# Patient Record
Sex: FEMALE | Race: WHITE | NOT HISPANIC OR LATINO | Employment: FULL TIME | URBAN - METROPOLITAN AREA
[De-identification: names, ages, dates, MRNs, and addresses within clinical notes are randomized per-mention and may not be internally consistent; named-entity substitution may affect disease eponyms.]

---

## 2017-01-10 ENCOUNTER — TELEPHONE (OUTPATIENT)
Dept: SURGERY | Facility: OTHER | Age: 47
End: 2017-01-10

## 2017-01-10 NOTE — TELEPHONE ENCOUNTER
Surgery Scheduled    Date of Surgery 02/22/17 Time of Surgery 7:30am  Procedure: Colonoscopy  Hospital/Surgical Facility: [facility]  Surgeon: Dr Sage  Type of Anesthesia Anticipated: MAC  Pre-Op: 02/09/17 with Dr Metz   Post-Op: 03/02/17 with Dr Sage  Pre-Certification - to be completed  Consent Signed - to be completed  Hospital Stay - same day procedure    Surgery Packet (and/or) Colonscopy Prep (was given/or mailed) to patient. Patient was also instructed to arrive 1 hour(s) prior to surgery.  Patient understood and agrees to the plan.      Ira Galvin  Specialty

## 2017-02-09 ENCOUNTER — OFFICE VISIT (OUTPATIENT)
Dept: FAMILY MEDICINE | Facility: CLINIC | Age: 47
End: 2017-02-09
Payer: COMMERCIAL

## 2017-02-09 VITALS
WEIGHT: 243 LBS | OXYGEN SATURATION: 99 % | HEIGHT: 66 IN | SYSTOLIC BLOOD PRESSURE: 132 MMHG | DIASTOLIC BLOOD PRESSURE: 82 MMHG | RESPIRATION RATE: 20 BRPM | HEART RATE: 100 BPM | TEMPERATURE: 97.1 F | BODY MASS INDEX: 39.05 KG/M2

## 2017-02-09 DIAGNOSIS — K57.32 DIVERTICULITIS OF COLON: ICD-10-CM

## 2017-02-09 DIAGNOSIS — I10 ESSENTIAL HYPERTENSION, BENIGN: ICD-10-CM

## 2017-02-09 DIAGNOSIS — E11.65 TYPE 2 DIABETES MELLITUS WITH HYPERGLYCEMIA, WITHOUT LONG-TERM CURRENT USE OF INSULIN (H): ICD-10-CM

## 2017-02-09 DIAGNOSIS — K21.9 GASTROESOPHAGEAL REFLUX DISEASE WITHOUT ESOPHAGITIS: ICD-10-CM

## 2017-02-09 DIAGNOSIS — N94.3 PMS (PREMENSTRUAL SYNDROME): ICD-10-CM

## 2017-02-09 DIAGNOSIS — Z01.818 PREOP GENERAL PHYSICAL EXAM: Primary | ICD-10-CM

## 2017-02-09 DIAGNOSIS — E78.2 MIXED HYPERLIPIDEMIA: ICD-10-CM

## 2017-02-09 LAB
CREAT UR-MCNC: 128 MG/DL
HBA1C MFR BLD: 8.4 % (ref 4.3–6)
MICROALBUMIN UR-MCNC: 24 MG/L
MICROALBUMIN/CREAT UR: 18.75 MG/G CR (ref 0–25)

## 2017-02-09 PROCEDURE — 99214 OFFICE O/P EST MOD 30 MIN: CPT | Performed by: FAMILY MEDICINE

## 2017-02-09 PROCEDURE — 82043 UR ALBUMIN QUANTITATIVE: CPT | Performed by: FAMILY MEDICINE

## 2017-02-09 PROCEDURE — 83036 HEMOGLOBIN GLYCOSYLATED A1C: CPT | Performed by: FAMILY MEDICINE

## 2017-02-09 PROCEDURE — 36415 COLL VENOUS BLD VENIPUNCTURE: CPT | Performed by: FAMILY MEDICINE

## 2017-02-09 RX ORDER — HYDROCHLOROTHIAZIDE 25 MG/1
25 TABLET ORAL DAILY
Qty: 30 TABLET | Refills: 0 | Status: CANCELLED | OUTPATIENT
Start: 2017-02-09

## 2017-02-09 RX ORDER — ROSUVASTATIN CALCIUM 20 MG/1
20 TABLET, COATED ORAL DAILY
Qty: 90 TABLET | Refills: 3 | Status: SHIPPED | OUTPATIENT
Start: 2017-02-09 | End: 2018-02-08

## 2017-02-09 NOTE — MR AVS SNAPSHOT
After Visit Summary   2/9/2017    Lashawn Reddy    MRN: [id number]           Patient Information     Date Of Birth          1970        Visit Information        Provider Department      2/9/2017 8:30 AM Tanner Metz MD [facility]        Today's Diagnoses     Preop general physical exam    -  1     Gastroesophageal reflux disease without esophagitis         Essential hypertension, benign         Mixed hyperlipidemia         PMS (premenstrual syndrome)         Type 2 diabetes mellitus with hyperglycemia, without long-term current use of insulin (H)           Care Instructions      Before Your Surgery      Call your surgeon if there is any change in your health. This includes signs of a cold or flu (such as a sore throat, runny nose, cough, rash or fever).    Do not smoke, drink alcohol or take over the counter medicine (unless your surgeon or primary care doctor tells you to) for the 24 hours before and after surgery.    If you take prescribed drugs: Follow your doctor s orders about which medicines to take and which to stop until after surgery.    Eating and drinking prior to surgery: follow the instructions from your surgeon    Take a shower or bath the night before surgery. Use the soap your surgeon gave you to gently clean your skin. If you do not have soap from your surgeon, use your regular soap. Do not shave or scrub the surgery site.  Wear clean pajamas and have clean sheets on your bed.       You may be eligible for the GRADE study at the [facility]  (Glycemia reduction approaches in Diabetes: A comparative effectiveness study).    The point of the study is to determine what is the best second drug to add to metformin in patients with type 2 diabetes.  Adullts with type 2 for less than 10 years who take metformin or no medication at all.  Study participants would come to the [facility] 4 times a year for seven years and receive FREE medications, lab  tests, diabetes supplies.    Call , email [email or web address], or visit GRADE on faceboook at [email or web address]          Follow-ups after your visit        Your next 10 appointments already scheduled     Feb 22, 2017   Procedure with Raoul Sage MD   [facility] ([facility])    [address] [id number] [phone]            Mar 02, 2017  7:45 AM   [facility]    [address] [address] [phone]              Who to contact     If you have questions or need follow up information about today's clinic visit or your schedule please contact [facility] directly at [phone].  Normal or non-critical lab and imaging results will be communicated to you by MyChart, letter or phone within 4 business days after the clinic has received the results. If you do not hear from us within 7 days, please contact the clinic through [employer] or phone. If you have a critical or abnormal lab result, we will notify you by phone as soon as possible.  Submit refill requests through [employer] or call your pharmacy and they will forward the refill request to us. Please allow 3 business days for your refill to be completed.          Additional Information About Your Visit        MyChart Information     [employer] gives you secure access to your electronic health record. If you see a primary care provider, you can also send messages to your care team and make appointments. If you have questions, please call your primary care clinic.  If you do not have a primary care provider, please call [phone] and they will assist you.        Care EveryWhere ID     This is your Care EveryWhere ID. This could be used by other organizations to access your [facility] medical records  [id number]        Your Vitals Were     Pulse Temperature Respirations  "Height BMI (Body Mass Index) Pulse Oximetry    100 97.1  F (36.2  C) (Temporal) 20 5' 6\" (1.676 m) 39.24 kg/m2 99%       Blood Pressure from Last 3 Encounters:   02/09/17 132/82   12/03/16 141/88   09/19/16 157/97    Weight from Last 3 Encounters:   02/09/17 243 lb (110.224 kg)   12/12/16 251 lb 3.2 oz (113.944 kg)   11/28/16 253 lb 8.5 oz (115 kg)              We Performed the Following     Albumin Random Urine Quantitative     Hemoglobin A1c          Where to get your medicines      These medications were sent to [facility] - [place], [place] - [address]   [address] [place] [address]    Hours:  M-F 8:30-6:30; Sat 9-4; closed Sunday Phone:  [phone]    - omeprazole 20 MG CR capsule  - rosuvastatin 20 MG tablet  - sertraline 50 MG tablet       Primary Care Provider Office Phone # Fax #    Tanner Metz [phone]       [facility] [address] [place] [address]        Thank you!     Thank you for choosing [facility]  for your care. Our goal is always to provide you with excellent care. Hearing back from our patients is one way we can continue to improve our services. Please take a few minutes to complete the written survey that you may receive in the mail after your visit with us. Thank you!             Your Updated Medication List - Protect others around you: Learn how to safely use, store and throw away your medicines at www.disposemymeds.org.          This list is accurate as of: 2/9/17  8:57 AM.  Always use your most recent med list.                   Brand Name Dispense Instructions for use    acetaminophen 325 MG tablet    TYLENOL    100 tablet    Take 2 tablets (650 mg) by mouth every 4 hours as needed for mild pain       hydrochlorothiazide 25 MG tablet    HYDRODIURIL    30 tablet    Take 1 tablet (25 mg) by mouth daily       metFORMIN 500 MG 24 hr tablet    GLUCOPHAGE-XR    360 tablet    Take 2 tablets (1,000 mg) tonight " with supper and then go back to your normal regimen of 4 tablets (2,000 mg) tomorrow night with supper.       omeprazole 20 MG CR capsule    priLOSEC    90 capsule    Take 1 capsule (20 mg) by mouth daily       rosuvastatin 20 MG tablet    CRESTOR    90 tablet    Take 1 tablet (20 mg) by mouth daily       sertraline 50 MG tablet    ZOLOFT    45 tablet    Take 0.5 tablets (25 mg) by mouth every morning

## 2017-02-09 NOTE — PATIENT INSTRUCTIONS
Before Your Surgery      Call your surgeon if there is any change in your health. This includes signs of a cold or flu (such as a sore throat, runny nose, cough, rash or fever).    Do not smoke, drink alcohol or take over the counter medicine (unless your surgeon or primary care doctor tells you to) for the 24 hours before and after surgery.    If you take prescribed drugs: Follow your doctor s orders about which medicines to take and which to stop until after surgery.    Eating and drinking prior to surgery: follow the instructions from your surgeon    Take a shower or bath the night before surgery. Use the soap your surgeon gave you to gently clean your skin. If you do not have soap from your surgeon, use your regular soap. Do not shave or scrub the surgery site.  Wear clean pajamas and have clean sheets on your bed.       You may be eligible for the GRADE study at the [facility]  (Glycemia reduction approaches in Diabetes: A comparative effectiveness study).    The point of the study is to determine what is the best second drug to add to metformin in patients with type 2 diabetes.  Adullts with type 2 for less than 10 years who take metformin or no medication at all.  Study participants would come to the [facility] 4 times a year for seven years and receive FREE medications, lab tests, diabetes supplies.    Call , email [email or web address], or visit QUENTIN on faceboook at [email or web address]

## 2017-02-09 NOTE — NURSING NOTE
"Chief Complaint   Patient presents with     Pre-Op Exam       Initial /82 mmHg  Pulse 100  Temp(Src) 97.1  F (36.2  C) (Temporal)  Resp 20  Ht 5' 6\" (1.676 m)  Wt 243 lb (110.224 kg)  BMI 39.24 kg/m2  SpO2 99% Estimated body mass index is 39.24 kg/(m^2) as calculated from the following:    Height as of this encounter: 5' 6\" (1.676 m).    Weight as of this encounter: 243 lb (110.224 kg).  Medication Reconciliation: unable or not appropriate to perform     Jessica Dixon CMA      "

## 2017-02-09 NOTE — PROGRESS NOTES
[address] [address]  [phone]  Dept: [phone]    PRE-OP EVALUATION:  Today's date: 2017    Lashawn Reddy (: 1970) presents for pre-operative evaluation assessment as requested by Dr. Sage.  She requires evaluation and anesthesia risk assessment prior to undergoing surgery/procedure for treatment of Diverticulitis, Fx of colon cancer .  Proposed procedure: Colonoscopy     Date of Surgery/ Procedure: 17  Time of Surgery/ Procedure: 7:30 am  Hospital/Surgical Facility: [facility]  Primary Physician: Tanner Metz  Type of Anesthesia Anticipated: Monitor Anesthesia Care    Patient has a Health Care Directive or Living Will:  NO    1. NO - Do you have a history of heart attack, stroke, stent, bypass or surgery on an artery in the head, neck, heart or legs?  2. NO - Do you ever have any pain or discomfort in your chest?  3. NO - Do you have a history of  Heart Failure?  4. NO - Are you troubled by shortness of breath when: walking on the level, up a slight hill or at night?  5. YES - Do you currently have a cold, bronchitis or other respiratory infection?  6. NO - Do you have a cough, shortness of breath or wheezing?  7. NO - Do you sometimes get pains in the calves of your legs when you walk?  8. YES - Do you or anyone in your family have previous history of blood clots? Patient had blood clot due to a leg injury  9. NO - Do you or does anyone in your family have a serious bleeding problem such as prolonged bleeding following surgeries or cuts?  10. NO - Have you ever had problems with anemia or been told to take iron pills?  11. NO - Have you had any abnormal blood loss such as black, tarry or bloody stools, or abnormal vaginal bleeding?  12. NO - Have you ever had a blood transfusion?  13. YES - Have you or any of your relatives ever had problems with anesthesia?Patients mother does  14. NO - Do you have sleep apnea,  excessive snoring or daytime drowsiness?  15. NO - Do you have any prosthetic heart valves?  16. NO - Do you have prosthetic joints?  17. NO - Is there any chance that you may be pregnant?      HPI:                                                      Brief HPI related to upcoming procedure: She had an episode of diverticulitis and was treated with antibiotics.  Her symptoms have resolved but she is now scheduled for a colonoscopy for follow up of her diverticulitis.      See problem list for active medical problems.  Problems all longstanding and stable, except as noted/documented.  See ROS for pertinent symptoms related to these conditions.                                                                                                  .    MEDICAL HISTORY:                                                      Patient Active Problem List    Diagnosis Date Noted     Type 2 diabetes mellitus with hyperglycemia, without long-term current use of insulin (H) 02/09/2017     Priority: Medium     Sigmoid diverticulitis 11/28/2016     Priority: Medium     Sepsis (H) 11/28/2016     Priority: Medium     Lung nodule 11/28/2016     Priority: Medium     Nausea and vomiting 11/28/2016     Priority: Medium     Mixed hyperlipidemia 05/03/2016     Priority: Medium     CARDIOVASCULAR SCREENING; LDL GOAL LESS THAN 100 05/03/2016     Priority: Medium     Menorrhagia with regular cycle 04/29/2016     Priority: Medium     PMS (premenstrual syndrome) 04/28/2016     Priority: Medium     DVT (deep venous thrombosis) (H) 11/19/2013     Priority: Medium     Type 2 diabetes, HbA1c goal < 7% (H) 06/06/2013     Priority: Medium     Hyperlipidemia with target LDL less than 100 06/06/2013     Priority: Medium     Diagnosis updated by automated process. Provider to review and confirm.       Obesity 02/14/2007     Priority: Medium     Problem list name updated by automated process. Provider to review       Family history of malignant neoplasm of  gastrointestinal tract 05/24/2005     Priority: Medium     Varicose veins of lower extremities with complications 05/13/2005     Priority: Medium     Problem list name updated by automated process. Provider to review       Slow transit constipation 02/24/2003     Priority: Medium      Past Medical History   Diagnosis Date     Depressive disorder, not elsewhere classified      depression in the post partum period after her daughter     Tobacco use disorder      quit in 2008     Diffuse cystic mastopathy      fibrocystic breast disease     Type 2 diabetes, HbA1c goal < 7% (H) 6/6/2013     Diabetes mellitus, type 2 (H) 6/6/2013     CARDIOVASCULAR SCREENING; LDL GOAL LESS THAN 100 6/6/2013     Past Surgical History   Procedure Laterality Date     C appendectomy  04/14/89     Hc dilation/curettage diag/ther non ob  1986     after a miscarriage     Hc removal of tonsils,<10 y/o       C anesth,lower leg vein surg,nos  2002     bilateral     Hc colonoscopy w/wo brush/wash  06/17/2005     Tubal ligation  08/04/2006     Colonoscopy  6/21/2013     Procedure: COLONOSCOPY;  colonoscopy;  Surgeon: Vamshi Loomis MD;  Location:  GI     Current Outpatient Prescriptions   Medication Sig Dispense Refill     omeprazole (PRILOSEC) 20 MG CR capsule Take 1 capsule (20 mg) by mouth daily 90 capsule 3     rosuvastatin (CRESTOR) 20 MG tablet Take 1 tablet (20 mg) by mouth daily 90 tablet 3     sertraline (ZOLOFT) 50 MG tablet Take 0.5 tablets (25 mg) by mouth every morning 45 tablet 3     acetaminophen (TYLENOL) 325 MG tablet Take 2 tablets (650 mg) by mouth every 4 hours as needed for mild pain 100 tablet      metFORMIN (GLUCOPHAGE-XR) 500 MG 24 hr tablet Take 2 tablets (1,000 mg) tonight with supper and then go back to your normal regimen of 4 tablets (2,000 mg) tomorrow night with supper. 360 tablet 3     hydrochlorothiazide (HYDRODIURIL) 25 MG tablet Take 1 tablet (25 mg) by mouth daily 30 tablet 0     [DISCONTINUED] rosuvastatin  "(CRESTOR) 20 MG tablet Take 1 tablet (20 mg) by mouth daily 90 tablet 3     [DISCONTINUED] sertraline (ZOLOFT) 50 MG tablet Take 0.5 tablets (25 mg) by mouth every morning 45 tablet 3     OTC products: herbals and vitamins - Vitamin C, Vitamin D, probiotic    Allergies   Allergen Reactions     No Known Drug Allergies       Latex Allergy: NO    Social History   Substance Use Topics     Smoking status: Former Smoker -- 16 years     Types: Cigarettes     Quit date: 09/29/2008     Smokeless tobacco: Never Used     Alcohol Use: No      Comment: couple drinks per year     History   Drug Use No       REVIEW OF SYSTEMS:                                                    C: NEGATIVE for fever, chills, change in weight  E/M: NEGATIVE for ear, mouth and throat problems.  She has some rhinorrhea but no fevers or purulent discharge.    R: NEGATIVE for significant cough or SOB  CV: NEGATIVE for chest pain, palpitations or peripheral edema    EXAM:                                                    /82 mmHg  Pulse 100  Temp(Src) 97.1  F (36.2  C) (Temporal)  Resp 20  Ht 5' 6\" (1.676 m)  Wt 243 lb (110.224 kg)  BMI 39.24 kg/m2  SpO2 99%  GENERAL APPEARANCE: healthy, alert and no distress  HENT: ear canals and TM's normal and mouth without ulcers or lesions.  She has some clear rhinorrhea noted today  RESP: lungs clear to auscultation - no rales, rhonchi or wheezes  CV: regular rate and rhythm, normal S1 S2, no S3 or S4 and no murmur, click or rub   ABDOMEN: soft, nontender, no HSM or masses and bowel sounds normal  NEURO: Normal strength and tone, sensory exam grossly normal, mentation intact and speech normal    DIAGNOSTICS:                                                    No labs or EKG required for low risk surgery (cataract, skin procedure, breast biopsy, etc)    Recent Labs   Lab Test  12/03/16   0530  12/02/16   0558  12/01/16   0540   11/29/16   0618   09/19/16   1150   04/11/14   1635  02/28/14   1630   HGB   " --   9.6*  9.5*   < >  10.8*   < >  12.4   --    --    --    PLT   --   268  251   < >  234   < >  304   --    --    --    INR   --    --    --    --    --    --    --    --   2.3*  2.5*   NA   --   142  143   < >  139   < >  140   < >   --    --    POTASSIUM   --   3.7  5.3   < >  3.5   < >  4.0   < >   --    --    CR  0.66  0.63  0.57   < >  0.74   < >  0.71   < >   --    --    A1C   --    --    --    --   8.6*   --   8.7*   < >   --    --     < > = values in this interval not displayed.        IMPRESSION:                                                    Reason for surgery/procedure: Patient with a recent episode of diverticulitis, now scheduled for a colonoscopy with Dr. Sage for follow up.    Diagnosis/reason for consult: Preoperative evaluation for surgery and anesthesia     The proposed surgical procedure is considered LOW risk.    REVISED CARDIAC RISK INDEX  The patient has the following serious cardiovascular risks for perioperative complications such as (MI, PE, VFib and 3  AV Block):  No serious cardiac risks  INTERPRETATION: 0 risks: Class I (very low risk - 0.4% complication rate)    The patient has the following additional risks for perioperative complications:  No identified additional risks      ICD-10-CM    1. Preop general physical exam Z01.818    2. Diverticulitis of colon K57.32                                              RECOMMENDATIONS:                                                        Diabetes Medication Use  Will hold her metformin  -----Hold usual  oral diabetic meds (e.g. Metformin, Actos, Glipizide) while NPO.       APPROVAL GIVEN to proceed with proposed procedure, without further diagnostic evaluation       Signed Electronically by: Tanner Metz MD    Copy of this evaluation report is provided to requesting physician.    [facility] Preop Guidelines

## 2017-02-21 NOTE — H&P (VIEW-ONLY)
[address] [address]  [phone]  Dept: [phone]    PRE-OP EVALUATION:  Today's date: 2017    Lashawn Reddy (: 1970) presents for pre-operative evaluation assessment as requested by Dr. Sage.  She requires evaluation and anesthesia risk assessment prior to undergoing surgery/procedure for treatment of Diverticulitis, Fx of colon cancer .  Proposed procedure: Colonoscopy     Date of Surgery/ Procedure: 17  Time of Surgery/ Procedure: 7:30 am  Hospital/Surgical Facility: [facility]  Primary Physician: Tanner Metz  Type of Anesthesia Anticipated: Monitor Anesthesia Care    Patient has a Health Care Directive or Living Will:  NO    1. NO - Do you have a history of heart attack, stroke, stent, bypass or surgery on an artery in the head, neck, heart or legs?  2. NO - Do you ever have any pain or discomfort in your chest?  3. NO - Do you have a history of  Heart Failure?  4. NO - Are you troubled by shortness of breath when: walking on the level, up a slight hill or at night?  5. YES - Do you currently have a cold, bronchitis or other respiratory infection?  6. NO - Do you have a cough, shortness of breath or wheezing?  7. NO - Do you sometimes get pains in the calves of your legs when you walk?  8. YES - Do you or anyone in your family have previous history of blood clots? Patient had blood clot due to a leg injury  9. NO - Do you or does anyone in your family have a serious bleeding problem such as prolonged bleeding following surgeries or cuts?  10. NO - Have you ever had problems with anemia or been told to take iron pills?  11. NO - Have you had any abnormal blood loss such as black, tarry or bloody stools, or abnormal vaginal bleeding?  12. NO - Have you ever had a blood transfusion?  13. YES - Have you or any of your relatives ever had problems with anesthesia?Patients mother does  14. NO - Do you have sleep apnea,  excessive snoring or daytime drowsiness?  15. NO - Do you have any prosthetic heart valves?  16. NO - Do you have prosthetic joints?  17. NO - Is there any chance that you may be pregnant?      HPI:                                                      Brief HPI related to upcoming procedure: She had an episode of diverticulitis and was treated with antibiotics.  Her symptoms have resolved but she is now scheduled for a colonoscopy for follow up of her diverticulitis.      See problem list for active medical problems.  Problems all longstanding and stable, except as noted/documented.  See ROS for pertinent symptoms related to these conditions.                                                                                                  .    MEDICAL HISTORY:                                                      Patient Active Problem List    Diagnosis Date Noted     Type 2 diabetes mellitus with hyperglycemia, without long-term current use of insulin (H) 02/09/2017     Priority: Medium     Sigmoid diverticulitis 11/28/2016     Priority: Medium     Sepsis (H) 11/28/2016     Priority: Medium     Lung nodule 11/28/2016     Priority: Medium     Nausea and vomiting 11/28/2016     Priority: Medium     Mixed hyperlipidemia 05/03/2016     Priority: Medium     CARDIOVASCULAR SCREENING; LDL GOAL LESS THAN 100 05/03/2016     Priority: Medium     Menorrhagia with regular cycle 04/29/2016     Priority: Medium     PMS (premenstrual syndrome) 04/28/2016     Priority: Medium     DVT (deep venous thrombosis) (H) 11/19/2013     Priority: Medium     Type 2 diabetes, HbA1c goal < 7% (H) 06/06/2013     Priority: Medium     Hyperlipidemia with target LDL less than 100 06/06/2013     Priority: Medium     Diagnosis updated by automated process. Provider to review and confirm.       Obesity 02/14/2007     Priority: Medium     Problem list name updated by automated process. Provider to review       Family history of malignant neoplasm of  gastrointestinal tract 05/24/2005     Priority: Medium     Varicose veins of lower extremities with complications 05/13/2005     Priority: Medium     Problem list name updated by automated process. Provider to review       Slow transit constipation 02/24/2003     Priority: Medium      Past Medical History   Diagnosis Date     Depressive disorder, not elsewhere classified      depression in the post partum period after her daughter     Tobacco use disorder      quit in 2008     Diffuse cystic mastopathy      fibrocystic breast disease     Type 2 diabetes, HbA1c goal < 7% (H) 6/6/2013     Diabetes mellitus, type 2 (H) 6/6/2013     CARDIOVASCULAR SCREENING; LDL GOAL LESS THAN 100 6/6/2013     Past Surgical History   Procedure Laterality Date     C appendectomy  04/14/89     Hc dilation/curettage diag/ther non ob  1986     after a miscarriage     Hc removal of tonsils,<10 y/o       C anesth,lower leg vein surg,nos  2002     bilateral     Hc colonoscopy w/wo brush/wash  06/17/2005     Tubal ligation  08/04/2006     Colonoscopy  6/21/2013     Procedure: COLONOSCOPY;  colonoscopy;  Surgeon: Vamshi Lomois MD;  Location:  GI     Current Outpatient Prescriptions   Medication Sig Dispense Refill     omeprazole (PRILOSEC) 20 MG CR capsule Take 1 capsule (20 mg) by mouth daily 90 capsule 3     rosuvastatin (CRESTOR) 20 MG tablet Take 1 tablet (20 mg) by mouth daily 90 tablet 3     sertraline (ZOLOFT) 50 MG tablet Take 0.5 tablets (25 mg) by mouth every morning 45 tablet 3     acetaminophen (TYLENOL) 325 MG tablet Take 2 tablets (650 mg) by mouth every 4 hours as needed for mild pain 100 tablet      metFORMIN (GLUCOPHAGE-XR) 500 MG 24 hr tablet Take 2 tablets (1,000 mg) tonight with supper and then go back to your normal regimen of 4 tablets (2,000 mg) tomorrow night with supper. 360 tablet 3     hydrochlorothiazide (HYDRODIURIL) 25 MG tablet Take 1 tablet (25 mg) by mouth daily 30 tablet 0     [DISCONTINUED] rosuvastatin  "(CRESTOR) 20 MG tablet Take 1 tablet (20 mg) by mouth daily 90 tablet 3     [DISCONTINUED] sertraline (ZOLOFT) 50 MG tablet Take 0.5 tablets (25 mg) by mouth every morning 45 tablet 3     OTC products: herbals and vitamins - Vitamin C, Vitamin D, probiotic    Allergies   Allergen Reactions     No Known Drug Allergies       Latex Allergy: NO    Social History   Substance Use Topics     Smoking status: Former Smoker -- 16 years     Types: Cigarettes     Quit date: 09/29/2008     Smokeless tobacco: Never Used     Alcohol Use: No      Comment: couple drinks per year     History   Drug Use No       REVIEW OF SYSTEMS:                                                    C: NEGATIVE for fever, chills, change in weight  E/M: NEGATIVE for ear, mouth and throat problems.  She has some rhinorrhea but no fevers or purulent discharge.    R: NEGATIVE for significant cough or SOB  CV: NEGATIVE for chest pain, palpitations or peripheral edema    EXAM:                                                    /82 mmHg  Pulse 100  Temp(Src) 97.1  F (36.2  C) (Temporal)  Resp 20  Ht 5' 6\" (1.676 m)  Wt 243 lb (110.224 kg)  BMI 39.24 kg/m2  SpO2 99%  GENERAL APPEARANCE: healthy, alert and no distress  HENT: ear canals and TM's normal and mouth without ulcers or lesions.  She has some clear rhinorrhea noted today  RESP: lungs clear to auscultation - no rales, rhonchi or wheezes  CV: regular rate and rhythm, normal S1 S2, no S3 or S4 and no murmur, click or rub   ABDOMEN: soft, nontender, no HSM or masses and bowel sounds normal  NEURO: Normal strength and tone, sensory exam grossly normal, mentation intact and speech normal    DIAGNOSTICS:                                                    No labs or EKG required for low risk surgery (cataract, skin procedure, breast biopsy, etc)    Recent Labs   Lab Test  12/03/16   0530  12/02/16   0558  12/01/16   0540   11/29/16   0618   09/19/16   1150   04/11/14   1635  02/28/14   1630   HGB   " --   9.6*  9.5*   < >  10.8*   < >  12.4   --    --    --    PLT   --   268  251   < >  234   < >  304   --    --    --    INR   --    --    --    --    --    --    --    --   2.3*  2.5*   NA   --   142  143   < >  139   < >  140   < >   --    --    POTASSIUM   --   3.7  5.3   < >  3.5   < >  4.0   < >   --    --    CR  0.66  0.63  0.57   < >  0.74   < >  0.71   < >   --    --    A1C   --    --    --    --   8.6*   --   8.7*   < >   --    --     < > = values in this interval not displayed.        IMPRESSION:                                                    Reason for surgery/procedure: Patient with a recent episode of diverticulitis, now scheduled for a colonoscopy with Dr. Sage for follow up.    Diagnosis/reason for consult: Preoperative evaluation for surgery and anesthesia     The proposed surgical procedure is considered LOW risk.    REVISED CARDIAC RISK INDEX  The patient has the following serious cardiovascular risks for perioperative complications such as (MI, PE, VFib and 3  AV Block):  No serious cardiac risks  INTERPRETATION: 0 risks: Class I (very low risk - 0.4% complication rate)    The patient has the following additional risks for perioperative complications:  No identified additional risks      ICD-10-CM    1. Preop general physical exam Z01.818    2. Diverticulitis of colon K57.32                                              RECOMMENDATIONS:                                                        Diabetes Medication Use  Will hold her metformin  -----Hold usual  oral diabetic meds (e.g. Metformin, Actos, Glipizide) while NPO.       APPROVAL GIVEN to proceed with proposed procedure, without further diagnostic evaluation       Signed Electronically by: Tanner Metz MD    Copy of this evaluation report is provided to requesting physician.    [facility] Pre[facility] Guidelines

## 2017-02-22 ENCOUNTER — HOSPITAL ENCOUNTER (OUTPATIENT)
Facility: CLINIC | Age: 47
Discharge: HOME | End: 2017-02-22
Attending: SPECIALIST | Admitting: SPECIALIST
Payer: COMMERCIAL

## 2017-02-22 ENCOUNTER — ANESTHESIA (OUTPATIENT)
Dept: GASTROENTEROLOGY | Facility: CLINIC | Age: 47
End: 2017-02-22
Payer: COMMERCIAL

## 2017-02-22 ENCOUNTER — ANESTHESIA EVENT (OUTPATIENT)
Dept: GASTROENTEROLOGY | Facility: CLINIC | Age: 47
End: 2017-02-22
Payer: COMMERCIAL

## 2017-02-22 ENCOUNTER — SURGERY (OUTPATIENT)
Age: 47
End: 2017-02-22

## 2017-02-22 VITALS
OXYGEN SATURATION: 97 % | TEMPERATURE: 97.5 F | RESPIRATION RATE: 20 BRPM | DIASTOLIC BLOOD PRESSURE: 83 MMHG | SYSTOLIC BLOOD PRESSURE: 129 MMHG

## 2017-02-22 LAB
B-HCG SERPL-ACNC: <1 IU/L
COLONOSCOPY: NORMAL
GLUCOSE BLDC GLUCOMTR-MCNC: 184 MG/DL (ref 70–99)

## 2017-02-22 PROCEDURE — 25000125 ZZHC RX 250: Performed by: NURSE ANESTHETIST, CERTIFIED REGISTERED

## 2017-02-22 PROCEDURE — 84702 CHORIONIC GONADOTROPIN TEST: CPT | Performed by: SPECIALIST

## 2017-02-22 PROCEDURE — 40000296 ZZH STATISTIC ENDO RECOVERY CLASS 1:2 FIRST HOUR: Performed by: SPECIALIST

## 2017-02-22 PROCEDURE — 25800025 ZZH RX 258: Performed by: NURSE ANESTHETIST, CERTIFIED REGISTERED

## 2017-02-22 PROCEDURE — 45378 DIAGNOSTIC COLONOSCOPY: CPT | Performed by: SPECIALIST

## 2017-02-22 PROCEDURE — 82962 GLUCOSE BLOOD TEST: CPT

## 2017-02-22 PROCEDURE — 40000297 ZZH STATISTIC ENDO RECOVERY CLASS 1:2 EACH ADDL HOUR: Performed by: SPECIALIST

## 2017-02-22 PROCEDURE — G0105 COLORECTAL SCRN; HI RISK IND: HCPCS | Performed by: SPECIALIST

## 2017-02-22 PROCEDURE — 36415 COLL VENOUS BLD VENIPUNCTURE: CPT | Performed by: SPECIALIST

## 2017-02-22 RX ORDER — ONDANSETRON 4 MG/1
4 TABLET, ORALLY DISINTEGRATING ORAL EVERY 30 MIN PRN
Status: DISCONTINUED | OUTPATIENT
Start: 2017-02-22 | End: 2017-02-22 | Stop reason: HOSPADM

## 2017-02-22 RX ORDER — SODIUM CHLORIDE, SODIUM LACTATE, POTASSIUM CHLORIDE, CALCIUM CHLORIDE 600; 310; 30; 20 MG/100ML; MG/100ML; MG/100ML; MG/100ML
INJECTION, SOLUTION INTRAVENOUS CONTINUOUS
Status: DISCONTINUED | OUTPATIENT
Start: 2017-02-22 | End: 2017-02-22 | Stop reason: HOSPADM

## 2017-02-22 RX ORDER — LIDOCAINE HYDROCHLORIDE 20 MG/ML
INJECTION, SOLUTION INFILTRATION; PERINEURAL PRN
Status: DISCONTINUED | OUTPATIENT
Start: 2017-02-22 | End: 2017-02-22

## 2017-02-22 RX ORDER — LIDOCAINE 40 MG/G
CREAM TOPICAL
Status: DISCONTINUED | OUTPATIENT
Start: 2017-02-22 | End: 2017-02-22 | Stop reason: HOSPADM

## 2017-02-22 RX ORDER — FLUMAZENIL 0.1 MG/ML
0.2 INJECTION, SOLUTION INTRAVENOUS
Status: DISCONTINUED | OUTPATIENT
Start: 2017-02-22 | End: 2017-02-22 | Stop reason: HOSPADM

## 2017-02-22 RX ORDER — ONDANSETRON 2 MG/ML
4 INJECTION INTRAMUSCULAR; INTRAVENOUS EVERY 30 MIN PRN
Status: DISCONTINUED | OUTPATIENT
Start: 2017-02-22 | End: 2017-02-22 | Stop reason: HOSPADM

## 2017-02-22 RX ORDER — PROPOFOL 10 MG/ML
INJECTION, EMULSION INTRAVENOUS PRN
Status: DISCONTINUED | OUTPATIENT
Start: 2017-02-22 | End: 2017-02-22

## 2017-02-22 RX ORDER — MEPERIDINE HYDROCHLORIDE 25 MG/ML
12.5 INJECTION INTRAMUSCULAR; INTRAVENOUS; SUBCUTANEOUS
Status: DISCONTINUED | OUTPATIENT
Start: 2017-02-22 | End: 2017-02-22 | Stop reason: HOSPADM

## 2017-02-22 RX ORDER — NALOXONE HYDROCHLORIDE 0.4 MG/ML
.1-.4 INJECTION, SOLUTION INTRAMUSCULAR; INTRAVENOUS; SUBCUTANEOUS
Status: DISCONTINUED | OUTPATIENT
Start: 2017-02-22 | End: 2017-02-22 | Stop reason: HOSPADM

## 2017-02-22 RX ORDER — SODIUM CHLORIDE 9 MG/ML
INJECTION, SOLUTION INTRAVENOUS CONTINUOUS
Status: DISCONTINUED | OUTPATIENT
Start: 2017-02-22 | End: 2017-02-22 | Stop reason: HOSPADM

## 2017-02-22 RX ORDER — PROPOFOL 10 MG/ML
INJECTION, EMULSION INTRAVENOUS CONTINUOUS PRN
Status: DISCONTINUED | OUTPATIENT
Start: 2017-02-22 | End: 2017-02-22

## 2017-02-22 RX ADMIN — PROPOFOL 40 MG: 10 INJECTION, EMULSION INTRAVENOUS at 07:31

## 2017-02-22 RX ADMIN — SODIUM CHLORIDE, POTASSIUM CHLORIDE, SODIUM LACTATE AND CALCIUM CHLORIDE: 600; 310; 30; 20 INJECTION, SOLUTION INTRAVENOUS at 07:02

## 2017-02-22 RX ADMIN — PROPOFOL 100 MCG/KG/MIN: 10 INJECTION, EMULSION INTRAVENOUS at 07:32

## 2017-02-22 RX ADMIN — LIDOCAINE HYDROCHLORIDE 1 ML: 10 INJECTION, SOLUTION EPIDURAL; INFILTRATION; INTRACAUDAL; PERINEURAL at 07:03

## 2017-02-22 RX ADMIN — LIDOCAINE HYDROCHLORIDE 60 MG: 20 INJECTION, SOLUTION INFILTRATION; PERINEURAL at 07:30

## 2017-02-22 ASSESSMENT — LIFESTYLE VARIABLES: TOBACCO_USE: 1

## 2017-02-22 NOTE — IP AVS SNAPSHOT
[facility]    [address] [address]    Phone:  [phone]                                       After Visit Summary   2/22/2017    Lashawn Reddy    MRN: [id number]           After Visit Summary Signature Page     I have received my discharge instructions, and my questions have been answered. I have discussed any challenges I see with this plan with the nurse or doctor.    ..........................................................................................................................................  Patient/Patient Representative Signature      ..........................................................................................................................................  Patient Representative Print Name and Relationship to Patient    ..................................................               ................................................  Date                                            Time    ..........................................................................................................................................  Reviewed by Signature/Title    ...................................................              ..............................................  Date                                                            Time

## 2017-02-22 NOTE — IP AVS SNAPSHOT
MRN:[id number]                      After Visit Summary   2/22/2017    Lashawn Reddy    MRN: [id number]           Thank you!     Thank you for choosing [facility] for your care. Our goal is always to provide you with excellent care. Hearing back from our patients is one way we can continue to improve our services. Please take a few minutes to complete the written survey that you may receive in the mail after you visit with us. Thank you!        Patient Information     Date Of Birth          1970        About your hospital stay     You were admitted on:  February 22, 2017 You last received care in the:  [facility]    You were discharged on:  February 22, 2017       Who to Call     For medical emergencies, please call 911.  For non-urgent questions about your medical care, please call your primary care provider or clinic, [phone]  For questions related to your surgery, please call your surgery clinic        Attending Provider     Provider Specialty    Raoul Sage MD General Surgery       Primary Care Provider Office Phone # Fax #    Tanner TAMARA Metz [phone]       [facility] [address] [place] [address]        After Care Instructions     Discharge Instructions       Resume pre procedure diet            Discharge Instructions       Restart home medications.                  Your next 10 appointments already scheduled     Mar 02, 2017  7:45 AM CST   [facility]    [address] [address] [phone]              Further instructions from your care team         [facility]    Home Care Following Endoscopy    Dr. Sage    Activity:    You have just undergone an endoscopic procedure usually performed with conscious sedation.  Do not work or operate machinery (including a car) for at least 12 hours.       I encourage you to walk and attempt to pass this air as soon as possible.    Diet:    Return to the diet you were on before your procedure but eat lightly for the first 12-24 hours.    Drink plenty of water.    Resume any regular medications unless otherwise advised by your physician.  Please begin any new medication prescribed as a result of your procedure as directed by your physician.     If you had any biopsy or polyp removed please refrain from aspirin or aspirin products for 2 days.  If on Coumadin please restart as instructed by your physician.   Pain:    You may take Tylenol as needed for pain.  Expected Recovery:    You can expect some mild abdominal fullness and/or discomfort due to the air used to inflate your intestinal tract. It is also normal to have a mild sore throat after upper endoscopy.    Call Your Physician if You Have:    After Upper Endoscopy:  o Shoulder, back or chest pain.  o Difficulty breathing or swallowing.  o Vomiting blood.    After Colonoscopy:  o Worsening persisting abdominal pain which is worse with activity.  o Fevers (>101 degrees F), chills or shakes.  o Passage of continued blood with bowel movements.   Any questions or concerns about your recovery, please call [phone] or after hours [phone] Nurse Advice Line.    Follow-up Care:    You should receive a call or letter with your results within 1 week. Please call if you have not received a notification of your results.    If asked to return to clinic please make an appointment 1 week after your procedure.  Call [phone].  [facility]   Adult Discharge Orders & Instructions     For 24 hours after surgery    1. Get plenty of rest.  A responsible adult must stay with you for at least 24 hours after you leave the hospital.   2. Do not drive or use heavy equipment.  If you have weakness or tingling, don't drive or use heavy equipment until this feeling goes away.  3. Do not drink alcohol.  4. Avoid  strenuous or risky activities.  Ask for help when climbing stairs.   5. You may feel lightheaded.  IF so, sit for a few minutes before standing.  Have someone help you get up.   6. If you have nausea (feel sick to your stomach): Drink only clear liquids such as apple juice, ginger ale, broth or 7-Up.  Rest may also help.  Be sure to drink enough fluids.  Move to a regular diet as you feel able.  7. You may have a slight fever. Call the doctor if your fever is over 100 F (37.7 C) (taken under the tongue) or lasts longer than 24 hours.  8. You may have a dry mouth, a sore throat, muscle aches or trouble sleeping.  These should go away after 24 hours.  9. Do not make important or legal decisions.   Call your doctor for any of the followin.  Signs of infection (fever, growing tenderness at the surgery site, a large amount of drainage or bleeding, severe pain, foul-smelling drainage, redness, swelling).    2. It has been over 8 to 10 hours since surgery and you are still not able to urinate (pass water).    3.  Headache for over 24 hours.          Pending Results     No orders found from 2017 to 2017.            Admission Information     Date & Time Provider Department Dept. Phone    2017 Raoul Sage MD [facility] [phone]      Your Vitals Were     Blood Pressure Temperature Respirations Last Period Pulse Oximetry       128/93 97.5  F (36.4  C) (Oral) 20 2017 92%       MyChart Information     [employer] ([employer] gives you secure access to your electronic health record. If you see a primary care provider, you can also send messages to your care team and make appointments. If you have questions, please call your primary care clinic.  If you do not have a primary care provider, please call [phone] and they will assist you.        Care EveryWhere ID     This is your Care EveryWhere ID. This could be used by other organizations to access your [facility]  records  [id number]           Review of your medicines      UNREVIEWED medicines. Ask your doctor about these medicines        Dose / Directions    acetaminophen 325 MG tablet   Commonly known as:  TYLENOL        Dose:  650 mg   Take 2 tablets (650 mg) by mouth every 4 hours as needed for mild pain   Quantity:  100 tablet   Refills:  0       metFORMIN 500 MG 24 hr tablet   Commonly known as:  GLUCOPHAGE-XR   Used for:  Type 2 diabetes mellitus without complication, without long-term current use of insulin (H)        Take 2 tablets (1,000 mg) tonight with supper and then go back to your normal regimen of 4 tablets (2,000 mg) tomorrow night with supper.   Quantity:  360 tablet   Refills:  3       omeprazole 20 MG CR capsule   Commonly known as:  priLOSEC   Used for:  Gastroesophageal reflux disease without esophagitis        Dose:  20 mg   Take 1 capsule (20 mg) by mouth daily   Quantity:  90 capsule   Refills:  3       rosuvastatin 20 MG tablet   Commonly known as:  CRESTOR   Used for:  Mixed hyperlipidemia, Type 2 diabetes mellitus with hyperglycemia, without long-term current use of insulin (H)        Dose:  20 mg   Take 1 tablet (20 mg) by mouth daily   Quantity:  90 tablet   Refills:  3       sertraline 50 MG tablet   Commonly known as:  ZOLOFT   Used for:  PMS (premenstrual syndrome)        Dose:  25 mg   Take 0.5 tablets (25 mg) by mouth every morning   Quantity:  45 tablet   Refills:  3                Protect others around you: Learn how to safely use, store and throw away your medicines at www.disposemymeds.org.             Medication List: This is a list of all your medications and when to take them. Check marks below indicate your daily home schedule. Keep this list as a reference.      Medications           Morning Afternoon Evening Bedtime As Needed    acetaminophen 325 MG tablet   Commonly known as:  TYLENOL   Take 2 tablets (650 mg) by mouth every 4 hours as needed for mild pain                                 metFORMIN 500 MG 24 hr tablet   Commonly known as:  GLUCOPHAGE-XR   Take 2 tablets (1,000 mg) tonight with supper and then go back to your normal regimen of 4 tablets (2,000 mg) tomorrow night with supper.                                omeprazole 20 MG CR capsule   Commonly known as:  priLOSEC   Take 1 capsule (20 mg) by mouth daily                                rosuvastatin 20 MG tablet   Commonly known as:  CRESTOR   Take 1 tablet (20 mg) by mouth daily                                sertraline 50 MG tablet   Commonly known as:  ZOLOFT   Take 0.5 tablets (25 mg) by mouth every morning

## 2017-02-22 NOTE — DISCHARGE INSTRUCTIONS
[facility]    Home Care Following Endoscopy    Dr. Sage    Activity:    You have just undergone an endoscopic procedure usually performed with conscious sedation.  Do not work or operate machinery (including a car) for at least 12 hours.      I encourage you to walk and attempt to pass this air as soon as possible.    Diet:    Return to the diet you were on before your procedure but eat lightly for the first 12-24 hours.    Drink plenty of water.    Resume any regular medications unless otherwise advised by your physician.  Please begin any new medication prescribed as a result of your procedure as directed by your physician.     If you had any biopsy or polyp removed please refrain from aspirin or aspirin products for 2 days.  If on Coumadin please restart as instructed by your physician.   Pain:    You may take Tylenol as needed for pain.  Expected Recovery:    You can expect some mild abdominal fullness and/or discomfort due to the air used to inflate your intestinal tract. It is also normal to have a mild sore throat after upper endoscopy.    Call Your Physician if You Have:    After Upper Endoscopy:  o Shoulder, back or chest pain.  o Difficulty breathing or swallowing.  o Vomiting blood.    After Colonoscopy:  o Worsening persisting abdominal pain which is worse with activity.  o Fevers (>101 degrees F), chills or shakes.  o Passage of continued blood with bowel movements.   Any questions or concerns about your recovery, please call [phone] or after hours [phone] Nurse Advice Line.    Follow-up Care:    You should receive a call or letter with your results within 1 week. Please call if you have not received a notification of your results.    If asked to return to clinic please make an appointment 1 week after your procedure.  Call [phone].  [facility]   Adult Discharge Orders & Instructions     For 24 hours after surgery    1. Get plenty of rest.  A responsible  adult must stay with you for at least 24 hours after you leave the hospital.   2. Do not drive or use heavy equipment.  If you have weakness or tingling, don't drive or use heavy equipment until this feeling goes away.  3. Do not drink alcohol.  4. Avoid strenuous or risky activities.  Ask for help when climbing stairs.   5. You may feel lightheaded.  IF so, sit for a few minutes before standing.  Have someone help you get up.   6. If you have nausea (feel sick to your stomach): Drink only clear liquids such as apple juice, ginger ale, broth or 7-Up.  Rest may also help.  Be sure to drink enough fluids.  Move to a regular diet as you feel able.  7. You may have a slight fever. Call the doctor if your fever is over 100 F (37.7 C) (taken under the tongue) or lasts longer than 24 hours.  8. You may have a dry mouth, a sore throat, muscle aches or trouble sleeping.  These should go away after 24 hours.  9. Do not make important or legal decisions.   Call your doctor for any of the followin.  Signs of infection (fever, growing tenderness at the surgery site, a large amount of drainage or bleeding, severe pain, foul-smelling drainage, redness, swelling).    2. It has been over 8 to 10 hours since surgery and you are still not able to urinate (pass water).    3.  Headache for over 24 hours.

## 2017-02-22 NOTE — ANESTHESIA POSTPROCEDURE EVALUATION
Patient: Lashawn Reddy    Procedure(s):  COLONOSCOPY  - Wound Class: II-Clean Contaminated    Diagnosis:diverticulitis  Diagnosis Additional Information: No value filed.    Anesthesia Type:  MAC    Note:  Anesthesia Post Evaluation    Patient location during evaluation: Phase 2  Patient participation: Able to fully participate in evaluation  Level of consciousness: awake and alert  Pain management: adequate  Airway patency: patent  Cardiovascular status: stable and blood pressure returned to baseline  Respiratory status: spontaneous ventilation and room air  Hydration status: acceptable  PONV: none     Anesthetic complications: None    Comments: Patient resting without complaint. Denies any discomforts a this time.         Last vitals:  Vitals:    02/22/17 0649 02/22/17 0819   BP: (!) 141/92 (!) 128/93   Resp: 20 20   Temp: 97.5  F (36.4  C)    SpO2: 96% 92%         Electronically Signed By: KRISTI Melton CRNA  February 22, 2017  8:42 AM

## 2017-02-22 NOTE — ANESTHESIA PREPROCEDURE EVALUATION
Anesthesia Evaluation     . Pt has had prior anesthetic. Type: General and MAC      ROS/MED HX    ENT/Pulmonary:     (+)tobacco use, Past use , . .    Neurologic:  - neg neurologic ROS     Cardiovascular:     (+) Dyslipidemia, ----. : . . . :. . No previous cardiac testing       METS/Exercise Tolerance:     Hematologic:     (+) Other Hematologic Disorder-history of menorrhagia      Musculoskeletal:   (+) arthritis, , , -       GI/Hepatic:  - neg GI/hepatic ROS       Renal/Genitourinary:  - ROS Renal section negative   (+) Pt has no history of transplant,       Endo:     (+) type II DM Last HgA1c: 8.2 date: 2/9/17 Not using insulin Obesity, .      Psychiatric:     (+) psychiatric history depression      Infectious Disease:  - neg infectious disease ROS       Malignancy:   (+) Malignancy History of GI          Other:    (+) No chance of pregnancy C-spine cleared: N/A, no H/O Chronic Pain,             Physical Exam  Normal systems: cardiovascular, pulmonary and dental    Airway   Mallampati: II  TM distance: >3 FB  Neck ROM: full    Dental     Cardiovascular   Rhythm and rate: regular and normal      Pulmonary    breath sounds clear to auscultation                    Anesthesia Plan      History & Physical Review  History and physical reviewed and following examination; no interval change.    ASA Status:  2 .    NPO Status:  > 6 hours    Plan for MAC Reason for MAC:  Deep or markedly invasive procedure (G8)         Postoperative Care  Postoperative pain management:  Oral pain medications.      Consents  Anesthetic plan, risks, benefits and alternatives discussed with:  Patient..                          .

## 2017-02-22 NOTE — ANESTHESIA CARE TRANSFER NOTE
Patient: Lashawn Reddy    Procedure(s):  COLONOSCOPY  - Wound Class: II-Clean Contaminated    Diagnosis: diverticulitis  Diagnosis Additional Information: No value filed.    Anesthesia Type:   MAC     Note:  Airway :Room Air  Patient transferred to:Phase II  Comments: Patient resting and denies any discomforts at this time. Report to pacu rn      Vitals: (Last set prior to Anesthesia Care Transfer)    CRNA VITALS  2/22/2017 0745 - 2/22/2017 0822      2/22/2017             Pulse: 87    SpO2: 92 %    Resp Rate (observed): 22                Electronically Signed By: KRISTI Melton CRNA  February 22, 2017  8:22 AM

## 2017-02-28 ENCOUNTER — TELEPHONE (OUTPATIENT)
Dept: SURGERY | Facility: CLINIC | Age: 47
End: 2017-02-28

## 2017-02-28 NOTE — TELEPHONE ENCOUNTER
Have attempted to call pt with colonoscopy results. Pt does not need to come in for f/u appt later this week. Farhad left messages twice to call me. SABRINA Mcmanus

## 2017-03-01 NOTE — TELEPHONE ENCOUNTER
Pt did return my call today. She is aware we cancelled her appt this week. She did understand the instructions and info stated after her colonoscopy. She is aware that she needs to be on lo residue diet. SABRINA Mcmanus '

## 2017-08-24 ENCOUNTER — TELEPHONE (OUTPATIENT)
Dept: FAMILY MEDICINE | Facility: CLINIC | Age: 47
End: 2017-08-24

## 2017-08-24 DIAGNOSIS — E11.9 TYPE 2 DIABETES MELLITUS WITHOUT COMPLICATION, WITHOUT LONG-TERM CURRENT USE OF INSULIN (H): Primary | ICD-10-CM

## 2017-08-24 DIAGNOSIS — E78.5 HYPERLIPIDEMIA WITH TARGET LDL LESS THAN 100: ICD-10-CM

## 2017-08-24 NOTE — TELEPHONE ENCOUNTER
Reason for Call: Request for an order or referral:    Order or referral being requested: PRE-VISIT LABS    Date needed: before my next appointment    Has the patient been seen by the PCP for this problem? YES    Additional comments: Lashawn would like to have her fasting labs drawn prior to her next physical which is set for October 18th. Would Dr. Metz be willing to order what he would like tested?     Phone number Patient can be reached at:  Home number on file [phone] (home)    Best Time:  any    Can we leave a detailed message on this number?  YES    Call taken on 8/24/2017 at 2:31 PM by Rahul Grossman

## 2017-09-06 DIAGNOSIS — E11.65 TYPE 2 DIABETES MELLITUS WITH HYPERGLYCEMIA (H): ICD-10-CM

## 2017-09-06 NOTE — TELEPHONE ENCOUNTER
Metformin         Last Written Prescription Date: 8/24/2017  Last Fill Quantity: 360, # refills: 0  Last Office Visit with G, P or  Health prescribing provider:  2/9/2017   Next 5 appointments (look out 90 days)     Oct 18, 2017  1:40 PM CDT   [facility]    [address] [place] [id number] [phone]                   BP Readings from Last 3 Encounters:   02/22/17 129/83   02/09/17 132/82   12/03/16 141/88     Lab Results   Component Value Date    MICROL 24 02/09/2017     Lab Results   Component Value Date    UMALCR 18.75 02/09/2017     Creatinine   Date Value Ref Range Status   12/03/2016 0.66 0.52 - 1.04 mg/dL Final   ]  GFR Estimate   Date Value Ref Range Status   12/03/2016 >90  Non  GFR Calc   >60 mL/min/1.7m2 Final   12/02/2016 >90  Non  GFR Calc   >60 mL/min/1.7m2 Final   12/01/2016 >90  Non  GFR Calc   >60 mL/min/1.7m2 Final     GFR Estimate If Black   Date Value Ref Range Status   12/03/2016 >90   GFR Calc   >60 mL/min/1.7m2 Final   12/02/2016 >90   GFR Calc   >60 mL/min/1.7m2 Final   12/01/2016 >90   GFR Calc   >60 mL/min/1.7m2 Final     Lab Results   Component Value Date    CHOL 127 07/01/2016     Lab Results   Component Value Date    HDL 63 07/01/2016     Lab Results   Component Value Date    LDL 46 07/01/2016     Lab Results   Component Value Date    TRIG 91 07/01/2016     Lab Results   Component Value Date    CHOLHDLRATIO 3.8 06/29/2015     Lab Results   Component Value Date    AST 10 11/28/2016     Lab Results   Component Value Date    ALT 14 11/28/2016     Lab Results   Component Value Date    A1C 8.4 02/09/2017    A1C 8.6 11/29/2016    A1C 8.7 09/19/2016    A1C 8.6 07/01/2016    A1C 8.8 04/28/2016     Potassium   Date Value Ref Range Status   12/02/2016 3.7 3.4 - 5.3 mmol/L Final

## 2017-09-08 RX ORDER — METFORMIN HCL 500 MG
TABLET, EXTENDED RELEASE 24 HR ORAL
Qty: 360 TABLET | Refills: 0 | Status: SHIPPED | OUTPATIENT
Start: 2017-09-08 | End: 2017-11-17

## 2017-09-08 NOTE — TELEPHONE ENCOUNTER
Medication is being filled for 1 time refill only due to:  Patient has upcoming appointment with PCP.      Una Maria RN

## 2017-10-10 ENCOUNTER — TELEPHONE (OUTPATIENT)
Dept: FAMILY MEDICINE | Facility: CLINIC | Age: 47
End: 2017-10-10

## 2017-10-10 DIAGNOSIS — E11.65 TYPE 2 DIABETES MELLITUS WITH HYPERGLYCEMIA, WITHOUT LONG-TERM CURRENT USE OF INSULIN (H): Primary | ICD-10-CM

## 2017-10-10 DIAGNOSIS — E78.5 HYPERLIPIDEMIA WITH TARGET LDL LESS THAN 100: ICD-10-CM

## 2017-10-10 NOTE — TELEPHONE ENCOUNTER
Reason for Call: Request for an order or referral:    Order or referral being requested: requesting lab orders to be done before her physical date of 10/18.     Date needed: as soon as possible    Has the patient been seen by the PCP for this problem? Yes     Additional comments: Requesting to do all labs prior to her physical appointment. Please call once orders are placed.     Phone number Patient can be reached at:  Home number on file [phone] (home)    Best Time:       Can we leave a detailed message on this number?  YES    Call taken on 10/10/2017 at 4:03 PM by Rosey Anna

## 2017-10-16 ENCOUNTER — TELEPHONE (OUTPATIENT)
Dept: FAMILY MEDICINE | Facility: CLINIC | Age: 47
End: 2017-10-16

## 2017-10-16 DIAGNOSIS — E11.65 TYPE 2 DIABETES MELLITUS WITH HYPERGLYCEMIA, WITHOUT LONG-TERM CURRENT USE OF INSULIN (H): ICD-10-CM

## 2017-10-16 DIAGNOSIS — E78.5 HYPERLIPIDEMIA WITH TARGET LDL LESS THAN 100: ICD-10-CM

## 2017-10-16 DIAGNOSIS — E11.65 TYPE 2 DIABETES MELLITUS WITH HYPERGLYCEMIA, WITHOUT LONG-TERM CURRENT USE OF INSULIN (H): Primary | ICD-10-CM

## 2017-10-16 LAB
CHOLEST SERPL-MCNC: 117 MG/DL
HBA1C MFR BLD: 8.5 % (ref 4.3–6)
HDLC SERPL-MCNC: 67 MG/DL
LDLC SERPL CALC-MCNC: 33 MG/DL
NONHDLC SERPL-MCNC: 50 MG/DL
TRIGL SERPL-MCNC: 87 MG/DL

## 2017-10-16 PROCEDURE — 36415 COLL VENOUS BLD VENIPUNCTURE: CPT | Performed by: FAMILY MEDICINE

## 2017-10-16 PROCEDURE — 80061 LIPID PANEL: CPT | Performed by: FAMILY MEDICINE

## 2017-10-16 PROCEDURE — 83036 HEMOGLOBIN GLYCOSYLATED A1C: CPT | Mod: QW | Performed by: FAMILY MEDICINE

## 2017-10-16 RX ORDER — PIOGLITAZONEHYDROCHLORIDE 15 MG/1
15 TABLET ORAL DAILY
Qty: 90 TABLET | Refills: 3 | Status: SHIPPED | OUTPATIENT
Start: 2017-10-16 | End: 2018-06-19 | Stop reason: ALTCHOICE

## 2017-10-16 NOTE — TELEPHONE ENCOUNTER
Left message for patient to return call to clinic. Referral to diab ed placed.  Anette Jovel, CMA

## 2017-10-16 NOTE — TELEPHONE ENCOUNTER
----- Message from Tanner Metz MD sent at 10/16/2017  1:14 PM CDT -----  Lashawn, your A1C level is still quite elevated.  We need you to get in to see the diabetic educator to discuss your sugars.  We also need start you on a second medication to help get them under better control.  I sent the new medication to the pharmacy for you and will have them set you up to see Dominga MOYA, or diabetic educator.  We really need to get things under better control so you don't start developing complications from your diabetes.      Can you get her set up with diabetic ed and I will send her new Rx to the pharmacy for her to get started on.    Electronically signed by:  Tanner Metz M.D.  10/16/2017

## 2017-10-18 ENCOUNTER — OFFICE VISIT (OUTPATIENT)
Dept: FAMILY MEDICINE | Facility: CLINIC | Age: 47
End: 2017-10-18
Payer: COMMERCIAL

## 2017-10-18 VITALS
TEMPERATURE: 98.2 F | SYSTOLIC BLOOD PRESSURE: 142 MMHG | OXYGEN SATURATION: 98 % | HEART RATE: 97 BPM | HEIGHT: 66 IN | RESPIRATION RATE: 16 BRPM | WEIGHT: 253 LBS | BODY MASS INDEX: 40.66 KG/M2 | DIASTOLIC BLOOD PRESSURE: 88 MMHG

## 2017-10-18 DIAGNOSIS — Z23 NEED FOR VACCINATION: ICD-10-CM

## 2017-10-18 DIAGNOSIS — I10 BENIGN ESSENTIAL HYPERTENSION: ICD-10-CM

## 2017-10-18 DIAGNOSIS — E11.65 TYPE 2 DIABETES MELLITUS WITH HYPERGLYCEMIA, WITHOUT LONG-TERM CURRENT USE OF INSULIN (H): Primary | ICD-10-CM

## 2017-10-18 PROCEDURE — 90471 IMMUNIZATION ADMIN: CPT | Performed by: FAMILY MEDICINE

## 2017-10-18 PROCEDURE — 99214 OFFICE O/P EST MOD 30 MIN: CPT | Mod: 25 | Performed by: FAMILY MEDICINE

## 2017-10-18 PROCEDURE — 90732 PPSV23 VACC 2 YRS+ SUBQ/IM: CPT | Performed by: FAMILY MEDICINE

## 2017-10-18 RX ORDER — LISINOPRIL 10 MG/1
10 TABLET ORAL DAILY
Qty: 30 TABLET | Refills: 3 | Status: SHIPPED | OUTPATIENT
Start: 2017-10-18 | End: 2018-07-09

## 2017-10-18 NOTE — PROGRESS NOTES
SUBJECTIVE:   CC: Lashawn Reddy is an 46 year old woman who presents for preventive health visit.     Healthy Habits:    Do you get at least three servings of calcium containing foods daily (dairy, green leafy vegetables, etc.)? yes and no, taking calcium and/or vitamin D supplement: yes - vitamin D    Amount of exercise or daily activities, outside of work: 3 day(s) per week    Problems taking medications regularly No    Medication side effects: No    Have you had an eye exam in the past two years? yes    Do you see a dentist twice per year? yes  Do you have sleep apnea, excessive snoring or daytime drowsiness?no      Abuse: Current or Past(Physical, Sexual or Emotional)- No  Do you feel safe in your environment - Yes    Social History   Substance Use Topics     Smoking status: Former Smoker     Years: 16.00     Types: Cigarettes     Quit date: 9/29/2008     Smokeless tobacco: Never Used     Alcohol use No      Comment: couple drinks per year     The patient does not drink >3 drinks per day nor >7 drinks per week.    Reviewed orders with patient.  Reviewed health maintenance and updated orders accordingly - Yes      Patient under age 50, mutual decision reflected in health maintenance.      Pertinent mammograms are reviewed under the imaging tab.  History of abnormal Pap smear:   Last 3 Pap Results:   PAP (no units)   Date Value   11/03/2014 NIL   10/14/2014 UNSAT   02/01/2011 NIL       Reviewed and updated as needed this visit by clinical staff         Reviewed and updated as needed this visit by Provider        Past Medical History:   Diagnosis Date     CARDIOVASCULAR SCREENING; LDL GOAL LESS THAN 100 6/6/2013     Depressive disorder, not elsewhere classified     depression in the post partum period after her daughter     Diabetes mellitus, type 2 (H) 6/6/2013     Diffuse cystic mastopathy     fibrocystic breast disease     Tobacco use disorder     quit in 2008     Type 2 diabetes, HbA1c goal < 7% (H)  "2013      Past Surgical History:   Procedure Laterality Date     C ANESTH,LOWER LEG VEIN SURG,NOS  2002    bilateral     C APPENDECTOMY  88     COLONOSCOPY  2013    Procedure: COLONOSCOPY;  colonoscopy;  Surgeon: Vamshi Loomis MD;  Location: [facility]     COLONOSCOPY N/A 2017    Procedure: COLONOSCOPY;  Surgeon: Raoul Sage MD;  Location: [facility]     HC COLONOSCOPY W/WO BRUSH/WASH  2005     HC DILATION/CURETTAGE DIAG/THER NON OB      after a miscarriage     HC REMOVAL OF TONSILS,<10 Y/O       TUBAL LIGATION  2006     Obstetric History       T2      L2     SAB0   TAB0   Ectopic0   Multiple0   Live Births2       # Outcome Date GA Lbr Ezio/2nd Weight Sex Delivery Anes PTL Lv   3 Term 99 39w0d 06:00 8 lb 1 oz (3.657 kg) M    CYNTHIA      Name: Paul   2 Term 96 37w0d 13:00 7 lb 15 oz (3.6 kg) F    CYNTHIA      Name: Jessica   1 SAB 86 6w0d    INCOMPLETE C             ROS:  C: no fevers/chills  I: Skin tag on posterior RLE  E: NEGATIVE for vision changes, sees optometrist regularly  N: NEGATIVE for weakness, headaches or paresthesias  P: NEGATIVE for changes in mood or affect      OBJECTIVE:   /88  Pulse 97  Temp 98.2  F (36.8  C) (Temporal)  Resp 16  Ht 5' 6\" (1.676 m)  Wt 253 lb (114.8 kg)  SpO2 98%  BMI 40.84 kg/m2    EXAM:  GENERAL: healthy, alert and no distress  EYES: Eyes grossly normal to inspection, conjunctivae and sclerae normal  HENT: ear canals and TM's normal, nose and mouth without ulcers or lesions  NECK: no adenopathy, no asymmetry, masses, or scars and thyroid normal to palpation  RESP: lungs clear to auscultation - no rales, rhonchi or wheezes  CV: regular rate and rhythm, normal S1 S2, 1+ pitting peripheral edema on RLE, trace edema on LLE. and peripheral pulses weak  MS: no gross musculoskeletal defects noted, no edema  SKIN: 1 x 0.5 cm skin tag on popliteal area of LLE. Chronic venous stasis changes of RLE  NEURO: Normal " strength and tone, mentation intact and speech normal  PSYCH: mentation appears normal, affect normal/bright  Diabetic foot exam: normal sensory exam, normal monofilament exam and DP reduced bilateral      ASSESSMENT/PLAN:   (E11.65) Type 2 diabetes mellitus with hyperglycemia, without long-term current use of insulin (H)  (primary encounter diagnosis)  Comment: Most recent A1C of 8.5. Patient manages diabetes with oral meds and does not check blood glucose at home.   Plan: Discussed risks of persistently elevated A1C such as irreversible kidney damage, retinopathy, etc. Discussed metabolic syndrome and encouraged patient to lose weight to help improve her blood glucose levels   Comprehensive metabolic panel, Hemoglobin A1c,         Albumin Random Urine Quantitative with Creat         Ratio, lisinopril (PRINIVIL/ZESTRIL) 10 MG  tablet    (I10) Benign essential hypertension  Comment: Hypertensive blood pressure reading today with history of elevated blood pressure over the past two years.   Plan: Discussed long term risks of hypertension with patient as well as protective benefit and possible side effects of of an ACE inhibitor   Start lisinopril (PRINIVIL/ZESTRIL) 10 MG tablet           (Z23) Need for vaccination  Comment: Patient has diabetes and is at an increased risk for infection and it is thus indicated for her to receive PCV 23  Plan: Pneumococcal vaccine 23 valent PPSV23          (Pneumovax) [81764], 1st  Administration   [97075]            COUNSELING:   Reviewed preventive health counseling, as reflected in patient instructions       Regular exercise       Healthy diet/nutrition       Vision screening       Immunizations    Vaccinated for: Pneumococcal           reports that she quit smoking about 9 years ago. Her smoking use included Cigarettes. She quit after 16.00 years of use. She has never used smokeless tobacco.    Estimated body mass index is 39.22 kg/(m^2) as calculated from the following:    Height  "as of 2/9/17: 5' 6\" (1.676 m).    Weight as of 2/9/17: 243 lb (110.2 kg).   Weight management plan: Discussed healthy diet and exercise guidelines and patient will follow up in 3 months in clinic to re-evaluate.    Counseling Resources:  ATP IV Guidelines  Pooled Cohorts Equation Calculator  Breast Cancer Risk Calculator  FRAX Risk Assessment  ICSI Preventive Guidelines  Dietary Guidelines for Americans, 2010  USDA's MyPlate  ASA Prophylaxis  Lung CA Screening    Patient was seen and examined by myself and Dr. Metz.  The note was then scribed by me.     Malaika Qiu, MS3    This patient was seen and examined by myself as well as the medical student.  The medical student scribed the note and I have reviewed it, edited it appropriately, and agree with the final documentation.     Electronically signed by:  Tanner Metz M.D.  10/18/2017      "

## 2017-10-18 NOTE — LETTER
[address]   [address]  Tel. [phone]/ Fax [phone]    January 25, 2018        Lashawn Reddy  [address] [place] [id number]          Dear Ms. Lashawn Reddy:    Your previous orders for lab work have been extended.  Please call to schedule a lab appointment and return to the clinic to have the labs drawn at your earliest convenience.    If you are required to be fasting for these tests,  remember to have nothing by mouth (except water) after midnight on the night before your test.    If you have any questions or concerns, please contact our office.    Sincerely,       Tanner Metz M.D.

## 2017-10-18 NOTE — NURSING NOTE
Screening Questionnaire for Adult Immunization    Are you sick today?  no   Do you have allergies to medications, food, a vaccine component or latex?   No   Have you ever had a serious reaction after receiving a vaccination?   No   Do you have a long-term health problem with heart disease, lung disease, asthma, kidney disease, metabolic disease (e.g. diabetes), anemia, or other blood disorder?   No   Do you have cancer, leukemia, HIV/AIDS, or any other immune system problem?   No   In the past 3 months, have you taken medications that affect  your immune system, such as prednisone, other steroids, or anticancer drugs; drugs for the treatment of rheumatoid arthritis, Crohn s disease, or psoriasis; or have you had radiation treatments?   No   Have you had a seizure, or a brain or other nervous system problem?   No   During the past year, have you received a transfusion of blood or blood     products, or been given immune (gamma) globulin or antiviral drug?   No   For women: Are you pregnant or is there a chance you could become        pregnant during the next month?   No   Have you received any vaccinations in the past 4 weeks?   No     Immunization questionnaire answers were all negative.       Patient instructed to remain in clinic for 15 minutes afterwards, and to report any adverse reaction to me immediately.       Screening performed by Vicky Headley on 10/18/2017 at 3:53 PM.

## 2017-10-18 NOTE — MR AVS SNAPSHOT
After Visit Summary   10/18/2017    Lashawn Reddy    MRN: [id number]           Patient Information     Date Of Birth          1970        Visit Information        Provider Department      10/18/2017 1:40 PM Tanner Metz MD [facility]        Today's Diagnoses     Type 2 diabetes mellitus with hyperglycemia, without long-term current use of insulin (H)    -  1    Benign essential hypertension          Care Instructions      Preventive Health Recommendations  Female Ages 40 to 49    Yearly exam:     See your health care provider every year in order to  1. Review health changes.   2. Discuss preventive care.    3. Review your medicines if your doctor prescribed any.      Get a Pap test every three years (unless you have an abnormal result and your provider advises testing more often).      If you get Pap tests with HPV test, you only need to test every 5 years, unless you have an abnormal result. You do not need a Pap test if your uterus was removed (hysterectomy) and you have not had cancer.      You should be tested each year for STDs (sexually transmitted diseases), if you're at risk.       Ask your doctor if you should have a mammogram.      Have a colonoscopy (test for colon cancer) if someone in your family has had colon cancer or polyps before age 50.       Have a cholesterol test every 5 years.       Have a diabetes test (fasting glucose) after age 45. If you are at risk for diabetes, you should have this test every 3 years.    Shots: Get a flu shot each year. Get a tetanus shot every 10 years.     Nutrition:     Eat at least 5 servings of fruits and vegetables each day.    Eat whole-grain bread, whole-wheat pasta and brown rice instead of white grains and rice.    Talk to your provider about Calcium and Vitamin D.     Lifestyle    Exercise at least 150 minutes a week (an average of 30 minutes a day, 5 days a week). This will help you control your weight and prevent  disease.    Limit alcohol to one drink per day.    No smoking.     Wear sunscreen to prevent skin cancer.    See your dentist every six months for an exam and cleaning.          Follow-ups after your visit        Your next 10 appointments already scheduled     Nov 02, 2017  3:00 PM CDT   Office Visit with Tanner Metz MD   [facility]    [address] [place] [id number]   [phone]           Bring a current list of meds and any records pertaining to this visit. For Physicals, please bring immunization records and any forms needing to be filled out. Please arrive 10 minutes early to complete paperwork.              Future tests that were ordered for you today     Open Future Orders        Priority Expected Expires Ordered    Comprehensive metabolic panel Routine 1/18/2018 1/18/2018 10/18/2017    Hemoglobin A1c Routine 1/18/2018 1/18/2018 10/18/2017    Albumin Random Urine Quantitative with Creat Ratio Routine 1/18/2018 1/18/2018 10/18/2017            Who to contact     If you have questions or need follow up information about today's clinic visit or your schedule please contact [facility] directly at [phone].  Normal or non-critical lab and imaging results will be communicated to you by MyChart, letter or phone within 4 business days after the clinic has received the results. If you do not hear from us within 7 days, please contact the clinic through [employer] or phone. If you have a critical or abnormal lab result, we will notify you by phone as soon as possible.  Submit refill requests through [employer] or call your pharmacy and they will forward the refill request to us. Please allow 3 business days for your refill to be completed.          Additional Information About Your Visit        [employer] Information     [employer] gives you secure access to your electronic health record. If you see a primary care provider, you can also send  "messages to your care team and make appointments. If you have questions, please call your primary care clinic.  If you do not have a primary care provider, please call [phone] and they will assist you.        Care EveryWhere ID     This is your Care EveryWhere ID. This could be used by other organizations to access your [facility] medical records  [id number]        Your Vitals Were     Pulse Temperature Respirations Height Pulse Oximetry BMI (Body Mass Index)    97 98.2  F (36.8  C) (Temporal) 16 5' 6\" (1.676 m) 98% 40.84 kg/m2       Blood Pressure from Last 3 Encounters:   10/18/17 142/88   02/22/17 129/83   02/09/17 132/82    Weight from Last 3 Encounters:   10/18/17 253 lb (114.8 kg)   02/09/17 243 lb (110.2 kg)   12/12/16 251 lb 3.2 oz (113.9 kg)                 Today's Medication Changes          These changes are accurate as of: 10/18/17  2:43 PM.  If you have any questions, ask your nurse or doctor.               Start taking these medicines.        Dose/Directions    lisinopril 10 MG tablet   Commonly known as:  PRINIVIL/ZESTRIL   Used for:  Type 2 diabetes mellitus with hyperglycemia, without long-term current use of insulin (H), Benign essential hypertension   Started by:  Tanner Metz MD        Dose:  10 mg   Take 1 tablet (10 mg) by mouth daily   Quantity:  30 tablet   Refills:  3            Where to get your medicines      These medications were sent to [id number] - [place], [place] - [address] [place] [id number]    Hours:  M-F 8:30-6:30; Sat 9-4; closed Sunday Phone:  [phone]     lisinopril 10 MG tablet                Primary Care Provider Office Phone # Fax #    Tanner Metz [phone]       [address] [place] [id number]        Equal Access to Services     CAMI SAUL AH: Linda Munroe, miguel patiño, sergio morales, yoli bolesn ah. [facility] [phone].    ATENCIÓN: " Si habla renu, tiene a obrien disposición servicios gratuitos de asistencia lingüística. Hector jaramillo [phone].    We comply with applicable federal civil rights laws and [place] laws. We do not discriminate on the basis of race, color, national origin, age, disability, sex, sexual orientation, or gender identity.            Thank you!     Thank you for choosing [facility]  for your care. Our goal is always to provide you with excellent care. Hearing back from our patients is one way we can continue to improve our services. Please take a few minutes to complete the written survey that you may receive in the mail after your visit with us. Thank you!             Your Updated Medication List - Protect others around you: Learn how to safely use, store and throw away your medicines at www.disposemymeds.org.          This list is accurate as of: 10/18/17  2:43 PM.  Always use your most recent med list.                   Brand Name Dispense Instructions for use Diagnosis    acetaminophen 325 MG tablet    TYLENOL    100 tablet    Take 2 tablets (650 mg) by mouth every 4 hours as needed for mild pain        lisinopril 10 MG tablet    PRINIVIL/ZESTRIL    30 tablet    Take 1 tablet (10 mg) by mouth daily    Type 2 diabetes mellitus with hyperglycemia, without long-term current use of insulin (H), Benign essential hypertension       * metFORMIN 500 MG 24 hr tablet    GLUCOPHAGE-XR    360 tablet    Take 2 tablets (1,000 mg) tonight with supper and then go back to your normal regimen of 4 tablets (2,000 mg) tomorrow night with supper.    Type 2 diabetes mellitus without complication, without long-term current use of insulin (H)       * metFORMIN 500 MG 24 hr tablet    GLUCOPHAGE-XR    360 tablet    TAKE 4 TABLETS (2,000 MG) BY MOUTH DAILY WITH DINNER    Type 2 diabetes mellitus with hyperglycemia (H)       omeprazole 20 MG CR capsule    priLOSEC    90 capsule    Take 1 capsule (20 mg) by mouth daily     Gastroesophageal reflux disease without esophagitis       pioglitazone 15 MG tablet    ACTOS    90 tablet    Take 1 tablet (15 mg) by mouth daily    Type 2 diabetes mellitus with hyperglycemia, without long-term current use of insulin (H)       rosuvastatin 20 MG tablet    CRESTOR    90 tablet    Take 1 tablet (20 mg) by mouth daily    Mixed hyperlipidemia, Type 2 diabetes mellitus with hyperglycemia, without long-term current use of insulin (H)       sertraline 50 MG tablet    ZOLOFT    45 tablet    Take 0.5 tablets (25 mg) by mouth every morning    PMS (premenstrual syndrome)       * Notice:  This list has 2 medication(s) that are the same as other medications prescribed for you. Read the directions carefully, and ask your doctor or other care provider to review them with you.

## 2017-10-18 NOTE — NURSING NOTE
"Chief Complaint   Patient presents with     Physical       Initial /88  Pulse 97  Temp 98.2  F (36.8  C) (Temporal)  Resp 16  Ht 5' 6\" (1.676 m)  Wt 253 lb (114.8 kg)  SpO2 98%  BMI 40.84 kg/m2 Estimated body mass index is 40.84 kg/(m^2) as calculated from the following:    Height as of this encounter: 5' 6\" (1.676 m).    Weight as of this encounter: 253 lb (114.8 kg).  Medication Reconciliation: complete   Geovanna Henley CMA    "

## 2017-10-23 PROBLEM — I10 ESSENTIAL HYPERTENSION: Status: ACTIVE | Noted: 2017-10-23

## 2017-10-28 ENCOUNTER — TELEPHONE (OUTPATIENT)
Dept: FAMILY MEDICINE | Facility: CLINIC | Age: 47
End: 2017-10-28

## 2017-10-28 ENCOUNTER — NURSE TRIAGE (OUTPATIENT)
Dept: NURSING | Facility: CLINIC | Age: 47
End: 2017-10-28

## 2017-10-28 DIAGNOSIS — B37.31 YEAST INFECTION OF THE VAGINA: Primary | ICD-10-CM

## 2017-10-28 RX ORDER — FLUCONAZOLE 150 MG/1
150 TABLET ORAL ONCE
Qty: 1 TABLET | Refills: 0 | Status: SHIPPED | OUTPATIENT
Start: 2017-10-28 | End: 2017-10-28

## 2017-10-28 NOTE — TELEPHONE ENCOUNTER
Patient states she spoke with FNA earlier today and prescription for vaginal yeast infection was to be sent to [id number], but pharmacy states they never received.  FNA checked Epic and found:  E-Prescribing Status: Transmission to pharmacy failed.  FNA called pharmacy and gave verbal order.

## 2017-10-28 NOTE — TELEPHONE ENCOUNTER
Patient called with symptoms of a yeast infection:  White discharge, itching, burning, labia swelling. Requested on call MD be paged to ask for treatment. Dr Roy approved diflucan one 150 mg tablet, just this once. I sent the prescription off to [facility] pharmacy.  I called Lashawn and notified her that the MD approved it just this once. She is grateful. MD also recommended the use of Lotrimin cream to the labia, which I told Lashawn about using it. She will talk with the pharmacist about it.  Hilaria Barkley RN-[facility]

## 2017-10-28 NOTE — TELEPHONE ENCOUNTER
"  Reason for Disposition    Diabetes mellitus or weak immune system (e.g., HIV positive, cancer chemotherapy, transplant patient)    Additional Information    Negative: Followed a genital area injury    Negative: Symptoms could be from sexual assault(AFTER using this guideline to treat symptoms, go to guideline SEXUAL ASSAULT OR RAPE)    Negative: Pain or burning with urination is main symptom    Negative: Foreign body in vagina (e.g., tampon)    Negative: [1] Pregnant > 20 weeks  (5 months or more) AND [2] contractions    Negative: Pregnant    Negative: [1] SEVERE abdominal pain (e.g., excruciating) AND [2] present > 1 hour    Negative: Patient sounds very sick or weak to the triager    Negative: [1] Yellow or green vaginal discharge AND [2] fever    Negative: [1] Genital area looks infected (e.g., draining sore, spreading redness) AND [2] fever    Negative: [1] Constant abdominal pain AND [2] present > 2 hours    Negative: [1] Mild lower abdominal pain comes and goes (cramps) AND [2] lasts > 24 hours    Negative: Genital area looks infected (e.g., draining sore, spreading redness)    Negative: [1] Rash is tiny water blisters AND [2] 3 or more    Negative: [1] Rash (e.g., redness, tiny bumps, sore) of genital area AND [2] present > 24 hours    Negative: Bad smelling vaginal discharge    Negative: Abnormal color vaginal discharge (i.e., yellow, green, gray)    Negative: [1] Symptoms of a \"yeast infection\" (i.e., itchy, white discharge, not bad smelling) AND [2] not improved > 3 days following CARE ADVICE    Negative: 4 or more episodes of vaginal infection in past year    Protocols used: VAGINAL DISCHARGE-ADULT-AH    9:21 a.m. Paged Dr Amada Roy to call RN directly about burning, itching, swelling and white vaginal discharge, diabetic. She's had them in the past and was always treated over the phone, requesting prescription be called in to [facility] pharmacy in [place].   Advises use of lotrimin cream for the " labia swelling. He authorized diflucan 150 mg, once.  I sent the prescription to [facility] pharmacy in [place] and called the patient to let her know. She will talk with the pharmacist about the lotrimin cream.  Hilaria Barkley RN-[facility]

## 2017-11-17 DIAGNOSIS — E11.65 TYPE 2 DIABETES MELLITUS WITH HYPERGLYCEMIA (H): ICD-10-CM

## 2017-11-17 RX ORDER — METFORMIN HCL 500 MG
TABLET, EXTENDED RELEASE 24 HR ORAL
Qty: 360 TABLET | Refills: 1 | Status: SHIPPED | OUTPATIENT
Start: 2017-11-17 | End: 2018-05-17

## 2017-11-17 NOTE — TELEPHONE ENCOUNTER
Requested Prescriptions   Pending Prescriptions Disp Refills     metFORMIN (GLUCOPHAGE-XR) 500 MG 24 hr tablet [Pharmacy Med Name: METFORMIN 500MG ER TAB] 360 tablet      Sig: TAKE 4 TABLETS (2,000MG) BY MOUTH EVERY DAY WITH DINNER    Biguanide Agents Failed    11/17/2017  1:09 AM       Failed - Patient's BP is less than 140/90    BP Readings from Last 3 Encounters:   10/18/17 142/88   02/22/17 129/83   02/09/17 132/82                Passed - Patient has documented LDL within the past 12 mos.    Recent Labs   Lab Test  10/16/17   0755   LDL  33            Passed - Patient has had a Microalbumin in the past 12 mos.    Recent Labs   Lab Test  02/09/17   0918   MICROL  24   UMALCR  18.75            Passed - Patient is age 10 or older       Passed - Patient has documented A1c within the specified period of time.    Recent Labs   Lab Test  10/16/17   0755   A1C  8.5*            Passed - Patient's CR is NOT>1.4 OR Patient's EGFR is NOT<45 within past 12 mos.    Recent Labs   Lab Test  12/03/16   0530   GFRESTIMATED  >90  Non  GFR Calc     GFRESTBLACK  >90   GFR Calc         Recent Labs   Lab Test  12/03/16   0530   CR  0.66            Passed - Patient does NOT have a diagnosis of CHF.       Passed - Patient is not pregnant       Passed - Patient has not had a positive pregnancy test within the past 12 mos.        Passed - Recent (6 mos) or future visit with authorizing provider's specialty    Patient had office visit in the last 6 months or has a visit in the next 30 days with authorizing provider.  See chart review.

## 2017-11-17 NOTE — TELEPHONE ENCOUNTER
LOV 10/18/2017.    Prescription approved per [facility] Refill Protocol.    Una Maria RN       Requested Prescriptions   Pending Prescriptions Disp Refills     metFORMIN (GLUCOPHAGE-XR) 500 MG 24 hr tablet [Pharmacy Med Name: METFORMIN 500MG ER TAB] 360 tablet      Sig: TAKE 4 TABLETS (2,000MG) BY MOUTH EVERY DAY WITH DINNER    Biguanide Agents Failed    11/17/2017  9:17 AM       Failed - Patient's BP is less than 140/90    BP Readings from Last 3 Encounters:   10/18/17 142/88   02/22/17 129/83   02/09/17 132/82                Passed - Patient has documented LDL within the past 12 mos.    Recent Labs   Lab Test  10/16/17   0755   LDL  33            Passed - Patient has had a Microalbumin in the past 12 mos.    Recent Labs   Lab Test  02/09/17   0918   MICROL  24   UMALCR  18.75            Passed - Patient is age 10 or older       Passed - Patient has documented A1c within the specified period of time.    Recent Labs   Lab Test  10/16/17   0755   A1C  8.5*            Passed - Patient's CR is NOT>1.4 OR Patient's EGFR is NOT<45 within past 12 mos.    Recent Labs   Lab Test  12/03/16   0530   GFRESTIMATED  >90  Non  GFR Calc     GFRESTBLACK  >90   GFR Calc         Recent Labs   Lab Test  12/03/16   0530   CR  0.66            Passed - Patient does NOT have a diagnosis of CHF.       Passed - Patient is not pregnant       Passed - Patient has not had a positive pregnancy test within the past 12 mos.        Passed - Recent (6 mos) or future visit with authorizing provider's specialty    Patient had office visit in the last 6 months or has a visit in the next 30 days with authorizing provider.  See chart review.

## 2017-12-06 ENCOUNTER — HOSPITAL ENCOUNTER (OUTPATIENT)
Dept: MAMMOGRAPHY | Facility: CLINIC | Age: 47
Discharge: HOME | End: 2017-12-06
Attending: FAMILY MEDICINE | Admitting: FAMILY MEDICINE
Payer: COMMERCIAL

## 2017-12-06 DIAGNOSIS — Z12.31 VISIT FOR SCREENING MAMMOGRAM: ICD-10-CM

## 2017-12-06 PROCEDURE — 77063 BREAST TOMOSYNTHESIS BI: CPT

## 2017-12-06 PROCEDURE — G0202 SCR MAMMO BI INCL CAD: HCPCS

## 2017-12-19 ENCOUNTER — TELEPHONE (OUTPATIENT)
Dept: FAMILY MEDICINE | Facility: CLINIC | Age: 47
End: 2017-12-19

## 2017-12-19 NOTE — TELEPHONE ENCOUNTER
Lashawn Reddy is a 47 year old female who calls with concerns about cold symptoms.  Patient reports symptoms for about 4 days.  Reports cough started as a scratch in the back of the throat, cough is now productive at times with yellow is colored phlegm.  Patient reports sinus pressure to the forehead and around the eyes.  Reports sinus congestion and nasal drainage that is light yellow to clear.  She has sore throat related to post nasal drainage.  She is getting fluids, but decreased appetite.  No fevers.  She is using Tylenol OTC for symptoms.      NURSING ASSESSMENT:  Description:  Cold symptoms.   Onset/duration:  Past 4 days.   Precip. factors:  Family has been sick.   Associated symptoms:  Sore throat, sinus pressure, cough.   Improves/worsens symptoms:  OTC Tylenol not overly effective.   Pain scale (0-10)   6/10  Last exam/Treatment:  10/18/2017  Allergies:   Allergies   Allergen Reactions     No Known Drug Allergies      NURSING PLAN: Nursing advice to patient .    RECOMMENDED DISPOSITION:  Patient encouraged to increase fluids, OTC cough / cold medications, neti pot or saline spray, monitor fevers, humidifier, Vicks rub, tea with lemon and honey, warm salt water gargle.  Educated when to seek emergent care, educated about safe medications to while taking blood pressure medications.   Will comply with recommendation: Yes  If further questions/concerns or if symptoms do not improve, worsen or new symptoms develop, call your PCP or [facility] as soon as possible.    Guideline used:  Common cold symptoms / cough.   Telephone Triage Protocols for Nurses, Fifth Edition, Ofe Maria RN

## 2017-12-19 NOTE — TELEPHONE ENCOUNTER
Reason for call:  Patient reporting a symptom    Symptom or request: sore throat, cough, no fever, chest congestion    Duration (how long have symptoms been present): 4th day    Have you been treated for this before? Yes    Additional comments: asking for nurse advice    Phone Number patient can be reached at:  Home number on file [phone] (home)    Best Time:  Any     Can we leave a detailed message on this number:  YES    Call taken on 12/19/2017 at 7:22 AM by Sara Baires

## 2018-01-08 ENCOUNTER — TELEPHONE (OUTPATIENT)
Dept: FAMILY MEDICINE | Facility: CLINIC | Age: 48
End: 2018-01-08

## 2018-01-08 DIAGNOSIS — B37.31 CANDIDIASIS OF VULVA AND VAGINA: Primary | ICD-10-CM

## 2018-01-08 RX ORDER — FLUCONAZOLE 150 MG/1
150 TABLET ORAL
Qty: 4 TABLET | Refills: 0 | Status: SHIPPED | OUTPATIENT
Start: 2018-01-08 | End: 2018-07-09

## 2018-01-08 NOTE — TELEPHONE ENCOUNTER
Reason for call:  Patient reporting a symptom    Symptom or request: yeast infection    Duration (how long have symptoms been present): five days    Have you been treated for this before? No    Additional comments: patient has been experiencing yeast infection symptoms for five days now.  She is requesting a medication to treat this.  She would like it called into [facility].    Phone Number patient can be reached at:  Cell number on file:    Telephone Information:   Mobile [phone]       Best Time:  anytime    Can we leave a detailed message on this number:  YES    Call taken on 1/8/2018 at 2:07 PM by Melva Samano

## 2018-01-09 NOTE — TELEPHONE ENCOUNTER
This is going to continue to be a problem until she gets her diabetes under better control.  I will send in a Rx for diflucan for her.     Electronically signed by:  Tanner Metz M.D.  1/8/2018

## 2018-02-08 DIAGNOSIS — N94.3 PMS (PREMENSTRUAL SYNDROME): ICD-10-CM

## 2018-02-08 DIAGNOSIS — E78.2 MIXED HYPERLIPIDEMIA: ICD-10-CM

## 2018-02-08 DIAGNOSIS — E11.65 TYPE 2 DIABETES MELLITUS WITH HYPERGLYCEMIA, WITHOUT LONG-TERM CURRENT USE OF INSULIN (H): ICD-10-CM

## 2018-02-08 NOTE — TELEPHONE ENCOUNTER
"Requested Prescriptions   Pending Prescriptions Disp Refills     sertraline (ZOLOFT) 50 MG tablet [Pharmacy Med Name: SERTRALINE 50MG TAB] 45 tablet      Sig: TAKE 1/2 TABLET (25 MG) BY MOUTH EVERY MORNING    SSRIs Protocol Failed    2/8/2018  1:00 AM       Failed - PHQ-9 score less than 5 in past 6 months    The PHQ-9 criteria is meant to fail. It requires a PHQ-9 score review         Failed - No positive pregnancy test in last 12 months       Passed - Patient is age 18 or older       Passed - No active pregnancy on record       Passed - Recent (6 mo) or future visit with authorizing provider's specialty    Patient had office visit in the last 6 months or has a visit in the next 30 days with authorizing provider.  See \"Patient Info\" tab in inbasket, or \"Choose Columns\" in Meds & Orders section of the refill encounter.            rosuvastatin (CRESTOR) 20 MG tablet [Pharmacy Med Name: ROSUVASTATIN 20MG TABLET] 90 tablet      Sig: TAKE 1 TABLET BY MOUTH EVERY DAY    Statins Protocol Failed    2/8/2018  1:00 AM       Failed - No positive pregnancy test in past 12 months       Passed - LDL on file in past 12 months    Recent Labs   Lab Test  10/16/17   0755   LDL  33            Passed - No abnormal creatine kinase in past 12 months    No lab results found.         Passed - Recent or future visit with authorizing provider    Patient had office visit in the last year or has a visit in the next 30 days with authorizing provider.  See \"Patient Info\" tab in inbasket, or \"Choose Columns\" in Meds & Orders section of the refill encounter.            Passed - Patient is age 18 or older       Passed - No active pregnancy on record          Last Written Prescription Date:  2/9/17  Last Fill Quantity: 90, 45,  # refills: 3   Last Office Visit with G, P or [facility] prescribing provider:  10/18/17   Future Office Visit:       "

## 2018-02-09 NOTE — TELEPHONE ENCOUNTER
Routing refill request to provider for review/approval because:  Drug not on the [facility] refill protocol - due to associated dx. Pos pregnancy test in the last 12 months    Nydia Parnell RN

## 2018-02-12 RX ORDER — ROSUVASTATIN CALCIUM 20 MG/1
TABLET, COATED ORAL
Qty: 90 TABLET | Refills: 0 | Status: SHIPPED | OUTPATIENT
Start: 2018-02-12 | End: 2018-05-24

## 2018-02-12 NOTE — TELEPHONE ENCOUNTER
Patient is due for follow-up in the clinic and labs.  She needs both of these before further refills will be given.    Electronically signed by:  Tanner Metz M.D.  2/12/2018

## 2018-02-12 NOTE — TELEPHONE ENCOUNTER
The orders are placed so she can have those labs drawn at any time.  I will see her in April.    Electronically signed by:  Tanner Metz M.D.  2/12/2018

## 2018-02-12 NOTE — TELEPHONE ENCOUNTER
appt made for 4/2/18 nothing available sooner. Patient wants to get labs done first and she stated she isn't talking the lisinopril.    Christal Tse MA 2/12/2018

## 2018-03-30 DIAGNOSIS — E11.65 TYPE 2 DIABETES MELLITUS WITH HYPERGLYCEMIA, WITHOUT LONG-TERM CURRENT USE OF INSULIN (H): ICD-10-CM

## 2018-03-30 LAB
ALBUMIN SERPL-MCNC: 3.4 G/DL (ref 3.4–5)
ALP SERPL-CCNC: 102 U/L (ref 40–150)
ALT SERPL W P-5'-P-CCNC: 15 U/L (ref 0–50)
ANION GAP SERPL CALCULATED.3IONS-SCNC: 7 MMOL/L (ref 3–14)
AST SERPL W P-5'-P-CCNC: 12 U/L (ref 0–45)
BILIRUB SERPL-MCNC: 0.2 MG/DL (ref 0.2–1.3)
BUN SERPL-MCNC: 10 MG/DL (ref 7–30)
CALCIUM SERPL-MCNC: 8.3 MG/DL (ref 8.5–10.1)
CHLORIDE SERPL-SCNC: 103 MMOL/L (ref 94–109)
CO2 SERPL-SCNC: 28 MMOL/L (ref 20–32)
CREAT SERPL-MCNC: 0.57 MG/DL (ref 0.52–1.04)
CREAT UR-MCNC: 232 MG/DL
GFR SERPL CREATININE-BSD FRML MDRD: >90 ML/MIN/1.7M2
GLUCOSE SERPL-MCNC: 223 MG/DL (ref 70–99)
HBA1C MFR BLD: 9.4 % (ref 0–6.4)
MICROALBUMIN UR-MCNC: 32 MG/L
MICROALBUMIN/CREAT UR: 13.58 MG/G CR (ref 0–25)
POTASSIUM SERPL-SCNC: 4.3 MMOL/L (ref 3.4–5.3)
PROT SERPL-MCNC: 6.8 G/DL (ref 6.8–8.8)
SODIUM SERPL-SCNC: 138 MMOL/L (ref 133–144)

## 2018-03-30 PROCEDURE — 36415 COLL VENOUS BLD VENIPUNCTURE: CPT | Performed by: FAMILY MEDICINE

## 2018-03-30 PROCEDURE — 82043 UR ALBUMIN QUANTITATIVE: CPT | Performed by: FAMILY MEDICINE

## 2018-03-30 PROCEDURE — 83036 HEMOGLOBIN GLYCOSYLATED A1C: CPT | Mod: QW | Performed by: FAMILY MEDICINE

## 2018-03-30 PROCEDURE — 80053 COMPREHEN METABOLIC PANEL: CPT | Performed by: FAMILY MEDICINE

## 2018-04-04 ENCOUNTER — TELEPHONE (OUTPATIENT)
Dept: FAMILY MEDICINE | Facility: CLINIC | Age: 48
End: 2018-04-04

## 2018-04-04 NOTE — TELEPHONE ENCOUNTER
Patient called and message relayed to her. Patient states she is taking the Metformin 2000 mg daily and is NOT taking Actos 15mg, and never did take it. Patient states she was very concerned about the side effects of the Actos so did not want to start taking it. Please advise.

## 2018-04-04 NOTE — PROGRESS NOTES
Please call patient with following results.  Let L1 know that her glucose is elevated and her A1c went up from 8.5 to 9.4.  This is the highest it has ever been.  See if she is taking her metformin 2000 mg daily and her actos 15 mg daily.  If so, we need to increase her actos to 45 mg daily along with the metformin.  I want her to see Diabetic ed in follow up even if just by phone so she can have Lashawn call her sugars in to her and if needed can adjust medication.  We will need to recheck an A1c in 3 months then.    Electronically signed by:  Tanner Metz M.D.  4/4/2018

## 2018-04-04 NOTE — TELEPHONE ENCOUNTER
----- Message from Tanner Metz MD sent at 4/4/2018  4:24 PM CDT -----  Please call patient with following results.  Let L1 know that her glucose is elevated and her A1c went up from 8.5 to 9.4.  This is the highest it has ever been.  See if she is taking her metformin 2000 mg daily and her actos 15 mg daily.  If so, we need to increase her actos to 45 mg daily along with the metformin.  I want her to see Diabetic ed in follow up even if just by phone so she can have Lashawn call her sugars in to her and if needed can adjust medication.  We will need to recheck an A1c in 3 months then.    Electronically signed by:  Tanner Metz M.D.  4/4/2018

## 2018-04-05 NOTE — TELEPHONE ENCOUNTER
The black box warning on Actos is that it can exacerbate congestive heart failure which is probably what she is worried about.  It only exacerbates heart failure in those patients that have heart failure and she does not so that is not even a risk factor for her.  We will monitor her other labs along with her liver tests but again she is not on normal healthy liver so that side effect of this medication is minimal in her case.  Our options with her are either to start the Actos and see if we can get her A1c under control or toxic diabetic education about starting insulin or 1 of the other injectables.  The choice is hers.  We can have her meet with diabetic add to go over the options with her, but we need to act now because her labs are worsening.    Electronically signed by:  Tanner Metz M.D.  4/5/2018

## 2018-04-05 NOTE — TELEPHONE ENCOUNTER
Called pt and LM in detail. Read the message from Lashay and told her to let us know what she would like to do.  Let us know asap.  KH

## 2018-04-05 NOTE — TELEPHONE ENCOUNTER
I have attempted to call the pt with the message below. I left message for pt to call back. I will call back another time. Marcela Stoddard CMA ([facility])

## 2018-05-17 DIAGNOSIS — E11.65 TYPE 2 DIABETES MELLITUS WITH HYPERGLYCEMIA (H): ICD-10-CM

## 2018-05-17 NOTE — TELEPHONE ENCOUNTER
"Requested Prescriptions   Pending Prescriptions Disp Refills     metFORMIN (GLUCOPHAGE-XR) 500 MG 24 hr tablet [Pharmacy Med Name: METFORMIN 500MG ER TAB] 360 tablet      Sig: TAKE 4 TABLETS (2,000MG) BY MOUTH EVERY DAY WITH DINNER    Biguanide Agents Failed    5/17/2018  1:10 AM       Failed - Blood pressure less than 140/90 in past 6 months    BP Readings from Last 3 Encounters:   10/18/17 142/88   02/22/17 129/83   02/09/17 132/82                Failed - Recent (6 mo) or future (30 days) visit within the authorizing provider's specialty    Patient had office visit in the last 6 months or has a visit in the next 30 days with authorizing provider or within the authorizing provider's specialty.  See \"Patient Info\" tab in inbasket, or \"Choose Columns\" in Meds & Orders section of the refill encounter.           Passed - Patient has documented LDL within the past 12 mos.    Recent Labs   Lab Test  10/16/17   0755   LDL  33            Passed - Patient has had a Microalbumin in the past 12 mos.    Recent Labs   Lab Test  03/30/18   0805   MICROL  32   UMALCR  13.58            Passed - Patient is age 10 or older       Passed - Patient has documented A1c within the specified period of time.    If HgbA1C is 8 or greater, it needs to be on file within the past 3 months.  If less than 8, must be on file within the past 6 months.     Recent Labs   Lab Test  03/30/18   0755   A1C  9.4*            Passed - Patient's CR is NOT>1.4 OR Patient's EGFR is NOT<45 within past 12 mos.    Recent Labs   Lab Test  03/30/18   0755   GFRESTIMATED  >90   GFRESTBLACK  >90       Recent Labs   Lab Test  03/30/18   0755   CR  0.57            Passed - Patient does NOT have a diagnosis of CHF.       Passed - Patient is not pregnant       Passed - Patient has not had a positive pregnancy test within the past 12 mos.           Last Written Prescription Date:  11/17/17  Last Fill Quantity: 360,  # refills: 1   Last Office Visit with [facility], COLE[facility] or [facility] " Health prescribing provider:  11/2/17   Future Office Visit:    Next 5 appointments (look out 90 days)     Jul 09, 2018  5:20 PM CDT   Office Visit with Raoul Sherman MD   [facility]    [address] [place] [id number] [phone]

## 2018-05-18 RX ORDER — METFORMIN HCL 500 MG
TABLET, EXTENDED RELEASE 24 HR ORAL
Qty: 360 TABLET | Refills: 0 | Status: SHIPPED | OUTPATIENT
Start: 2018-05-18 | End: 2018-08-17

## 2018-05-18 NOTE — TELEPHONE ENCOUNTER
Prescription approved per [facility] Refill Protocol.    Una Maria RN

## 2018-05-24 DIAGNOSIS — E78.2 MIXED HYPERLIPIDEMIA: ICD-10-CM

## 2018-05-24 DIAGNOSIS — E11.65 TYPE 2 DIABETES MELLITUS WITH HYPERGLYCEMIA, WITHOUT LONG-TERM CURRENT USE OF INSULIN (H): ICD-10-CM

## 2018-05-24 DIAGNOSIS — N94.3 PMS (PREMENSTRUAL SYNDROME): ICD-10-CM

## 2018-05-24 RX ORDER — ROSUVASTATIN CALCIUM 20 MG/1
TABLET, COATED ORAL
Qty: 90 TABLET | Refills: 1 | Status: SHIPPED | OUTPATIENT
Start: 2018-05-24 | End: 2018-11-13

## 2018-05-24 NOTE — TELEPHONE ENCOUNTER
"Requested Prescriptions   Pending Prescriptions Disp Refills     sertraline (ZOLOFT) 50 MG tablet [Pharmacy Med Name: SERTRALINE 50MG TAB] 45 tablet      Sig: TAKE 1/2 TABLET (25 MG) BY MOUTH EVERY MORNING    SSRIs Protocol Failed    5/24/2018  1:03 AM       Failed - PHQ-9 score less than 5 in past 6 months    Please review last PHQ-9 score.          Failed - Recent (6 mo) or future (30 days) visit within the authorizing provider's specialty    Patient had office visit in the last 6 months or has a visit in the next 30 days with authorizing provider or within the authorizing provider's specialty.  See \"Patient Info\" tab in inbasket, or \"Choose Columns\" in Meds & Orders section of the refill encounter.           Passed - Patient is age 18 or older       Passed - No active pregnancy on record       Passed - No positive pregnancy test in last 12 months        rosuvastatin (CRESTOR) 20 MG tablet [Pharmacy Med Name: ROSUVASTATIN 20MG TABLET] 90 tablet      Sig: TAKE 1 TABLET BY MOUTH EVERY DAY    Statins Protocol Passed    5/24/2018  1:03 AM       Passed - LDL on file in past 12 months    Recent Labs   Lab Test  10/16/17   0755   LDL  33            Passed - No abnormal creatine kinase in past 12 months    No lab results found.            Passed - Recent (12 mo) or future (30 days) visit within the authorizing provider's specialty    Patient had office visit in the last 12 months or has a visit in the next 30 days with authorizing provider or within the authorizing provider's specialty.  See \"Patient Info\" tab in inbasket, or \"Choose Columns\" in Meds & Orders section of the refill encounter.           Passed - Patient is age 18 or older       Passed - No active pregnancy on record       Passed - No positive pregnancy test in past 12 months          Last Written Prescription Date:  2/12/18  Last Fill Quantity: 45,  # refills: 0   Last Office Visit with G, P or [facility] prescribing provider:  11/02/17   Future Office " Visit:    Next 5 appointments (look out 90 days)     Jul 09, 2018  5:20 PM CDT   [facility] Visit [facility]   [facility]    [address] [place] [id number] [phone]                  Rosuvastatin 20 MG       Last Written Prescription Date:  2/12/18  Last Fill Quantity: 90,   # refills: 0  Last Office Visit: 11/02/17  Future Office visit:

## 2018-05-24 NOTE — TELEPHONE ENCOUNTER
Prescription approved per [facility] Refill Protocol..............SABRINA Sofia

## 2018-06-19 ENCOUNTER — TELEPHONE (OUTPATIENT)
Dept: FAMILY MEDICINE | Facility: CLINIC | Age: 48
End: 2018-06-19

## 2018-06-19 DIAGNOSIS — E11.65 TYPE 2 DIABETES MELLITUS WITH HYPERGLYCEMIA, WITHOUT LONG-TERM CURRENT USE OF INSULIN (H): Primary | ICD-10-CM

## 2018-06-19 RX ORDER — GLIPIZIDE 10 MG/1
10 TABLET, FILM COATED, EXTENDED RELEASE ORAL DAILY
Qty: 90 TABLET | Refills: 3 | Status: ON HOLD | OUTPATIENT
Start: 2018-06-19 | End: 2018-11-17

## 2018-06-19 NOTE — TELEPHONE ENCOUNTER
Reason for Call:  Other prescription    Detailed comments: patient just finished  taking ACTOS and she is concerned about the side effects while taking it. She has been having some swelling in her legs, and causing weight gain. Is wondering if she can be changed to something different. She uses [facility] in [place]. Please do not use [employer] to contact her. It gives her some troubles when trying to use it.     Phone Number Patient can be reached at: Home number on file [phone] (home) or [phone] Ext [phone]    Best Time: any    Can we leave a detailed message on this number? YES    Call taken on 6/19/2018 at 3:42 PM by Ale Ivan

## 2018-06-19 NOTE — TELEPHONE ENCOUNTER
Continue call Lashawn and let her know that I did switch her over to glipizide which is a once a day medications she takes in the morning with breakfast.  We need to recheck her A1c lab so she can stop by the lab and do that anytime in the next week or so.    Electronically signed by:  Tanner Metz M.D.  6/19/2018

## 2018-06-26 ENCOUNTER — TELEPHONE (OUTPATIENT)
Dept: FAMILY MEDICINE | Facility: CLINIC | Age: 48
End: 2018-06-26

## 2018-06-26 NOTE — TELEPHONE ENCOUNTER
Reason for Call:  Same Day Appointment, Requested Provider:  Tanner Metz M.D.    PCP: Tanner Metz    Reason for visit: Lesion removal on Left Thigh. Pt stated this was discussed at a previous visit.    Duration of symptoms:     Have you been treated for this in the past? No    Additional comments: pt will be on vac starting 7/16. Would like this done before vac    Can we leave a detailed message on this number? YES    Phone number patient can be reached at:     Best Time:     Call taken on 6/26/2018 at 3:31 PM by Josefa Pitts

## 2018-06-27 NOTE — TELEPHONE ENCOUNTER
Left message for patient to call back and speak with any .    Thank you,  Amy Ivan   for [facility]

## 2018-07-02 DIAGNOSIS — E11.9 TYPE 2 DIABETES MELLITUS WITHOUT COMPLICATION, WITHOUT LONG-TERM CURRENT USE OF INSULIN (H): Primary | ICD-10-CM

## 2018-07-02 LAB — HBA1C MFR BLD: 8.2 % (ref 0–5.6)

## 2018-07-02 PROCEDURE — 36415 COLL VENOUS BLD VENIPUNCTURE: CPT | Performed by: FAMILY MEDICINE

## 2018-07-02 PROCEDURE — 83036 HEMOGLOBIN GLYCOSYLATED A1C: CPT | Mod: QW | Performed by: FAMILY MEDICINE

## 2018-07-09 ENCOUNTER — OFFICE VISIT (OUTPATIENT)
Dept: FAMILY MEDICINE | Facility: CLINIC | Age: 48
End: 2018-07-09
Payer: COMMERCIAL

## 2018-07-09 VITALS
BODY MASS INDEX: 40.66 KG/M2 | OXYGEN SATURATION: 99 % | TEMPERATURE: 98.2 F | RESPIRATION RATE: 16 BRPM | DIASTOLIC BLOOD PRESSURE: 90 MMHG | SYSTOLIC BLOOD PRESSURE: 144 MMHG | HEART RATE: 74 BPM | HEIGHT: 66 IN | WEIGHT: 253 LBS

## 2018-07-09 DIAGNOSIS — R10.2 PELVIC PAIN IN FEMALE: ICD-10-CM

## 2018-07-09 DIAGNOSIS — N92.1 MENORRHAGIA WITH IRREGULAR CYCLE: Primary | ICD-10-CM

## 2018-07-09 DIAGNOSIS — I10 BENIGN ESSENTIAL HYPERTENSION: ICD-10-CM

## 2018-07-09 DIAGNOSIS — E11.9 TYPE 2 DIABETES MELLITUS WITHOUT COMPLICATION, WITHOUT LONG-TERM CURRENT USE OF INSULIN (H): ICD-10-CM

## 2018-07-09 DIAGNOSIS — E66.01 MORBID OBESITY (H): ICD-10-CM

## 2018-07-09 LAB
ERYTHROCYTE [DISTWIDTH] IN BLOOD BY AUTOMATED COUNT: 16.4 % (ref 10–15)
HCT VFR BLD AUTO: 34.8 % (ref 35–47)
HGB BLD-MCNC: 10.9 G/DL (ref 11.7–15.7)
MCH RBC QN AUTO: 24.6 PG (ref 26.5–33)
MCHC RBC AUTO-ENTMCNC: 31.3 G/DL (ref 31.5–36.5)
MCV RBC AUTO: 79 FL (ref 78–100)
PLATELET # BLD AUTO: 283 10E9/L (ref 150–450)
RBC # BLD AUTO: 4.43 10E12/L (ref 3.8–5.2)
TSH SERPL DL<=0.005 MIU/L-ACNC: 3.16 MU/L (ref 0.4–4)
WBC # BLD AUTO: 10.9 10E9/L (ref 4–11)

## 2018-07-09 PROCEDURE — 84443 ASSAY THYROID STIM HORMONE: CPT | Performed by: OBSTETRICS & GYNECOLOGY

## 2018-07-09 PROCEDURE — 36415 COLL VENOUS BLD VENIPUNCTURE: CPT | Performed by: OBSTETRICS & GYNECOLOGY

## 2018-07-09 PROCEDURE — G0145 SCR C/V CYTO,THINLAYER,RESCR: HCPCS | Performed by: OBSTETRICS & GYNECOLOGY

## 2018-07-09 PROCEDURE — 58100 BIOPSY OF UTERUS LINING: CPT | Performed by: OBSTETRICS & GYNECOLOGY

## 2018-07-09 PROCEDURE — 87624 HPV HI-RISK TYP POOLED RSLT: CPT | Performed by: OBSTETRICS & GYNECOLOGY

## 2018-07-09 PROCEDURE — 85027 COMPLETE CBC AUTOMATED: CPT | Performed by: OBSTETRICS & GYNECOLOGY

## 2018-07-09 PROCEDURE — 88305 TISSUE EXAM BY PATHOLOGIST: CPT | Performed by: OBSTETRICS & GYNECOLOGY

## 2018-07-09 PROCEDURE — 99214 OFFICE O/P EST MOD 30 MIN: CPT | Mod: 25 | Performed by: OBSTETRICS & GYNECOLOGY

## 2018-07-09 ASSESSMENT — PAIN SCALES - GENERAL: PAINLEVEL: NO PAIN (0)

## 2018-07-09 NOTE — MR AVS SNAPSHOT
After Visit Summary   7/9/2018    Lashawn Reddy    MRN: [id number]           Patient Information     Date Of Birth          1970        Visit Information        Provider Department      7/9/2018 5:20 PM Raoul Sherman MD [facility]        Today's Diagnoses     Menorrhagia with irregular cycle    -  1    Morbid obesity (H)        Pelvic pain in female        Type 2 diabetes mellitus without complication, without long-term current use of insulin (H)        Benign essential hypertension           Follow-ups after your visit        Your next 10 appointments already scheduled     Jul 12, 2018  4:00 PM CDT   PROCEDURE with Tanner Metz MD, NL PROC ROOM ONE [facility] ([facility])    [address] [address] [phone]              Future tests that were ordered for you today     Open Future Orders        Priority Expected Expires Ordered    US Pelvic Complete with Transvaginal Routine  7/10/2019 7/9/2018            Who to contact     If you have questions or need follow up information about today's clinic visit or your schedule please contact [facility] directly at [phone].  Normal or non-critical lab and imaging results will be communicated to you by MyChart, letter or phone within 4 business days after the clinic has received the results. If you do not hear from us within 7 days, please contact the clinic through [employer] or phone. If you have a critical or abnormal lab result, we will notify you by phone as soon as possible.  Submit refill requests through [employer] or call your pharmacy and they will forward the refill request to us. Please allow 3 business days for your refill to be completed.          Additional Information About Your Visit        MyChart Information     [employer] gives you secure access to your electronic health record. If you see a primary care provider, you  "can also send messages to your care team and make appointments. If you have questions, please call your primary care clinic.  If you do not have a primary care provider, please call [phone] and they will assist you.        Care EveryWhere ID     This is your Care EveryWhere ID. This could be used by other organizations to access your [employer] medical records  [id number]        Your Vitals Were     Pulse Temperature Respirations Height Last Period Pulse Oximetry    74 98.2  F (36.8  C) (Temporal) 16 5' 6\" (1.676 m) 06/18/2018 99%    Breastfeeding? BMI (Body Mass Index)                No 40.84 kg/m2           Blood Pressure from Last 3 Encounters:   07/09/18 144/90   10/18/17 142/88   02/22/17 129/83    Weight from Last 3 Encounters:   07/09/18 253 lb (114.8 kg)   10/18/17 253 lb (114.8 kg)   02/09/17 243 lb (110.2 kg)              We Performed the Following     CBC with platelets     ENDOMETRIAL BIOPSY W/O CERVICAL DILATION     HPV High Risk Types DNA Cervical     Pap imaged thin layer screen with HPV - recommended age 30 - 65 years (select HPV order below)     Surgical pathology exam     TSH with free T4 reflex        Primary Care Provider Office Phone # Fax #    Tanner Metz [phone] [address] [place] [address]        Equal Access to Services     CAMI SAUL AH: Hadii ashley tran hadmikeo Sogaye, waaxda luqadaha, qaybta kaalmada carmen, yoli rawls. So [facility] [phone].    ATENCIÓN: Si habla español, tiene a obrien disposición servicios gratuitos de asistencia lingüística. Hector al [phone].    We comply with applicable federal civil rights laws and [place] laws. We do not discriminate on the basis of race, color, national origin, age, disability, sex, sexual orientation, or gender identity.            Thank you!     Thank you for choosing [facility]  for your care. Our goal is always to provide you with excellent " care. Hearing back from our patients is one way we can continue to improve our services. Please take a few minutes to complete the written survey that you may receive in the mail after your visit with us. Thank you!             Your Updated Medication List - Protect others around you: Learn how to safely use, store and throw away your medicines at www.disposemymeds.org.          This list is accurate as of 7/9/18  6:27 PM.  Always use your most recent med list.                   Brand Name Dispense Instructions for use Diagnosis    acetaminophen 325 MG tablet    TYLENOL    100 tablet    Take 2 tablets (650 mg) by mouth every 4 hours as needed for mild pain        glipiZIDE 10 MG 24 hr tablet    glipiZIDE XL    90 tablet    Take 1 tablet (10 mg) by mouth daily    Type 2 diabetes mellitus with hyperglycemia, without long-term current use of insulin (H)       * metFORMIN 500 MG 24 hr tablet    GLUCOPHAGE-XR    360 tablet    Take 2 tablets (1,000 mg) tonight with supper and then go back to your normal regimen of 4 tablets (2,000 mg) tomorrow night with supper.    Type 2 diabetes mellitus without complication, without long-term current use of insulin (H)       * metFORMIN 500 MG 24 hr tablet    GLUCOPHAGE-XR    360 tablet    TAKE 4 TABLETS (2,000MG) BY MOUTH EVERY DAY WITH DINNER    Type 2 diabetes mellitus with hyperglycemia (H)       omeprazole 20 MG CR capsule    priLOSEC    90 capsule    Take 1 capsule (20 mg) by mouth daily    Gastroesophageal reflux disease without esophagitis       rosuvastatin 20 MG tablet    CRESTOR    90 tablet    TAKE 1 TABLET BY MOUTH EVERY DAY    Mixed hyperlipidemia, Type 2 diabetes mellitus with hyperglycemia, without long-term current use of insulin (H)       sertraline 50 MG tablet    ZOLOFT    45 tablet    TAKE 1/2 TABLET (25 MG) BY MOUTH EVERY MORNING    PMS (premenstrual syndrome)       * Notice:  This list has 2 medication(s) that are the same as other medications prescribed for you.  Read the directions carefully, and ask your doctor or other care provider to review them with you.

## 2018-07-09 NOTE — PROGRESS NOTES
SUBJECTIVE:                                                      Referral from Tanner Metz       Reason for consultation:  Menorrhagia and pelvic pain- severe dysmenorrhea-    History:  Lashawn  Is a 47 year old female  3 para ( 2 SVDs, 1 SAB- largest baby 8-3 )   who presents today because of 2 year history of menorrhagia-she has had previous history of DVT and so she cannot use estrogen shied away from hormone use.  She has just struggled through the heavy periods which are sometimes irregular.  She has not had a recent ultrasound.  The heavy menses are accompanied by severe dysmenorrhea.    She has type 2 diabetes and recent hemoglobin A1c is 8.2.  No recent CBC or TSH.       Last pap:  2014- NIL        Patient Active Problem List   Diagnosis     Slow transit constipation     Varicose veins of lower extremities with complications     Family history of malignant neoplasm of gastrointestinal tract     Obesity     Type 2 diabetes, HbA1c goal < 7% (H)     Hyperlipidemia with target LDL less than 100     DVT (deep venous thrombosis) (H)     PMS (premenstrual syndrome)     Menorrhagia with regular cycle     Mixed hyperlipidemia     CARDIOVASCULAR SCREENING; LDL GOAL LESS THAN 100     Sigmoid diverticulitis     Sepsis (H)     Lung nodule     Nausea and vomiting     Type 2 diabetes mellitus with hyperglycemia, without long-term current use of insulin (H)     Essential hypertension     Past Surgical History:   Procedure Laterality Date     C ANESTH,LOWER LEG VEIN SURG,NOS      bilateral     C APPENDECTOMY  88     COLONOSCOPY  2013    Procedure: COLONOSCOPY;  colonoscopy;  Surgeon: Vamshi Loomis MD;  Location:  GI     COLONOSCOPY N/A 2017    Procedure: COLONOSCOPY;  Surgeon: Raoul Sage MD;  Location:  GI     HC COLONOSCOPY W/WO BRUSH/WASH  2005     HC DILATION/CURETTAGE DIAG/THER NON OB      after a miscarriage     HC REMOVAL OF TONSILS,<10 Y/O       TUBAL  LIGATION  08/04/2006       Social History   Substance Use Topics     Smoking status: Former Smoker     Years: 16.00     Types: Cigarettes     Quit date: 9/29/2008     Smokeless tobacco: Never Used     Alcohol use No      Comment: couple drinks per year     Family History   Problem Relation Age of Onset     Anesthesia Reaction Mother      Severe nausea     GASTROINTESTINAL DISEASE Mother      Partial colon removal secondary to a blockage     Gynecology Mother      hysterectomy     Lipids Mother      Lipids Father      Cancer - colorectal Father      dx Oct '03 (dx at age 57)     Cancer Father      skin     Hypertension Father      Cancer Maternal Grandmother      colon at age 68     Cerebrovascular Disease Paternal Grandfather      Cerebrovascular Disease Paternal Grandmother      brain aneurysm     Arthritis Sister      mainly affecting her legs     GASTROINTESTINAL DISEASE Sister      2 sisters with colon polyps     Cancer Sister      cervical ca     Cancer Maternal Aunt      all over ? breast was the origin     Cancer Maternal Aunt      pancreatic     HEART DISEASE Maternal Aunt      MI 2010         Current Outpatient Prescriptions   Medication Sig Dispense Refill     acetaminophen (TYLENOL) 325 MG tablet Take 2 tablets (650 mg) by mouth every 4 hours as needed for mild pain 100 tablet      glipiZIDE (GLIPIZIDE XL) 10 MG 24 hr tablet Take 1 tablet (10 mg) by mouth daily 90 tablet 3     metFORMIN (GLUCOPHAGE-XR) 500 MG 24 hr tablet TAKE 4 TABLETS (2,000MG) BY MOUTH EVERY DAY WITH DINNER 360 tablet 0     metFORMIN (GLUCOPHAGE-XR) 500 MG 24 hr tablet Take 2 tablets (1,000 mg) tonight with supper and then go back to your normal regimen of 4 tablets (2,000 mg) tomorrow night with supper. 360 tablet 3     omeprazole (PRILOSEC) 20 MG CR capsule Take 1 capsule (20 mg) by mouth daily (Patient not taking: Reported on 7/9/2018) 90 capsule 3     rosuvastatin (CRESTOR) 20 MG tablet TAKE 1 TABLET BY MOUTH EVERY DAY 90 tablet 1  "    sertraline (ZOLOFT) 50 MG tablet TAKE 1/2 TABLET (25 MG) BY MOUTH EVERY MORNING 45 tablet 1       ROS:  A 12 point systems review is negative except for what is listed above in the Subjective history.             OBJECTIVE:                                                    Vital signs: Pulse 74, temperature 98.2  F (36.8  C), temperature source Temporal, resp. rate 16, height 5' 6\" (1.676 m), weight 253 lb (114.8 kg), last menstrual period 06/18/2018, SpO2 99 %, not currently breastfeeding.    /100 on 1st check    144/90 on second check    HEENT is normal.      Neck is supple, mobile, no adenoapthy or masses palpable. Normal range of motion noted.     Chest is clear to auscultation. No wheezes, rales or rhonchi heard.  cardiac exam is normal with s1, s2, no murmurs or adventitious sounds.Normal rate and rhythm is heard.     Abdomen is soft,  nondistended, No masses felt.No HSM. No guarding or rigidity or rebound noted. Palpation reveals  no    tenderness   Normal bowel sounds heard.  She has healed right lower quadrant scar from open appendectomy and also a subumbilical scar from tubal ligation    Pelvic exam:My nurse Anette   was present to chaperone the exam.    The external genitalia appeared normal.      The vaginal vault was without bleeding  or   discharge or odor.      The cervix was markedly retroverted and that made it difficult to access it.  Also it felt somewhat knobby on the top half possibly consistent with a fibroid.    A pap was obtained.   However it was difficult to access.    No vaginal support defects were noted,      Bimanual exam revealed a 9 week  sized uterus. It does not   descend at all well in the vaginal vault.  She would not be a candidate for vaginal hysterectomy.  The cervix felt anteflexed and knobby and the lower uterine segment feels firm as well so I wonder about a fibroid.    No adnexal masses were felt.      There was no  cervical motion tenderness.      Exam was " severely limited by the patient's body habitus.        With my nurse present, and after receiving informed consent from the patient, I performed the endometrial biopsy in the usual fashion using a standard pipelle. The pipelle sounded to 6 cm. I sent the biopsy for pathology. She tolerated the procedure well. She was instructed to call or present to clinic or ER if she has unusual amount of cramping, bleeding, fever or vaginal discharge.                 ASSESSMENT/PLAN:                                                        1.47 year old female  with Menorrhagia. Differential diagnosis would include:  A. thyroid dysfunction(hypothroid or hyperthroid)- will check TSH      B. uterine leiomyomata(fibroids)- -will check pelvic US      C. endometrial hyperplasia -  await the endometrial biopsy results    D. anovulatory cycles, such as from PCO, stress, weight gain or loss, aging, etc  E.  With the menorrhagia, one must rule out anemia.- will check hgb            2. Severe dysmenorrhea- I wonder about fibroids-    3.  She has type 2 diabetes which is only moderately well controlled.  Before any surgical intervention could be considered this would have to be better controlled.    4.  She has benign essential hypertension and has not treated this.  I advised her to start back on the lisinopril she has been prescribed.    5. Recheck here in 1 week To go over all of her results.                                                                      Thank you for this consultation.    Copy to  Tanner Metz MD  [facility]

## 2018-07-11 ENCOUNTER — OFFICE VISIT (OUTPATIENT)
Dept: FAMILY MEDICINE | Facility: CLINIC | Age: 48
End: 2018-07-11
Payer: COMMERCIAL

## 2018-07-11 ENCOUNTER — HOSPITAL ENCOUNTER (OUTPATIENT)
Dept: ULTRASOUND IMAGING | Facility: CLINIC | Age: 48
Discharge: HOME | End: 2018-07-11
Attending: OBSTETRICS & GYNECOLOGY | Admitting: OBSTETRICS & GYNECOLOGY
Payer: COMMERCIAL

## 2018-07-11 VITALS
WEIGHT: 253 LBS | OXYGEN SATURATION: 97 % | DIASTOLIC BLOOD PRESSURE: 70 MMHG | SYSTOLIC BLOOD PRESSURE: 134 MMHG | TEMPERATURE: 98.3 F | HEIGHT: 66 IN | BODY MASS INDEX: 40.66 KG/M2 | RESPIRATION RATE: 16 BRPM | HEART RATE: 64 BPM

## 2018-07-11 DIAGNOSIS — R10.2 PELVIC PAIN IN FEMALE: ICD-10-CM

## 2018-07-11 DIAGNOSIS — D50.9 IRON DEFICIENCY ANEMIA, UNSPECIFIED IRON DEFICIENCY ANEMIA TYPE: Primary | ICD-10-CM

## 2018-07-11 DIAGNOSIS — N92.1 MENORRHAGIA WITH IRREGULAR CYCLE: ICD-10-CM

## 2018-07-11 LAB
COPATH REPORT: NORMAL
PAP: NORMAL

## 2018-07-11 PROCEDURE — 99213 OFFICE O/P EST LOW 20 MIN: CPT | Performed by: OBSTETRICS & GYNECOLOGY

## 2018-07-11 PROCEDURE — 76830 TRANSVAGINAL US NON-OB: CPT

## 2018-07-11 RX ORDER — FERROUS GLUCONATE 324(38)MG
324 TABLET ORAL
Qty: 60 TABLET | Refills: 1 | Status: ON HOLD | OUTPATIENT
Start: 2018-07-11 | End: 2019-03-04

## 2018-07-11 ASSESSMENT — PAIN SCALES - GENERAL: PAINLEVEL: NO PAIN (0)

## 2018-07-11 NOTE — PROGRESS NOTES
Subjective:  Here to discuss results. The TSH is normal but hgb is low at 10.9   Pap NIL      The US shows a large uterus with multiple fibroids.      The embx is pending.      The past medical history, social history, past surgical history and family history as shown below have been reviewed by me today.  Past Medical History:   Diagnosis Date     CARDIOVASCULAR SCREENING; LDL GOAL LESS THAN 100 6/6/2013     Depressive disorder, not elsewhere classified     depression in the post partum period after her daughter     Diabetes mellitus, type 2 (H) 6/6/2013     Diffuse cystic mastopathy     fibrocystic breast disease     Tobacco use disorder     quit in 2008     Type 2 diabetes, HbA1c goal < 7% (H) 6/6/2013        Allergies   Allergen Reactions     No Known Drug Allergies      Current Outpatient Prescriptions   Medication Sig Dispense Refill     acetaminophen (TYLENOL) 325 MG tablet Take 2 tablets (650 mg) by mouth every 4 hours as needed for mild pain 100 tablet      ferrous gluconate (FERGON) 324 (38 Fe) MG tablet Take 1 tablet (324 mg) by mouth daily (with breakfast) 60 tablet 1     glipiZIDE (GLIPIZIDE XL) 10 MG 24 hr tablet Take 1 tablet (10 mg) by mouth daily 90 tablet 3     metFORMIN (GLUCOPHAGE-XR) 500 MG 24 hr tablet TAKE 4 TABLETS (2,000MG) BY MOUTH EVERY DAY WITH DINNER 360 tablet 0     omeprazole (PRILOSEC) 20 MG CR capsule Take 1 capsule (20 mg) by mouth daily 90 capsule 3     rosuvastatin (CRESTOR) 20 MG tablet TAKE 1 TABLET BY MOUTH EVERY DAY 90 tablet 1     sertraline (ZOLOFT) 50 MG tablet TAKE 1/2 TABLET (25 MG) BY MOUTH EVERY MORNING 45 tablet 1     [DISCONTINUED] metFORMIN (GLUCOPHAGE-XR) 500 MG 24 hr tablet Take 2 tablets (1,000 mg) tonight with supper and then go back to your normal regimen of 4 tablets (2,000 mg) tomorrow night with supper. 360 tablet 3     Past Surgical History:   Procedure Laterality Date     C ANESTH,LOWER LEG VEIN SURG,NOS  2002    bilateral     C APPENDECTOMY  04/14/89      COLONOSCOPY  6/21/2013    Procedure: COLONOSCOPY;  colonoscopy;  Surgeon: Vamshi Loomis MD;  Location: [facility]     COLONOSCOPY N/A 2/22/2017    Procedure: COLONOSCOPY;  Surgeon: Raoul Sage MD;  Location:  GI     HC COLONOSCOPY W/WO BRUSH/WASH  06/17/2005     HC DILATION/CURETTAGE DIAG/THER NON OB  1986    after a miscarriage     HC REMOVAL OF TONSILS,<12 Y/O       TUBAL LIGATION  08/04/2006     Social History     Social History     Marital status:      Spouse name: Tim     Number of children: 2     Years of education: 12     Occupational History     labor      Social History Main Topics     Smoking status: Former Smoker     Years: 16.00     Types: Cigarettes     Quit date: 9/29/2008     Smokeless tobacco: Never Used     Alcohol use No      Comment: couple drinks per year     Drug use: No     Sexual activity: Yes     Partners: Male     Birth control/ protection: Surgical      Comment: tubal ligation     Other Topics Concern      Service No     Blood Transfusions No     Caffeine Concern No     Occupational Exposure No     Hobby Hazards No     Sleep Concern No     Stress Concern Yes     stress at home at work     Weight Concern Yes     has continued to gain weight     Special Diet Yes     trying to eat better     Back Care No     Exercise No     nothing in the past 3 months     Bike Helmet No     NA     Seat Belt Yes     Self-Exams Yes     sometimes     Social History Narrative     Family History   Problem Relation Age of Onset     Anesthesia Reaction Mother      Severe nausea     GASTROINTESTINAL DISEASE Mother      Partial colon removal secondary to a blockage     Gynecology Mother      hysterectomy     Lipids Mother      Lipids Father      Cancer - colorectal Father      dx Oct '03 (dx at age 57)     Cancer Father      skin     Hypertension Father      Cancer Maternal Grandmother      colon at age 68     Cerebrovascular Disease Paternal Grandfather      Cerebrovascular Disease Paternal  "Grandmother      brain aneurysm     Arthritis Sister      mainly affecting her legs     GASTROINTESTINAL DISEASE Sister      2 sisters with colon polyps     Cancer Sister      cervical ca     Cancer Maternal Aunt      all over ? breast was the origin     Cancer Maternal Aunt      pancreatic     HEART DISEASE Maternal Aunt      MI 2010       ROS: A 12 point review of systems was done. Except for what is listed above in the HPI, the systems review is negative .      Objective: Vital signs:  Blood pressure 134/70, pulse 64, temperature 98.3  F (36.8  C), temperature source Temporal, resp. rate 16, height 5' 6\" (1.676 m), weight 253 lb (114.8 kg), last menstrual period 06/18/2018, SpO2 97 %, not currently breastfeeding.    No other exam is repeated today.        Assessment/Plan:A total of 20 minutes were spent face-to-face with this patient during today's consultation, with more than 50% of that time devoted to conversation and counseling about the management decisions.      1. She is 47, has a large uterus with multiple fibroids and menorrhagia and pelvic pain.  The only solution for the pain but also the bleeding will be hysterectomy.  I am going to refer her to the [facility] since I am without a operating partner and I believe this will be a challenging hysterectomy.  She will wait for her consultation until she comes back from a trip to [place] which will take about a month.    2.  She has anemia which I am certain is iron deficiency.  Will start iron supplementation.  See Rx.    3.  I am awaiting the official ultrasound report I saw only a preliminary report.  4.  We will call her with the endometrial biopsy results.  5.  We have placed a referral to be evaluated by the [facility] gynecology.      MAGDALENA Sherman MD              "

## 2018-07-11 NOTE — MR AVS SNAPSHOT
After Visit Summary   7/11/2018    Lasahwn Reddy    MRN: [id number]           Patient Information     Date Of Birth          1970        Visit Information        Provider Department      7/11/2018 2:30 PM Raoul Sherman MD [facility]        Today's Diagnoses     Iron deficiency anemia, unspecified iron deficiency anemia type    -  1       Follow-ups after your visit        Additional Services     OB/GYN REFERRAL       Your provider has referred you to:  [facility]: [facility] [phone]   [email or web address]    Please be aware that coverage of these services is subject to the terms and limitations of your health insurance plan.  Call member services at your health plan with any benefit or coverage questions.      Please bring the following with you to your appointment:    (1) Any X-Rays, CTs or MRIs which have been performed.  Contact the facility where they were done to arrange for  prior to your scheduled appointment.   (2) List of current medications   (3) This referral request   (4) Any documents/labs given to you for this referral                  Your next 10 appointments already scheduled     Jul 12, 2018  4:00 PM CDT   PROCEDURE with Tanner Metz MD, NL PROC ROOM ONE [facility] ([facility])    [address] [place] [address] [phone]              Who to contact     If you have questions or need follow up information about today's clinic visit or your schedule please contact [facility] directly at [phone].  Normal or non-critical lab and imaging results will be communicated to you by MyChart, letter or phone within 4 business days after the clinic has received the results. If you do not hear from us within 7 days, please contact the clinic through MyChart or phone. If you have a critical or  "abnormal lab result, we will notify you by phone as soon as possible.  Submit refill requests through [employer] or call your pharmacy and they will forward the refill request to us. Please allow 3 business days for your refill to be completed.          Additional Information About Your Visit        [employer] Information     [employer] gives you secure access to your electronic health record. If you see a primary care provider, you can also send messages to your care team and make appointments. If you have questions, please call your primary care clinic.  If you do not have a primary care provider, please call [phone] and they will assist you.        Care EveryWhere ID     This is your Care EveryWhere ID. This could be used by other organizations to access your [facility] medical records  [id number]        Your Vitals Were     Pulse Temperature Respirations Height Last Period Pulse Oximetry    64 98.3  F (36.8  C) (Temporal) 16 5' 6\" (1.676 m) 06/18/2018 97%    Breastfeeding? BMI (Body Mass Index)                No 40.84 kg/m2           Blood Pressure from Last 3 Encounters:   07/11/18 134/70   07/09/18 144/90   10/18/17 142/88    Weight from Last 3 Encounters:   07/11/18 253 lb (114.8 kg)   07/09/18 253 lb (114.8 kg)   10/18/17 253 lb (114.8 kg)              We Performed the Following     OB/GYN REFERRAL          Today's Medication Changes          These changes are accurate as of 7/11/18  4:01 PM.  If you have any questions, ask your nurse or doctor.               Start taking these medicines.        Dose/Directions    ferrous gluconate 324 (38 Fe) MG tablet   Commonly known as:  FERGON   Used for:  Iron deficiency anemia, unspecified iron deficiency anemia type   Started by:  Raoul Sherman MD        Dose:  324 mg   Take 1 tablet (324 mg) by mouth daily (with breakfast)   Quantity:  60 tablet   Refills:  1            Where to get your medicines      These medications were sent to [id number] - " [place], [place] - [address] [place] [address]  [address] [place], [place] [address] [address]    Hours:  M-F 8:30-6:30; Sat 9-4; closed Sunday Phone:  [phone]     ferrous gluconate 324 (38 Fe) MG tablet                Primary Care Provider Office Phone # Fax #    Tanner TAMARA Metz [phone]       [address] [place] [address]        Equal Access to Services     CAMI SAUL AH: Hadii aad ku hadasho Soomaali, waaxda luqadaha, qaybta kaalmada adeegyada, waxay idiin hayaan adeeg kharash la'nadine . So [facility] [phone].    ATENCIÓN: Si habla español, tiene a obrien disposición servicios gratuitos de asistencia lingüística. [facility] [phone].    We comply with applicable federal civil rights laws and [place] laws. We do not discriminate on the basis of race, color, national origin, age, disability, sex, sexual orientation, or gender identity.            Thank you!     Thank you for choosing [facility]  for your care. Our goal is always to provide you with excellent care. Hearing back from our patients is one way we can continue to improve our services. Please take a few minutes to complete the written survey that you may receive in the mail after your visit with us. Thank you!             Your Updated Medication List - Protect others around you: Learn how to safely use, store and throw away your medicines at www.disposemymeds.org.          This list is accurate as of 7/11/18  4:01 PM.  Always use your most recent med list.                   Brand Name Dispense Instructions for use Diagnosis    acetaminophen 325 MG tablet    TYLENOL    100 tablet    Take 2 tablets (650 mg) by mouth every 4 hours as needed for mild pain        ferrous gluconate 324 (38 Fe) MG tablet    FERGON    60 tablet    Take 1 tablet (324 mg) by mouth daily (with breakfast)    Iron deficiency anemia, unspecified iron deficiency anemia type       glipiZIDE 10 MG 24 hr tablet    glipiZIDE XL    90 tablet    Take 1 tablet (10 mg)  by mouth daily    Type 2 diabetes mellitus with hyperglycemia, without long-term current use of insulin (H)       metFORMIN 500 MG 24 hr tablet    GLUCOPHAGE-XR    360 tablet    TAKE 4 TABLETS (2,000MG) BY MOUTH EVERY DAY WITH DINNER    Type 2 diabetes mellitus with hyperglycemia (H)       omeprazole 20 MG CR capsule    priLOSEC    90 capsule    Take 1 capsule (20 mg) by mouth daily    Gastroesophageal reflux disease without esophagitis       rosuvastatin 20 MG tablet    CRESTOR    90 tablet    TAKE 1 TABLET BY MOUTH EVERY DAY    Mixed hyperlipidemia, Type 2 diabetes mellitus with hyperglycemia, without long-term current use of insulin (H)       sertraline 50 MG tablet    ZOLOFT    45 tablet    TAKE 1/2 TABLET (25 MG) BY MOUTH EVERY MORNING    PMS (premenstrual syndrome)

## 2018-07-12 ENCOUNTER — TELEPHONE (OUTPATIENT)
Dept: FAMILY MEDICINE | Facility: CLINIC | Age: 48
End: 2018-07-12

## 2018-07-12 LAB
FINAL DIAGNOSIS: NORMAL
HPV HR 12 DNA CVX QL NAA+PROBE: NEGATIVE
HPV16 DNA SPEC QL NAA+PROBE: NEGATIVE
HPV18 DNA SPEC QL NAA+PROBE: NEGATIVE
SPECIMEN DESCRIPTION: NORMAL
SPECIMEN SOURCE CVX/VAG CYTO: NORMAL

## 2018-07-12 NOTE — TELEPHONE ENCOUNTER
Called patient and left message to call the clinic back.  MJP/MA   I did ask some other providers, Dr Melissa, Dr. Roy and Dr Anna and there is no openings for a mole removal today. She will need to reschedule with  or another Provider.  MP/MA

## 2018-07-12 NOTE — TELEPHONE ENCOUNTER
Reason for Call:  Same Day Appointment    PCP: Tanner Metz    Reason for visit: Lesion removal    Duration of symptoms:     Have you been treated for this in the past? Yes    Additional comments: Pt has appt today at 4 to have this removal done. Pt is leaving out of town on Monday for 3 weeks and would like to do it before she leaves. She will be very disappointed if she can't get this done today? Can another provider work her in today around that 4 spot?     Can we leave a detailed message on this number? NO    Phone number patient can be reached at: Other phone number:      Best Time:     Call taken on 7/12/2018 at 2:01 PM by Manda Meyers

## 2018-07-13 LAB — COPATH REPORT: NORMAL

## 2018-07-14 NOTE — PROGRESS NOTES
Anette Please inform Lashawn/ or caretaker  that this result(s) is/are normal.  The biopsy of the uterine lining is normal-Thanks. MAGDALENA Sherman MD

## 2018-07-16 NOTE — PROGRESS NOTES
Pt notified of labs results, via [employer] with providers comments.  Anette Jovel, CMA

## 2018-07-18 NOTE — TELEPHONE ENCOUNTER
Left message for patient to return call. Will close encounter till patient calls back due to her being out of town and would return call  Christal Tse MA 7/18/2018

## 2018-08-17 DIAGNOSIS — E11.65 TYPE 2 DIABETES MELLITUS WITH HYPERGLYCEMIA (H): ICD-10-CM

## 2018-08-20 RX ORDER — METFORMIN HCL 500 MG
TABLET, EXTENDED RELEASE 24 HR ORAL
Qty: 360 TABLET | Refills: 1 | Status: SHIPPED | OUTPATIENT
Start: 2018-08-20 | End: 2019-02-13

## 2018-08-20 NOTE — TELEPHONE ENCOUNTER
"Requested Prescriptions   Pending Prescriptions Disp Refills     metFORMIN (GLUCOPHAGE-XR) 500 MG 24 hr tablet [Pharmacy Med Name: METFORMIN 500MG ER TAB] 360 tablet      Sig: TAKE 4 TABLETS (2,000MG) BY MOUTH EVERY DAY WITH DINNER    Biguanide Agents Passed    8/17/2018  5:49 PM       Passed - Blood pressure less than 140/90 in past 6 months    BP Readings from Last 3 Encounters:   07/11/18 134/70   07/09/18 144/90   10/18/17 142/88                Passed - Patient has documented LDL within the past 12 mos.    Recent Labs   Lab Test  10/16/17   0755   LDL  33            Passed - Patient has had a Microalbumin in the past 12 mos.    Recent Labs   Lab Test  03/30/18   0805   MICROL  32   UMALCR  13.58            Passed - Patient is age 10 or older       Passed - Patient has documented A1c within the specified period of time.    If HgbA1C is 8 or greater, it needs to be on file within the past 3 months.  If less than 8, must be on file within the past 6 months.     Recent Labs   Lab Test  07/02/18   0805   A1C  8.2*            Passed - Patient's CR is NOT>1.4 OR Patient's EGFR is NOT<45 within past 12 mos.    Recent Labs   Lab Test  03/30/18   0755   GFRESTIMATED  >90   GFRESTBLACK  >90       Recent Labs   Lab Test  03/30/18   0755   CR  0.57            Passed - Patient does NOT have a diagnosis of CHF.       Passed - Patient is not pregnant       Passed - Patient has not had a positive pregnancy test within the past 12 mos.        Passed - Recent (6 mo) or future (30 days) visit within the authorizing provider's specialty    Patient had office visit in the last 6 months or has a visit in the next 30 days with authorizing provider or within the authorizing provider's specialty.  See \"Patient Info\" tab in inbasket, or \"Choose Columns\" in Meds & Orders section of the refill encounter.            Last Written Prescription Date:  5/18/2018  Last Fill Quantity: 360,  # refills: 0   Last office visit: 7/11/2018 with " prescribing provider:  11/2/2017   Future Office Visit:

## 2018-08-20 NOTE — TELEPHONE ENCOUNTER
Prescription approved per [facility] Refill Protocol.    Una Maria RN

## 2018-08-21 ENCOUNTER — OFFICE VISIT (OUTPATIENT)
Dept: OBGYN | Facility: CLINIC | Age: 48
End: 2018-08-21
Payer: COMMERCIAL

## 2018-08-21 VITALS
SYSTOLIC BLOOD PRESSURE: 146 MMHG | BODY MASS INDEX: 40.79 KG/M2 | HEIGHT: 66 IN | WEIGHT: 253.8 LBS | DIASTOLIC BLOOD PRESSURE: 87 MMHG | HEART RATE: 83 BPM

## 2018-08-21 DIAGNOSIS — D25.1 INTRAMURAL AND SUBMUCOUS LEIOMYOMA OF UTERUS: ICD-10-CM

## 2018-08-21 DIAGNOSIS — Z86.718 PERSONAL HISTORY OF DVT (DEEP VEIN THROMBOSIS): ICD-10-CM

## 2018-08-21 DIAGNOSIS — E11.9 TYPE 2 DIABETES MELLITUS WITHOUT COMPLICATION, WITHOUT LONG-TERM CURRENT USE OF INSULIN (H): ICD-10-CM

## 2018-08-21 DIAGNOSIS — D64.9 ANEMIA, UNSPECIFIED TYPE: ICD-10-CM

## 2018-08-21 DIAGNOSIS — N93.9 ABNORMAL UTERINE BLEEDING: Primary | ICD-10-CM

## 2018-08-21 DIAGNOSIS — D25.0 INTRAMURAL AND SUBMUCOUS LEIOMYOMA OF UTERUS: ICD-10-CM

## 2018-08-21 NOTE — MR AVS SNAPSHOT
After Visit Summary   8/21/2018    Lashawn Reddy    MRN: [id number]           Patient Information     Date Of Birth          1970        Visit Information        Provider Department      8/21/2018 2:15 PM Suad Robledo MD [facility]        Today's Diagnoses     Abnormal uterine bleeding    -  1    Intramural and submucous leiomyoma of uterus        Anemia, unspecified type        Personal history of DVT (deep vein thrombosis)        Type 2 diabetes mellitus without complication, without long-term current use of insulin (H)           Follow-ups after your visit        Your next 10 appointments already scheduled     Aug 30, 2018  8:00 AM CDT   [facility]   [facility]    [address] [facility] [id number] [phone]            Sep 12, 2018   Procedure with Temi Corado MD   [facility], Same Day Surgery (--)    [address] [id number] [phone]            Oct 25, 2018 12:45 PM CDT   Return Visit with Temi Corado MD   [facility] ([facility])    [facility] [address]  [address]  [facility] [place] [address] [phone]              Who to contact     Please call your clinic at [phone] to:    Ask questions about your health    Make or cancel appointments    Discuss your medicines    Learn about your test results    Speak to your doctor            Additional Information About Your Visit        [employer] Information     [employer] gives you secure access to your electronic health record. If you see a primary care provider, you can also send messages to your care team and make appointments. If you have questions, please call your primary care clinic.  If you do not have a primary care provider, please call [phone] and they will assist you.      [employer] is an electronic gateway  "that provides easy, online access to your medical records. With [employer], you can request a clinic appointment, read your test results, renew a prescription or communicate with your care team.     To access your existing account, please contact your [facility] or call [phone] for assistance.        Care EveryWhere ID     This is your Care EveryWhere ID. This could be used by other organizations to access your [facility] medical records  [id number]        Your Vitals Were     Pulse Height Last Period BMI (Body Mass Index)          83 1.676 m (5' 6\") 08/12/2018 (Exact Date) 40.96 kg/m2         Blood Pressure from Last 3 Encounters:   08/21/18 146/87   07/11/18 134/70   07/09/18 144/90    Weight from Last 3 Encounters:   08/21/18 115.1 kg (253 lb 12.8 oz)   07/11/18 114.8 kg (253 lb)   07/09/18 114.8 kg (253 lb)              We Performed the Following     [facility]aphne-Operative Worksheet (Total Abdominal Hysterectomy)        Primary Care Provider Office Phone # Fax #    Tanner Metz [phone]       [address] [place] [address]        Equal Access to Services     CAMI SAUL : Hadii ashley cliffordo Sogaye, waaxda luqadaha, qaybta kaalmada adeegyada, yoli mckinney . So [facility] [phone].    ATENCIÓN: Si habla español, tiene a obrien disposición servicios gratuitos de asistencia lingüística. Llame al [phone].    We comply with applicable federal civil rights laws and [place] laws. We do not discriminate on the basis of race, color, national origin, age, disability, sex, sexual orientation, or gender identity.            Thank you!     Thank you for choosing [facility]  for your care. Our goal is always to provide you with excellent care. Hearing back from our patients is one way we can continue to improve our services. Please take a few minutes to complete the written survey that you may receive in the " mail after your visit with us. Thank you!             Your Updated Medication List - Protect others around you: Learn how to safely use, store and throw away your medicines at www.disposemymeds.org.          This list is accurate as of 8/21/18 11:59 PM.  Always use your most recent med list.                   Brand Name Dispense Instructions for use Diagnosis    acetaminophen 325 MG tablet    TYLENOL    100 tablet    Take 2 tablets (650 mg) by mouth every 4 hours as needed for mild pain        ferrous gluconate 324 (38 Fe) MG tablet    FERGON    60 tablet    Take 1 tablet (324 mg) by mouth daily (with breakfast)    Iron deficiency anemia, unspecified iron deficiency anemia type       glipiZIDE 10 MG 24 hr tablet    glipiZIDE XL    90 tablet    Take 1 tablet (10 mg) by mouth daily    Type 2 diabetes mellitus with hyperglycemia, without long-term current use of insulin (H)       metFORMIN 500 MG 24 hr tablet    GLUCOPHAGE-XR    360 tablet    TAKE 4 TABLETS (2,000MG) BY MOUTH EVERY DAY WITH DINNER    Type 2 diabetes mellitus with hyperglycemia (H)       omeprazole 20 MG CR capsule    priLOSEC    90 capsule    Take 1 capsule (20 mg) by mouth daily    Gastroesophageal reflux disease without esophagitis       rosuvastatin 20 MG tablet    CRESTOR    90 tablet    TAKE 1 TABLET BY MOUTH EVERY DAY    Mixed hyperlipidemia, Type 2 diabetes mellitus with hyperglycemia, without long-term current use of insulin (H)       sertraline 50 MG tablet    ZOLOFT    45 tablet    TAKE 1/2 TABLET (25 MG) BY MOUTH EVERY MORNING    PMS (premenstrual syndrome)

## 2018-08-21 NOTE — PROGRESS NOTES
[facility]  Gynecology Visit    Reason for Consult: Definitive management of AUB  Consulting Provider: MAGDALENA Sherman MD    HPI:    Lashawn Reddy is a 47 year old , here for further discussion and management of abnormal uterine bleeding. Her past medical history is notable for type II diabetes mellitus which is moderately controlled and history of DVT.    Per her report, she has been dealing with abnormal uterine bleeding for several years. She states that she had fairly regular menstrual cycles throughout her life until about 2-3 years ago. At that time, they worsened significantly. She reports needing to wear 3-4 pads at once and changing them every 2-3 hours. The duration of her periods were longer at a total of 7 days and the quantity of menses also became heavy and included passage of clots. Her menstrual cycle is occurring at regular intervals q28-30 days. She intermittently will notice some light spotting the week prior to her menses. Additional symptoms noted at the time of her bleeding, includes extensive pelvic pain/dysmenorrhea. She denies any symptoms of bloating, change in bowel habit, or constipation. She is currently taking iron supplementation for management of anemia.  Her mother also had a hysterectomy for heavy bleeding.      GYN History  - LMP: Patient's last menstrual period was 2018 (exact date).  - Menses: Menarche: 14. As listed in HPI  - Pap Smears: Never any abnormal pap smears or procedures on cervix     Lab Results   Component Value Date    PAP NIL 2018    PAP NIL 2014    PAP UNSAT 10/14/2014     - Contraception: Has taken OCPs in the past, but it has been at least 13-15 years ago since her last use.  - Sexual Activity/Concerns: Denies  - Hx STIs/UTIs: Denies    OBHx  Obstetric History       T2      L2     SAB1   TAB0   Ectopic0   Multiple0   Live Births2       # Outcome Date GA Lbr Ezio/2nd Weight Sex Delivery Anes PTL Lv   3 Term 99 39w0d  06:00 3.657 kg (8 lb 1 oz) M    CYNTHIA      Name: Paul Goodrich Term 96 37w0d 13:00 3.6 kg (7 lb 15 oz) F    CYNTHIA      Name: Jessica Lawrence SAB 86 6w0d    INCOMPLETE C             PMHx:   Past Medical History:   Diagnosis Date     CARDIOVASCULAR SCREENING; LDL GOAL LESS THAN 100 2013     Depressive disorder, not elsewhere classified     depression in the post partum period after her daughter     Diabetes mellitus, type 2 (H) 2013     Diffuse cystic mastopathy     fibrocystic breast disease     Tobacco use disorder     quit in      Type 2 diabetes, HbA1c goal < 7% (H) 2013       PSHx:   Past Surgical History:   Procedure Laterality Date     C ANESTH,LOWER LEG VEIN SURG,NOS      bilateral     C APPENDECTOMY  88     COLONOSCOPY  2013    Procedure: COLONOSCOPY;  colonoscopy;  Surgeon: Vamshi Loomis MD;  Location: [facility]     COLONOSCOPY N/A 2017    Procedure: COLONOSCOPY;  Surgeon: Raoul Sage MD;  Location: [facility]     HC COLONOSCOPY W/WO BRUSH/WASH  2005     HC DILATION/CURETTAGE DIAG/THER NON OB  1986    after a miscarriage     HC REMOVAL OF TONSILS,<13 Y/O       TUBAL LIGATION  2006       Meds:   Current Outpatient Prescriptions   Medication     acetaminophen (TYLENOL) 325 MG tablet     ferrous gluconate (FERGON) 324 (38 Fe) MG tablet     glipiZIDE (GLIPIZIDE XL) 10 MG 24 hr tablet     metFORMIN (GLUCOPHAGE-XR) 500 MG 24 hr tablet     omeprazole (PRILOSEC) 20 MG CR capsule     rosuvastatin (CRESTOR) 20 MG tablet     sertraline (ZOLOFT) 50 MG tablet     No current facility-administered medications for this visit.        Allergies:     Allergies   Allergen Reactions     No Known Drug Allergies        SocHx:  Quit smoking. No alcohol, or illicit drug. Works as a . Her job consistently mainly of tasks completed at a desk.  Social History     Social History     Marital status:      Spouse name: Tim     Number of children: 2      Years of education: 12     Occupational History     labor      Social History Main Topics     Smoking status: Former Smoker     Years: 16.00     Types: Cigarettes     Quit date: 9/29/2008     Smokeless tobacco: Never Used     Alcohol use No      Comment: couple drinks per year     Drug use: No     Sexual activity: Yes     Partners: Male     Birth control/ protection: Surgical      Comment: tubal ligation     Other Topics Concern      Service No     Blood Transfusions No     Caffeine Concern No     Occupational Exposure No     Hobby Hazards No     Sleep Concern No     Stress Concern Yes     stress at home at work     Weight Concern Yes     has continued to gain weight     Special Diet Yes     trying to eat better     Back Care No     Exercise No     nothing in the past 3 months     Bike Helmet No     NA     Seat Belt Yes     Self-Exams Yes     sometimes     Social History Narrative     FamHx:    Reports sister had potential gynecological cancer. However, states she never had any surgery, chemo, or radiation. She also states that she is continuing regular screening. Suspicion that her sister may have pre-malignant lesion of the cervix.    Denies family history of uterine cancer, ovarian cancer, or breast cancer    Family History   Problem Relation Age of Onset     Anesthesia Reaction Mother      Severe nausea     GASTROINTESTINAL DISEASE Mother      Partial colon removal secondary to a blockage     Gynecology Mother      hysterectomy     Lipids Mother      Lipids Father      Cancer - colorectal Father      dx Oct '03 (dx at age 57)     Cancer Father      skin     Hypertension Father      Cancer Maternal Grandmother      colon at age 68     Cerebrovascular Disease Paternal Grandfather      Cerebrovascular Disease Paternal Grandmother      brain aneurysm     Arthritis Sister      mainly affecting her legs     GASTROINTESTINAL DISEASE Sister      2 sisters with colon polyps     Cancer Sister      cervical ca  "    Cancer Maternal Aunt      all over ? breast was the origin     Cancer Maternal Aunt      pancreatic     HEART DISEASE Maternal Aunt      MI 2010     ROS: 10-Point ROS negative except as noted in HPI    Physical Exam  Ht 1.676 m (5' 6\")  Wt 115.1 kg (253 lb 12.8 oz)  LMP 08/12/2018 (Exact Date)  BMI 40.96 kg/m2  Gen: Well-appearing, NAD  HEENT: Normocephalic, atraumatic  Neck: Thyroid is not enlarged, no appreciable masses palpated. Non-tender  CV:  RRR, no m/r/g auscultated  Pulm: CTAB, no w/r/r auscultated  Abd: Soft, non-tender, non-distended  Ext: No LE edema, extremities warm and well perfused    Pelvic:  Normal appearing external female genitalia. Normal hair distribution. Vagina is without lesions. Physiologic discharge. Cervix multiparous, no lesions, no cervical motion tenderness. Uterus is roughly 12 weeks in size, mobile, non-tender. No adnexal tenderness or masses    Pelvic US:  FINDINGS: The uterus is markedly enlarged measuring 13.4 x 2.9 x 10.0 cm. Multiple fibroids are seen in the uterus with the largest in the posterior uterine body measuring 8.7 x 6.9 x 8.6 cm. This fibroid is mostly myometrial but is also submucosal. Another smaller fibroid is seen along the uterine fundus on the right measuring 2.7 x 2.5 x 2.8 cm. Endometrial stripe measures 0.7 cm in thickness and is limited in evaluation due to the large uterine fibroid.     The right ovary measures 4.2 x 4.0 x 3.4 cm and contains a simple appearing cystic structure measuring up to 2.9 cm in diameter likely representing a dominant follicle. The left ovary is not seen and cannot be evaluated. There is trace amount of free fluid in the pelvis. Color and spectral Doppler analysis demonstrate arterial and venous waveforms in the right ovary.     IMPRESSION:  1. Enlarged uterus with several fibroids. The largest fibroid is myometrial and submucosal and could be associated with the patient's pelvic pain and menorrhagia.  2. Right ovary contains " a probable dominant follicle measuring up to 2.9 cm.  3. Left ovary is not seen and cannot be evaluated.  4. Physiologic amount of free fluid is seen in the pelvis.    Labs:  18: Hgb A1C 8.2  18: TSH 3.16  18: Pap smear NILM, HPV negative  18: Hgb 10.9, Plt 283  18: EMBx: No diagnostic abnormalities identified, benign late secretory endometrium without atypia.    Assessment/Plan:  Lashawn Reddy is a 47 year old , here for further discussion and management of abnormal uterine bleeding. Her past medical history is notable for type II diabetes mellitus which is moderately controlled and history of DVT.    - Abnormal uterine bleeding- Leiomyoma   - Several year history of abnormal uterine bleeding.   - Ultrasound with evidence of multiple large fibroids, the largest of which is in the posterior uterine body abutting the cervix.   - Endometrial biopsy without evidence of atypia or malignancy.   - Discussed various options for management of continued AUB. Reviewed that she is not a candidate for hormonal methods that include estrogen d/t her history of DVT, however, she would be a candidate for progesterone only therapy. IUD not likely to be successful d/t fibroid distortion of cavity and its large extent. Alternative therapy could include UAE or surgical intervention. Reviewed type of hysterectomy, including vaginal, laparoscopic, and open methods, but discussed that based on her ultrasound recommendations and close proximity of fibroid to site of colpotomy, recommendations would be made to proceed with an open procedure if she desired definitive management.  Also reviewed recommendation for removal of fallopian tubes at the time of surgery. Also reviewed that we would evaluate her right ovary at the time of surgery due to recent findings of cyst. If appears normal, the plan would be to leave bilateral ovaries in place as there are significant health benefits derived from ovaries prior to the  age of 59.  Reviewed risks of surgery, including bleeding, infection, and injury to surrounding organs. After complete discussion, patient wished for definitive management with surgery. Orders placed. Discussed that surgery scheduling would be in touch.    - Pre-operative planning. Patient will require pre-operative clearance by PCP. In addition, she should work on optimization of her diabetes regimen. Currently on glipizide and metformin with recent decrease of A1c to 8.2. Prior history of provoked DVT. Discussed post-operative management with Lovenox and patient is amenable.    Staffed with Dr. Mireille Robledo MD  Ob/Gyn, PGY-4  8/21/2018, 2:05 PM    I agree with note as above. The patient was seen in continuity clinic by the resident doctor.  Assessment and plan were jointly made.  Carey Kendrick MD

## 2018-08-22 ENCOUNTER — TELEPHONE (OUTPATIENT)
Dept: OBGYN | Facility: CLINIC | Age: 48
End: 2018-08-22

## 2018-08-22 NOTE — TELEPHONE ENCOUNTER
Confirmed surgery date with patient 9/12/18 with arrival time at 5:30a.m with nothing to eat eight hours before scheduled surgery time and clear liquids up to two hours before scheduled surgery time, informed patient that a h&p is needed before surgery and that a map and letter will be mailed out.     to complete the following fields:            CHECKLIST     Google Calendar : Yes     Resident notified:YES      Clinic schedule blocked:  Not Applicable    Patient notified:Yes      Pre op information sent: Yes     Given to patient over the phone.Yes    Comments:

## 2018-08-30 ENCOUNTER — ALLIED HEALTH/NURSE VISIT (OUTPATIENT)
Dept: EDUCATION SERVICES | Facility: CLINIC | Age: 48
End: 2018-08-30
Payer: COMMERCIAL

## 2018-08-30 ENCOUNTER — OFFICE VISIT (OUTPATIENT)
Dept: FAMILY MEDICINE | Facility: CLINIC | Age: 48
End: 2018-08-30
Payer: COMMERCIAL

## 2018-08-30 VITALS
SYSTOLIC BLOOD PRESSURE: 144 MMHG | DIASTOLIC BLOOD PRESSURE: 86 MMHG | BODY MASS INDEX: 40.98 KG/M2 | HEIGHT: 66 IN | WEIGHT: 255 LBS | RESPIRATION RATE: 18 BRPM | HEART RATE: 84 BPM | OXYGEN SATURATION: 99 % | TEMPERATURE: 98.1 F

## 2018-08-30 DIAGNOSIS — E78.2 MIXED HYPERLIPIDEMIA: ICD-10-CM

## 2018-08-30 DIAGNOSIS — N92.0 MENORRHAGIA WITH REGULAR CYCLE: ICD-10-CM

## 2018-08-30 DIAGNOSIS — E66.01 MORBID OBESITY (H): ICD-10-CM

## 2018-08-30 DIAGNOSIS — E11.65 TYPE 2 DIABETES MELLITUS WITH HYPERGLYCEMIA, WITHOUT LONG-TERM CURRENT USE OF INSULIN (H): Primary | ICD-10-CM

## 2018-08-30 DIAGNOSIS — Z86.718 PERSONAL HISTORY OF DVT (DEEP VEIN THROMBOSIS): ICD-10-CM

## 2018-08-30 DIAGNOSIS — Z01.818 PREOP GENERAL PHYSICAL EXAM: Primary | ICD-10-CM

## 2018-08-30 DIAGNOSIS — I10 BENIGN ESSENTIAL HYPERTENSION: ICD-10-CM

## 2018-08-30 DIAGNOSIS — E11.65 TYPE 2 DIABETES MELLITUS WITH HYPERGLYCEMIA, WITHOUT LONG-TERM CURRENT USE OF INSULIN (H): ICD-10-CM

## 2018-08-30 LAB
ANION GAP SERPL CALCULATED.3IONS-SCNC: 5 MMOL/L (ref 3–14)
BUN SERPL-MCNC: 11 MG/DL (ref 7–30)
CALCIUM SERPL-MCNC: 8.1 MG/DL (ref 8.5–10.1)
CHLORIDE SERPL-SCNC: 106 MMOL/L (ref 94–109)
CO2 SERPL-SCNC: 28 MMOL/L (ref 20–32)
CREAT SERPL-MCNC: 0.89 MG/DL (ref 0.52–1.04)
GFR SERPL CREATININE-BSD FRML MDRD: 67 ML/MIN/1.7M2
GLUCOSE SERPL-MCNC: 124 MG/DL (ref 70–99)
POTASSIUM SERPL-SCNC: 4.2 MMOL/L (ref 3.4–5.3)
SODIUM SERPL-SCNC: 139 MMOL/L (ref 133–144)

## 2018-08-30 PROCEDURE — G0108 DIAB MANAGE TRN  PER INDIV: HCPCS

## 2018-08-30 PROCEDURE — 36415 COLL VENOUS BLD VENIPUNCTURE: CPT | Performed by: FAMILY MEDICINE

## 2018-08-30 PROCEDURE — 80048 BASIC METABOLIC PNL TOTAL CA: CPT | Performed by: FAMILY MEDICINE

## 2018-08-30 PROCEDURE — 93000 ELECTROCARDIOGRAM COMPLETE: CPT | Performed by: FAMILY MEDICINE

## 2018-08-30 PROCEDURE — 99215 OFFICE O/P EST HI 40 MIN: CPT | Performed by: FAMILY MEDICINE

## 2018-08-30 RX ORDER — LISINOPRIL 10 MG/1
10 TABLET ORAL DAILY
Qty: 30 TABLET | Refills: 1 | Status: ON HOLD | OUTPATIENT
Start: 2018-08-30 | End: 2018-11-17

## 2018-08-30 RX ORDER — LANCETS
1 EACH MISCELLANEOUS 2 TIMES DAILY
Qty: 102 EACH | Refills: 3 | Status: SHIPPED | OUTPATIENT
Start: 2018-08-30 | End: 2022-08-25

## 2018-08-30 ASSESSMENT — PAIN SCALES - GENERAL: PAINLEVEL: NO PAIN (0)

## 2018-08-30 NOTE — Clinical Note
Patient has been checking her blood sugars daily up until her surgery in 2 weeks.  If her blood sugars are not consistently less than 200, she needs to be contacting us to help her get her sugars adequately controlled prior to her surgery.

## 2018-08-30 NOTE — PROGRESS NOTES
[address] [address]  [phone]  Dept: [phone]    PRE-OP EVALUATION:  Today's date: 2018    Lashawn Reddy (: 1970) presents for pre-operative evaluation assessment as requested by Dr. Corado .  She requires evaluation and anesthesia risk assessment prior to undergoing surgery/procedure for treatment of hysterectomy  .    Fax number for surgical facility:   Primary Physician: Mu Stiles   Type of Anesthesia Anticipated: General    Patient has a Health Care Directive or Living Will:  NO    Preop Questions 2018   Who is doing your surgery? Dr   At the    What are you having done? hysterectomy   Date of Surgery/Procedure: 2018   Facility or Hospital where procedure/surgery will be performed:    1.  Do you have a history of Heart attack, stroke, stent, coronary bypass surgery, or other heart surgery? No   2.  Do you ever have any pain or discomfort in your chest? No   3.  Do you have a history of  Heart Failure? No   4.   Are you troubled by shortness of breath when:  walking on a level surface, or up a slight hill, or at night? No   5.  Do you currently have a cold, bronchitis or other respiratory infection? No   6.  Do you have a cough, shortness of breath, or wheezing? No   7.  Do you sometimes get pains in the calves of your legs when you walk? No   8. Do you or anyone in your family have previous history of blood clots? YES - 4 years ago.  She fell, it was provoked.  Was not recommended to continue anticoagulation.   9.  Do you or does anyone in your family have a serious bleeding problem such as prolonged bleeding following surgeries or cuts? No   10. Have you ever had problems with anemia or been told to take iron pills? YES -  Due to blood loss from heavy periods.  Not taking the iron due to constipation.    11. Have you had any abnormal blood loss such as black, tarry or bloody stools, or abnormal vaginal bleeding?  YES - heavy periods (reason for surgery)   12. Have you ever had a blood transfusion? No   13. Have you or any of your relatives ever had problems with anesthesia? YES - mom gets sick    14. Do you have sleep apnea, excessive snoring or daytime drowsiness? No   15. Do you have any prosthetic heart valves? No   16. Do you have prosthetic joints? No   17. Is there any chance that you may be pregnant? No         HPI:     HPI related to upcoming procedure: is having history of heavy periods.  Was recommend have a hysterectomy due to lots of fibroids.  Is having procedure at the [facility] due to likely complexity of surgery.    Patient has diabetes that is not well controlled.  She recently got started on glipizide XL for helping with her diabetes management.  She is not certain of what her blood sugar is that she does not check her sugars daily and does not have a glucometer or test strips.  She has not seen a diabetic educator for a long time.  Her last hemoglobin A1c was 8.2, done 6 weeks ago and is decreased from her previous.    Patient has hypertension and is supposed to be taking lisinopril.  This was prescribed to her 9 months ago.  She states that she has the prescription at home and has just not started taking it.    See problem list for all other active medical problems.  All other problems are longstanding and stable, except as noted/documented.  See ROS for pertinent symptoms related to these conditions.                                                                                                                                                          .    MEDICAL HISTORY:     Patient Active Problem List    Diagnosis Date Noted     Personal history of DVT (deep vein thrombosis) 08/30/2018     Priority: Medium     Morbid obesity (H) 07/09/2018     Priority: Medium     Essential hypertension 10/23/2017     Priority: Medium     Type 2 diabetes mellitus with hyperglycemia, without long-term  current use of insulin (H) 02/09/2017     Priority: Medium     History of diverticulitis 11/28/2016     Priority: Medium     Lung nodule 11/28/2016     Priority: Medium     Mixed hyperlipidemia 05/03/2016     Priority: Medium     CARDIOVASCULAR SCREENING; LDL GOAL LESS THAN 100 05/03/2016     Priority: Medium     Menorrhagia with regular cycle 04/29/2016     Priority: Medium     PMS (premenstrual syndrome) 04/28/2016     Priority: Medium     Hyperlipidemia with target LDL less than 100 06/06/2013     Priority: Medium     Diagnosis updated by automated process. Provider to review and confirm.       Family history of malignant neoplasm of gastrointestinal tract 05/24/2005     Priority: Medium     Varicose veins of lower extremities with complications 05/13/2005     Priority: Medium     Problem list name updated by automated process. Provider to review       Slow transit constipation 02/24/2003     Priority: Medium      Past Medical History:   Diagnosis Date     CARDIOVASCULAR SCREENING; LDL GOAL LESS THAN 100 6/6/2013     Depressive disorder, not elsewhere classified     depression in the post partum period after her daughter     Diabetes mellitus, type 2 (H) 6/6/2013     Diffuse cystic mastopathy     fibrocystic breast disease     Tobacco use disorder     quit in 2008     Type 2 diabetes, HbA1c goal < 7% (H) 6/6/2013     Past Surgical History:   Procedure Laterality Date     C ANESTH,LOWER LEG VEIN SURG,NOS  2002    bilateral     C APPENDECTOMY  04/14/89     COLONOSCOPY  6/21/2013    Procedure: COLONOSCOPY;  colonoscopy;  Surgeon: Vamshi Loomis MD;  Location: [facility]     COLONOSCOPY N/A 2/22/2017    Procedure: COLONOSCOPY;  Surgeon: Raoul Sage MD;  Location:  GI     HC COLONOSCOPY W/WO BRUSH/WASH  06/17/2005     HC DILATION/CURETTAGE DIAG/THER NON OB  1986    after a miscarriage     HC REMOVAL OF TONSILS,<13 Y/O       TUBAL LIGATION  08/04/2006     Current Outpatient Prescriptions   Medication Sig Dispense  "Refill     acetaminophen (TYLENOL) 325 MG tablet Take 2 tablets (650 mg) by mouth every 4 hours as needed for mild pain 100 tablet      ferrous gluconate (FERGON) 324 (38 Fe) MG tablet Take 1 tablet (324 mg) by mouth daily (with breakfast) 60 tablet 1     glipiZIDE (GLIPIZIDE XL) 10 MG 24 hr tablet Take 1 tablet (10 mg) by mouth daily 90 tablet 3     lisinopril (PRINIVIL/ZESTRIL) 10 MG tablet Take 1 tablet (10 mg) by mouth daily 30 tablet 1     metFORMIN (GLUCOPHAGE-XR) 500 MG 24 hr tablet TAKE 4 TABLETS (2,000MG) BY MOUTH EVERY DAY WITH DINNER 360 tablet 1     omeprazole (PRILOSEC) 20 MG CR capsule Take 1 capsule (20 mg) by mouth daily 90 capsule 3     rosuvastatin (CRESTOR) 20 MG tablet TAKE 1 TABLET BY MOUTH EVERY DAY 90 tablet 1     sertraline (ZOLOFT) 50 MG tablet TAKE 1/2 TABLET (25 MG) BY MOUTH EVERY MORNING 45 tablet 1     OTC products: None, except as noted above    Allergies   Allergen Reactions     No Known Drug Allergies       Latex Allergy: NO    Social History   Substance Use Topics     Smoking status: Former Smoker     Years: 16.00     Types: Cigarettes     Quit date: 9/29/2008     Smokeless tobacco: Never Used     Alcohol use No      Comment: couple drinks per year     History   Drug Use No       REVIEW OF SYSTEMS:   Constitutional, neuro, ENT, endocrine, pulmonary, cardiac, gastrointestinal, genitourinary, musculoskeletal, integument and psychiatric systems are negative, except as otherwise noted.    EXAM:   /86  Pulse 84  Temp 98.1  F (36.7  C) (Temporal)  Resp 18  Ht 5' 6\" (1.676 m)  Wt 255 lb (115.7 kg)  LMP 08/12/2018 (Exact Date)  SpO2 99%  Breastfeeding? No  BMI 41.16 kg/m2    GENERAL APPEARANCE: healthy, alert and no distress     EYES: EOMI, PERRL     HENT: ear canals and TM's normal and nose and mouth without ulcers or lesions     NECK: no adenopathy, no asymmetry, masses, or scars and thyroid normal to palpation     RESP: lungs clear to auscultation - no rales, rhonchi or " wheezes     CV: regular rates and rhythm, normal S1 S2, no S3 or S4 and no murmur, click or rub     ABDOMEN:  soft, nontender, no HSM or masses and bowel sounds normal     MS: extremities normal- no gross deformities noted, no evidence of inflammation in joints, FROM in all extremities.     SKIN: no suspicious lesions or rashes     NEURO: Normal strength and tone, sensory exam grossly normal, mentation intact and speech normal     PSYCH: mentation appears normal. and affect normal/bright     LYMPHATICS: No cervical adenopathy    DIAGNOSTICS:   EKG: appears normal, NSR, normal axis, normal intervals, no acute ST/T changes c/w ischemia, no LVH by voltage criteria  BMP pending    Recent Labs   Lab Test  07/09/18   1805  07/02/18   0805  03/30/18   0755   12/03/16   0530  12/02/16   0558   04/11/14   1635  02/28/14   1630   HGB  10.9*   --    --    --    --   9.6*   < >   --    --    PLT  283   --    --    --    --   268   < >   --    --    INR   --    --    --    --    --    --    --   2.3*  2.5*   NA   --    --   138   --    --   142   < >   --    --    POTASSIUM   --    --   4.3   --    --   3.7   < >   --    --    CR   --    --   0.57   --   0.66  0.63   < >   --    --    A1C   --   8.2*  9.4*   < >   --    --    < >   --    --     < > = values in this interval not displayed.        IMPRESSION:   Reason for surgery/procedure:    Menorrhagia with regular cycle    Diagnosis/reason for consult:   Personal history of DVT (deep vein thrombosis)  Morbid obesity (H)  Type 2 diabetes mellitus with hyperglycemia, without long-term current use of insulin (H)  Mixed hyperlipidemia  Benign essential hypertension        The proposed surgical procedure is considered INTERMEDIATE risk.    REVISED CARDIAC RISK INDEX  The patient has the following serious cardiovascular risks for perioperative complications such as (MI, PE, VFib and 3  AV Block):  No serious cardiac risks  INTERPRETATION: 0 risks: Class I (very low risk - 0.4%  complication rate)    The patient has the following additional risks for perioperative complications:  No identified additional risks      ICD-10-CM    1. Preop general physical exam Z01.818 Basic metabolic panel     EKG 12-lead complete w/read - Clinics     CANCELED: Glucose   2. Menorrhagia with regular cycle N92.0    3. Personal history of DVT (deep vein thrombosis) Z86.718    4. Morbid obesity (H) E66.01    5. Type 2 diabetes mellitus with hyperglycemia, without long-term current use of insulin (H) E11.65 EKG 12-lead complete w/read - Clinics     DIABETES EDUCATOR REFERRAL     CANCELED: Glucose   6. Mixed hyperlipidemia E78.2    7. Benign essential hypertension I10 lisinopril (PRINIVIL/ZESTRIL) 10 MG tablet     Basic metabolic panel       RECOMMENDATIONS:     I discussed with patient today that if her diabetes is not well controlled, her elective surgery may indeed be canceled.  She needs to be reliably checking her blood sugars at least daily until her surgery.  We were able to get her in with diabetic educator today for a review of her diabetes management and to get her set up with a glucometer and test strips.    Ideally, her blood sugar should consistently be less than 200 in order to proceed with her surgery.  I feel that this is likely going to be the case but we need to be certain.    Patient was set up for a follow-up appointment with her primary care provider, Dr. Metz in 1 month for recheck on her diabetes management.    Patient needs to be starting her lisinopril and taking this daily as her blood pressure is not at goal today.    Anemia  Anemia and does not require treatment prior to surgery.  Monitor Hemoglobin postoperatively.      --Patient is to take all scheduled medications on the day of surgery EXCEPT for modifications listed below.    Diabetes Medication Use  -----Hold usual oral and non-insulin diabetic meds (e.g. Metformin, Actos, Glipizide) while NPO.       ACE Inhibitor or Angiotensin  Receptor Blocker (ARB) Use  Ace inhibitor or Angiotensin Receptor Blocker (ARB) and should HOLD this medication for the 24 hours prior to surgery.      APPROVAL GIVEN to proceed with proposed procedure, without further diagnostic evaluation provided that her blood sugars are below 200.       Signed Electronically by: Mu Stiles MD    Copy of this evaluation report is provided to requesting physician.    [facility] Preop Guidelines    Revised Cardiac Risk Index

## 2018-08-30 NOTE — MR AVS SNAPSHOT
After Visit Summary   8/30/2018    Lashawn Reddy    MRN: [id number]           Patient Information     Date Of Birth          1970        Visit Information        Provider Department      8/30/2018 8:00 AM Mu Stiles MD [facility]        Today's Diagnoses     Preop general physical exam    -  1    Menorrhagia with regular cycle        Personal history of DVT (deep vein thrombosis)        Morbid obesity (H)        Type 2 diabetes mellitus with hyperglycemia, without long-term current use of insulin (H)        Mixed hyperlipidemia        Benign essential hypertension          Care Instructions      Before Your Surgery      Call your surgeon if there is any change in your health. This includes signs of a cold or flu (such as a sore throat, runny nose, cough, rash or fever).    Do not smoke, drink alcohol or take over the counter medicine (unless your surgeon or primary care doctor tells you to) for the 24 hours before and after surgery.    If you take prescribed drugs: Follow your doctor s orders about which medicines to take and which to stop until after surgery.    Eating and drinking prior to surgery: follow the instructions from your surgeon    Take a shower or bath the night before surgery. Use the soap your surgeon gave you to gently clean your skin. If you do not have soap from your surgeon, use your regular soap. Do not shave or scrub the surgery site.  Wear clean pajamas and have clean sheets on your bed.           Follow-ups after your visit        Additional Services     DIABETES EDUCATOR REFERRAL       DIABETES SELF MANAGEMENT TRAINING (DSMT)      Your provider has referred you to Diabetes Education: FMG: Diabetes Education - [facility] [phone]   [email or web address]     If an urgent visit is needed or A1C is above 12, Care Team to call the Diabetes  Education Team at [phone] or send an In  Basket message to the Diabetes Education Pool (P DIAB ED-PATIENT CARE).    A  will call you to make your appointment. If it has been more than 3 business days since your referral was placed, please call the above phone number to schedule.    Type of training and number of hours: Previous Diagnosis: Follow-up DSMT - 2 hours.    Diabetes Type: Type 2 - On Oral Medication   Medicare covers: 10 hours of initial DSMT in 12 month period from the time of first visit, plus 2 hours of follow-up DSMT annually, and additional hours as requested for insulin training.         Diabetes Co-Morbidities: hypertension and obesity               A1C Goal:  <8.0       A1C is: Lab Results       Component                Value               Date                       A1C                      8.2                 07/02/2018              MEDICAL NUTRITION THERAPY (MNT) for Diabetes    Medical Nutrition Therapy with a Registered Dietitian can be provided in coordination with Diabetes Self-Management Training to assist in achieving optimal diabetes management.    MNT Type and Hours: Previous diagnosis: Annual follow-up MNT - 2 hours                       Medicare will cover: 3 hours initial MNT in 12 month period after first visit, plus 2 hours of follow-up MNT annually        Diabetes Education Topics: Comprehensive Knowledge Assessment and Instruction, Knowledge: Healthy Eating, Being Active, Monitoring Blood Sugar, Taking Medication, Problem Solving/Goal Setting, Reducing Risks (Preventing Acute and Chronic Diabetes Complications) and Healthy Coping and Blood glucose meter instruction     Special Educational Needs Requiring Individual DSMT: None      Please be aware that coverage of these services is subject to the terms and limitations of your health insurance plan.  Call member services at your health plan to determine Diabetes Self-Management Training (Codes  and ) and Medical Nutrition Therapy (Codes 27819 and 18436)  benefits and ask which blood glucose monitor brands are covered by your plan.  Please bring the following with you to your appointment:    (1)  List of current medications   (2)  List of Blood Glucose Monitor brands that are covered by your insurance plan  (3)  Blood Glucose Monitor and log book  (4)   Food records for the 3 days prior to your visit    The Certified Diabetes Educator may make diabetes medication adjustments per the CDE Protocol and Collaborative Practice Agreement.                  Your next 10 appointments already scheduled     Aug 30, 2018  3:00 PM CDT   Diabetes Education with NL DIABETIC ED RESOURCE   [place] [facility] ([facility])    [address] [place], [place] [address] [place] [address] [phone]            Sep 12, 2018   Procedure with Temi Corado MD   [facility], [place], Same Day Surgery (--)    [address] [facility] [address] [phone]            Sep 26, 2018  4:00 PM CDT   Office Visit with Tanner Metz MD   [facility]    [address] [facility] [id number] [phone]           Bring a current list of meds and any records pertaining to this visit. For Physicals, please bring immunization records and any forms needing to be filled out. Please arrive 10 minutes early to complete paperwork.            Oct 25, 2018 12:45 PM CDT   Return Visit with Temi Corado MD   [facility] ([facility])    [place] [facility] [address]  [address]  [facility] [id number] [phone]              Who to contact     If you have questions or need follow up information about today's clinic visit or your schedule please contact [facility] directly at [phone].  Normal or non-critical lab and imaging results will be communicated to you by MyChart, letter or phone within 4 business days after the clinic has received  "the results. If you do not hear from us within 7 days, please contact the clinic through the Shelf or phone. If you have a critical or abnormal lab result, we will notify you by phone as soon as possible.  Submit refill requests through the Shelf or call your pharmacy and they will forward the refill request to us. Please allow 3 business days for your refill to be completed.          Additional Information About Your Visit        IbercheckharCandid io Information     the Shelf gives you secure access to your electronic health record. If you see a primary care provider, you can also send messages to your care team and make appointments. If you have questions, please call your primary care clinic.  If you do not have a primary care provider, please call [phone] and they will assist you.        Care EveryWhere ID     This is your Care EveryWhere ID. This could be used by other organizations to access your [place] medical records  [id number]        Your Vitals Were     Pulse Temperature Respirations Height Last Period Pulse Oximetry    84 98.1  F (36.7  C) (Temporal) 18 5' 6\" (1.676 m) 08/12/2018 (Exact Date) 99%    Breastfeeding? BMI (Body Mass Index)                No 41.16 kg/m2           Blood Pressure from Last 3 Encounters:   08/30/18 144/86   08/21/18 146/87   07/11/18 134/70    Weight from Last 3 Encounters:   08/30/18 255 lb (115.7 kg)   08/21/18 253 lb 12.8 oz (115.1 kg)   07/11/18 253 lb (114.8 kg)              We Performed the Following     Basic metabolic panel     DIABETES EDUCATOR REFERRAL     EKG 12-lead complete w/read - Clinics          Today's Medication Changes          These changes are accurate as of 8/30/18  9:27 AM.  If you have any questions, ask your nurse or doctor.               Start taking these medicines.        Dose/Directions    lisinopril 10 MG tablet   Commonly known as:  PRINIVIL/ZESTRIL   Used for:  Benign essential hypertension   Started by:  Mu Stiles MD        Dose:  10 mg "   Take 1 tablet (10 mg) by mouth daily   Quantity:  30 tablet   Refills:  1            Where to get your medicines      These medications were sent to [facility] - [place], [place] - [address]   [address] [place] [address]    Hours:  M-F 8:30-6:30; Sat 9-4; closed Sunday Phone:  [phone]     lisinopril 10 MG tablet                Primary Care Provider Office Phone # Fax #    Tanner Metz [phone]       [address] [place] [address]        Equal Access to Services     [facility]: Hadii aad ku hadasho Sogaye, waaxda luqadaha, qaybta kaalmada adeegyada, yoli mckinney . So [facility] [phone].    ATENCIÓN: Si habla español, tiene a obrien disposición servicios gratuitos de asistencia lingüística. [facility] [phone].    We comply with applicable federal civil rights laws and [place] laws. We do not discriminate on the basis of race, color, national origin, age, disability, sex, sexual orientation, or gender identity.            Thank you!     Thank you for choosing [facility]  for your care. Our goal is always to provide you with excellent care. Hearing back from our patients is one way we can continue to improve our services. Please take a few minutes to complete the written survey that you may receive in the mail after your visit with us. Thank you!             Your Updated Medication List - Protect others around you: Learn how to safely use, store and throw away your medicines at www.disposemymeds.org.          This list is accurate as of 8/30/18  9:27 AM.  Always use your most recent med list.                   Brand Name Dispense Instructions for use Diagnosis    acetaminophen 325 MG tablet    TYLENOL    100 tablet    Take 2 tablets (650 mg) by mouth every 4 hours as needed for mild pain        ferrous gluconate 324 (38 Fe) MG tablet    FERGON    60 tablet    Take 1 tablet (324 mg) by mouth daily (with breakfast)     Iron deficiency anemia, unspecified iron deficiency anemia type       glipiZIDE 10 MG 24 hr tablet    glipiZIDE XL    90 tablet    Take 1 tablet (10 mg) by mouth daily    Type 2 diabetes mellitus with hyperglycemia, without long-term current use of insulin (H)       lisinopril 10 MG tablet    PRINIVIL/ZESTRIL    30 tablet    Take 1 tablet (10 mg) by mouth daily    Benign essential hypertension       metFORMIN 500 MG 24 hr tablet    GLUCOPHAGE-XR    360 tablet    TAKE 4 TABLETS (2,000MG) BY MOUTH EVERY DAY WITH DINNER    Type 2 diabetes mellitus with hyperglycemia (H)       omeprazole 20 MG CR capsule    priLOSEC    90 capsule    Take 1 capsule (20 mg) by mouth daily    Gastroesophageal reflux disease without esophagitis       rosuvastatin 20 MG tablet    CRESTOR    90 tablet    TAKE 1 TABLET BY MOUTH EVERY DAY    Mixed hyperlipidemia, Type 2 diabetes mellitus with hyperglycemia, without long-term current use of insulin (H)       sertraline 50 MG tablet    ZOLOFT    45 tablet    TAKE 1/2 TABLET (25 MG) BY MOUTH EVERY MORNING    PMS (premenstrual syndrome)

## 2018-08-30 NOTE — PROGRESS NOTES
Lashawn,  Your results look good. Hopefully your blood sugars continue to look good.  Please let me know if you have any questions.    Sincerely,  Dr. Stiles

## 2018-08-30 NOTE — MR AVS SNAPSHOT
After Visit Summary   8/30/2018    Lashawn Reddy    MRN: [id number]           Patient Information     Date Of Birth          1970        Visit Information        Provider Department      8/30/2018 3:00 PM NL DIABETIC ED RESOURCE [place] [facility]        Today's Diagnoses     Type 2 diabetes mellitus with hyperglycemia, without long-term current use of insulin (H)    -  1       Follow-ups after your visit        Follow-up notes from your care team     Return if symptoms worsen or fail to improve.      Your next 10 appointments already scheduled     Sep 12, 2018   Procedure with Temi Corado MD   [facility], [place], Same Day Surgery (--)    [address] [facility] [id number] [phone]            Sep 26, 2018  4:00 PM CDT   Office Visit with Tanner Metz MD   [facility]    [address] [facility] [id number] [phone]           Bring a current list of meds and any records pertaining to this visit. For Physicals, please bring immunization records and any forms needing to be filled out. Please arrive 10 minutes early to complete paperwork.            Oct 25, 2018 12:45 PM CDT   Return Visit with Temi Corado MD   [facility] ([facility])    [place] [facility] [address]  [address] [facility] [address] [phone]              Who to contact     If you have questions or need follow up information about today's clinic visit or your schedule please contact [facility] directly at [phone].  Normal or non-critical lab and imaging results will be communicated to you by MyChart, letter or phone within 4 business days after the clinic has received the results. If you do not hear from us within 7 days, please contact the clinic through MyChart or phone. If you have a critical or abnormal lab result, we will  notify you by phone as soon as possible.  Submit refill requests through [facility] app or call your pharmacy and they will forward the refill request to us. Please allow 3 business days for your refill to be completed.          Additional Information About Your Visit        Caring.comharOSSIANIX Information     [facility] app gives you secure access to your electronic health record. If you see a primary care provider, you can also send messages to your care team and make appointments. If you have questions, please call your primary care clinic.  If you do not have a primary care provider, please call [phone] and they will assist you.        Care EveryWhere ID     This is your Care EveryWhere ID. This could be used by other organizations to access your [place] medical records  [id number]        Your Vitals Were     Last Period                   08/12/2018 (Exact Date)            Blood Pressure from Last 3 Encounters:   08/30/18 144/86   08/21/18 146/87   07/11/18 134/70    Weight from Last 3 Encounters:   08/30/18 115.7 kg (255 lb)   08/21/18 115.1 kg (253 lb 12.8 oz)   07/11/18 114.8 kg (253 lb)              We Performed the Following     DIABETES EDUCATION - Individual  []          Today's Medication Changes          These changes are accurate as of 8/30/18  4:17 PM.  If you have any questions, ask your nurse or doctor.               Start taking these medicines.        Dose/Directions    blood glucose monitoring lancets   Used for:  Type 2 diabetes mellitus with hyperglycemia, without long-term current use of insulin (H)        Dose:  1 each   1 each 2 times daily   Quantity:  102 each   Refills:  3       blood glucose monitoring test strip   Commonly known as:  ACCU-CHEK GUIDE   Used for:  Type 2 diabetes mellitus with hyperglycemia, without long-term current use of insulin (H)        Use to test blood sugar 2 times daily or as directed.   Quantity:  100 strip   Refills:  11       lisinopril 10 MG tablet   Commonly known as:   PRINIVIL/ZESTRIL   Used for:  Benign essential hypertension   Started by:  Mu Stiles MD        Dose:  10 mg   Take 1 tablet (10 mg) by mouth daily   Quantity:  30 tablet   Refills:  1            Where to get your medicines      These medications were sent to [facility] - [place], [place] - [address] [facility]  [address] [facility] [place] [address]    Hours:  M-F 8:30-6:30; Sat 9-4; closed Sunday Phone:  [phone]     blood glucose monitoring lancets    blood glucose monitoring test strip    lisinopril 10 MG tablet                Primary Care Provider Office Phone # Fax #    Tanner TAMARA Metz -[phone] [address] [place] [address]        Goals        General    Monitoring (pt-stated)     Notes - Note created  8/30/2018  4:11 PM by Dominga Oliveira RD    Goal Statement: I will check blood sugar 1-2 times daily    Measure of Success: meter memory or log of blood sugar results    Strengths: good understanding of using meter  Ideas to overcome barriers: no barriers noted    Date to Achieve By: 9/12          Equal Access to Services     [facility]: Hadii ashley tran hadasho Sogaye, waaxda luqadaha, qaybta kaalmada aderoger, yoli mckinney . So [facility] [phone].    ATENCIÓN: Si habla español, tiene a obrien disposición servicios gratuitos de asistencia lingüística. [facility] [phone].    We comply with applicable federal civil rights laws and [place] laws. We do not discriminate on the basis of race, color, national origin, age, disability, sex, sexual orientation, or gender identity.            Thank you!     Thank you for choosing [place] [facility]  for your care. Our goal is always to provide you with excellent care. Hearing back from our patients is one way we can continue to improve our services. Please take a few minutes to complete the written survey that you may receive in the mail after your visit with us. Thank  you!             Your Updated Medication List - Protect others around you: Learn how to safely use, store and throw away your medicines at www.disposemymeds.org.          This list is accurate as of 8/30/18  4:17 PM.  Always use your most recent med list.                   Brand Name Dispense Instructions for use Diagnosis    acetaminophen 325 MG tablet    TYLENOL    100 tablet    Take 2 tablets (650 mg) by mouth every 4 hours as needed for mild pain        blood glucose monitoring lancets     102 each    1 each 2 times daily    Type 2 diabetes mellitus with hyperglycemia, without long-term current use of insulin (H)       blood glucose monitoring test strip    ACCU-CHEK GUIDE    100 strip    Use to test blood sugar 2 times daily or as directed.    Type 2 diabetes mellitus with hyperglycemia, without long-term current use of insulin (H)       ferrous gluconate 324 (38 Fe) MG tablet    FERGON    60 tablet    Take 1 tablet (324 mg) by mouth daily (with breakfast)    Iron deficiency anemia, unspecified iron deficiency anemia type       glipiZIDE 10 MG 24 hr tablet    glipiZIDE XL    90 tablet    Take 1 tablet (10 mg) by mouth daily    Type 2 diabetes mellitus with hyperglycemia, without long-term current use of insulin (H)       lisinopril 10 MG tablet    PRINIVIL/ZESTRIL    30 tablet    Take 1 tablet (10 mg) by mouth daily    Benign essential hypertension       metFORMIN 500 MG 24 hr tablet    GLUCOPHAGE-XR    360 tablet    TAKE 4 TABLETS (2,000MG) BY MOUTH EVERY DAY WITH DINNER    Type 2 diabetes mellitus with hyperglycemia (H)       omeprazole 20 MG CR capsule    priLOSEC    90 capsule    Take 1 capsule (20 mg) by mouth daily    Gastroesophageal reflux disease without esophagitis       rosuvastatin 20 MG tablet    CRESTOR    90 tablet    TAKE 1 TABLET BY MOUTH EVERY DAY    Mixed hyperlipidemia, Type 2 diabetes mellitus with hyperglycemia, without long-term current use of insulin (H)       sertraline 50 MG tablet     ZOLOFT    45 tablet    TAKE 1/2 TABLET (25 MG) BY MOUTH EVERY MORNING    PMS (premenstrual syndrome)

## 2018-08-30 NOTE — Clinical Note
I am not sure what the University requirements are prior to surgery but she is curious to know if her A1c has come down. Do you think we should order A1c to have checked prior to surgery or just go with her meter results?   Dominga Oliveira RDN, LD, CDE

## 2018-09-11 ENCOUNTER — ANESTHESIA EVENT (OUTPATIENT)
Dept: SURGERY | Facility: CLINIC | Age: 48
DRG: 742 | End: 2018-09-11
Payer: COMMERCIAL

## 2018-09-11 ASSESSMENT — LIFESTYLE VARIABLES: TOBACCO_USE: 1

## 2018-09-11 NOTE — OR NURSING
Pt had recent root canal a week ago and was on antibiotics. Called OBGYN clinic and notified them.

## 2018-09-11 NOTE — ANESTHESIA PREPROCEDURE EVALUATION
Anesthesia Evaluation     . Pt has had prior anesthetic. Type: General and MAC    No history of anesthetic complications          ROS/MED HX    ENT/Pulmonary:     (+)hypertension, obese, tobacco use, Past use , . .    Neurologic:  - neg neurologic ROS     Cardiovascular:     (+) Dyslipidemia, hypertension-range: 135-145 / 70-95, ---. : . . . :. . Previous cardiac testing date:results:date: results:ECG reviewed date:8/30/2018 results:NSR date: results:          METS/Exercise Tolerance:     Hematologic:     (+) History of blood clots Anemia (2/2 abnormal uterine bleeding, last Hgb = 10.9), Other Hematologic Disorder-history of menorrhagia      Musculoskeletal:   (+) arthritis, , , -       GI/Hepatic:  - neg GI/hepatic ROS   (+) GERD       Renal/Genitourinary:  - ROS Renal section negative   (+) Pt has no history of transplant,       Endo:     (+) type II DM Last HgA1c: 8.2 date: 2/9/17 - not using insulin pump Obesity, .      Psychiatric:     (+) psychiatric history depression      Infectious Disease:  - neg infectious disease ROS       Malignancy:   (+) Malignancy History of GI          Other:    (+) No chance of pregnancy C-spine cleared: N/A, no H/O Chronic Pain,no other significant disability   - neg other ROS                 Physical Exam  Normal systems: cardiovascular, pulmonary and dental    Airway   Mallampati: II  TM distance: >3 FB  Neck ROM: full    Dental     Cardiovascular   Rhythm and rate: regular and normal      Pulmonary    breath sounds clear to auscultation                        Anesthesia Plan      History & Physical Review  History and physical reviewed and following examination; no interval change.    ASA Status:  3 .    NPO Status:  > 6 hours    Plan for General and ETT with Intravenous induction. Maintenance will be Balanced.    PONV prophylaxis:  Ondansetron (or other 5HT-3) and Dexamethasone or Solumedrol       Postoperative Care  Postoperative pain management:  Oral pain medications, IV  analgesics, Multi-modal analgesia and Peripheral nerve block (Single Shot).      Consents  Anesthetic plan, risks, benefits and alternatives discussed with:  Patient.  Use of blood products discussed: Yes.   Use of blood products discussed with Patient.  Consented to blood products.  .                          .

## 2018-09-12 ENCOUNTER — ANESTHESIA (OUTPATIENT)
Dept: SURGERY | Facility: CLINIC | Age: 48
DRG: 742 | End: 2018-09-12
Payer: COMMERCIAL

## 2018-09-12 ENCOUNTER — SURGERY (OUTPATIENT)
Age: 48
End: 2018-09-12

## 2018-09-12 ENCOUNTER — HOSPITAL ENCOUNTER (INPATIENT)
Facility: CLINIC | Age: 48
LOS: 3 days | Discharge: HOME | DRG: 742 | End: 2018-09-15
Attending: OBSTETRICS & GYNECOLOGY | Admitting: OBSTETRICS & GYNECOLOGY
Payer: COMMERCIAL

## 2018-09-12 DIAGNOSIS — Z90.710 S/P HYSTERECTOMY: Primary | ICD-10-CM

## 2018-09-12 DIAGNOSIS — K59.01 SLOW TRANSIT CONSTIPATION: ICD-10-CM

## 2018-09-12 LAB
ABO + RH BLD: NORMAL
ABO + RH BLD: NORMAL
BLD GP AB SCN SERPL QL: NORMAL
BLOOD BANK CMNT PATIENT-IMP: NORMAL
GLUCOSE BLDC GLUCOMTR-MCNC: 112 MG/DL (ref 70–99)
GLUCOSE BLDC GLUCOMTR-MCNC: 137 MG/DL (ref 70–99)
GLUCOSE BLDC GLUCOMTR-MCNC: 146 MG/DL (ref 70–99)
GLUCOSE SERPL-MCNC: 150 MG/DL (ref 70–99)
HCG UR QL: NEGATIVE
HGB BLD-MCNC: 9.9 G/DL (ref 11.7–15.7)
SPECIMEN EXP DATE BLD: NORMAL

## 2018-09-12 PROCEDURE — 40000170 ZZH STATISTIC PRE-PROCEDURE ASSESSMENT II: Performed by: OBSTETRICS & GYNECOLOGY

## 2018-09-12 PROCEDURE — 0UT90ZZ RESECTION OF UTERUS, OPEN APPROACH: ICD-10-PCS | Performed by: OBSTETRICS & GYNECOLOGY

## 2018-09-12 PROCEDURE — 71000014 ZZH RECOVERY PHASE 1 LEVEL 2 FIRST HR: Performed by: OBSTETRICS & GYNECOLOGY

## 2018-09-12 PROCEDURE — 25000125 ZZHC RX 250: Performed by: STUDENT IN AN ORGANIZED HEALTH CARE EDUCATION/TRAINING PROGRAM

## 2018-09-12 PROCEDURE — 25000566 ZZH SEVOFLURANE, EA 15 MIN: Performed by: OBSTETRICS & GYNECOLOGY

## 2018-09-12 PROCEDURE — 0UB60ZZ EXCISION OF LEFT FALLOPIAN TUBE, OPEN APPROACH: ICD-10-PCS | Performed by: OBSTETRICS & GYNECOLOGY

## 2018-09-12 PROCEDURE — 25000128 H RX IP 250 OP 636: Performed by: STUDENT IN AN ORGANIZED HEALTH CARE EDUCATION/TRAINING PROGRAM

## 2018-09-12 PROCEDURE — 36000057 ZZH SURGERY LEVEL 3 1ST 30 MIN - UMMC: Performed by: OBSTETRICS & GYNECOLOGY

## 2018-09-12 PROCEDURE — 25000125 ZZHC RX 250: Performed by: OBSTETRICS & GYNECOLOGY

## 2018-09-12 PROCEDURE — 37000008 ZZH ANESTHESIA TECHNICAL FEE, 1ST 30 MIN: Performed by: OBSTETRICS & GYNECOLOGY

## 2018-09-12 PROCEDURE — C9290 INJ, BUPIVACAINE LIPOSOME: HCPCS | Performed by: ANESTHESIOLOGY

## 2018-09-12 PROCEDURE — 88307 TISSUE EXAM BY PATHOLOGIST: CPT | Performed by: OBSTETRICS & GYNECOLOGY

## 2018-09-12 PROCEDURE — 12000001 ZZH R&B MED SURG/OB UMMC

## 2018-09-12 PROCEDURE — 85018 HEMOGLOBIN: CPT | Performed by: ANESTHESIOLOGY

## 2018-09-12 PROCEDURE — 86900 BLOOD TYPING SEROLOGIC ABO: CPT | Performed by: ANESTHESIOLOGY

## 2018-09-12 PROCEDURE — 27210794 ZZH OR GENERAL SUPPLY STERILE: Performed by: OBSTETRICS & GYNECOLOGY

## 2018-09-12 PROCEDURE — 25000128 H RX IP 250 OP 636: Performed by: ANESTHESIOLOGY

## 2018-09-12 PROCEDURE — 82947 ASSAY GLUCOSE BLOOD QUANT: CPT | Performed by: ANESTHESIOLOGY

## 2018-09-12 PROCEDURE — 25000132 ZZH RX MED GY IP 250 OP 250 PS 637: Performed by: OBSTETRICS & GYNECOLOGY

## 2018-09-12 PROCEDURE — 81025 URINE PREGNANCY TEST: CPT | Performed by: ANESTHESIOLOGY

## 2018-09-12 PROCEDURE — 25000132 ZZH RX MED GY IP 250 OP 250 PS 637: Performed by: STUDENT IN AN ORGANIZED HEALTH CARE EDUCATION/TRAINING PROGRAM

## 2018-09-12 PROCEDURE — 25000125 ZZHC RX 250: Performed by: ANESTHESIOLOGY

## 2018-09-12 PROCEDURE — 86901 BLOOD TYPING SEROLOGIC RH(D): CPT | Performed by: ANESTHESIOLOGY

## 2018-09-12 PROCEDURE — 86850 RBC ANTIBODY SCREEN: CPT | Performed by: ANESTHESIOLOGY

## 2018-09-12 PROCEDURE — 36000059 ZZH SURGERY LEVEL 3 EA 15 ADDTL MIN UMMC: Performed by: OBSTETRICS & GYNECOLOGY

## 2018-09-12 PROCEDURE — 88307 TISSUE EXAM BY PATHOLOGIST: CPT | Mod: 26 | Performed by: OBSTETRICS & GYNECOLOGY

## 2018-09-12 PROCEDURE — 37000009 ZZH ANESTHESIA TECHNICAL FEE, EACH ADDTL 15 MIN: Performed by: OBSTETRICS & GYNECOLOGY

## 2018-09-12 PROCEDURE — 88302 TISSUE EXAM BY PATHOLOGIST: CPT | Mod: 26 | Performed by: OBSTETRICS & GYNECOLOGY

## 2018-09-12 PROCEDURE — 71000015 ZZH RECOVERY PHASE 1 LEVEL 2 EA ADDTL HR: Performed by: OBSTETRICS & GYNECOLOGY

## 2018-09-12 PROCEDURE — 88302 TISSUE EXAM BY PATHOLOGIST: CPT | Performed by: OBSTETRICS & GYNECOLOGY

## 2018-09-12 PROCEDURE — 25000128 H RX IP 250 OP 636: Performed by: OBSTETRICS & GYNECOLOGY

## 2018-09-12 PROCEDURE — 00000146 ZZHCL STATISTIC GLUCOSE BY METER IP

## 2018-09-12 PROCEDURE — 36415 COLL VENOUS BLD VENIPUNCTURE: CPT | Performed by: ANESTHESIOLOGY

## 2018-09-12 RX ORDER — ONDANSETRON 2 MG/ML
4 INJECTION INTRAMUSCULAR; INTRAVENOUS EVERY 30 MIN PRN
Status: DISCONTINUED | OUTPATIENT
Start: 2018-09-12 | End: 2018-09-12 | Stop reason: HOSPADM

## 2018-09-12 RX ORDER — KETOROLAC TROMETHAMINE 30 MG/ML
30 INJECTION, SOLUTION INTRAMUSCULAR; INTRAVENOUS EVERY 6 HOURS
Status: COMPLETED | OUTPATIENT
Start: 2018-09-12 | End: 2018-09-13

## 2018-09-12 RX ORDER — ACETAMINOPHEN 325 MG/1
975 TABLET ORAL EVERY 8 HOURS
Status: DISPENSED | OUTPATIENT
Start: 2018-09-12 | End: 2018-09-15

## 2018-09-12 RX ORDER — MAGNESIUM HYDROXIDE 1200 MG/15ML
LIQUID ORAL PRN
Status: DISCONTINUED | OUTPATIENT
Start: 2018-09-12 | End: 2018-09-12 | Stop reason: HOSPADM

## 2018-09-12 RX ORDER — SODIUM CHLORIDE, SODIUM LACTATE, POTASSIUM CHLORIDE, CALCIUM CHLORIDE 600; 310; 30; 20 MG/100ML; MG/100ML; MG/100ML; MG/100ML
INJECTION, SOLUTION INTRAVENOUS CONTINUOUS PRN
Status: DISCONTINUED | OUTPATIENT
Start: 2018-09-12 | End: 2018-09-12

## 2018-09-12 RX ORDER — AMOXICILLIN 250 MG
1 CAPSULE ORAL 2 TIMES DAILY
Status: DISCONTINUED | OUTPATIENT
Start: 2018-09-12 | End: 2018-09-15 | Stop reason: HOSPADM

## 2018-09-12 RX ORDER — BUPIVACAINE HYDROCHLORIDE 2.5 MG/ML
INJECTION, SOLUTION EPIDURAL; INFILTRATION; INTRACAUDAL PRN
Status: DISCONTINUED | OUTPATIENT
Start: 2018-09-12 | End: 2018-09-12

## 2018-09-12 RX ORDER — SERTRALINE HYDROCHLORIDE 25 MG/1
25 TABLET, FILM COATED ORAL DAILY
Status: DISCONTINUED | OUTPATIENT
Start: 2018-09-12 | End: 2018-09-15 | Stop reason: HOSPADM

## 2018-09-12 RX ORDER — CEFAZOLIN SODIUM 1 G/3ML
INJECTION, POWDER, FOR SOLUTION INTRAMUSCULAR; INTRAVENOUS PRN
Status: DISCONTINUED | OUTPATIENT
Start: 2018-09-12 | End: 2018-09-12

## 2018-09-12 RX ORDER — ATROPA BELLADONNA AND OPIUM 16.2; 6 MG/1; MG/1
60 SUPPOSITORY RECTAL EVERY 8 HOURS PRN
Status: DISCONTINUED | OUTPATIENT
Start: 2018-09-12 | End: 2018-09-12 | Stop reason: HOSPADM

## 2018-09-12 RX ORDER — NICOTINE POLACRILEX 4 MG
15-30 LOZENGE BUCCAL
Status: DISCONTINUED | OUTPATIENT
Start: 2018-09-12 | End: 2018-09-15 | Stop reason: HOSPADM

## 2018-09-12 RX ORDER — SODIUM CHLORIDE, SODIUM LACTATE, POTASSIUM CHLORIDE, CALCIUM CHLORIDE 600; 310; 30; 20 MG/100ML; MG/100ML; MG/100ML; MG/100ML
INJECTION, SOLUTION INTRAVENOUS CONTINUOUS
Status: DISCONTINUED | OUTPATIENT
Start: 2018-09-12 | End: 2018-09-12 | Stop reason: HOSPADM

## 2018-09-12 RX ORDER — MEPERIDINE HYDROCHLORIDE 25 MG/ML
12.5 INJECTION INTRAMUSCULAR; INTRAVENOUS; SUBCUTANEOUS
Status: DISCONTINUED | OUTPATIENT
Start: 2018-09-12 | End: 2018-09-12 | Stop reason: HOSPADM

## 2018-09-12 RX ORDER — NALOXONE HYDROCHLORIDE 0.4 MG/ML
.1-.4 INJECTION, SOLUTION INTRAMUSCULAR; INTRAVENOUS; SUBCUTANEOUS
Status: DISCONTINUED | OUTPATIENT
Start: 2018-09-12 | End: 2018-09-12 | Stop reason: HOSPADM

## 2018-09-12 RX ORDER — ONDANSETRON 4 MG/1
4 TABLET, ORALLY DISINTEGRATING ORAL EVERY 6 HOURS PRN
Status: DISCONTINUED | OUTPATIENT
Start: 2018-09-12 | End: 2018-09-15 | Stop reason: HOSPADM

## 2018-09-12 RX ORDER — LABETALOL HYDROCHLORIDE 5 MG/ML
10 INJECTION, SOLUTION INTRAVENOUS
Status: COMPLETED | OUTPATIENT
Start: 2018-09-12 | End: 2018-09-12

## 2018-09-12 RX ORDER — ONDANSETRON 2 MG/ML
4 INJECTION INTRAMUSCULAR; INTRAVENOUS EVERY 6 HOURS PRN
Status: DISCONTINUED | OUTPATIENT
Start: 2018-09-12 | End: 2018-09-15 | Stop reason: HOSPADM

## 2018-09-12 RX ORDER — PROPOFOL 10 MG/ML
INJECTION, EMULSION INTRAVENOUS PRN
Status: DISCONTINUED | OUTPATIENT
Start: 2018-09-12 | End: 2018-09-12

## 2018-09-12 RX ORDER — GABAPENTIN 300 MG/1
300 CAPSULE ORAL ONCE
Status: COMPLETED | OUTPATIENT
Start: 2018-09-12 | End: 2018-09-12

## 2018-09-12 RX ORDER — ACETAMINOPHEN 325 MG/1
975 TABLET ORAL ONCE
Status: COMPLETED | OUTPATIENT
Start: 2018-09-12 | End: 2018-09-12

## 2018-09-12 RX ORDER — AMOXICILLIN 250 MG
2 CAPSULE ORAL 2 TIMES DAILY
Status: DISCONTINUED | OUTPATIENT
Start: 2018-09-12 | End: 2018-09-15 | Stop reason: HOSPADM

## 2018-09-12 RX ORDER — ACETAMINOPHEN 325 MG/1
650 TABLET ORAL EVERY 4 HOURS PRN
Status: DISCONTINUED | OUTPATIENT
Start: 2018-09-15 | End: 2018-09-15 | Stop reason: HOSPADM

## 2018-09-12 RX ORDER — SODIUM CHLORIDE, SODIUM LACTATE, POTASSIUM CHLORIDE, CALCIUM CHLORIDE 600; 310; 30; 20 MG/100ML; MG/100ML; MG/100ML; MG/100ML
INJECTION, SOLUTION INTRAVENOUS CONTINUOUS
Status: DISCONTINUED | OUTPATIENT
Start: 2018-09-12 | End: 2018-09-13

## 2018-09-12 RX ORDER — LIDOCAINE 40 MG/G
CREAM TOPICAL
Status: DISCONTINUED | OUTPATIENT
Start: 2018-09-12 | End: 2018-09-12 | Stop reason: HOSPADM

## 2018-09-12 RX ORDER — FENTANYL CITRATE 50 UG/ML
25-50 INJECTION, SOLUTION INTRAMUSCULAR; INTRAVENOUS
Status: DISCONTINUED | OUTPATIENT
Start: 2018-09-12 | End: 2018-09-12 | Stop reason: HOSPADM

## 2018-09-12 RX ORDER — LIDOCAINE HYDROCHLORIDE 20 MG/ML
INJECTION, SOLUTION INFILTRATION; PERINEURAL PRN
Status: DISCONTINUED | OUTPATIENT
Start: 2018-09-12 | End: 2018-09-12

## 2018-09-12 RX ORDER — HYDROMORPHONE HYDROCHLORIDE 1 MG/ML
.3-.5 INJECTION, SOLUTION INTRAMUSCULAR; INTRAVENOUS; SUBCUTANEOUS
Status: DISCONTINUED | OUTPATIENT
Start: 2018-09-12 | End: 2018-09-15 | Stop reason: HOSPADM

## 2018-09-12 RX ORDER — ONDANSETRON 4 MG/1
4 TABLET, ORALLY DISINTEGRATING ORAL EVERY 30 MIN PRN
Status: DISCONTINUED | OUTPATIENT
Start: 2018-09-12 | End: 2018-09-12 | Stop reason: HOSPADM

## 2018-09-12 RX ORDER — LISINOPRIL 10 MG/1
10 TABLET ORAL DAILY
Status: DISCONTINUED | OUTPATIENT
Start: 2018-09-13 | End: 2018-09-15 | Stop reason: HOSPADM

## 2018-09-12 RX ORDER — HYDROMORPHONE HYDROCHLORIDE 1 MG/ML
.3-.5 INJECTION, SOLUTION INTRAMUSCULAR; INTRAVENOUS; SUBCUTANEOUS EVERY 10 MIN PRN
Status: DISCONTINUED | OUTPATIENT
Start: 2018-09-12 | End: 2018-09-12 | Stop reason: HOSPADM

## 2018-09-12 RX ORDER — DEXTROSE MONOHYDRATE 25 G/50ML
25-50 INJECTION, SOLUTION INTRAVENOUS
Status: DISCONTINUED | OUTPATIENT
Start: 2018-09-12 | End: 2018-09-15 | Stop reason: HOSPADM

## 2018-09-12 RX ORDER — ONDANSETRON 2 MG/ML
INJECTION INTRAMUSCULAR; INTRAVENOUS PRN
Status: DISCONTINUED | OUTPATIENT
Start: 2018-09-12 | End: 2018-09-12

## 2018-09-12 RX ORDER — LIDOCAINE 40 MG/G
CREAM TOPICAL
Status: DISCONTINUED | OUTPATIENT
Start: 2018-09-12 | End: 2018-09-15 | Stop reason: HOSPADM

## 2018-09-12 RX ORDER — PROPOFOL 10 MG/ML
INJECTION, EMULSION INTRAVENOUS CONTINUOUS PRN
Status: DISCONTINUED | OUTPATIENT
Start: 2018-09-12 | End: 2018-09-12

## 2018-09-12 RX ORDER — NALOXONE HYDROCHLORIDE 0.4 MG/ML
.1-.4 INJECTION, SOLUTION INTRAMUSCULAR; INTRAVENOUS; SUBCUTANEOUS
Status: DISCONTINUED | OUTPATIENT
Start: 2018-09-12 | End: 2018-09-15 | Stop reason: HOSPADM

## 2018-09-12 RX ORDER — PROCHLORPERAZINE MALEATE 5 MG
10 TABLET ORAL EVERY 6 HOURS PRN
Status: DISCONTINUED | OUTPATIENT
Start: 2018-09-12 | End: 2018-09-15 | Stop reason: HOSPADM

## 2018-09-12 RX ORDER — OXYCODONE HYDROCHLORIDE 5 MG/1
5-10 TABLET ORAL
Status: DISCONTINUED | OUTPATIENT
Start: 2018-09-12 | End: 2018-09-15 | Stop reason: HOSPADM

## 2018-09-12 RX ORDER — FENTANYL CITRATE 50 UG/ML
INJECTION, SOLUTION INTRAMUSCULAR; INTRAVENOUS PRN
Status: DISCONTINUED | OUTPATIENT
Start: 2018-09-12 | End: 2018-09-12

## 2018-09-12 RX ADMIN — SERTRALINE HYDROCHLORIDE 25 MG: 25 TABLET ORAL at 22:37

## 2018-09-12 RX ADMIN — FENTANYL CITRATE 100 MCG: 50 INJECTION, SOLUTION INTRAMUSCULAR; INTRAVENOUS at 07:36

## 2018-09-12 RX ADMIN — ROCURONIUM BROMIDE 20 MG: 10 INJECTION INTRAVENOUS at 07:41

## 2018-09-12 RX ADMIN — KETOROLAC TROMETHAMINE 30 MG: 30 INJECTION, SOLUTION INTRAMUSCULAR at 18:23

## 2018-09-12 RX ADMIN — ROCURONIUM BROMIDE 10 MG: 10 INJECTION INTRAVENOUS at 08:17

## 2018-09-12 RX ADMIN — PROPOFOL 300 MG: 10 INJECTION, EMULSION INTRAVENOUS at 07:36

## 2018-09-12 RX ADMIN — ONDANSETRON 4 MG: 2 INJECTION INTRAMUSCULAR; INTRAVENOUS at 13:03

## 2018-09-12 RX ADMIN — ROCURONIUM BROMIDE 10 MG: 10 INJECTION INTRAVENOUS at 08:40

## 2018-09-12 RX ADMIN — ACETAMINOPHEN 975 MG: 325 TABLET, FILM COATED ORAL at 06:52

## 2018-09-12 RX ADMIN — PROCHLORPERAZINE EDISYLATE 10 MG: 5 INJECTION INTRAMUSCULAR; INTRAVENOUS at 16:05

## 2018-09-12 RX ADMIN — ONDANSETRON 4 MG: 4 TABLET, ORALLY DISINTEGRATING ORAL at 21:37

## 2018-09-12 RX ADMIN — SENNOSIDES AND DOCUSATE SODIUM 2 TABLET: 8.6; 5 TABLET ORAL at 22:36

## 2018-09-12 RX ADMIN — ACETAMINOPHEN 975 MG: 325 TABLET, FILM COATED ORAL at 22:36

## 2018-09-12 RX ADMIN — BUPIVACAINE 20 ML: 13.3 INJECTION, SUSPENSION, LIPOSOMAL INFILTRATION at 08:50

## 2018-09-12 RX ADMIN — GABAPENTIN 300 MG: 300 CAPSULE ORAL at 06:51

## 2018-09-12 RX ADMIN — FENTANYL CITRATE 50 MCG: 50 INJECTION, SOLUTION INTRAMUSCULAR; INTRAVENOUS at 11:55

## 2018-09-12 RX ADMIN — BUPIVACAINE HYDROCHLORIDE 20 ML: 2.5 INJECTION, SOLUTION EPIDURAL; INFILTRATION; INTRACAUDAL at 08:50

## 2018-09-12 RX ADMIN — ONDANSETRON 4 MG: 2 INJECTION INTRAMUSCULAR; INTRAVENOUS at 10:17

## 2018-09-12 RX ADMIN — SODIUM CHLORIDE 200 ML: 900 IRRIGANT IRRIGATION at 10:45

## 2018-09-12 RX ADMIN — CEFAZOLIN 3 G: 1 INJECTION, POWDER, FOR SOLUTION INTRAMUSCULAR; INTRAVENOUS at 07:40

## 2018-09-12 RX ADMIN — FENTANYL CITRATE 200 MCG/HR: 50 INJECTION, SOLUTION INTRAMUSCULAR; INTRAVENOUS at 08:10

## 2018-09-12 RX ADMIN — KETOROLAC TROMETHAMINE 30 MG: 30 INJECTION, SOLUTION INTRAMUSCULAR at 13:16

## 2018-09-12 RX ADMIN — Medication 60 MG: at 07:36

## 2018-09-12 RX ADMIN — CEFAZOLIN 1 G: 1 INJECTION, POWDER, FOR SOLUTION INTRAMUSCULAR; INTRAVENOUS at 09:33

## 2018-09-12 RX ADMIN — FENTANYL CITRATE 50 MCG: 50 INJECTION, SOLUTION INTRAMUSCULAR; INTRAVENOUS at 11:20

## 2018-09-12 RX ADMIN — SODIUM CHLORIDE 400 ML: 900 IRRIGANT IRRIGATION at 10:33

## 2018-09-12 RX ADMIN — LIDOCAINE HYDROCHLORIDE 100 MG: 20 INJECTION, SOLUTION INFILTRATION; PERINEURAL at 07:36

## 2018-09-12 RX ADMIN — FENTANYL CITRATE 50 MCG: 50 INJECTION, SOLUTION INTRAMUSCULAR; INTRAVENOUS at 11:39

## 2018-09-12 RX ADMIN — SODIUM CHLORIDE, POTASSIUM CHLORIDE, SODIUM LACTATE AND CALCIUM CHLORIDE: 600; 310; 30; 20 INJECTION, SOLUTION INTRAVENOUS at 07:30

## 2018-09-12 RX ADMIN — Medication 10 MG: at 13:05

## 2018-09-12 RX ADMIN — FENTANYL CITRATE 50 MCG: 50 INJECTION, SOLUTION INTRAMUSCULAR; INTRAVENOUS at 12:12

## 2018-09-12 RX ADMIN — OXYCODONE HYDROCHLORIDE 5 MG: 5 TABLET ORAL at 22:36

## 2018-09-12 RX ADMIN — Medication 0.3 MG: at 12:44

## 2018-09-12 RX ADMIN — PROPOFOL 40 MCG/KG/MIN: 10 INJECTION, EMULSION INTRAVENOUS at 07:49

## 2018-09-12 RX ADMIN — VASOPRESSIN 2 ML: 20 INJECTION INTRAVENOUS at 08:48

## 2018-09-12 ASSESSMENT — ACTIVITIES OF DAILY LIVING (ADL)
ADLS_ACUITY_SCORE: 9
ADLS_ACUITY_SCORE: 9

## 2018-09-12 NOTE — DISCHARGE SUMMARY
[facility]  Gynecology Discharge Summary    Admission Date:  18  Discharge Date:  9/15/2018    Admission Attending: Temi Corado MD  Discharge Attending: Carey Lopes MD    Admission Diagnosis:  - Abnormal uterine bleeding  - Dysmenorrhea  - Uterine fibroids  - T2DM    Discharge Diagnosis:  - Same, now s/p below stated procedure    Procedures Performed:  - Total abdominal hysterectomy, left salpingectomy    Admission History:  Ms. Lashawn Reddy is a 47 year old  who initially presented to clinic with abnormal uterine bleeding and dysmenorrhea. She was found to have multiple uterine fibroids on ultrasound. Surgical management with TARAN DIA was recommended. The risks, benefits, and alternatives of surgery were discussed, and she agreed to proceed.    Operative Course:  She underwent total abdominal hysterectomy and left salpingectomy, which was uncomplicated. EBL was 500 cc.    Operative Findings:   EUA: large mobile uterus with firm posterior uterus palpated behind cervix. Intra-operative: Large mobile uterus with 6-7 cm fundal fibroid. Evidence of surgical absent portions of bilateral fallopian tube. The fimbriae of left fallopian tube easily identifiable. Right ovary within normal limits, left ovary with small hemorrhagic cyst. Hemostasis noted at end of case.    Hospital Course:  She had one fever on POD#2 that was thought to be due to atelectasis. Her postoperative course was otherwise uncomplicated. She was initially maintained on IV fluids with IV pain medications and mcneil catheter in place. She was placed on mid-dose sliding scale insulin for diabetes management. Her home sertraline was also continued and her home lisinopril was restarted on POD#1. On POD#1, diet was advanced, mcneil catheter was removed, and she was transitioned to PO pain medications. At time of discharge, she was tolerating regular diet, ambulating without difficulty, voiding spontaneously, and  controlling pain with PO medications, and she was deemed stable for discharge. She was afebrile for >24 hours.  Hemoglobin at the time of discharge was 9.1.     Discharge Plans:  - The patient was discharged to home  - PO Activity:   - No lifting >15 lbs or strenuous exercise for 6 weeks   - No driving while taking narcotics or until you can slam on the breaks without pain  - She was instructed to call [facility] or return to ED if she has any of the following:    - Temperature greater than 100.4F   - Pain not controlled by pain medications   - Uncontrolled nausea/vomiting   - Foul-smelling vaginal discharge   - Vaginal bleeding soaking 1 pad per hour for 2 hours in a row  - She will follow up with Dr. Corado on 10/25/2018.    - Discharge medications include:     Review of your medicines      START taking       Dose / Directions    ibuprofen 600 MG tablet   Commonly known as:  ADVIL/MOTRIN   Used for:  S/P hysterectomy        Dose:  600 mg   Take 1 tablet (600 mg) by mouth every 6 hours as needed for moderate pain   Quantity:  40 tablet   Refills:  0       oxyCODONE IR 5 MG tablet   Commonly known as:  ROXICODONE   Used for:  S/P hysterectomy        Dose:  5 mg   Take 1 tablet (5 mg) by mouth every 4 hours as needed for other (pain control or improvement in physical function. Hold dose for analgesic side effects.)   Quantity:  18 tablet   Refills:  0       polyethylene glycol Packet   Commonly known as:  MIRALAX/GLYCOLAX   Used for:  S/P hysterectomy, Slow transit constipation        Dose:  17 g   Take 17 g by mouth daily as needed for constipation   Quantity:  7 packet   Refills:  3       senna-docusate 8.6-50 MG per tablet   Commonly known as:  SENOKOT-S;PERICOLACE   Used for:  S/P hysterectomy        Dose:  1 tablet   Take 1 tablet by mouth 2 times daily as needed for constipation   Quantity:  30 tablet   Refills:  0         CONTINUE these medicines which may have CHANGED, or have new prescriptions. If we are uncertain  of the size of tablets/capsules you have at home, strength may be listed as something that might have changed.       Dose / Directions    acetaminophen 325 MG tablet   Commonly known as:  TYLENOL   This may have changed:  reasons to take this   Used for:  S/P hysterectomy        Dose:  650 mg   Take 2 tablets (650 mg) by mouth every 4 hours as needed for other (multimodal surgical pain management)   Quantity:  40 tablet   Refills:  0       rosuvastatin 20 MG tablet   Commonly known as:  CRESTOR   This may have changed:  See the new instructions.   Used for:  Mixed hyperlipidemia, Type 2 diabetes mellitus with hyperglycemia, without long-term current use of insulin (H)        TAKE 1 TABLET BY MOUTH EVERY DAY   Quantity:  90 tablet   Refills:  1       sertraline 50 MG tablet   Commonly known as:  ZOLOFT   This may have changed:  See the new instructions.   Used for:  PMS (premenstrual syndrome)        TAKE 1/2 TABLET (25 MG) BY MOUTH EVERY MORNING   Quantity:  45 tablet   Refills:  1         CONTINUE these medicines which have NOT CHANGED       Dose / Directions    blood glucose monitoring lancets   Used for:  Type 2 diabetes mellitus with hyperglycemia, without long-term current use of insulin (H)        Dose:  1 each   1 each 2 times daily   Quantity:  102 each   Refills:  3       blood glucose monitoring test strip   Commonly known as:  ACCU-CHEK GUIDE   Used for:  Type 2 diabetes mellitus with hyperglycemia, without long-term current use of insulin (H)        Use to test blood sugar 2 times daily or as directed.   Quantity:  100 strip   Refills:  11       ferrous gluconate 324 (38 Fe) MG tablet   Commonly known as:  FERGON   Used for:  Iron deficiency anemia, unspecified iron deficiency anemia type        Dose:  324 mg   Take 1 tablet (324 mg) by mouth daily (with breakfast)   Quantity:  60 tablet   Refills:  1       glipiZIDE 10 MG 24 hr tablet   Commonly known as:  glipiZIDE XL   Used for:  Type 2 diabetes  mellitus with hyperglycemia, without long-term current use of insulin (H)        Dose:  10 mg   Take 1 tablet (10 mg) by mouth daily   Quantity:  90 tablet   Refills:  3       lisinopril 10 MG tablet   Commonly known as:  PRINIVIL/ZESTRIL   Used for:  Benign essential hypertension        Dose:  10 mg   Take 1 tablet (10 mg) by mouth daily   Quantity:  30 tablet   Refills:  1       metFORMIN 500 MG 24 hr tablet   Commonly known as:  GLUCOPHAGE-XR   Used for:  Type 2 diabetes mellitus with hyperglycemia (H)        TAKE 4 TABLETS (2,000MG) BY MOUTH EVERY DAY WITH DINNER   Quantity:  360 tablet   Refills:  1       omeprazole 20 MG CR capsule   Commonly known as:  priLOSEC   Used for:  Gastroesophageal reflux disease without esophagitis        Dose:  20 mg   Take 1 capsule (20 mg) by mouth daily   Quantity:  90 capsule   Refills:  3            Where to get your medicines      These medications were sent to [facility] [address]  [address] [address]     Phone:  [phone]      acetaminophen 325 MG tablet     ibuprofen 600 MG tablet     polyethylene glycol Packet     senna-docusate 8.6-50 MG per tablet         Some of these will need a paper prescription and others can be bought over the counter. Ask your nurse if you have questions.     Bring a paper prescription for each of these medications      oxyCODONE IR 5 MG tablet               Lexii Lopez MD PhD  Ob/Gyn PGY-3  9/15/2018 3:23 PM    Appreciate Dr. Lopez's summary above, patient also seen and examined by me on the day of discharge. I agree with the summary above.   Carey Lopes MD

## 2018-09-12 NOTE — BRIEF OP NOTE
Brief Operative Note    Pre-operative diagnosis: Abnormal Uterine Bleeding, Dysmenorrhea    Post-operative diagnosis: Same   Procedure: Total abdominal hysterectomy, left salpingectomy   Surgeon: Temi Corado MD   Assistant(s): Hailey Singh, PGY-4  Suad Robledo, PGY-4  Janet Ramos, PGY-1   Anesthesia: General Endotracheal Anesthesia   Estimated blood loss: 500ml   Total IV fluids: 1200ml   Blood transfusion: No transfusion was given during surgery   Total urine output: 100ml   Drains: Mckeon   Specimens: Uterus and cervix, fundal fibroid, left fallopian tube   Findings: EUA: large mobile uterus with firm posterior uterus palpated behind cervix. Intra-operative: Large mobile uterus with 6-7 cm fundal fibroid. Evidence of surgical absent portions of bilateral fallopian tube. The fimbriae of left fallopian tube easily identifiable. Right ovary within normal limits, left ovary with small hemorrhagic cyst. Hemostasis noted at end of case.   Complications: None   Condition: Stable   Comments: See dictated operative report for full details     Suad Robledo MD  OB/GYN Resident, PGY-4  9/12/2018 10:57 AM

## 2018-09-12 NOTE — ANESTHESIA PROCEDURE NOTES
Peripheral Nerve Block Procedure Note    Staff:     Anesthesiologist:  ERIC CHOE  Location: OR AFTER induction  Procedure Start/Stop TImes:      9/12/2018 8:40 AM    patient identified, IV checked, site marked, risks and benefits discussed, informed consent, monitors and equipment checked, pre-op evaluation, at physician/surgeon's request and post-op pain management      Correct Patient: Yes      Correct Position: Yes      Correct Site: Yes      Correct Procedure: Yes      Correct Laterality:  Yes    Site Marked:  Yes  Procedure details:     Procedure:  TAP    ASA:  3    Diagnosis:  SOUTH    Laterality:  Bilateral    Position:  Supine    Sterile Prep: chloraprep      Ultrasound: Yes      Ultrasound used to identify targeted nerve, plexus, or vascular structure and placed a needle adjacent to it      Permanent Image entered into patiient's record      Abnormal pain on injection: No      Blood Aspirated: No      Paresthesias:  No    Bleeding at site: No      Bolus via:  Needle    Infusion Method:  Single Shot    Complications:  None

## 2018-09-12 NOTE — ANESTHESIA POSTPROCEDURE EVALUATION
Patient: Lashawn Reddy    Procedure(s):  Total Abdominal Hysterectomy, left Salpingectomy - Wound Class: II-Clean Contaminated    Diagnosis:Abnormal Uterine Bleeding, Dysmenorrhea   Diagnosis Additional Information: No value filed.    Anesthesia Type:  General, ETT    Note:  Anesthesia Post Evaluation    Patient location during evaluation: PACU  Patient participation: Able to fully participate in evaluation  Level of consciousness: awake  Pain management: adequate  Airway patency: patent  Cardiovascular status: acceptable and stable  Respiratory status: acceptable and room air  Hydration status: acceptable  PONV: none     Anesthetic complications: None          Last vitals:  Vitals:    09/12/18 1245 09/12/18 1345 09/12/18 1405   BP: (!) 155/93 (!) 155/91 162/74   Pulse:      Resp:  13 16   Temp:  36.5  C (97.7  F) 35.9  C (96.6  F)   SpO2: 99% 99% 95%         Electronically Signed By: Teodoro Mcnulty MD  September 12, 2018  2:06 PM

## 2018-09-12 NOTE — IP AVS SNAPSHOT
MRN:[id number]                      After Visit Summary   9/12/2018    Lashawn Reddy    MRN: [id number]           Thank you!     Thank you for choosing [facility] for your care. Our goal is always to provide you with excellent care. Hearing back from our patients is one way we can continue to improve our services. Please take a few minutes to complete the written survey that you may receive in the mail after you visit with us. Thank you!        Patient Information     Date Of Birth          1970        Designated Caregiver       Most Recent Value    Caregiver    Will someone help with your care after discharge? yes    Name of designated caregiver Family    Phone number of caregiver [phone]    Caregiver address [address] [address]      About your hospital stay     You were admitted on:  September 12, 2018 You last received care in the:  [facility]    You were discharged on:  September 15, 2018        Reason for your hospital stay       For hysterectomy                  Who to Call     For medical emergencies, please call 911.  For non-urgent questions about your medical care, please call your primary care provider or clinic, [phone]  For questions related to your surgery, please call your surgery clinic        Attending Provider     Provider Specialty    Temi Corado MD OB/Gyn       Primary Care Provider Office Phone # Fax #    Tanner Metz [phone]      After Care Instructions     Activity       Your activity upon discharge: no lifting >20lbs for 6 weeks, no driving for 2 weeks or until pain well controlled without PO narcotic pain meds.            Diet       Follow this diet upon discharge: Orders Placed This Encounter      Regular Diet Adult            Discharge Instructions       GENERAL POST-OPERATIVE  PATIENT INSTRUCTIONS      FOLLOW-UP:    Call Surgeon if you have:  Temperature greater than 100.4  Persistent nausea and vomiting  Severe  uncontrolled pain  Redness, tenderness, or signs of infection (pain, swelling, redness, odor or green/yellow discharge around the site)  Difficulty breathing, headache or visual disturbances  Hives  Persistent dizziness or light-headedness  Extreme fatigue  Any other questions or concerns you may have after discharge    In an emergency, call 911 or go to an Emergency Department at a nearby hospital       WOUND CARE INSTRUCTIONS:  Keep a dry clean dressing on the wound if there is drainage. The initial bandage may be removed after 24 hours.  Once the wound has quit draining you may leave it open to air.  If clothing rubs against the wound or causes irritation and the wound is not draining you may cover it with a dry dressing during the daytime.  Try to keep the wound dry and avoid ointments on the wound unless directed to do so.  If the wound becomes bright red and painful or starts to drain infected material that is not clear, please contact your physician immediately.    1.  You may shower 48 hrs after surgery   2.  No soaking in the tub        DIET:  There are no dietary restrictions.  You may eat any foods that you can tolerate.  It is a good idea to eat a high fiber diet and take in plenty of fluids to prevent constipation.  If you do become constipated you may want to take a mild laxative or take ducolax tablets on a daily basis until your bowel habits are regular.  Constipation can be very uncomfortable, along with straining, after recent surgery.    ACTIVITY:  You are encouraged to cough and deep breath or use your incentive spirometer if you were given one, every 15-30 minutes when awake.  This will help prevent respiratory complications and low grade fevers post-operatively if you had a general anesthetic.  You may want to hug a pillow when coughing and sneezing to add additional support to the surgical area, if you had abdominal or chest surgery, which will decrease pain during these times.       1.  No  heavy lifting >20lbs or strenuous exercise for six-eight weeks.  No exercise in which you are using core muscles (yoga, pilates, swimming, weight lifting)   2.  You may walk as much as you wish.  You are encouraged to increase your   activity each day after surgery.  Stairs are okay.    3.  Nothing per vagina for eight weeks.  No tampons, no intercourse, no douching.  You can expect some light vaginal spotting and discharge for up to six weeks.  If bleeding becomes heavy, please contact the office.     MEDICATIONS:  Try to take narcotic medications and anti-inflammatory medications, such as tylenol, ibuprofen, naprosyn, etc., with food.  This will minimize stomach upset from the medication.  Should you develop nausea and vomiting from the pain medication, or develop a rash, please discontinue the medication and contact your physician.  You should not drive, make important decisions, or operate machinery when taking narcotic pain medication.    OTHER:  Patients are often constipated after general anesthesia and surgery.  The patient should continue to take stool softeners (for example, Senokot-S) for the next six weeks and consume adequate amounts of water.  If the patient remains constipated or unable to pass stool, please try one or all of the following measures:  1.  Milk of Magnesia 30cc twice a day as needed by mouth  2.  Metamucil 2 tablespoons in 12 ounces of fluid  3.  Dulcolax oral or suppositories  4.  Prunes or prune juice  5.  Miralax daily      QUESTIONS:  Please feel free to call your physician or the hospital  if you have any questions, and they will be glad to assist you.                  Follow-up Appointments     Adult [facility] Follow-up and recommended labs and tests       Follow up with Dr. Corado in clinc on 10/25/18.     Appointments on [facility] and/or [facility] (with [facility] or [facility] provider or service). Call [phone] if you haven't heard regarding these appointments within 7  "days of discharge.                  Your next 10 appointments already scheduled     Sep 26, 2018  4:00 PM CDT   [facility]    [address] [facility] [address] [phone]           Bring a current list of meds and any records pertaining to this visit. For Physicals, please bring immunization records and any forms needing to be filled out. Please arrive 10 minutes early to complete paperwork.            Oct 25, 2018 12:45 PM CDT   Return Visit with Temi Corado MD   [facility] ([facility])    [facility] [address]  [address]  [facility] [address] [phone]              Pending Results     Date and Time Order Name Status Description    9/14/2018 0049 Blood culture Preliminary     9/14/2018 0049 Blood culture Preliminary     9/12/2018 0954 Surgical pathology exam In process             Statement of Approval     Ordered          09/15/18 1037  I have reviewed and agree with all the recommendations and orders detailed in this document.  EFFECTIVE NOW     Approved and electronically signed by:  Carey Lopes MD             Admission Information     Date & Time Provider Department Dept. Phone    9/12/2018 Temi Corado MD  [phone]      Your Vitals Were     Blood Pressure Pulse Temperature Respirations Height Weight    116/60 (BP Location: Right arm) 82 97.3  F (36.3  C) (Oral) 18 1.676 m (5' 5.98\") 113 kg (249 lb 1.9 oz)    Last Period Pulse Oximetry BMI (Body Mass Index)             09/03/2018 97% 40.23 kg/m2         MyChart Information     [employer] gives you secure access to your electronic health record. If you see a primary care provider, you can also send messages to your care team and make appointments. If you have questions, please call your primary care clinic.  If you do not have a primary care provider, please " call [phone] and they will assist you.        Care EveryWhere ID     This is your Care EveryWhere ID. This could be used by other organizations to access your [facility] medical records  [id number]        Equal Access to Services     CAMI SAUL : Hadii aad ku hadmikenancy Ludwig, waaxda luqadaha, qaybta kaalmada aderoger, yoli hernandezjonah tovarnatalie haque hank rawls. So [facility] [phone].    ATENCIÓN: Si habla español, tiene a obrien disposición servicios gratuitos de asistencia lingüística. Llame al [phone].    We comply with applicable federal civil rights laws and [place] laws. We do not discriminate on the basis of race, color, national origin, age, disability, sex, sexual orientation, or gender identity.               Review of your medicines      START taking        Dose / Directions    ibuprofen 600 MG tablet   Commonly known as:  ADVIL/MOTRIN        Dose:  600 mg   Take 1 tablet (600 mg) by mouth every 6 hours as needed for moderate pain   Quantity:  40 tablet   Refills:  0       oxyCODONE IR 5 MG tablet   Commonly known as:  ROXICODONE        Dose:  5 mg   Take 1 tablet (5 mg) by mouth every 4 hours as needed for other (pain control or improvement in physical function. Hold dose for analgesic side effects.)   Quantity:  18 tablet   Refills:  0       polyethylene glycol Packet   Commonly known as:  MIRALAX/GLYCOLAX   Used for:  Slow transit constipation        Dose:  17 g   Take 17 g by mouth daily as needed for constipation   Quantity:  7 packet   Refills:  3       senna-docusate 8.6-50 MG per tablet   Commonly known as:  SENOKOT-S;PERICOLACE        Dose:  1 tablet   Take 1 tablet by mouth 2 times daily as needed for constipation   Quantity:  30 tablet   Refills:  0         CONTINUE these medicines which may have CHANGED, or have new prescriptions. If we are uncertain of the size of tablets/capsules you have at home, strength may be listed as something that might have changed.        Dose / Directions     acetaminophen 325 MG tablet   Commonly known as:  TYLENOL   This may have changed:  reasons to take this        Dose:  650 mg   Take 2 tablets (650 mg) by mouth every 4 hours as needed for other (multimodal surgical pain management)   Quantity:  40 tablet   Refills:  0       rosuvastatin 20 MG tablet   Commonly known as:  CRESTOR   This may have changed:  See the new instructions.   Used for:  Mixed hyperlipidemia, Type 2 diabetes mellitus with hyperglycemia, without long-term current use of insulin (H)        TAKE 1 TABLET BY MOUTH EVERY DAY   Quantity:  90 tablet   Refills:  1       sertraline 50 MG tablet   Commonly known as:  ZOLOFT   This may have changed:  See the new instructions.   Used for:  PMS (premenstrual syndrome)        TAKE 1/2 TABLET (25 MG) BY MOUTH EVERY MORNING   Quantity:  45 tablet   Refills:  1         CONTINUE these medicines which have NOT CHANGED        Dose / Directions    blood glucose monitoring lancets   Used for:  Type 2 diabetes mellitus with hyperglycemia, without long-term current use of insulin (H)        Dose:  1 each   1 each 2 times daily   Quantity:  102 each   Refills:  3       blood glucose monitoring test strip   Commonly known as:  ACCU-CHEK GUIDE   Used for:  Type 2 diabetes mellitus with hyperglycemia, without long-term current use of insulin (H)        Use to test blood sugar 2 times daily or as directed.   Quantity:  100 strip   Refills:  11       ferrous gluconate 324 (38 Fe) MG tablet   Commonly known as:  FERGON   Used for:  Iron deficiency anemia, unspecified iron deficiency anemia type        Dose:  324 mg   Take 1 tablet (324 mg) by mouth daily (with breakfast)   Quantity:  60 tablet   Refills:  1       glipiZIDE 10 MG 24 hr tablet   Commonly known as:  glipiZIDE XL   Used for:  Type 2 diabetes mellitus with hyperglycemia, without long-term current use of insulin (H)        Dose:  10 mg   Take 1 tablet (10 mg) by mouth daily   Quantity:  90 tablet   Refills:  3  "      lisinopril 10 MG tablet   Commonly known as:  PRINIVIL/ZESTRIL   Used for:  Benign essential hypertension        Dose:  10 mg   Take 1 tablet (10 mg) by mouth daily   Quantity:  30 tablet   Refills:  1       metFORMIN 500 MG 24 hr tablet   Commonly known as:  GLUCOPHAGE-XR   Used for:  Type 2 diabetes mellitus with hyperglycemia (H)        TAKE 4 TABLETS (2,000MG) BY MOUTH EVERY DAY WITH DINNER   Quantity:  360 tablet   Refills:  1       omeprazole 20 MG CR capsule   Commonly known as:  priLOSEC   Used for:  Gastroesophageal reflux disease without esophagitis        Dose:  20 mg   Take 1 capsule (20 mg) by mouth daily   Quantity:  90 capsule   Refills:  3            Where to get your medicines      These medications were sent to [facility] [place], [place] - [address]  [address] [address]     Phone:  [phone]     acetaminophen 325 MG tablet    ibuprofen 600 MG tablet    polyethylene glycol Packet    senna-docusate 8.6-50 MG per tablet         Some of these will need a paper prescription and others can be bought over the counter. Ask your nurse if you have questions.     Bring a paper prescription for each of these medications     oxyCODONE IR 5 MG tablet                Protect others around you: Learn how to safely use, store and throw away your medicines at www.disposemymeds.org.        Information about your nerve block     Today you received a block to numb the nerves near your surgery site.    This is a block using local anesthetic or \"numbing\" medication injected around the nerves to anesthetize or \"numb\" the area supplied by those nerves. This block is injected into the muscle layer near your surgical site. The type of anesthesia (Exparel) your anesthesia team used to numb your abdomen may give you relief for up to 72 hours.     Diet: There are no diet restrictions, but you should drink plenty of fluids, unless you are on a fluid-restricted diet. "     Activity: If your surgical site is an arm or leg you should be careful with your affected limb, since it is possible to injure your limb without being aware of it due to the numbing. Until full feeling returns, you should guard against bumping or hitting your limb, and avoid extreme hot or cold temperatures on the skin.    Pain Medication: As the block wears off, the feeling will return as a tingling or prickly sensation near your surgical site. You will experience more discomfort from your incisions as the feeling returns. You may want to take a pain pill (a narcotic or Tylenol if this was prescribed by your surgeon) when you start to experience mild pain, before the pain becomes more severe. If your pain medications do not control your pain, you should notify your surgeon. If you are taking narcotics for pain management, do not drink alcohol, drive a car, or perform hazardous activities.  If you have questions or concerns you may call your surgeon at the number provided with your discharge instructions.     Call your surgeon if you experience blurry vision, ringing in the ears or metallic taste in your mouth.         Information about OPIOIDS     PRESCRIPTION OPIOIDS: WHAT YOU NEED TO KNOW   We gave you an opioid (narcotic) pain medicine. It is important to manage your pain, but opioids are not always the best choice. You should first try all the other options your care team gave you. Take this medicine for as short a time (and as few doses) as possible.    Some activities can increase your pain, such as bandage changes or therapy sessions. It may help to take your pain medicine 30 to 60 minutes before these activities. Reduce your stress by getting enough sleep, working on hobbies you enjoy and practicing relaxation or meditation. Talk to your care team about ways to manage your pain beyond prescription opioids.    These medicines have risks:    DO NOT drive when on new or higher doses of pain medicine. These  medicines can affect your alertness and reaction times, and you could be arrested for driving under the influence (DUI). If you need to use opioids long-term, talk to your care team about driving.    DO NOT operate heavy machinery    DO NOT do any other dangerous activities while taking these medicines.    DO NOT drink any alcohol while taking these medicines.     If the opioid prescribed includes acetaminophen, DO NOT take with any other medicines that contain acetaminophen. Read all labels carefully. Look for the word  acetaminophen  or  Tylenol.  Ask your pharmacist if you have questions or are unsure.    You can get addicted to pain medicines, especially if you have a history of addiction (chemical, alcohol or substance dependence). Talk to your care team about ways to reduce this risk.    All opioids tend to cause constipation. Drink plenty of water and eat foods that have a lot of fiber, such as fruits, vegetables, prune juice, apple juice and high-fiber cereal. Take a laxative (Miralax, milk of magnesia, Colace, Senna) if you don t move your bowels at least every other day. Other side effects include upset stomach, sleepiness, dizziness, throwing up, tolerance (needing more of the medicine to have the same effect), physical dependence and slowed breathing.    Store your pills in a secure place, locked if possible. We will not replace any lost or stolen medicine. If you don t finish your medicine, please throw away (dispose) as directed by your pharmacist. The [place] Pollution Control Agency has more information about safe disposal: https://www.pca.Pending sale to [facility].[place].us/living-green/managing-unwanted-medications             Medication List: This is a list of all your medications and when to take them. Check marks below indicate your daily home schedule. Keep this list as a reference.      Medications           Morning Afternoon Evening Bedtime As Needed    acetaminophen 325 MG tablet   Commonly known as:  TYLENOL    Take 2 tablets (650 mg) by mouth every 4 hours as needed for other (multimodal surgical pain management)   Last time this was given:  975 mg on 9/15/2018  1:57 AM                                blood glucose monitoring lancets   1 each 2 times daily                                blood glucose monitoring test strip   Commonly known as:  ACCU-CHEK GUIDE   Use to test blood sugar 2 times daily or as directed.                                ferrous gluconate 324 (38 Fe) MG tablet   Commonly known as:  FERGON   Take 1 tablet (324 mg) by mouth daily (with breakfast)                                glipiZIDE 10 MG 24 hr tablet   Commonly known as:  glipiZIDE XL   Take 1 tablet (10 mg) by mouth daily                                ibuprofen 600 MG tablet   Commonly known as:  ADVIL/MOTRIN   Take 1 tablet (600 mg) by mouth every 6 hours as needed for moderate pain   Last time this was given:  600 mg on 9/15/2018  5:10 AM                                lisinopril 10 MG tablet   Commonly known as:  PRINIVIL/ZESTRIL   Take 1 tablet (10 mg) by mouth daily   Last time this was given:  10 mg on 9/14/2018  8:00 AM                                metFORMIN 500 MG 24 hr tablet   Commonly known as:  GLUCOPHAGE-XR   TAKE 4 TABLETS (2,000MG) BY MOUTH EVERY DAY WITH DINNER                                omeprazole 20 MG CR capsule   Commonly known as:  priLOSEC   Take 1 capsule (20 mg) by mouth daily   Last time this was given:  20 mg on 9/15/2018  8:57 AM                                oxyCODONE IR 5 MG tablet   Commonly known as:  ROXICODONE   Take 1 tablet (5 mg) by mouth every 4 hours as needed for other (pain control or improvement in physical function. Hold dose for analgesic side effects.)   Last time this was given:  10 mg on 9/15/2018  9:19 AM                                polyethylene glycol Packet   Commonly known as:  MIRALAX/GLYCOLAX   Take 17 g by mouth daily as needed for constipation   Last time this was given:  17 g on  9/14/2018  6:57 PM                                rosuvastatin 20 MG tablet   Commonly known as:  CRESTOR   TAKE 1 TABLET BY MOUTH EVERY DAY                                senna-docusate 8.6-50 MG per tablet   Commonly known as:  SENOKOT-S;PERICOLACE   Take 1 tablet by mouth 2 times daily as needed for constipation   Last time this was given:  1 tablet on 9/15/2018  8:57 AM                                sertraline 50 MG tablet   Commonly known as:  ZOLOFT   TAKE 1/2 TABLET (25 MG) BY MOUTH EVERY MORNING   Last time this was given:  25 mg on 9/14/2018  8:16 PM

## 2018-09-12 NOTE — PROGRESS NOTES
Gynecology Progress Note     S: Pt asleep. Received update from mother and sister at bedside. Per family, she was feeling discomfort in her lower abdomen, describing the pressure as feeling like she needed to urinate. Family also stated that the patient began feeling nauseous earlier and was given prochorperazine 10mg IV. She has only had water and part of a popsicle since coming to the floor. No SOB, CP. No pain reported to family by patient.     O:  Vitals:    09/12/18 1230 09/12/18 1245 09/12/18 1345 09/12/18 1405   BP: (!) 176/106 (!) 155/93 (!) 155/91 162/74   BP Location:    Left arm   Pulse:       Resp: 12 13 16   Temp:   97.7  F (36.5  C) 96.6  F (35.9  C)   TempSrc:   Oral Oral   SpO2: 100% 99% 99% 95%   Weight:       Height:            Gen: sleeping comfortably, no acute distress  CV: RRR, no murmurs  Lungs: CTAB, appropriate work of breathing  Abd: soft, appropriately tender.  Incision with bandage in place; clean, dry, and intact.  : Mcneil in place draining light yellow urine.  Ext: warm and well perfused, SCDs in place, no edema noted    Hemoglobin   Date Value Ref Range Status   09/12/2018 9.9 (L) 11.7 - 15.7 g/dL Final   07/09/2018 10.9 (L) 11.7 - 15.7 g/dL Final       A/P: Lashawn Reddy is a 47 year old female POD #0 s/p Total abdominal hysterectomy, bilateral salpingectomy.    Abdominal Pressure/Discomfort: Reassured family that she is urinating via a catheter that is in place and feeling a bit of pressure/discomfort after surgery is normal. Mcneil draining adequately and urine is clear yellow, abdominal exam with no suprapubic fullness or tenderness. Will continue to reassess symptoms overnight.    Nausea: Controlled with prochlorperazine 10mg IV, zofran prn    FEN: continue IVF until tolerating adequate PO intake, advance diet as tolerated  Pain: doing well on Toradol 30mg injection & acetaminophen tablet 975mg  : mcneil catheter still in place, plan to remove in the morning  Heme: Hgb 9.9 >  EBL 500cc > AM Hgb pending    Dispo: Anticipate discharge to home POD#1-2 or when meeting postoperative goals    I, Janet Mendoza am acting as scribe for Dr. Janet Ramos M.D.    ------------------  I agree with the above note by Janet Mendoza, MS3, and have edited it as appropriate. Please see additional findings below:    S: Patient is feeling some nausea, controlled with zofran and compazine. Also feeling urethral pressure from Mckeon catheter, no pain per family. Otherwise feeling well postop.    O:  Gen: resting comfortably in bed  CV: RRR  Lungs: CTAB  Abd: Soft, appropriately tender, nondistended. Bandage in place; clean, dry, and intact  Ext: No LE edema    A/P: Continue prn zofran and compazine for nausea, plan to reassess discomfort secondary to Mckeon overnight. Plan to DC Mckeon in the morning. Otherwise continue normal postop cares.    Janet Ramos MD  Ob/Gyn Resident, PGY-1  09/12/18 5:23 PM

## 2018-09-12 NOTE — ANESTHESIA CARE TRANSFER NOTE
Patient: Lashawn Reddy    Procedure(s):  Total Abdominal Hysterectomy, left Salpingectomy - Wound Class: II-Clean Contaminated    Diagnosis: Abnormal Uterine Bleeding, Dysmenorrhea   Diagnosis Additional Information: No value filed.    Anesthesia Type:   General, ETT     Note:  Airway :Face Mask  Patient transferred to:PACU  Comments: Patient VSS, following commands, but patient is perseverating on needing to go to the bathroom, she has a mcneil in place. Care transfer signed out to PACU RNHandoff Report: Identifed the Patient, Identified the Reponsible Provider, Reviewed the pertinent medical history, Discussed the surgical course, Reviewed Intra-OP anesthesia mangement and issues during anesthesia, Set expectations for post-procedure period and Allowed opportunity for questions and acknowledgement of understanding      Vitals: (Last set prior to Anesthesia Care Transfer)    CRNA VITALS  9/12/2018 1030 - 9/12/2018 1118      9/12/2018             NIBP: (!)  149/106    Pulse: 72    NIBP Mean: 127    Ht Rate: 71    SpO2: 100 %    Resp Rate (observed): (!)  7                Electronically Signed By: Nathanael Moore MD  September 12, 2018  11:18 AM

## 2018-09-12 NOTE — OR NURSING
Pt has 5 inch area on right inner lower leg that is dark brown and lower leg has many veins that are distended and dark blue in color. Pt states he leg has been that way since she had a blood clot about 4 years ago.

## 2018-09-12 NOTE — IP AVS SNAPSHOT
[facility]    [address] [place] [address]    Phone:  [phone]                                       After Visit Summary   9/12/2018    Lashawn Reddy    MRN: [id number]           After Visit Summary Signature Page     I have received my discharge instructions, and my questions have been answered. I have discussed any challenges I see with this plan with the nurse or doctor.    ..........................................................................................................................................  Patient/Patient Representative Signature      ..........................................................................................................................................  Patient Representative Print Name and Relationship to Patient    ..................................................               ................................................  Date                                   Time    ..........................................................................................................................................  Reviewed by Signature/Title    ...................................................              ..............................................  Date                                               Time          22EPIC Rev 08/18

## 2018-09-12 NOTE — OP NOTE
Operative Note    Patient: Lashawn Reddy   : 1970  MRN: [id number]    Date of Service: 2018    Pre-operative diagnosis:  1. Abnormal uterine bleeding  2. Dysmenorrhea  3. Type II DM  4. obesity    Post-operative diagnosis:  Same as above s/p procedure    Procedure:   1. Total abdominal hysterectomy  2. left salpingectomy    Surgeon: Temi Corado MD    Assistants:   Suad Robledo, PGY-4  Hailey Singh, PGY-4  Janet Ramos, PGY-1    Anesthesia: General Endotracheal Anesthesia    EBL: 500mL  Urine: 100mL  Fluids: 1200cmL    Specimens:   1. Uterus and cervix  2. fundal fibroid  3. left fallopian tube    Complications: none apparent    Findings: EUA: large mobile uterus with firm posterior uterus palpated behind cervix. Intra-operative: Large mobile uterus with 6-7 cm fundal fibroid. Evidence of surgical absent portions of bilateral fallopian tube. The fimbriae of left fallopian tube easily identifiable. Right ovary within normal limits, left ovary with small hemorrhagic cyst. Hemostasis noted at end of case.    Indications: Lashawn Reddy is a 47 year old female with Pmhx of Type II DM and history of provoked DVT who is undergoing abdominal hysterectomy secondary to abnormal uterine bleeding and dysmenorrhea. US findings revealed a uterus measuring [id number] with multiple uterine fibroids, largest measuring 8.7cmx6.9cmx8.6cm.  Ovaries normal appearing other than simple cyst on right ovary. PAP smear NILM and endometrial biopsy without malignancy or hyperplasia.  Risks, benefits, and alternatives to the procedure were discussed. The patient desired surgical management with the above procedure.  Patient's questions were answered, understanding confirmed, and the patient signed written informed consent.    Procedure:  The patient was taken to the operating room with IV fluids running. She was given ancef IV for infection prophylaxis.  She was positioned in dorsal lithotomy position in yellow fin  maddi.  Exam under anesthesia was performed with the findings above.  She was then prepped and draped in the usual sterile fashion.  GETA was obtained and found to be adequate. A safety time out was performed. A mcneil catheter was placed in the bladder.      A Pfannenstiel skin incision was made with the scalpel and carried through the subcutaneous tissue to the fascia with electrocautery.  The fascia was incised and extended with the Bovie on cut. The rectus muscle diastasis was identified, seperated and the peritoneum was entered and extended with blunt dissection. The pelvis was examined with the above noted findings. The bowels were packed and the Te-0 retractor was placed. However given density of subcutaneous tissue, inadequate visualization was noted.  For this reason the Te-0 retractor was removed and the Dougie retractor was placed with adequate visualization.    A basket clamp was placed on the uterus, but complete delivery of the uterus was not achieved given a large posterior fundal fibroid.  Decision was made to proceed with myomectomy prior to hysterectomy.  Dilute vasopressin was infiltrated under the uterine serosa.  The serosa was excised in a linear fashion with electrocautery and a denise clamp was used to dissect the uterine fibroid from the uterus.  The fibroid was sent to pathology. The uterine defect was closed with a running, locked stitch of 0-vicryl. Next Carmalt clamps were placed on bilateral cornua for retraction in addition to the basket clamp.  The uterus was retracted out of the pelvis. Attention was turned to the left side where a kocher clamp was placed on the left round ligament and retracted.   An 0- vicryl suture was placed through the round ligament for hemostasis and traction. The round ligament and broad ligament was incised, opened, and the posterior leaf cauterized toward the lateral pelvic sidewall parallel to the IP ligament. A window was created in the  mesosalpinx using cautery. The left uterine-ovarian ligament was doubly clamped and cut. The distal end towards the ovary was tied with a free tie of 0 vicryl, followed by an Tee stitch of 0-vicryl.  The pedicle proximal to the uterus was also suture ligated. The anterior leaf of the left broad ligament was opened to the level of the lower uterine segment. The vesicouterine peritoneum was then dissected off from the lower uterine segment.     Attention was then turned to the right side which, in a similar fashion, had a suture placed through the round ligament, the posterior leaf incised, a window created in the mesosalpinx, the right utero-ovarian ligament doubly clamped and cut, suture ligated. The anterior leaf was incised towards the lower uterine segment to connect with the prior bladder flap. The bladder was further dissected down off of the lower uterine segment and cervix, and both uterine arteries were skeletonized bilaterally. The uterine arteries were then clamped, transected and suture ligated with 0-vicryl using a Greta stitch.   Hemostasis was noted. The cardinal ligaments on both sides were then serially clamped using straight Greta clamps, transected and suture ligated with 0-vicryl. The dissection was then carried down on cardinal ligaments and uterosacral ligaments using straight Greta clamp, transected and suture ligated using 0 Vicryl.  At this time, the vagina and the cervix was then grasped from both sides using curved Greta clamps and the cervix was then amputated from the upper vagina using Pippa scissors. The uterus, cervix, and left tube were removed and sent to pathology.  Vaginal cuff angles were closed using 0-Vicryl with Greta stitches. The vaginal cuff was closed using figure of X sutures of 0-vicryl. The vaginal cuff was noted to be hemostatic. The pelvis was copiously irrigated and again noted to be hemostatic.    Fascia and muscles were inspected and noted to be  hemostatic. The fascia was closed with a running stitch of 0-vicryl.  The subcutaneous tissue was re-approximated with 3-0 plain gut. The skin was closed with 4-0 monocryl.  Instrument, needle, and sponge counts were correct times two The patient was transferred to the PACU in stable condition.    Dr. Corado was present for the entire procedure.    Hailey Singh MD MPH  OB/GYN, PGY4  Pager: [phone]  9/12/2018  3:48 PM    I was present and scrubbed for entire procedure.   Temi Corado MD

## 2018-09-12 NOTE — OR NURSING
PACU to Inpatient Nursing Handoff    Patient Lashawn Reddy is a 47 year old female who speaks English.   Procedure Procedure(s):  Total Abdominal Hysterectomy, left Salpingectomy - Wound Class: II-Clean Contaminated   Surgeon(s) Primary: Temi Corado MD  Resident - Assisting: Suad Robledo MD; Janet Ramos MD; Hailey Singh MD     Allergies   Allergen Reactions     No Known Drug Allergies        Isolation  No active isolations     Past Medical History   has a past medical history of CARDIOVASCULAR SCREENING; LDL GOAL LESS THAN 100 (6/6/2013); Depressive disorder, not elsewhere classified; Diabetes mellitus, type 2 (H) (6/6/2013); Diffuse cystic mastopathy; Tobacco use disorder; and Type 2 diabetes, HbA1c goal < 7% (H) (6/6/2013).    Anesthesia Combined General with Block   Dermatome Level     Preop Meds acetaminophen (Tylenol) - time given: 975mg @ 0652  gabapentin (Neurontin) - time given: 300mg @ 0651   Nerve block Transversus abdominus plane (TAP).  Location:bilateral. Med:Exparel (liposomal bupivacaine). Time given: 1015   Intraop Meds fentanyl (Sublimaze): 100 mcg total  ondansetron (Zofran): last given at 1017   Local Meds No   Antibiotics cefazolin (Ancef) - last given at 0933     Pain Patient Currently in Pain: yes  Comfort: tolerable with discomfort  Pain Control: inadequate pain control   PACU meds  fentanyl (Sublimaze): 200 mcg (total dose) last given at 1130   hydromorphone (Dilaudid): 0.3 mg (total dose) last given at 1245   ketorolac (Toradol): 30 mg last given at 1317  labetalol (Normodyne/Trandate): 10 mg (total dose) last given at 1305   ondansetron (Zofran): 4 mg (total dose) last given at 1303   B & O Suppository @ 1200   PCA / epidural No   Capnography Respiratory Monitoring (EtCO2): 35 mmHg  Integrated Pulmonary Index (IPI): 10   Telemetry ECG Rhythm: Normal sinus rhythm   Inpatient Telemetry Monitor Ordered? No        Labs Glucose Lab Results   Component Value  Date     09/12/2018       Hgb Lab Results   Component Value Date    HGB 9.9 09/12/2018       INR Lab Results   Component Value Date    INR 2.3 04/11/2014    INR 3.4 11/25/2013    INR 1.04 10/25/2013      PACU Imaging Not applicable     Wound/Incision Incision/Surgical Site 09/12/18 Bilateral Abdomen (Active)   Incision Assessment UTV 9/12/2018 12:30 PM   Closure ISATU 9/12/2018 12:30 PM   Incision Drainage Amount None 9/12/2018 12:30 PM   Dressing Intervention Clean, dry, intact 9/12/2018 12:30 PM   Number of days:0      CMS Peripheral Neurovascular WDL: WDL (09/12/18 1230)  All Extremities Temperature: warm (09/12/18 1230)  All Extremities Color: no discoloration (09/12/18 1230)  All Extremities Sensation: no numbness;no tingling (09/12/18 1230)  RLE Color:  (see note) (09/12/18 0634)   Equipment Not applicable   Other LDA       IV Access Peripheral IV 09/12/18 Right Hand (Active)   Site Assessment [facility] 9/12/2018 12:30 PM   Line Status Infusing;Checked every 1 hour 9/12/2018 12:30 PM   Phlebitis Scale 0-->no symptoms 9/12/2018 12:30 PM   Infiltration Scale 0 9/12/2018 12:30 PM   Infiltration Site Treatment Method  None 9/12/2018 12:00 PM   Extravasation? No 9/12/2018 12:30 PM   Number of days:0       Peripheral IV 09/12/18 Left Hand (Active)   Site Assessment [facility] 9/12/2018 12:30 PM   Line Status Saline locked;Checked every 1-2 hour 9/12/2018 12:30 PM   Phlebitis Scale 0-->no symptoms 9/12/2018 12:30 PM   Infiltration Scale 0 9/12/2018 12:30 PM   Infiltration Site Treatment Method  None 9/12/2018 12:00 PM   Extravasation? No 9/12/2018 12:30 PM   Number of days:0      Blood Products Not applicable  mL   Intake/Output Date 09/12/18 0700 - 09/13/18 0659   Shift 5383-1907 7639-0890 6059-1382 24 Hour Total   I  N  T  A  K  E   I.V. 1200   1200    Shift Total  (mL/kg) 1200  (10.62)   1200  (10.62)   O  U  T  P  U  T   Urine 100   100    Blood 500   500    Shift Total  (mL/kg) 600  (5.31)   600  (5.31)   Weight  (kg) 113 113 113 113        Drains / Mckeon Urethral Catheter Latex;Straight-tip 16 fr (Active)   Collection Container Standard 9/12/2018 12:30 PM   Securement Method Securing device (Describe) 9/12/2018 12:30 PM   Rationale for Continued Use Strict 1-2 Hour I&O;/GI/GYN Pelvic Procedure 9/12/2018 12:30 PM   Number of days:0      Time of void PreOp Void Prior to Procedure: 0620 (09/12/18 0640)    PostOp      Diapered? No   Bladder Scan     PO    nausea and ice chips     Vitals    B/P: (!) 155/93  T: 96.8  F (36  C)    Temp src: Temporal  P:  Pulse: 82 (09/12/18 0610)    Heart Rate: 62 (09/12/18 1230)     R: 12  O2:  SpO2: 99 %    O2 Device: Nasal cannula (09/12/18 1245)    Oxygen Delivery: 3 LPM (09/12/18 1245)         Family/support present significant other   Patient belongings Patient Belongings: glasses;clothing;shoes  Disposition of Belongings: Locker (labeled and locked storage)   Patient transported on cart   DC meds/scripts (obs/outpt) Not applicable   Inpatient Pain Meds Released? Yes       Special needs/considerations None   Tasks needing completion None       Parisa Fonseca RN  [facility]COM [id number]

## 2018-09-13 LAB
GLUCOSE BLDC GLUCOMTR-MCNC: 118 MG/DL (ref 70–99)
GLUCOSE BLDC GLUCOMTR-MCNC: 122 MG/DL (ref 70–99)
GLUCOSE BLDC GLUCOMTR-MCNC: 130 MG/DL (ref 70–99)
GLUCOSE BLDC GLUCOMTR-MCNC: 139 MG/DL (ref 70–99)
GLUCOSE BLDC GLUCOMTR-MCNC: 145 MG/DL (ref 70–99)
GLUCOSE BLDC GLUCOMTR-MCNC: 148 MG/DL (ref 70–99)
GLUCOSE BLDC GLUCOMTR-MCNC: 165 MG/DL (ref 70–99)
GLUCOSE BLDC GLUCOMTR-MCNC: 171 MG/DL (ref 70–99)
HGB BLD-MCNC: 9.7 G/DL (ref 11.7–15.7)
PLATELET # BLD AUTO: 251 10E9/L (ref 150–450)

## 2018-09-13 PROCEDURE — 85049 AUTOMATED PLATELET COUNT: CPT | Performed by: OBSTETRICS & GYNECOLOGY

## 2018-09-13 PROCEDURE — 25000132 ZZH RX MED GY IP 250 OP 250 PS 637: Performed by: OBSTETRICS & GYNECOLOGY

## 2018-09-13 PROCEDURE — 12000001 ZZH R&B MED SURG/OB UMMC

## 2018-09-13 PROCEDURE — 25000128 H RX IP 250 OP 636: Performed by: OBSTETRICS & GYNECOLOGY

## 2018-09-13 PROCEDURE — 00000146 ZZHCL STATISTIC GLUCOSE BY METER IP

## 2018-09-13 PROCEDURE — 85018 HEMOGLOBIN: CPT | Performed by: OBSTETRICS & GYNECOLOGY

## 2018-09-13 PROCEDURE — 36415 COLL VENOUS BLD VENIPUNCTURE: CPT | Performed by: OBSTETRICS & GYNECOLOGY

## 2018-09-13 PROCEDURE — 25000131 ZZH RX MED GY IP 250 OP 636 PS 637: Performed by: OBSTETRICS & GYNECOLOGY

## 2018-09-13 RX ORDER — SODIUM CHLORIDE, SODIUM LACTATE, POTASSIUM CHLORIDE, CALCIUM CHLORIDE 600; 310; 30; 20 MG/100ML; MG/100ML; MG/100ML; MG/100ML
INJECTION, SOLUTION INTRAVENOUS CONTINUOUS
Status: DISCONTINUED | OUTPATIENT
Start: 2018-09-13 | End: 2018-09-15 | Stop reason: HOSPADM

## 2018-09-13 RX ORDER — IBUPROFEN 600 MG/1
600 TABLET, FILM COATED ORAL EVERY 6 HOURS PRN
Status: DISCONTINUED | OUTPATIENT
Start: 2018-09-13 | End: 2018-09-15 | Stop reason: HOSPADM

## 2018-09-13 RX ORDER — IBUPROFEN 600 MG/1
600 TABLET, FILM COATED ORAL EVERY 6 HOURS PRN
Qty: 40 TABLET | Refills: 0 | Status: ON HOLD | OUTPATIENT
Start: 2018-09-13 | End: 2019-03-06

## 2018-09-13 RX ORDER — OXYCODONE HYDROCHLORIDE 5 MG/1
5 TABLET ORAL EVERY 4 HOURS PRN
Qty: 18 TABLET | Refills: 0 | Status: SHIPPED | OUTPATIENT
Start: 2018-09-13 | End: 2018-09-21

## 2018-09-13 RX ORDER — AMOXICILLIN 250 MG
1 CAPSULE ORAL 2 TIMES DAILY PRN
Qty: 30 TABLET | Refills: 0 | Status: SHIPPED | OUTPATIENT
Start: 2018-09-13 | End: 2018-12-31

## 2018-09-13 RX ORDER — ACETAMINOPHEN 325 MG/1
650 TABLET ORAL EVERY 4 HOURS PRN
Qty: 40 TABLET | Refills: 0 | Status: ON HOLD | OUTPATIENT
Start: 2018-09-15 | End: 2019-03-04

## 2018-09-13 RX ADMIN — ACETAMINOPHEN 975 MG: 325 TABLET, FILM COATED ORAL at 16:13

## 2018-09-13 RX ADMIN — ACETAMINOPHEN 975 MG: 325 TABLET, FILM COATED ORAL at 07:57

## 2018-09-13 RX ADMIN — SODIUM CHLORIDE, POTASSIUM CHLORIDE, SODIUM LACTATE AND CALCIUM CHLORIDE: 600; 310; 30; 20 INJECTION, SOLUTION INTRAVENOUS at 11:11

## 2018-09-13 RX ADMIN — LISINOPRIL 10 MG: 10 TABLET ORAL at 07:58

## 2018-09-13 RX ADMIN — INSULIN ASPART 1 UNITS: 100 INJECTION, SOLUTION INTRAVENOUS; SUBCUTANEOUS at 12:35

## 2018-09-13 RX ADMIN — OXYCODONE HYDROCHLORIDE 5 MG: 5 TABLET ORAL at 14:21

## 2018-09-13 RX ADMIN — SENNOSIDES AND DOCUSATE SODIUM 2 TABLET: 8.6; 5 TABLET ORAL at 20:07

## 2018-09-13 RX ADMIN — INSULIN ASPART 1 UNITS: 100 INJECTION, SOLUTION INTRAVENOUS; SUBCUTANEOUS at 04:00

## 2018-09-13 RX ADMIN — SERTRALINE HYDROCHLORIDE 25 MG: 25 TABLET ORAL at 20:07

## 2018-09-13 RX ADMIN — OXYCODONE HYDROCHLORIDE 5 MG: 5 TABLET ORAL at 21:38

## 2018-09-13 RX ADMIN — SENNOSIDES AND DOCUSATE SODIUM 2 TABLET: 8.6; 5 TABLET ORAL at 07:59

## 2018-09-13 RX ADMIN — OXYCODONE HYDROCHLORIDE 5 MG: 5 TABLET ORAL at 20:07

## 2018-09-13 RX ADMIN — INSULIN ASPART 1 UNITS: 100 INJECTION, SOLUTION INTRAVENOUS; SUBCUTANEOUS at 20:16

## 2018-09-13 RX ADMIN — ENOXAPARIN SODIUM 40 MG: 40 INJECTION SUBCUTANEOUS at 11:12

## 2018-09-13 RX ADMIN — KETOROLAC TROMETHAMINE 30 MG: 30 INJECTION, SOLUTION INTRAMUSCULAR at 07:14

## 2018-09-13 RX ADMIN — IBUPROFEN 600 MG: 600 TABLET ORAL at 21:38

## 2018-09-13 RX ADMIN — OXYCODONE HYDROCHLORIDE 5 MG: 5 TABLET ORAL at 15:12

## 2018-09-13 RX ADMIN — OXYCODONE HYDROCHLORIDE 5 MG: 5 TABLET ORAL at 17:25

## 2018-09-13 RX ADMIN — KETOROLAC TROMETHAMINE 30 MG: 30 INJECTION, SOLUTION INTRAMUSCULAR at 00:23

## 2018-09-13 RX ADMIN — ACETAMINOPHEN 975 MG: 325 TABLET, FILM COATED ORAL at 23:08

## 2018-09-13 RX ADMIN — OMEPRAZOLE 20 MG: 20 CAPSULE, DELAYED RELEASE ORAL at 07:59

## 2018-09-13 ASSESSMENT — ACTIVITIES OF DAILY LIVING (ADL)
RETIRED_EATING: 0-->INDEPENDENT
COGNITION: 0 - NO COGNITION ISSUES REPORTED
ADLS_ACUITY_SCORE: 10
ADLS_ACUITY_SCORE: 10
DRESS: 0-->INDEPENDENT
AMBULATION: 0-->INDEPENDENT
ADLS_ACUITY_SCORE: 10
ADLS_ACUITY_SCORE: 10
FALL_HISTORY_WITHIN_LAST_SIX_MONTHS: YES
TOILETING: 0-->INDEPENDENT
RETIRED_COMMUNICATION: 0-->UNDERSTANDS/COMMUNICATES WITHOUT DIFFICULTY
SWALLOWING: 0-->SWALLOWS FOODS/LIQUIDS WITHOUT DIFFICULTY
ADLS_ACUITY_SCORE: 10
NUMBER_OF_TIMES_PATIENT_HAS_FALLEN_WITHIN_LAST_SIX_MONTHS: 1
TRANSFERRING: 0-->INDEPENDENT
BATHING: 0-->INDEPENDENT
ADLS_ACUITY_SCORE: 10

## 2018-09-13 NOTE — PLAN OF CARE
Problem: Surgery Nonspecified (Adult)  Goal: Signs and Symptoms of Listed Potential Problems Will be Absent, Minimized or Managed (Surgery Nonspecified)  Signs and symptoms of listed potential problems will be absent, minimized or managed by discharge/transition of care (reference Surgery Nonspecified (Adult) CPG).   Outcome: No Change  Pt A&Ox4. VSS. LS clear, on RA. BS active, LBM on 9/11/2018. Zofran given for nausea. Pain managed with 5mg oxycodone and toradol. Mckeon removed at 0600 . BG checks q4h, stable <150. PIV in r arm and infusing. Continue to monitor.

## 2018-09-13 NOTE — PLAN OF CARE
"Problem: Patient Care Overview  Goal: Plan of Care/Patient Progress Review  Outcome: Improving    VS: /68  Pulse 82  Temp 98.6  F (37  C) (Oral)  Resp 16  Ht 1.676 m (5' 5.98\")  Wt 113 kg (249 lb 1.9 oz)  LMP 09/03/2018  SpO2 94%  Breastfeeding? No  BMI 40.23 kg/m2     O2: Room air, capno   Output: Voided 1 x, 0 PVR   Last BM: 9/11   Activity: Up to bathroom   Up for meals? Yes   Skin: Incision   Pain: Patient declinded pain meds this shift   CMS: Intact   Dressing: CDI   Diet: Regular, poor appetite   LDA: PIV infusing    Equipment: IV pump and poll   Plan: TBD   Additional Info: Patient alert and oriented             "

## 2018-09-13 NOTE — PROGRESS NOTES
Gynecology Progress Note     S: Pt feels well. Pain well controlled while in bed, increased with movement. Nausea improved after zofran and compazine yesterday. Mckeon out this morning, patient has not yet voided or passed flatus. Not yet ambulating. No questions or concerns at this time. She required one unit of sliding scale insulin overnight.    O:  Vitals:    09/12/18 1605 09/12/18 1705 09/12/18 1805 09/12/18 2100   BP: 149/81 170/81 168/85 161/74   BP Location:       Pulse:       Resp:    16   Temp:    98.4  F (36.9  C)   TempSrc:    Oral   SpO2:    94%   Weight:       Height:            Gen: comfortable, no acute distress  CV: well perfused  Lungs: appropriate work of breathing  Abd: soft, appropriately tender.  Incision with bandage in place; clean, dry, and intact with minimal shadowing  Ext: warm and well perfused, SCDs in place, trace bilateral LE edema.    Hemoglobin   Date Value Ref Range Status   09/13/2018 9.7 (L) 11.7 - 15.7 g/dL Final   09/12/2018 9.9 (L) 11.7 - 15.7 g/dL Final       A/P: Lashawn Reddy is a 47 year old female POD #1 s/p SOUTH, left salpingectomy.    T2DM: home oral meds held, continue MSSI    Hx of provoked DVT: Hgb stable, plan to start prophylactic lovenox today    Chronic HTN: BPs 140s-160s/80s overnight, restart home lisinopril today    Depression/anxiety: continue home sertraline    GERD: omeprazole ordered    HLD: home statin held    FEN: continue IVF until tolerating adequate PO intake, advance diet as tolerated  Pain: doing well on tylenol and toradol, transition to PO pain meds today  GI: advance diet as tolerated, continue prn antiemetics and stool softeners  : Mckeon out, follow up void  Heme: Hgb 9.9 >  > 9.7    Dispo: Anticipate discharge to home POD#1-2 or when meeting postoperative goals    Janet Ramos MD  Ob/Gyn Resident, PGY-1  09/13/18 7:31 AM     I have seen and examined the patient without the resident. I have reviewed, edited, and agree with the  note.   My findings are:having pain with coughing otherwise doing well. Has not been up to void yet. Advised on use of binder.   Abdomen: soft, NT, diffusely tender  Bandage clean and dry  Hemoglobin   Date Value Ref Range Status   09/13/2018 9.7 (L) 11.7 - 15.7 g/dL Final   09/12/2018 9.9 (L) 11.7 - 15.7 g/dL Final   chronic anemia, stable and asymptomatic.     Stephanie Warren MD

## 2018-09-13 NOTE — PLAN OF CARE
Problem: Surgery Nonspecified (Adult)  Goal: Signs and Symptoms of Listed Potential Problems Will be Absent, Minimized or Managed (Surgery Nonspecified)  Signs and symptoms of listed potential problems will be absent, minimized or managed by discharge/transition of care (reference Surgery Nonspecified (Adult) CPG).  Outcome: No Change  Pt arrived to unit at 1400 and was oriented to room and call light  VS: BP elevated; MD notified; no new orders at this time. Pt received labetolol at 1300 in PACU   O2: >90% on RA   Output: Mcneil in place with adequate output   Last BM: 9/10   Activity: 2A to reposition in bed; explained to pt that she should dangle this evening.   Up for meals? NA (did not eat anything besides a popcicle)   Skin: Intact except for incisions. Baseline discoloration BLE   Pain: Pain described as spasms and the urge to void. Mcneil draining; bladder scan in PACU miniman. Pain managed with scheduled toradol. Declined oxycodone/IV dilaudid. Pt received B&O suppository in PACU.   CMS: Intact   Dressing: CDI   Diet: Clears; could only tolerate part of a popsicle. Nausea managed with zofran and compazine. Pt became sedated but arousable after compazine.   LDA: PIV infusing; mcneil patent   Equipment: PCDs, IV pole   Plan: TBD   Additional Info: -Pt received general anesthesia + tap block with exparel. .  -DM II: sliding scale Insulin ordered q4 (verified with OB) BG upon arrival 137.

## 2018-09-14 ENCOUNTER — APPOINTMENT (OUTPATIENT)
Dept: GENERAL RADIOLOGY | Facility: CLINIC | Age: 48
DRG: 742 | End: 2018-09-14
Attending: OBSTETRICS & GYNECOLOGY
Payer: COMMERCIAL

## 2018-09-14 LAB
ALBUMIN UR-MCNC: 10 MG/DL
APPEARANCE UR: CLEAR
BACTERIA #/AREA URNS HPF: ABNORMAL /HPF
BILIRUB UR QL STRIP: NEGATIVE
COLOR UR AUTO: YELLOW
ERYTHROCYTE [DISTWIDTH] IN BLOOD BY AUTOMATED COUNT: 15.7 % (ref 10–15)
GLUCOSE BLDC GLUCOMTR-MCNC: 138 MG/DL (ref 70–99)
GLUCOSE BLDC GLUCOMTR-MCNC: 143 MG/DL (ref 70–99)
GLUCOSE BLDC GLUCOMTR-MCNC: 145 MG/DL (ref 70–99)
GLUCOSE BLDC GLUCOMTR-MCNC: 160 MG/DL (ref 70–99)
GLUCOSE BLDC GLUCOMTR-MCNC: 186 MG/DL (ref 70–99)
GLUCOSE UR STRIP-MCNC: 30 MG/DL
HCT VFR BLD AUTO: 29.5 % (ref 35–47)
HGB BLD-MCNC: 9.1 G/DL (ref 11.7–15.7)
HGB UR QL STRIP: ABNORMAL
HYALINE CASTS #/AREA URNS LPF: 1 /LPF (ref 0–2)
KETONES UR STRIP-MCNC: NEGATIVE MG/DL
LACTATE BLD-SCNC: 0.7 MMOL/L (ref 0.7–2)
LEUKOCYTE ESTERASE UR QL STRIP: ABNORMAL
MCH RBC QN AUTO: 24.1 PG (ref 26.5–33)
MCHC RBC AUTO-ENTMCNC: 30.8 G/DL (ref 31.5–36.5)
MCV RBC AUTO: 78 FL (ref 78–100)
MUCOUS THREADS #/AREA URNS LPF: PRESENT /LPF
NITRATE UR QL: NEGATIVE
PH UR STRIP: 5.5 PH (ref 5–7)
PLATELET # BLD AUTO: 258 10E9/L (ref 150–450)
RBC # BLD AUTO: 3.77 10E12/L (ref 3.8–5.2)
RBC #/AREA URNS AUTO: 167 /HPF (ref 0–2)
SOURCE: ABNORMAL
SP GR UR STRIP: 1.02 (ref 1–1.03)
SQUAMOUS #/AREA URNS AUTO: 1 /HPF (ref 0–1)
UROBILINOGEN UR STRIP-MCNC: NORMAL MG/DL (ref 0–2)
WBC # BLD AUTO: 12.5 10E9/L (ref 4–11)
WBC #/AREA URNS AUTO: 12 /HPF (ref 0–5)

## 2018-09-14 PROCEDURE — 83605 ASSAY OF LACTIC ACID: CPT | Performed by: OBSTETRICS & GYNECOLOGY

## 2018-09-14 PROCEDURE — 25000132 ZZH RX MED GY IP 250 OP 250 PS 637: Performed by: OBSTETRICS & GYNECOLOGY

## 2018-09-14 PROCEDURE — 00000146 ZZHCL STATISTIC GLUCOSE BY METER IP

## 2018-09-14 PROCEDURE — 25000128 H RX IP 250 OP 636: Performed by: OBSTETRICS & GYNECOLOGY

## 2018-09-14 PROCEDURE — 87040 BLOOD CULTURE FOR BACTERIA: CPT | Performed by: OBSTETRICS & GYNECOLOGY

## 2018-09-14 PROCEDURE — 81001 URINALYSIS AUTO W/SCOPE: CPT | Performed by: OBSTETRICS & GYNECOLOGY

## 2018-09-14 PROCEDURE — 87086 URINE CULTURE/COLONY COUNT: CPT | Performed by: OBSTETRICS & GYNECOLOGY

## 2018-09-14 PROCEDURE — 25000132 ZZH RX MED GY IP 250 OP 250 PS 637: Performed by: INTERNAL MEDICINE

## 2018-09-14 PROCEDURE — 71046 X-RAY EXAM CHEST 2 VIEWS: CPT

## 2018-09-14 PROCEDURE — 12000001 ZZH R&B MED SURG/OB UMMC

## 2018-09-14 PROCEDURE — 85027 COMPLETE CBC AUTOMATED: CPT | Performed by: OBSTETRICS & GYNECOLOGY

## 2018-09-14 PROCEDURE — 36415 COLL VENOUS BLD VENIPUNCTURE: CPT | Performed by: OBSTETRICS & GYNECOLOGY

## 2018-09-14 PROCEDURE — 25000125 ZZHC RX 250: Performed by: OBSTETRICS & GYNECOLOGY

## 2018-09-14 RX ORDER — POLYETHYLENE GLYCOL 3350 17 G/17G
17 POWDER, FOR SOLUTION ORAL DAILY PRN
Status: DISCONTINUED | OUTPATIENT
Start: 2018-09-14 | End: 2018-09-15 | Stop reason: HOSPADM

## 2018-09-14 RX ADMIN — SERTRALINE HYDROCHLORIDE 25 MG: 25 TABLET ORAL at 20:16

## 2018-09-14 RX ADMIN — IBUPROFEN 600 MG: 600 TABLET ORAL at 22:22

## 2018-09-14 RX ADMIN — ACETAMINOPHEN 975 MG: 325 TABLET, FILM COATED ORAL at 15:58

## 2018-09-14 RX ADMIN — ONDANSETRON 4 MG: 4 TABLET, ORALLY DISINTEGRATING ORAL at 02:15

## 2018-09-14 RX ADMIN — LISINOPRIL 10 MG: 10 TABLET ORAL at 08:00

## 2018-09-14 RX ADMIN — POLYETHYLENE GLYCOL 3350 17 G: 17 POWDER, FOR SOLUTION ORAL at 18:57

## 2018-09-14 RX ADMIN — SENNOSIDES AND DOCUSATE SODIUM 2 TABLET: 8.6; 5 TABLET ORAL at 08:00

## 2018-09-14 RX ADMIN — SENNOSIDES AND DOCUSATE SODIUM 2 TABLET: 8.6; 5 TABLET ORAL at 18:57

## 2018-09-14 RX ADMIN — OMEPRAZOLE 20 MG: 20 CAPSULE, DELAYED RELEASE ORAL at 08:00

## 2018-09-14 RX ADMIN — OXYCODONE HYDROCHLORIDE 10 MG: 5 TABLET ORAL at 03:26

## 2018-09-14 RX ADMIN — OXYCODONE HYDROCHLORIDE 5 MG: 5 TABLET ORAL at 11:33

## 2018-09-14 RX ADMIN — IBUPROFEN 600 MG: 600 TABLET ORAL at 15:58

## 2018-09-14 RX ADMIN — IBUPROFEN 600 MG: 600 TABLET ORAL at 03:26

## 2018-09-14 RX ADMIN — OXYCODONE HYDROCHLORIDE 10 MG: 5 TABLET ORAL at 22:22

## 2018-09-14 RX ADMIN — ACETAMINOPHEN 975 MG: 325 TABLET, FILM COATED ORAL at 08:00

## 2018-09-14 RX ADMIN — OXYCODONE HYDROCHLORIDE 10 MG: 5 TABLET ORAL at 18:57

## 2018-09-14 RX ADMIN — OXYCODONE HYDROCHLORIDE 5 MG: 5 TABLET ORAL at 14:53

## 2018-09-14 RX ADMIN — OXYCODONE HYDROCHLORIDE 10 MG: 5 TABLET ORAL at 00:34

## 2018-09-14 RX ADMIN — IBUPROFEN 600 MG: 600 TABLET ORAL at 10:26

## 2018-09-14 RX ADMIN — ENOXAPARIN SODIUM 40 MG: 40 INJECTION SUBCUTANEOUS at 10:26

## 2018-09-14 ASSESSMENT — ACTIVITIES OF DAILY LIVING (ADL)
ADLS_ACUITY_SCORE: 10
ADLS_ACUITY_SCORE: 10
ADLS_ACUITY_SCORE: 11
ADLS_ACUITY_SCORE: 10
ADLS_ACUITY_SCORE: 11
ADLS_ACUITY_SCORE: 11

## 2018-09-14 NOTE — PROVIDER NOTIFICATION
Notified OBGYN on call that pt spiked temp of 101.1, endorsing chills. Pt BP also elevated at 186/77, recheck 169/96. Tylenol not due until midnight. Will await for response.     Instructed to give tylenol an hour early and recheck temp/ BP at midnight and notify Suad RAMSAY resident on call if still elevated.

## 2018-09-14 NOTE — PLAN OF CARE
"Problem: Surgery Nonspecified (Adult)  Goal: Signs and Symptoms of Listed Potential Problems Will be Absent, Minimized or Managed (Surgery Nonspecified)  Signs and symptoms of listed potential problems will be absent, minimized or managed by discharge/transition of care (reference Surgery Nonspecified (Adult) CPG).   Outcome: No Change    VS: VSS ex high BP at end of shift and spiking temp of 101.1, endorsing chills.   O2: >90% on RA   Output: Pt voiding in BR in adequate amounts, spotting noted earlier but no spotting seen at final time to BR tonight.    Last BM: LBM 9/11 per pt report, denies passing flatus but reports \"feeling rumbles/like I could go\". Given senna.   Activity: Pt up with assist of 1 and walker to BR   Skin: Skin intact ex for incision, skin is warm and dry.   Pain: Pain is being managed with PRN oxycodone 5-10 mg Q3H, attempted to do 5 mg x2 but pt having poor pain control, recommend doing 10 mg around-the-clock overnight. Also given ibuprofen x1. Heat packs provided.    CMS: CMS intact, denies N/T in all extremities. DP +2 bilaterally, able to wiggle toes.    Dressing: Dressing to lower abdomen CDI, no drainage visible.    Diet: Tolerating regular diet, endorses poor appetite. Denies N/V. Adequate intake of PO fluids.   LDA: PIV x2 WDL and SL    Equipment: IV pole, walker, PCD's, personal belongings at bedside.   Plan: Continue to monitor patient and manage pain. Possible discharge tomorrow if pain control improving and medically cleared.    Additional Info: Followed by OB only. OB resident on call paged about temperature spike and high BP.              "

## 2018-09-14 NOTE — PLAN OF CARE
Problem: Surgery Nonspecified (Adult)  Goal: Signs and Symptoms of Listed Potential Problems Will be Absent, Minimized or Managed (Surgery Nonspecified)  Signs and symptoms of listed potential problems will be absent, minimized or managed by discharge/transition of care (reference Surgery Nonspecified (Adult) CPG).   Outcome: No Change    VS: VSS   O2: Sats >90% on RA   Output: Urine output adequate   Last BM: 9/10 - passing flatus    Activity: Up with SBA to bathroom, ambulation encouraged   Skin: Intact- incision    Pain: Managed with Ibuprofen, Oxycodone, and heat packs    CMS: Intact - denies numbness or tingling   Dressing: Open to air   Diet: Regular diet, well tolerated. Denies nausea    LDA: PIV x2, SL   Equipment: None   Plan: Continue to monitor and manage pain   Additional Info:

## 2018-09-14 NOTE — PLAN OF CARE
Problem: Patient Care Overview  Goal: Plan of Care/Patient Progress Review  Pt. A&Ox4. BP improved this shift last 114/59. Tmax 101.1, last 98.8. Triggered sepsis protocol, LA returned 0.7. Lung sounds diminished at bases. CXR complete. O2 sats 87-88% on RA, 1L O2 applied sating mid 90's. IS encouraged and performed. Pt notes improvement with IS overnight. Bowel sounds active, LBM 9/11, flauts +. PP+ DP+. CMS and neuro's are intact. Denies numbness and tingling in all extremities. Denies shortness of breath, and chest pain. Nausea w/ emesis x1 overnight, Zofran given.  overnight. Lower abdominal pain managed w/ scheduled Tylenol, prn Ibuprofen, Oxycodone, and warm applied. Voids spontaneously without difficulty in the BR. Lower abdominal incisional is CDI and OPAL, dressing removed per OB. Pt up with ax1. PIV is patent and SL. PCD's on BLE's. Bilateral heels are elevated off the bed. Call light is within reach, pt able to make needs known. Will continue to monitor.

## 2018-09-14 NOTE — PROGRESS NOTES
"Brief OBGYN Note:  Lashawn Reddy is a 47 year old  female who is POD#2 s/p SOUTH, LS for AUB.  Alerted about two episodes of fever in the post-operative period - 101.1F x 2.    /76 (BP Location: Left arm)  Pulse 82  Temp 101.1  F (38.4  C) (Oral)  Resp 17  Ht 1.676 m (5' 5.98\")  Wt 113 kg (249 lb 1.9 oz)  LMP 2018  SpO2 91%  Breastfeeding? No  BMI 40.23 kg/m2     Patient notes that she feels warm and intermittent chills. Endorses that she has not been using her incentive spirometry often. She denies any nausea/vomiting, chest pain, shortness of breath, or difficulties with urination. On exam, patient does appear to be diaphoretic. Crackles at bases. Pain diffusely, but appropriately tender. Bandage overlying incision removed and incision remains intact. SCDs in place, non-tender calves.    Discussed common causes of fever in the post-operative setting, including atelectasis, UTI, pneumonia, abscess, etc.  Most commonly this early in the course, atelectasis is the cause.    Plan:  - Continue to monitor vitals closely  - UA/UC, CBC, Chest X-ray, Blood Cx.  - Continuous pulse oximetry. Sats in high 80s. O2 replaced.  - Discussed importance of aggressive incentive spirometry and encouraged regular use.    Staffed with Dr. Alexander Robledo MD  OB/GYN Resident, PGY-4  2018 12:36 AM    "

## 2018-09-14 NOTE — PROGRESS NOTES
Gynecology Progress Note     S: Pt feeling okay this morning, improved from overnight. She was febrile around midnight and also vomited once. She is still somewhat nauseated. Still taking zofran and compazine. She also had some pain in her right calf early this morning which has since resolved. Pain well controlled while in bed, increased with movement or coughing. Ambulating to bathroom only. Voiding spontaneously and passing flatus, no BM yet. She required one unit of insulin at 1235 and 2016 yesterday. She has many questions about postop cares today.    O:  Vitals:    09/14/18 0254 09/14/18 0500 09/14/18 0545 09/14/18 0700   BP: 114/59      BP Location: Right arm      Pulse:       Resp: 16      Temp: 98.8  F (37.1  C)      TempSrc: Oral      SpO2: 94% 95% 94% 96%   Weight:       Height:         Tmax/last 101.1 0022 9/14/18     Gen: Appears somewhat uncomfortable  CV: Regular rate and rhythm  Lungs: Diminished breath sounds at bilateral lung bases, no crackles or rales  Abd: Soft, appropriately tender. Incision clean, dry, and intact  Ext: No tenderness in right calf, no erythema or swelling. SCDs reapplied.    Hemoglobin   Date Value Ref Range Status   09/14/2018 9.1 (L) 11.7 - 15.7 g/dL Final   09/13/2018 9.7 (L) 11.7 - 15.7 g/dL Final     CXR (prelim read):  IMPRESSION: Streaky and platelike opacities in the lung bases favor  atelectasis, versus aspiration or infection.    Lactate 0.7  WBC 12.5  UA with moderate blood, trace LE, negative nitrites, 12 WBCs, 167 RBCs, few bacteria, 1 squam, mucous    Urine culture, blood cultures pending.      A/P: Lashawn Reddy is a 47 year old female POD #1 s/p SOUTH, left salpingectomy.    Fever: Most likely atelectasis given poor use of IS, minimal ambulation with findings of atelectasis on CXR. Patient denies symptoms of UTI, UA with blood, trace LE, some WBCs and urine culture pending. Blood cultures pending. Will continue to monitor temps, repeat labs if again febrile.  Encouraged IS, ambulation, SCDs while in bed today.    T2DM: Home oral meds held, continue MSSI.    Hx of provoked DVT: On prophylactic lovenox, no evidence for DVT on exam but low threshold to order LE venous dopplers if patient continues to have calf pain. Encouraged SCDs while in bed, ambulation.    Chronic HTN: BPs with max of 186/77 > 169/85 at 2200, otherwise within normal to mild range, continue home lisinopril.    Depression/anxiety: Continue home sertraline.    GERD: Omeprazole ordered.    HLD: Home statin held.    FEN: Continue IVF until tolerating adequate PO intake, advance diet as tolerated.  Pain: Doing well on tylenol, ibuprofen, prn oxycodone. Recommended decreasing narcotics if nausea continues and pain well controlled.  GI: Advance diet as tolerated, continue prn antiemetics and stool softeners.  : Voiding spontaneously. Follow up urine culture.  Heme: Hgb 9.9 >  > 9.7. No signs or symptoms of anemia.    Dispo: Anticipate discharge to home POD#3 or when meeting postoperative goals    Janet Ramos MD  Ob/Gyn Resident, PGY-1  09/14/18 7:29 AM     [facility] staff:  Appreciate note by Dr. Ramos.  I have seen and examined the patient without the resident. I have reviewed, edited, and agree with the note.    My findings are: no calf tenderness noted.  Calf feels totally better per patient. F/u fevers if re spikes. cx pending. Likely discharge tomorrow if remains afebrile.     Sally Galeas MD, FACOG  9/14/2018  9:13 AM

## 2018-09-15 VITALS
HEART RATE: 82 BPM | OXYGEN SATURATION: 97 % | WEIGHT: 249.12 LBS | TEMPERATURE: 97.3 F | RESPIRATION RATE: 18 BRPM | HEIGHT: 66 IN | SYSTOLIC BLOOD PRESSURE: 116 MMHG | DIASTOLIC BLOOD PRESSURE: 60 MMHG | BODY MASS INDEX: 40.04 KG/M2

## 2018-09-15 LAB
BACTERIA SPEC CULT: NORMAL
GLUCOSE BLDC GLUCOMTR-MCNC: 136 MG/DL (ref 70–99)
GLUCOSE BLDC GLUCOMTR-MCNC: 151 MG/DL (ref 70–99)
Lab: NORMAL
SPECIMEN SOURCE: NORMAL

## 2018-09-15 PROCEDURE — 25000128 H RX IP 250 OP 636: Performed by: OBSTETRICS & GYNECOLOGY

## 2018-09-15 PROCEDURE — 25000132 ZZH RX MED GY IP 250 OP 250 PS 637: Performed by: OBSTETRICS & GYNECOLOGY

## 2018-09-15 PROCEDURE — 00000146 ZZHCL STATISTIC GLUCOSE BY METER IP

## 2018-09-15 RX ORDER — BISACODYL 10 MG
10 SUPPOSITORY, RECTAL RECTAL DAILY PRN
Status: DISCONTINUED | OUTPATIENT
Start: 2018-09-15 | End: 2018-09-15 | Stop reason: HOSPADM

## 2018-09-15 RX ORDER — POLYETHYLENE GLYCOL 3350 17 G/17G
17 POWDER, FOR SOLUTION ORAL DAILY PRN
Qty: 7 PACKET | Refills: 3 | Status: SHIPPED | OUTPATIENT
Start: 2018-09-15 | End: 2018-11-14

## 2018-09-15 RX ADMIN — BISACODYL 10 MG: 10 SUPPOSITORY RECTAL at 10:43

## 2018-09-15 RX ADMIN — ENOXAPARIN SODIUM 40 MG: 40 INJECTION SUBCUTANEOUS at 10:50

## 2018-09-15 RX ADMIN — IBUPROFEN 600 MG: 600 TABLET ORAL at 05:10

## 2018-09-15 RX ADMIN — OXYCODONE HYDROCHLORIDE 10 MG: 5 TABLET ORAL at 01:57

## 2018-09-15 RX ADMIN — SENNOSIDES AND DOCUSATE SODIUM 1 TABLET: 8.6; 5 TABLET ORAL at 08:57

## 2018-09-15 RX ADMIN — OXYCODONE HYDROCHLORIDE 10 MG: 5 TABLET ORAL at 05:10

## 2018-09-15 RX ADMIN — ACETAMINOPHEN 975 MG: 325 TABLET, FILM COATED ORAL at 01:57

## 2018-09-15 RX ADMIN — OXYCODONE HYDROCHLORIDE 10 MG: 5 TABLET ORAL at 09:19

## 2018-09-15 RX ADMIN — OMEPRAZOLE 20 MG: 20 CAPSULE, DELAYED RELEASE ORAL at 08:57

## 2018-09-15 ASSESSMENT — ACTIVITIES OF DAILY LIVING (ADL)
ADLS_ACUITY_SCORE: 11
ADLS_ACUITY_SCORE: 12

## 2018-09-15 NOTE — PROGRESS NOTES
POD #2 s/p SOUTH/LS    S: Patient has done better yesterday and today.  She states pain medications are helpful, but she will notice when they start to wear off.  She was up early this am ambulating.  She has tolerated regular meals x2 yesterday and this am.  She is passing flatus and no difficulty voiding.  No BM yet.  She has had no further leg pain.  She is worried about going home to [place] because it it so far from the hospital.    O:  Vitals:    09/14/18 0737 09/14/18 1604 09/14/18 2213 09/15/18 0819   BP: 94/48 98/54 137/76 116/60   BP Location: Left arm Left arm Right arm Right arm   Pulse:       Resp: 16 16 16 18   Temp: 96.8  F (36  C) 96.1  F (35.6  C) 97.5  F (36.4  C) 97.3  F (36.3  C)   TempSrc: Oral Oral Oral Oral   SpO2: 96% 95% 93% 97%   Weight:       Height:       Tmax/last 101.1 at 0022 9/14/18    Gen'l: appears in no distress, tired  CV: RRR  Lungs: clear bilaterally  Abd: mild distention, soft, appropriately tender  Incision: c/d/i with minimal surrounding ecchymosis   Ext: no edema, no tenderness,     Hemoglobin   Date Value Ref Range Status   09/14/2018 9.1 (L) 11.7 - 15.7 g/dL Final   09/13/2018 9.7 (L) 11.7 - 15.7 g/dL Final   ]  WBC 12.5, Plt 258  Lactate 0.7  Urine culture <10,000 mixed urogenital matt  Blood culture x2: no growth to date  Last glucose at 0156 151 (total of 3 units insulin yesterday), am level pending    Assessment/Plan  46 yo POD #2 s/p SOUTH, left salpingectomy, post op course c/b fever early POD#1, now >24 hours afebrile    ID/Fever: most likely atelectasis, encourage continued improved use of IS.  Patient has a thermometer at home.    T2DM: continue home meds and sliding scale insulin    History provoked DVT: on lovenox.  No sign or symptoms of DVT on exam today, continue to increase ambulation    HTN: continue home meds    Pain: continue current regimen    Dispo: Plan discharge to home today.  Discharge instructions reviewed.    Carey Lopes MD

## 2018-09-15 NOTE — PLAN OF CARE
Problem: Surgery Nonspecified (Adult)  Goal: Signs and Symptoms of Listed Potential Problems Will be Absent, Minimized or Managed (Surgery Nonspecified)  Signs and symptoms of listed potential problems will be absent, minimized or managed by discharge/transition of care (reference Surgery Nonspecified (Adult) CPG).   Outcome: Improving  VS: VSS   O2: Sats >90% on RA   Output: Urine output adequate, no spotting noted today.    Last BM: 9/10 - passing flatus. Given senna and miralax added. May need suppository in AM.   Activity: Up independently to BR and ambulated in hallways   Skin: Skin intact ex for incision, skin is warm and dry   Pain: Managed with Ibuprofen, Oxycodone, and heat packs    CMS: Intact - denies numbness or tingling, DP and radial pulses +2 bilaterally.    Dressing: Open to air, incision approximated. Heat applied.    Diet: Regular diet, well tolerated. Denies nausea.  at dinnertime, 186 at bedtime. Given sliding scale insulin per orders.    LDA: PIV x2, SL. Flushed.    Equipment: None   Plan: Continue to monitor and manage pain, most likely discharge tomorrow.    Additional Info: Afebrile  Is encouraged, pt doing independently.   R calf pain this morning resolved

## 2018-09-15 NOTE — PROGRESS NOTES
"  VS: stable   O2: Room air saturations 97%   Output: Up to bathroom to void clear faisal urine   Last BM: Monday.9/10/2018 gave suppository with no results.    Activity: UP with abdominal binder on for extra support.   Skin: Right lower leg has a big purple bruise on leg. Patient also has a lot of varicose veins in both lower legs.    Pain: Using oral pain management for pain relief. Script was filled prior to discharge to home. See Discharge instruction sheets!   CMS: Intact   Dressing: NA. Abdominal incision open to air. Appear to be CDI and no signs of infection   Diet: Regular. Patient states\" My appetite is not back yet. But it is getting there.\"   LDA: NA   Equipment: Abdominal binder.    Plan: Patient to be discharged to home per Drs orders. Patient will be picked up by  via car. Patient wanted to have family medical papers filled out prior to discharge. Dr Lopes was notified of patients wishes. Papers were left with charge nurse and Resident of OB was text page to fill out papers and submit later. Patient was allowed to discharge prior to papers for family medical leave being filled out. Drs are aware of status. Patient had all scripts filled here at the hospital and all belongings returned to patient prior to discharge.    Additional Info:        "

## 2018-09-15 NOTE — PLAN OF CARE
Problem: Patient Care Overview  Goal: Plan of Care/Patient Progress Review  Outcome: Improving  Patient A/Ox4. VSS. Denies CP, SOB, dizziness/LH. LSCTA, pt has a productive cough. +fl/BS. Voiding well in bathroom. CMS intact. Dressing to abdomen CDI. Tolerating regular diet without NV. IS encouraged. Activity level is good, pt up walking in halls and to bathroom. IV SL. Pain rated as comfortably managed throughout shift, pt taking PRN oxycodone for pain and rotating scheduled tylenol and ibuprofen PRN. Patient has demonstrated ability to call appropriately. Patient is resting with call light within reach. Will continue to monitor.

## 2018-09-17 LAB — COPATH REPORT: NORMAL

## 2018-09-18 ENCOUNTER — TELEPHONE (OUTPATIENT)
Dept: FAMILY MEDICINE | Facility: CLINIC | Age: 48
End: 2018-09-18

## 2018-09-18 NOTE — TELEPHONE ENCOUNTER
Follow up encounter documentation:    Reason for follow up: Lashawn Reddy appeared on our list for recent discharge from an Emergency Room, inpatient admission, or TCU/nursing home facility.    Visit date: 9/12/18  Location: [facility]   Reason for visit: s/p hysterectomy   Discharge instructions include: follow up with provider in a week   Follow up phone call indicated? Yes. Details: Patient is doing well. Appt scheduled for 9/26/18. No further questions or concerns.   Katherine Kaiser, RN, BSN

## 2018-09-18 NOTE — TELEPHONE ENCOUNTER
Patient called to schedule an appointment for a hospital follow-up or appeared on a report showing that they were recently discharged from the hospital.    Patient was admitted to [facility]:  9/12/18  Discharged date: 9/15/18  Reason for hospital admission:  Abnormal Uterine Bleeding, Dysmenorrhea, S/P Hysterectomy  Does patient have future appointment scheduled with provider? No ([facility] 10/25/18)  Date of future appointment:        This information will be used to help the care team plan for the patients upcoming visit.  The triage RN may determine that a follow up call is necessary and reach out to the patient via the phone number listed in the chart.     Please route this message on routine priority to the Triage RN pool.

## 2018-09-20 LAB
BACTERIA SPEC CULT: NO GROWTH
BACTERIA SPEC CULT: NO GROWTH
Lab: NORMAL
Lab: NORMAL
SPECIMEN SOURCE: NORMAL
SPECIMEN SOURCE: NORMAL

## 2018-09-21 ENCOUNTER — HOSPITAL ENCOUNTER (EMERGENCY)
Facility: CLINIC | Age: 48
Discharge: HOME | End: 2018-09-21
Attending: EMERGENCY MEDICINE | Admitting: EMERGENCY MEDICINE
Payer: COMMERCIAL

## 2018-09-21 ENCOUNTER — TELEPHONE (OUTPATIENT)
Dept: OBGYN | Facility: CLINIC | Age: 48
End: 2018-09-21

## 2018-09-21 ENCOUNTER — OFFICE VISIT (OUTPATIENT)
Dept: FAMILY MEDICINE | Facility: OTHER | Age: 48
End: 2018-09-21
Payer: COMMERCIAL

## 2018-09-21 ENCOUNTER — APPOINTMENT (OUTPATIENT)
Dept: ULTRASOUND IMAGING | Facility: CLINIC | Age: 48
End: 2018-09-21
Attending: EMERGENCY MEDICINE
Payer: COMMERCIAL

## 2018-09-21 ENCOUNTER — APPOINTMENT (OUTPATIENT)
Dept: GENERAL RADIOLOGY | Facility: CLINIC | Age: 48
End: 2018-09-21
Attending: EMERGENCY MEDICINE
Payer: COMMERCIAL

## 2018-09-21 VITALS
RESPIRATION RATE: 14 BRPM | SYSTOLIC BLOOD PRESSURE: 168 MMHG | HEART RATE: 74 BPM | DIASTOLIC BLOOD PRESSURE: 98 MMHG | OXYGEN SATURATION: 100 % | WEIGHT: 266 LBS | BODY MASS INDEX: 42.95 KG/M2 | TEMPERATURE: 98.3 F

## 2018-09-21 VITALS
SYSTOLIC BLOOD PRESSURE: 161 MMHG | BODY MASS INDEX: 42.65 KG/M2 | HEART RATE: 70 BPM | RESPIRATION RATE: 20 BRPM | TEMPERATURE: 97.7 F | OXYGEN SATURATION: 97 % | DIASTOLIC BLOOD PRESSURE: 92 MMHG | WEIGHT: 264.1 LBS

## 2018-09-21 DIAGNOSIS — Z90.710 S/P HYSTERECTOMY: ICD-10-CM

## 2018-09-21 DIAGNOSIS — D64.9 ANEMIA, UNSPECIFIED TYPE: ICD-10-CM

## 2018-09-21 DIAGNOSIS — R60.0 BILATERAL EDEMA OF LOWER EXTREMITY: ICD-10-CM

## 2018-09-21 DIAGNOSIS — R60.9 DEPENDENT EDEMA: Primary | ICD-10-CM

## 2018-09-21 DIAGNOSIS — I10 ESSENTIAL HYPERTENSION: ICD-10-CM

## 2018-09-21 DIAGNOSIS — M79.89 LEG SWELLING: ICD-10-CM

## 2018-09-21 DIAGNOSIS — R11.0 NAUSEA: ICD-10-CM

## 2018-09-21 DIAGNOSIS — Z98.890 POSTOPERATIVE STATE: ICD-10-CM

## 2018-09-21 LAB
ALBUMIN SERPL-MCNC: 3 G/DL (ref 3.4–5)
ALBUMIN SERPL-MCNC: 3 G/DL (ref 3.4–5)
ALBUMIN UR-MCNC: NEGATIVE MG/DL
ALP SERPL-CCNC: 92 U/L (ref 40–150)
ALP SERPL-CCNC: 92 U/L (ref 40–150)
ALT SERPL W P-5'-P-CCNC: 18 U/L (ref 0–50)
ALT SERPL W P-5'-P-CCNC: 20 U/L (ref 0–50)
ANION GAP SERPL CALCULATED.3IONS-SCNC: 9 MMOL/L (ref 3–14)
ANION GAP SERPL CALCULATED.3IONS-SCNC: 9 MMOL/L (ref 3–14)
APPEARANCE UR: CLEAR
AST SERPL W P-5'-P-CCNC: 11 U/L (ref 0–45)
AST SERPL W P-5'-P-CCNC: 12 U/L (ref 0–45)
BASOPHILS # BLD AUTO: 0 10E9/L (ref 0–0.2)
BASOPHILS NFR BLD AUTO: 0.5 %
BILIRUB SERPL-MCNC: 0.3 MG/DL (ref 0.2–1.3)
BILIRUB SERPL-MCNC: 0.3 MG/DL (ref 0.2–1.3)
BILIRUB UR QL STRIP: NEGATIVE
BUN SERPL-MCNC: 8 MG/DL (ref 7–30)
BUN SERPL-MCNC: 9 MG/DL (ref 7–30)
CALCIUM SERPL-MCNC: 8.1 MG/DL (ref 8.5–10.1)
CALCIUM SERPL-MCNC: 8.4 MG/DL (ref 8.5–10.1)
CHLORIDE SERPL-SCNC: 106 MMOL/L (ref 94–109)
CHLORIDE SERPL-SCNC: 106 MMOL/L (ref 94–109)
CO2 SERPL-SCNC: 26 MMOL/L (ref 20–32)
CO2 SERPL-SCNC: 26 MMOL/L (ref 20–32)
COLOR UR AUTO: ABNORMAL
CREAT SERPL-MCNC: 0.51 MG/DL (ref 0.52–1.04)
CREAT SERPL-MCNC: 0.57 MG/DL (ref 0.52–1.04)
DIFFERENTIAL METHOD BLD: ABNORMAL
EOSINOPHIL # BLD AUTO: 0.3 10E9/L (ref 0–0.7)
EOSINOPHIL NFR BLD AUTO: 2.9 %
ERYTHROCYTE [DISTWIDTH] IN BLOOD BY AUTOMATED COUNT: 15.8 % (ref 10–15)
GFR SERPL CREATININE-BSD FRML MDRD: >90 ML/MIN/1.7M2
GFR SERPL CREATININE-BSD FRML MDRD: >90 ML/MIN/1.7M2
GLUCOSE SERPL-MCNC: 113 MG/DL (ref 70–99)
GLUCOSE SERPL-MCNC: 154 MG/DL (ref 70–99)
GLUCOSE UR STRIP-MCNC: NEGATIVE MG/DL
HCT VFR BLD AUTO: 26.7 % (ref 35–47)
HGB BLD-MCNC: 8.2 G/DL (ref 11.7–15.7)
HGB UR QL STRIP: NEGATIVE
INR BLD: 1.1 (ref 0.86–1.14)
KETONES UR STRIP-MCNC: NEGATIVE MG/DL
LEUKOCYTE ESTERASE UR QL STRIP: NEGATIVE
LYMPHOCYTES # BLD AUTO: 1.7 10E9/L (ref 0.8–5.3)
LYMPHOCYTES NFR BLD AUTO: 19 %
MCH RBC QN AUTO: 24.2 PG (ref 26.5–33)
MCHC RBC AUTO-ENTMCNC: 30.7 G/DL (ref 31.5–36.5)
MCV RBC AUTO: 79 FL (ref 78–100)
MONOCYTES # BLD AUTO: 0.5 10E9/L (ref 0–1.3)
MONOCYTES NFR BLD AUTO: 5.2 %
MUCOUS THREADS #/AREA URNS LPF: PRESENT /LPF
NEUTROPHILS # BLD AUTO: 6.3 10E9/L (ref 1.6–8.3)
NEUTROPHILS NFR BLD AUTO: 72.4 %
NITRATE UR QL: NEGATIVE
NT-PROBNP SERPL-MCNC: 1011 PG/ML (ref 0–450)
NT-PROBNP SERPL-MCNC: 963 PG/ML (ref 0–125)
PH UR STRIP: 6 PH (ref 5–7)
PLATELET # BLD AUTO: 339 10E9/L (ref 150–450)
POTASSIUM SERPL-SCNC: 3.7 MMOL/L (ref 3.4–5.3)
POTASSIUM SERPL-SCNC: 3.7 MMOL/L (ref 3.4–5.3)
PROT SERPL-MCNC: 6.6 G/DL (ref 6.8–8.8)
PROT SERPL-MCNC: 6.7 G/DL (ref 6.8–8.8)
RBC # BLD AUTO: 3.39 10E12/L (ref 3.8–5.2)
RBC #/AREA URNS AUTO: 1 /HPF (ref 0–2)
SODIUM SERPL-SCNC: 141 MMOL/L (ref 133–144)
SODIUM SERPL-SCNC: 141 MMOL/L (ref 133–144)
SOURCE: ABNORMAL
SP GR UR STRIP: 1 (ref 1–1.03)
SQUAMOUS #/AREA URNS AUTO: 1 /HPF (ref 0–1)
TROPONIN I SERPL-MCNC: <0.015 UG/L (ref 0–0.04)
TSH SERPL DL<=0.005 MIU/L-ACNC: 1.57 MU/L (ref 0.4–4)
TSH SERPL DL<=0.005 MIU/L-ACNC: 1.96 MU/L (ref 0.4–4)
UROBILINOGEN UR STRIP-MCNC: 0 MG/DL (ref 0–2)
WBC # BLD AUTO: 8.7 10E9/L (ref 4–11)
WBC #/AREA URNS AUTO: 1 /HPF (ref 0–5)

## 2018-09-21 PROCEDURE — 80053 COMPREHEN METABOLIC PANEL: CPT | Performed by: EMERGENCY MEDICINE

## 2018-09-21 PROCEDURE — 96374 THER/PROPH/DIAG INJ IV PUSH: CPT | Performed by: EMERGENCY MEDICINE

## 2018-09-21 PROCEDURE — 96375 TX/PRO/DX INJ NEW DRUG ADDON: CPT | Performed by: EMERGENCY MEDICINE

## 2018-09-21 PROCEDURE — 71046 X-RAY EXAM CHEST 2 VIEWS: CPT | Mod: TC

## 2018-09-21 PROCEDURE — 25000128 H RX IP 250 OP 636: Performed by: EMERGENCY MEDICINE

## 2018-09-21 PROCEDURE — 99285 EMERGENCY DEPT VISIT HI MDM: CPT | Mod: 25 | Performed by: EMERGENCY MEDICINE

## 2018-09-21 PROCEDURE — 81001 URINALYSIS AUTO W/SCOPE: CPT | Performed by: EMERGENCY MEDICINE

## 2018-09-21 PROCEDURE — 80053 COMPREHEN METABOLIC PANEL: CPT | Performed by: PHYSICIAN ASSISTANT

## 2018-09-21 PROCEDURE — 84443 ASSAY THYROID STIM HORMONE: CPT | Performed by: PHYSICIAN ASSISTANT

## 2018-09-21 PROCEDURE — 36415 COLL VENOUS BLD VENIPUNCTURE: CPT | Performed by: PHYSICIAN ASSISTANT

## 2018-09-21 PROCEDURE — 83880 ASSAY OF NATRIURETIC PEPTIDE: CPT | Performed by: EMERGENCY MEDICINE

## 2018-09-21 PROCEDURE — 83880 ASSAY OF NATRIURETIC PEPTIDE: CPT | Performed by: PHYSICIAN ASSISTANT

## 2018-09-21 PROCEDURE — 85025 COMPLETE CBC W/AUTO DIFF WBC: CPT | Performed by: PHYSICIAN ASSISTANT

## 2018-09-21 PROCEDURE — 99214 OFFICE O/P EST MOD 30 MIN: CPT | Performed by: PHYSICIAN ASSISTANT

## 2018-09-21 PROCEDURE — 93970 EXTREMITY STUDY: CPT

## 2018-09-21 PROCEDURE — 93010 ELECTROCARDIOGRAM REPORT: CPT | Mod: Z6 | Performed by: EMERGENCY MEDICINE

## 2018-09-21 PROCEDURE — 93005 ELECTROCARDIOGRAM TRACING: CPT | Performed by: EMERGENCY MEDICINE

## 2018-09-21 PROCEDURE — 84443 ASSAY THYROID STIM HORMONE: CPT | Performed by: EMERGENCY MEDICINE

## 2018-09-21 PROCEDURE — 85610 PROTHROMBIN TIME: CPT | Mod: QW | Performed by: PHYSICIAN ASSISTANT

## 2018-09-21 PROCEDURE — 84484 ASSAY OF TROPONIN QUANT: CPT | Performed by: EMERGENCY MEDICINE

## 2018-09-21 RX ORDER — FUROSEMIDE 10 MG/ML
20 INJECTION INTRAMUSCULAR; INTRAVENOUS ONCE
Status: COMPLETED | OUTPATIENT
Start: 2018-09-21 | End: 2018-09-21

## 2018-09-21 RX ORDER — FUROSEMIDE 20 MG
20 TABLET ORAL DAILY PRN
Qty: 10 TABLET | Refills: 0 | Status: SHIPPED | OUTPATIENT
Start: 2018-09-21 | End: 2018-10-25

## 2018-09-21 RX ORDER — KETOROLAC TROMETHAMINE 15 MG/ML
15 INJECTION, SOLUTION INTRAMUSCULAR; INTRAVENOUS ONCE
Status: COMPLETED | OUTPATIENT
Start: 2018-09-21 | End: 2018-09-21

## 2018-09-21 RX ORDER — OXYCODONE HYDROCHLORIDE 5 MG/1
5 TABLET ORAL EVERY 4 HOURS PRN
Qty: 18 TABLET | Refills: 0 | Status: SHIPPED | OUTPATIENT
Start: 2018-09-21 | End: 2018-10-25

## 2018-09-21 RX ORDER — POTASSIUM CHLORIDE 1500 MG/1
20 TABLET, EXTENDED RELEASE ORAL DAILY PRN
Qty: 10 TABLET | Refills: 0 | Status: SHIPPED | OUTPATIENT
Start: 2018-09-21 | End: 2018-10-25

## 2018-09-21 RX ORDER — ONDANSETRON 8 MG/1
8 TABLET, ORALLY DISINTEGRATING ORAL
Qty: 20 TABLET | Refills: 1 | Status: SHIPPED | OUTPATIENT
Start: 2018-09-21 | End: 2018-11-14

## 2018-09-21 RX ADMIN — KETOROLAC TROMETHAMINE 15 MG: 15 INJECTION, SOLUTION INTRAMUSCULAR; INTRAVENOUS at 18:02

## 2018-09-21 RX ADMIN — FUROSEMIDE 20 MG: 10 INJECTION, SOLUTION INTRAVENOUS at 17:51

## 2018-09-21 ASSESSMENT — PAIN SCALES - GENERAL: PAINLEVEL: SEVERE PAIN (6)

## 2018-09-21 NOTE — ED AVS SNAPSHOT
[facility] Emergency Department    [address] [place] [address]    Phone:  [phone]    Fax:  [phone]                                       Lashawn Reddy   MRN: [id number]    Department:  [facility] Emergency Department   Date of Visit:  9/21/2018           After Visit Summary Signature Page     I have received my discharge instructions, and my questions have been answered. I have discussed any challenges I see with this plan with the nurse or doctor.    ..........................................................................................................................................  Patient/Patient Representative Signature      ..........................................................................................................................................  Patient Representative Print Name and Relationship to Patient    ..................................................               ................................................  Date                                   Time    ..........................................................................................................................................  Reviewed by Signature/Title    ...................................................              ..............................................  Date                                               Time          22EPIC Rev 08/18

## 2018-09-21 NOTE — PROGRESS NOTES
SUBJECTIVE:   Lashawn Reddy is a 47 year old female who presents to clinic today for the following health issues:      [facility]      Hospital Follow-up Visit:    Hospital/Nursing Home/IP Rehab Facility: [facility]  Date of Admission: 09/12  Date of Discharge: 09/15  Reason(s) for Admission: Hysterectomy            Problems taking medications regularly:  None       Medication changes since discharge: None       Problems adhering to non-medication therapy:  None    Summary of hospitalization:  [facility] discharge summary reviewed  Diagnostic Tests/Treatments reviewed.  Follow up needed: none  Other Healthcare Providers Involved in Patient s Care:         None  Update since discharge: worsened with respect to swelling her legs.  She states she feels like she had less swelling and was more comfortable 2 days ago than she is today.      Post Discharge Medication Reconciliation: discharge medications reconciled, continue medications without change.  Plan of care communicated with patient and family     Coding guidelines for this visit:  Type of Medical   Decision Making Face-to-Face Visit       within 7 Days of discharge Face-to-Face Visit        within 14 days of discharge   Moderate Complexity [id number]   High Complexity [id number]            Problem list and histories reviewed & adjusted, as indicated.  Additional history: She had a cough about 2 days ago but has not had one since.    Patient Active Problem List   Diagnosis     Slow transit constipation     Varicose veins of lower extremities with complications     Family history of malignant neoplasm of gastrointestinal tract     Hyperlipidemia with target LDL less than 100     PMS (premenstrual syndrome)     Menorrhagia with regular cycle     Mixed hyperlipidemia     History of diverticulitis     Lung nodule     Type 2 diabetes mellitus with hyperglycemia, without long-term current use of insulin (H)     Essential hypertension      Morbid obesity (H)     Personal history of DVT (deep vein thrombosis)     S/P hysterectomy     Past Surgical History:   Procedure Laterality Date     C ANESTH,LOWER LEG VEIN SURG,NOS  2002    bilateral     C APPENDECTOMY  04/14/89     COLONOSCOPY  6/21/2013    Procedure: COLONOSCOPY;  colonoscopy;  Surgeon: Vamshi Loomis MD;  Location: [facility]     COLONOSCOPY N/A 2/22/2017    Procedure: COLONOSCOPY;  Surgeon: Raoul Sage MD;  Location: [facility]     HC COLONOSCOPY W/WO BRUSH/WASH  06/17/2005     HC DILATION/CURETTAGE DIAG/THER NON OB  1986    after a miscarriage     HC REMOVAL OF TONSILS,<10 Y/O       HYSTERECTOMY TOTAL ABDOMINAL, SALPINGECTOMY Bilateral 9/12/2018    Procedure: HYSTERECTOMY TOTAL ABDOMINAL, SALPINGECTOMY;  Total Abdominal Hysterectomy, left Salpingectomy;  Surgeon: Temi Corado MD;  Location: [facility] OR     TUBAL LIGATION  08/04/2006       Social History   Substance Use Topics     Smoking status: Former Smoker     Years: 16.00     Types: Cigarettes     Quit date: 9/29/2008     Smokeless tobacco: Never Used     Alcohol use No      Comment: couple drinks per year     Family History   Problem Relation Age of Onset     Anesthesia Reaction Mother      Severe nausea     GASTROINTESTINAL DISEASE Mother      Partial colon removal secondary to a blockage     Gynecology Mother      hysterectomy     Lipids Mother      Lipids Father      Cancer - colorectal Father      dx Oct '03 (dx at age 57)     Cancer Father      skin     Hypertension Father      Cancer Maternal Grandmother      colon at age 68     Cerebrovascular Disease Paternal Grandfather      Cerebrovascular Disease Paternal Grandmother      brain aneurysm     Arthritis Sister      mainly affecting her legs     GASTROINTESTINAL DISEASE Sister      2 sisters with colon polyps     Cancer Sister      cervical ca     Cancer Maternal Aunt      all over ? breast was the origin     Cancer Maternal Aunt      pancreatic     HEART DISEASE Maternal Aunt       MI 2010         Current Outpatient Prescriptions   Medication Sig Dispense Refill     acetaminophen (TYLENOL) 325 MG tablet Take 2 tablets (650 mg) by mouth every 4 hours as needed for other (multimodal surgical pain management) 40 tablet 0     blood glucose monitoring (ACCU-CHEK FASTCLIX) lancets 1 each 2 times daily 102 each 3     blood glucose monitoring (ACCU-CHEK GUIDE) test strip Use to test blood sugar 2 times daily or as directed. 100 strip 11     ferrous gluconate (FERGON) 324 (38 Fe) MG tablet Take 1 tablet (324 mg) by mouth daily (with breakfast) 60 tablet 1     glipiZIDE (GLIPIZIDE XL) 10 MG 24 hr tablet Take 1 tablet (10 mg) by mouth daily 90 tablet 3     ibuprofen (ADVIL/MOTRIN) 600 MG tablet Take 1 tablet (600 mg) by mouth every 6 hours as needed for moderate pain 40 tablet 0     lisinopril (PRINIVIL/ZESTRIL) 10 MG tablet Take 1 tablet (10 mg) by mouth daily 30 tablet 1     metFORMIN (GLUCOPHAGE-XR) 500 MG 24 hr tablet TAKE 4 TABLETS (2,000MG) BY MOUTH EVERY DAY WITH DINNER 360 tablet 1     omeprazole (PRILOSEC) 20 MG CR capsule Take 1 capsule (20 mg) by mouth daily 90 capsule 3     ondansetron (ZOFRAN ODT) 8 MG ODT tab Take 1 tablet (8 mg) by mouth 3 times daily (before meals) 20 tablet 1     oxyCODONE IR (ROXICODONE) 5 MG tablet Take 1 tablet (5 mg) by mouth every 4 hours as needed 18 tablet 0     polyethylene glycol (MIRALAX/GLYCOLAX) Packet Take 17 g by mouth daily as needed for constipation 7 packet 3     rosuvastatin (CRESTOR) 20 MG tablet TAKE 1 TABLET BY MOUTH EVERY DAY (Patient taking differently: TAKE 1 TABLET BY MOUTH EVERY EVENING) 90 tablet 1     senna-docusate (SENOKOT-S;PERICOLACE) 8.6-50 MG per tablet Take 1 tablet by mouth 2 times daily as needed for constipation 30 tablet 0     sertraline (ZOLOFT) 50 MG tablet TAKE 1/2 TABLET (25 MG) BY MOUTH EVERY MORNING (Patient taking differently: TAKE 1/2 TABLET (25 MG) BY MOUTH EVERY EVENING) 45 tablet 1     Allergies   Allergen Reactions      No Known Drug Allergies      Recent Labs   Lab Test  08/30/18   0931  07/09/18   1805  07/02/18   0805  03/30/18   0755  10/16/17   0755   11/28/16   1750  09/19/16   1150  07/01/16   0800  04/28/16   0958   A1C   --    --   8.2*  9.4*  8.5*   < >   --   8.7*  8.6*  8.8*   LDL   --    --    --    --   33   --    --    --   46  145*   HDL   --    --    --    --   67   --    --    --   63  57   TRIG   --    --    --    --   87   --    --    --   91  94   ALT   --    --    --   15   --    --   14  21   --   19   CR  0.89   --    --   0.57   --    < >  0.76  0.71  0.71  0.85   GFRESTIMATED  67   --    --   >90   --    < >  82  88  88  72   GFRESTBLACK  82   --    --   >90   --    < >  >90   GFR Calc    >90   GFR Calc    >90   GFR Calc    87   POTASSIUM  4.2   --    --   4.3   --    < >  3.8  4.0  3.7  4.3   TSH   --   3.16   --    --    --    --    --    --    --   1.32    < > = values in this interval not displayed.      BP Readings from Last 3 Encounters:   09/21/18 (!) 161/92   09/15/18 116/60   08/30/18 144/86    Wt Readings from Last 3 Encounters:   09/21/18 264 lb 1.6 oz (119.8 kg)   09/12/18 249 lb 1.9 oz (113 kg)   08/30/18 255 lb (115.7 kg)                    ROS:  Constitutional, HEENT, cardiovascular, pulmonary, gi and gu systems are negative, except as otherwise noted.    OBJECTIVE:     BP (!) 161/92 (Cuff Size: Adult Large)  Pulse 70  Temp 97.7  F (36.5  C) (Temporal)  Resp 20  Wt 264 lb 1.6 oz (119.8 kg)  LMP 09/03/2018  SpO2 97%  BMI 42.65 kg/m2  Body mass index is 42.65 kg/(m^2).  GENERAL: healthy, alert and no distress  NECK: no adenopathy, no asymmetry, masses, or scars and trachea midline and normal to palpation  RESP: Fairly clear with exception of a few scattered rales to the left base posteriorly.  CV: regular rate and rhythm, normal S1 S2, no S3 or S4, no murmur, click or rub, no peripheral edema and peripheral pulses strong  ABDOMEN: soft,  nontender, no hepatosplenomegaly, no masses and bowel sounds normal  MS: 3+ edema above the knee is noted.  This is equal bilaterally.  Possibly more tense on the right than on the left.  Evidence of past healed venous stasis ulceration of the right medial lower extremity is noted.   SKIN: no suspicious lesions or rashes to visible skin however varicose veins are noted.  NEURO: Normal strength and tone, mentation intact and speech normal  PSYCH: anxious, fatigued, judgement and insight intact, appearance well groomed and she appears miserable today.    Diagnostic Test Results:  Results for orders placed or performed in visit on 09/21/18 (from the past 24 hour(s))   CBC with platelets and differential   Result Value Ref Range    WBC 8.7 4.0 - 11.0 10e9/L    RBC Count 3.39 (L) 3.8 - 5.2 10e12/L    Hemoglobin 8.2 (L) 11.7 - 15.7 g/dL    Hematocrit 26.7 (L) 35.0 - 47.0 %    MCV 79 78 - 100 fl    MCH 24.2 (L) 26.5 - 33.0 pg    MCHC 30.7 (L) 31.5 - 36.5 g/dL    RDW 15.8 (H) 10.0 - 15.0 %    Platelet Count 339 150 - 450 10e9/L    % Neutrophils 72.4 %    % Lymphocytes 19.0 %    % Monocytes 5.2 %    % Eosinophils 2.9 %    % Basophils 0.5 %    Absolute Neutrophil 6.3 1.6 - 8.3 10e9/L    Absolute Lymphocytes 1.7 0.8 - 5.3 10e9/L    Absolute Monocytes 0.5 0.0 - 1.3 10e9/L    Absolute Eosinophils 0.3 0.0 - 0.7 10e9/L    Absolute Basophils 0.0 0.0 - 0.2 10e9/L    Diff Method Automated Method    INR point of care   Result Value Ref Range    INR Point of Care 1.1 0.86 - 1.14       ASSESSMENT/PLAN:     1. Dependent edema  2. Leg swelling  3. Postoperative state  4. S/P hysterectomy  5. Nausea  - CBC with platelets and differential  - Comprehensive metabolic panel  - BNP-N terminal pro  - TSH with free T4 reflex  - INR point of care  - US Lower Extremity Venous Duplex Bilateral; Future  - oxyCODONE IR (ROXICODONE) 5 MG tablet; Take 1 tablet (5 mg) by mouth every 4 hours as needed  Dispense: 18 tablet; Refill: 0  - ondansetron (ZOFRAN  ODT) 8 MG ODT tab; Take 1 tablet (8 mg) by mouth 3 times daily (before meals)  Dispense: 20 tablet; Refill: 1    He is to follow-up as directed.  After discussion with my attending physician Dr. Sylvester here in the clinic, radiology staff and ER clinicians, we elect to send her to the ER for further evaluation and treatment since we do not have a ultrasound tech to take a look at bilateral Doppler ultrasounds for DVT.  I must admit that DVT bilaterally is remote but cannot ignore her current risk factors and underlying vascular disease potential due to diabetes etc.  She understands the risks and will proceed to the emergency room in [place] for further evaluation treatment.    Jatinder Alvarez PA-C  [facility]

## 2018-09-21 NOTE — ED TRIAGE NOTES
Pt here with bilateral leg swelling after having an abdominal Hysterectomy on Sept 12th. She has history of DVT's so was on Coumadin, but is not now.

## 2018-09-21 NOTE — ED PROVIDER NOTES
History     Chief Complaint   Patient presents with     Leg Swelling     The history is provided by the patient.     Lashawn Reddy is a 47 year old female who was sent to the emergency department from the [facility] with concerns of bilateral leg swelling. The patient is 9 days post op from an open hysterectomy for fibroids and bleeding.  She spent 3 days recovering at the [facility]. She was on Lovenox during the time that she was in the hospital. She says that her legs were swollen when she left the hospital, but they have been getting progressively worse since leaving. Her right leg feels more swollen than the left. She also has some numbness in her leg from the swelling. She has been trying to lay in her chair with her legs up, but she doesn't think her legs have been elevated above her heart.   The patient notes that she has also had decreased energy since getting home from the hospital.  Patient denies any chest pain, shortness of breath, fever, or chills. The patient says that her stomach is also feeling bigger than normal. She has been able to pass gas and have bowel movements normally. She thinks that she has been urinating a lot. Patient has had a nagging headache. She has been taking Ibuprofen.  The patient had a DVT in her right leg about 4 years ago. She says that she tore her meniscus and cracked her knee cap so she was not able to walk on her leg for a while and developed a blood clot. She still occasionally gets swelling in her right leg, but it usually subsides after elevating. Patient is a diabetic and takes oral medication. Her blood sugar was 120 this morning.    Patient does not smoke.  No current blood thinners.      Problem List:    Patient Active Problem List    Diagnosis Date Noted     S/P hysterectomy 09/12/2018     Priority: Medium     Personal history of DVT (deep vein thrombosis) 08/30/2018     Priority: Medium     Morbid obesity (H) 07/09/2018     Priority: Medium     Essential  hypertension 10/23/2017     Priority: Medium     Type 2 diabetes mellitus with hyperglycemia, without long-term current use of insulin (H) 02/09/2017     Priority: Medium     History of diverticulitis 11/28/2016     Priority: Medium     Lung nodule 11/28/2016     Priority: Medium     Mixed hyperlipidemia 05/03/2016     Priority: Medium     Menorrhagia with regular cycle 04/29/2016     Priority: Medium     PMS (premenstrual syndrome) 04/28/2016     Priority: Medium     Hyperlipidemia with target LDL less than 100 06/06/2013     Priority: Medium     Diagnosis updated by automated process. Provider to review and confirm.       Family history of malignant neoplasm of gastrointestinal tract 05/24/2005     Priority: Medium     Varicose veins of lower extremities with complications 05/13/2005     Priority: Medium     Problem list name updated by automated process. Provider to review       Slow transit constipation 02/24/2003     Priority: Medium        Past Medical History:    Past Medical History:   Diagnosis Date     Depressive disorder, not elsewhere classified      Diabetes mellitus, type 2 (H) 6/6/2013     Diffuse cystic mastopathy      Tobacco use disorder      Type 2 diabetes, HbA1c goal < 7% (H) 6/6/2013       Past Surgical History:    Past Surgical History:   Procedure Laterality Date     C ANESTH,LOWER LEG VEIN SURG,NOS  2002    bilateral     C APPENDECTOMY  04/14/89     COLONOSCOPY  6/21/2013    Procedure: COLONOSCOPY;  colonoscopy;  Surgeon: Vamshi Loomis MD;  Location: [facility]     COLONOSCOPY N/A 2/22/2017    Procedure: COLONOSCOPY;  Surgeon: Raoul Sage MD;  Location:  GI     HC COLONOSCOPY W/WO BRUSH/WASH  06/17/2005     HC DILATION/CURETTAGE DIAG/THER NON OB  1986    after a miscarriage     HC REMOVAL OF TONSILS,<12 Y/O       HYSTERECTOMY TOTAL ABDOMINAL, SALPINGECTOMY Bilateral 9/12/2018    Procedure: HYSTERECTOMY TOTAL ABDOMINAL, SALPINGECTOMY;  Total Abdominal Hysterectomy, left Salpingectomy;   Surgeon: Temi Corado MD;  Location: [facility]     TUBAL LIGATION  08/04/2006       Family History:    Family History   Problem Relation Age of Onset     Anesthesia Reaction Mother      Severe nausea     GASTROINTESTINAL DISEASE Mother      Partial colon removal secondary to a blockage     Gynecology Mother      hysterectomy     Lipids Mother      Lipids Father      Cancer - colorectal Father      dx Oct '03 (dx at age 57)     Cancer Father      skin     Hypertension Father      Cancer Maternal Grandmother      colon at age 68     Cerebrovascular Disease Paternal Grandfather      Cerebrovascular Disease Paternal Grandmother      brain aneurysm     Arthritis Sister      mainly affecting her legs     GASTROINTESTINAL DISEASE Sister      2 sisters with colon polyps     Cancer Sister      cervical ca     Cancer Maternal Aunt      all over ? breast was the origin     Cancer Maternal Aunt      pancreatic     HEART DISEASE Maternal Aunt      MI 2010       Social History:  Marital Status:   [2]  Social History   Substance Use Topics     Smoking status: Former Smoker     Years: 16.00     Types: Cigarettes     Quit date: 9/29/2008     Smokeless tobacco: Never Used     Alcohol use No      Comment: couple drinks per year        Medications:      furosemide (LASIX) 20 MG tablet   potassium chloride SA (KLOR-CON) 20 MEQ CR tablet   acetaminophen (TYLENOL) 325 MG tablet   blood glucose monitoring (ACCU-CHEK FASTCLIX) lancets   blood glucose monitoring (ACCU-CHEK GUIDE) test strip   ferrous gluconate (FERGON) 324 (38 Fe) MG tablet   glipiZIDE (GLIPIZIDE XL) 10 MG 24 hr tablet   ibuprofen (ADVIL/MOTRIN) 600 MG tablet   lisinopril (PRINIVIL/ZESTRIL) 10 MG tablet   metFORMIN (GLUCOPHAGE-XR) 500 MG 24 hr tablet   omeprazole (PRILOSEC) 20 MG CR capsule   ondansetron (ZOFRAN ODT) 8 MG ODT tab   oxyCODONE IR (ROXICODONE) 5 MG tablet   polyethylene glycol (MIRALAX/GLYCOLAX) Packet   rosuvastatin (CRESTOR) 20 MG tablet    senna-docusate (SENOKOT-S;PERICOLACE) 8.6-50 MG per tablet   sertraline (ZOLOFT) 50 MG tablet         Review of Systems   All other ROS reviewed and are negative or non-contributory except as stated in HPI.    Physical Exam   BP: (!) 180/111  Pulse: 74  Temp: 98.3  F (36.8  C)  Resp: 14  Weight: 120.7 kg (266 lb)  SpO2: 100 %      Physical Exam   Constitutional: She appears well-developed and well-nourished.   Pleasant, tired appearing, mildly obese female sitting in the bed   HENT:   Head: Normocephalic.   Nose: Nose normal.   Mouth/Throat: Oropharynx is clear and moist.   Eyes: Conjunctivae and EOM are normal.   Neck: Normal range of motion. Neck supple.   Cardiovascular: Normal rate, regular rhythm, normal heart sounds and intact distal pulses.    Pulmonary/Chest: Effort normal and breath sounds normal.   Abdominal: Soft. There is tenderness (Mild diffuse).   Lower abdominal incision site clean/dry/intact   Musculoskeletal: Normal range of motion. She exhibits edema (Bilateral lower extremity pitting edema to the knees).   Neurological: She is alert. No cranial nerve deficit. She exhibits normal muscle tone.   Skin: Skin is warm and dry. No rash noted. She is not diaphoretic.   Psychiatric: She has a normal mood and affect. Her behavior is normal.   Vitals reviewed.      ED Course (with Medical Decision Making)    Pt seen and examined by me.  RN and EPIC notes reviewed.      Patient with recent surgery and hospitalization presenting with leg swelling.  History of DVT.  Sent from clinic over concerns of fluid overload versus DVT.    Patient had some labs done at clinic that were not completed so these were done in the ED.  Ultrasound ordered.    BNP is over twice normal.  Troponin negative.  TSH and chemistry panel unremarkable.  Patient is anemic with hemoglobin 8.2 versus postoperatively 9.1.  Possible delusional effect as she has not had any other bleeding.  No evidence for infection.  Chest x-ray is clear.   EKG normal.    Patient was given 20 mg of IV Lasix.  She had good urine output and her blood pressure was noted to have slight decrease.  She also states she starting to feel better.  Spoke at length about peripheral edema, elevating the legs, blood pressures, salt intake.  She also has been taking nonsteroidal anti-inflammatory agents which could be contributing to the edema.  Recommendation will be for increased elevation of the legs at or above the level of her heart.  Continue to be as mobile as possible.  Limit salt intake.  I am going to give her a small amount of as needed Lasix to use for weight gain and peripheral edema.  She has an appointment for follow-up with Dr. Sherman on Wednesday, 5 days from now.  In the meantime, return at anytime for concerns.       Procedures               EKG Interpretation:      Interpreted by Sruthi Valadez  Time reviewed: 1812  Symptoms at time of EKG: none   Rhythm: normal sinus   Rate: normal  Axis: normal  Ectopy: none  Conduction: normal  ST Segments/ T Waves: No ST-T wave changes  Q Waves: none  Comparison to prior: Unchanged from 8/30/18    Clinical Impression: normal EKG    Results for orders placed or performed during the hospital encounter of 09/21/18 (from the past 24 hour(s))   Comprehensive metabolic panel   Result Value Ref Range    Sodium 141 133 - 144 mmol/L    Potassium 3.7 3.4 - 5.3 mmol/L    Chloride 106 94 - 109 mmol/L    Carbon Dioxide 26 20 - 32 mmol/L    Anion Gap 9 3 - 14 mmol/L    Glucose 154 (H) 70 - 99 mg/dL    Urea Nitrogen 8 7 - 30 mg/dL    Creatinine 0.51 (L) 0.52 - 1.04 mg/dL    GFR Estimate >90 >60 mL/min/1.7m2    GFR Estimate If Black >90 >60 mL/min/1.7m2    Calcium 8.1 (L) 8.5 - 10.1 mg/dL    Bilirubin Total 0.3 0.2 - 1.3 mg/dL    Albumin 3.0 (L) 3.4 - 5.0 g/dL    Protein Total 6.6 (L) 6.8 - 8.8 g/dL    Alkaline Phosphatase 92 40 - 150 U/L    ALT 18 0 - 50 U/L    AST 12 0 - 45 U/L   Nt probnp inpatient (BNP)   Result Value Ref Range     N-Terminal Pro BNP Inpatient 1011 (H) 0 - 450 pg/mL   TSH with free T4 reflex   Result Value Ref Range    TSH 1.57 0.40 - 4.00 mU/L   Troponin I   Result Value Ref Range    Troponin I ES <0.015 0.000 - 0.045 ug/L   UA with Microscopic   Result Value Ref Range    Color Urine Straw     Appearance Urine Clear     Glucose Urine Negative NEG^Negative mg/dL    Bilirubin Urine Negative NEG^Negative    Ketones Urine Negative NEG^Negative mg/dL    Specific Gravity Urine 1.005 1.003 - 1.035    Blood Urine Negative NEG^Negative    pH Urine 6.0 5.0 - 7.0 pH    Protein Albumin Urine Negative NEG^Negative mg/dL    Urobilinogen mg/dL 0.0 0.0 - 2.0 mg/dL    Nitrite Urine Negative NEG^Negative    Leukocyte Esterase Urine Negative NEG^Negative    Source Unspecified Urine     WBC Urine 1 0 - 5 /HPF    RBC Urine 1 0 - 2 /HPF    Squamous Epithelial /HPF Urine 1 0 - 1 /HPF    Mucous Urine Present (A) NEG^Negative /LPF   US Lower Extremity Venous Duplex Bilateral    Narrative    US LOWER EXTREMITY VENOUS DUPLEX BILATERAL 9/21/2018 6:55 PM    HISTORY: Postop swelling.     TECHNIQUE: Imaged deep venous structures of each lower extremity  include the common femoral veins, superficial femoral veins, popliteal  veins, and visualized posterior deep calf veins.  Color flow and  spectral Doppler with waveform analysis performed.    COMPARISON: None.    FINDINGS: No DVT is demonstrated in either lower extremity.      Impression    IMPRESSION: Negative for DVT. Bilateral calf edema.     ALEXANDER CLARK MD   Chest XR,  PA & LAT    Narrative    XR CHEST 2 VW 9/21/2018 7:26 PM    HISTORY: Hypertension.     COMPARISON: September 14, 2018.       Impression    IMPRESSION: The lungs are clear. No focal pulmonary opacities. Heart  and mediastinum are unremarkable. No acute cardiopulmonary  abnormalities.    ALEXANDER CLARK MD       Medications   furosemide (LASIX) injection 20 mg (20 mg Intravenous Given 9/21/18 1579)   ketorolac (TORADOL) injection 15 mg (15  mg Intravenous Given 9/21/18 5232)       Assessments & Plan     I have reviewed the findings, diagnosis, plan and need for follow up with the patient.    Discharge Medication List as of 9/21/2018  8:07 PM      START taking these medications    Details   furosemide (LASIX) 20 MG tablet Take 1 tablet (20 mg) by mouth daily as needed (leg swelling), Disp-10 tablet, R-0, E-Prescribe      potassium chloride SA (KLOR-CON) 20 MEQ CR tablet Take 1 tablet (20 mEq) by mouth daily as needed for potassium supplementation ((take with furosemide/lasix)), Disp-10 tablet, R-0, E-Prescribe             Final diagnoses:   Bilateral edema of lower extremity   Essential hypertension   Anemia, unspecified type     Disposition: Patient discharged home in stable condition.  Plan as above.  Return for concerns.      This document serves as a record of services personally performed by Sruthi Valadez MD. It was created on their behalf by Shannan Martinez, a trained medical scribe. The creation of this record is based on the provider's personal observations and the statements of the patient. This document has been checked and approved by the attending provider.  Note: Chart documentation done in part with Dragon Voice Recognition software. Although reviewed after completion, some word and grammatical errors may remain.  9/21/2018   [facility] EMERGENCY DEPARTMENT     Sruthi Valadez MD  09/21/18 0026

## 2018-09-21 NOTE — ED AVS SNAPSHOT
[facility] Emergency Department    [address] [place] [address]    Phone:  [phone]    Fax:  [phone]                                       Lashawn Reddy   MRN: [id number]    Department:  [facility] Emergency Department   Date of Visit:  9/21/2018           Patient Information     Date Of Birth          1970        Your diagnoses for this visit were:     Bilateral edema of lower extremity     Essential hypertension     Anemia, unspecified type        You were seen by Sruthi Valadez MD.      Follow-up Information     Follow up with Raoul Sherman MD.    Specialties:  Family Practice, OB/Gyn    Contact information:    [address] [place] [address] [address] [place] [address] [phone]          Discharge Instructions       Elevate legs as much as possible.    Lasix/furosemide daily as needed for leg swelling and weight gain.  If you take the Lasix, also take 1 tablet of potassium to replace.    If possible, cut back on your ibuprofen and use Tylenol for pain.    Follow-up with Dr. Sherman as scheduled.    Be sure to start the iron.  You could also try Slow Fe which may be a little less hard on your stomach.    Return at anytime for concerns.    I hope you heal quickly!!    Discharge References/Attachments     LEG SWELLING IN BOTH LEGS (ENGLISH)      Your next 10 appointments already scheduled     Sep 26, 2018  9:10 AM CDT   [facility]    [address] [place] [id number] [phone]           Bring a current list of meds and any records pertaining to this visit. For Physicals, please bring immunization records and any forms needing to be filled out. Please arrive 10 minutes early to complete paperwork.            Oct 25, 2018 12:45 PM CDT   Return Visit with Temi Corado MD   [facility] ([facility])    [place]  [address] [facility] [address]  [address]  [facility] [id number] [phone]              24 Hour Appointment Hotline       To make an appointment at any [facility] clinic, call [phone] ([phone]). If you don't have a family doctor or clinic, we will help you find one. [facility] clinics are conveniently located to serve the needs of you and your family.             Review of your medicines      START taking        Dose / Directions Last dose taken    furosemide 20 MG tablet   Commonly known as:  LASIX   Dose:  20 mg   Quantity:  10 tablet        Take 1 tablet (20 mg) by mouth daily as needed (leg swelling)   Refills:  0        potassium chloride SA 20 MEQ CR tablet   Commonly known as:  KLOR-CON   Dose:  20 mEq   Quantity:  10 tablet        Take 1 tablet (20 mEq) by mouth daily as needed for potassium supplementation ((take with furosemide/lasix))   Refills:  0          Our records show that you are taking the medicines listed below. If these are incorrect, please call your family doctor or clinic.        Dose / Directions Last dose taken    acetaminophen 325 MG tablet   Commonly known as:  TYLENOL   Dose:  650 mg   Quantity:  40 tablet        Take 2 tablets (650 mg) by mouth every 4 hours as needed for other (multimodal surgical pain management)   Refills:  0        blood glucose monitoring lancets   Dose:  1 each   Quantity:  102 each        1 each 2 times daily   Refills:  3        blood glucose monitoring test strip   Commonly known as:  ACCU-CHEK GUIDE   Quantity:  100 strip        Use to test blood sugar 2 times daily or as directed.   Refills:  11        ferrous gluconate 324 (38 Fe) MG tablet   Commonly known as:  FERGON   Dose:  324 mg   Quantity:  60 tablet        Take 1 tablet (324 mg) by mouth daily (with breakfast)   Refills:  1        glipiZIDE 10 MG 24 hr tablet   Commonly known as:  glipiZIDE XL   Dose:  10 mg   Quantity:  90 tablet        Take 1 tablet (10 mg) by  mouth daily   Refills:  3        ibuprofen 600 MG tablet   Commonly known as:  ADVIL/MOTRIN   Dose:  600 mg   Quantity:  40 tablet        Take 1 tablet (600 mg) by mouth every 6 hours as needed for moderate pain   Refills:  0        lisinopril 10 MG tablet   Commonly known as:  PRINIVIL/ZESTRIL   Dose:  10 mg   Quantity:  30 tablet        Take 1 tablet (10 mg) by mouth daily   Refills:  1        metFORMIN 500 MG 24 hr tablet   Commonly known as:  GLUCOPHAGE-XR   Quantity:  360 tablet        TAKE 4 TABLETS (2,000MG) BY MOUTH EVERY DAY WITH DINNER   Refills:  1        omeprazole 20 MG CR capsule   Commonly known as:  priLOSEC   Dose:  20 mg   Quantity:  90 capsule        Take 1 capsule (20 mg) by mouth daily   Refills:  3        ondansetron 8 MG ODT tab   Commonly known as:  ZOFRAN ODT   Dose:  8 mg   Quantity:  20 tablet        Take 1 tablet (8 mg) by mouth 3 times daily (before meals)   Refills:  1        oxyCODONE IR 5 MG tablet   Commonly known as:  ROXICODONE   Dose:  5 mg   Quantity:  18 tablet        Take 1 tablet (5 mg) by mouth every 4 hours as needed   Refills:  0        polyethylene glycol Packet   Commonly known as:  MIRALAX/GLYCOLAX   Dose:  17 g   Quantity:  7 packet        Take 17 g by mouth daily as needed for constipation   Refills:  3        rosuvastatin 20 MG tablet   Commonly known as:  CRESTOR   Quantity:  90 tablet        TAKE 1 TABLET BY MOUTH EVERY DAY   Refills:  1        senna-docusate 8.6-50 MG per tablet   Commonly known as:  SENOKOT-S;PERICOLACE   Dose:  1 tablet   Quantity:  30 tablet        Take 1 tablet by mouth 2 times daily as needed for constipation   Refills:  0        sertraline 50 MG tablet   Commonly known as:  ZOLOFT   Quantity:  45 tablet        TAKE 1/2 TABLET (25 MG) BY MOUTH EVERY MORNING   Refills:  1                Prescriptions were sent or printed at these locations (2 Prescriptions)                   [id number] - [place], [place] - [address] [place] [address] [address]  [place] [address]    Telephone:  [phone]   Fax:  [phone]   Hours:  M-F 8:30-6:30; Sat 9-4; closed Sunday                E-Prescribed (2 of 2)         furosemide (LASIX) 20 MG tablet               potassium chloride SA (KLOR-CON) 20 MEQ CR tablet                Procedures and tests performed during your visit     Chest XR,  PA & LAT    Comprehensive metabolic panel    EKG 12 lead    Nt probnp inpatient (BNP)    Peripheral IV catheter    TSH with free T4 reflex    Troponin I    UA with Microscopic    US Lower Extremity Venous Duplex Bilateral      Orders Needing Specimen Collection     None      Pending Results     Date and Time Order Name Status Description    9/21/2018 1507 TSH WITH FREE T4 REFLEX In process     9/21/2018 1507 N TERMINAL PRO BNP OUTPATIENT In process     9/21/2018 1507 COMPREHENSIVE METABOLIC PANEL In process             Pending Culture Results     No orders found from 9/19/2018 to 9/22/2018.            Pending Results Instructions     If you had any lab results that were not finalized at the time of your Discharge, you can call the ED Lab Result RN at [phone]. You will be contacted by this team for any positive Lab results or changes in treatment. The nurses are available 7 days a week from 10A to 6:30P.  You can leave a message 24 hours per day and they will return your call.        Thank you for choosing [facility]       Thank you for choosing [facility] for your care. Our goal is always to provide you with excellent care. Hearing back from our patients is one way we can continue to improve our services. Please take a few minutes to complete the written survey that you may receive in the mail after you visit with us. Thank you!        [facility] Information     [facility] gives you secure access to your electronic health record. If you see a primary care provider, you can also send messages to your care team and make appointments. If you have questions, please call your primary care  clinic.  If you do not have a primary care provider, please call [phone] and they will assist you.        Care EveryWhere ID     This is your Care EveryWhere ID. This could be used by other organizations to access your [facility] medical records  [id number]        Equal Access to Services     CAMI SAUL : Linda Munroe, wailene patiño, sergio spannalrosemary morales, yoli rawls. So [facility] [phone].    ATENCIÓN: Si habla español, tiene a obrien disposición servicios gratuitos de asistencia lingüística. Llame al [phone].    We comply with applicable federal civil rights laws and [place] laws. We do not discriminate on the basis of race, color, national origin, age, disability, sex, sexual orientation, or gender identity.            After Visit Summary       This is your record. Keep this with you and show to your community pharmacist(s) and doctor(s) at your next visit.

## 2018-09-21 NOTE — MR AVS SNAPSHOT
After Visit Summary   9/21/2018    Lashawn Reddy    MRN: [id number]           Patient Information     Date Of Birth          1970        Visit Information        Provider Department      9/21/2018 2:40 PM Jatinder Rankin PA-C [facility]        Today's Diagnoses     Dependent edema    -  1    Leg swelling        Postoperative state        S/P hysterectomy        Nausea           Follow-ups after your visit        Your next 10 appointments already scheduled     Sep 26, 2018  9:10 AM CDT   [facility] Visit with Raoul Sherman MD   [facility]    [address] [place] [id number] [phone]           Bring a current list of meds and any records pertaining to this visit. For Physicals, please bring immunization records and any forms needing to be filled out. Please arrive 10 minutes early to complete paperwork.            Oct 25, 2018 12:45 PM CDT   Return Visit with Temi Corado MD   [facility] ([facility])    [facility]  [address]  [address]  [facility] [id number] [phone]              Future tests that were ordered for you today     Open Future Orders        Priority Expected Expires Ordered    US Lower Extremity Venous Duplex Bilateral Routine  9/21/2019 9/21/2018            Who to contact     If you have questions or need follow up information about today's clinic visit or your schedule please contact [facility] directly at [phone].  Normal or non-critical lab and imaging results will be communicated to you by MyChart, letter or phone within 4 business days after the clinic has received the results. If you do not hear from us within 7 days, please contact the clinic through MyChart or phone. If you have a critical or abnormal lab result, we will notify you by phone as soon as possible.  Submit refill requests  through [employer] or call your pharmacy and they will forward the refill request to us. Please allow 3 business days for your refill to be completed.          Additional Information About Your Visit        [employer] Information     [employer] gives you secure access to your electronic health record. If you see a primary care provider, you can also send messages to your care team and make appointments. If you have questions, please call your primary care clinic.  If you do not have a primary care provider, please call [phone] and they will assist you.        Care EveryWhere ID     This is your Care EveryWhere ID. This could be used by other organizations to access your [place] medical records  [id number]        Your Vitals Were     Pulse Temperature Respirations Last Period Pulse Oximetry BMI (Body Mass Index)    70 97.7  F (36.5  C) (Temporal) 20 09/03/2018 97% 42.65 kg/m2       Blood Pressure from Last 3 Encounters:   09/21/18 (!) 161/92   09/15/18 116/60   08/30/18 144/86    Weight from Last 3 Encounters:   09/21/18 264 lb 1.6 oz (119.8 kg)   09/12/18 249 lb 1.9 oz (113 kg)   08/30/18 255 lb (115.7 kg)              We Performed the Following     BNP-N terminal pro     CBC with platelets and differential     Comprehensive metabolic panel     INR point of care     TSH with free T4 reflex          Today's Medication Changes          These changes are accurate as of 9/21/18  3:57 PM.  If you have any questions, ask your nurse or doctor.               Start taking these medicines.        Dose/Directions    ondansetron 8 MG ODT tab   Commonly known as:  ZOFRAN ODT   Used for:  S/P hysterectomy, Postoperative state, Leg swelling, Dependent edema, Nausea   Started by:  Jatinder Rankin PA-C        Dose:  8 mg   Take 1 tablet (8 mg) by mouth 3 times daily (before meals)   Quantity:  20 tablet   Refills:  1         These medicines have changed or have updated prescriptions.        Dose/Directions    oxyCODONE IR 5 MG tablet    Commonly known as:  ROXICODONE   This may have changed:  reasons to take this   Used for:  S/P hysterectomy, Dependent edema, Leg swelling, Postoperative state   Changed by:  Jatinder Rankin PA-C        Dose:  5 mg   Take 1 tablet (5 mg) by mouth every 4 hours as needed   Quantity:  18 tablet   Refills:  0       rosuvastatin 20 MG tablet   Commonly known as:  CRESTOR   This may have changed:  See the new instructions.   Used for:  Mixed hyperlipidemia, Type 2 diabetes mellitus with hyperglycemia, without long-term current use of insulin (H)        TAKE 1 TABLET BY MOUTH EVERY DAY   Quantity:  90 tablet   Refills:  1       sertraline 50 MG tablet   Commonly known as:  ZOLOFT   This may have changed:  See the new instructions.   Used for:  PMS (premenstrual syndrome)        TAKE 1/2 TABLET (25 MG) BY MOUTH EVERY MORNING   Quantity:  45 tablet   Refills:  1            Where to get your medicines      These medications were sent to [id number] - [place], [place]  [address] [place] [address]  [address] [place] [address]    Hours:  M-F 8:30-6:30; Sat 9-4; closed Sunday Phone:  [phone]     ondansetron 8 MG ODT tab         Some of these will need a paper prescription and others can be bought over the counter.  Ask your nurse if you have questions.     Bring a paper prescription for each of these medications     oxyCODONE IR 5 MG tablet               Information about OPIOIDS     PRESCRIPTION OPIOIDS: WHAT YOU NEED TO KNOW   We gave you an opioid (narcotic) pain medicine. It is important to manage your pain, but opioids are not always the best choice. You should first try all the other options your care team gave you. Take this medicine for as short a time (and as few doses) as possible.    Some activities can increase your pain, such as bandage changes or therapy sessions. It may help to take your pain medicine 30 to 60 minutes before these activities. Reduce your stress by getting enough sleep, working on hobbies you  enjoy and practicing relaxation or meditation. Talk to your care team about ways to manage your pain beyond prescription opioids.    These medicines have risks:    DO NOT drive when on new or higher doses of pain medicine. These medicines can affect your alertness and reaction times, and you could be arrested for driving under the influence (DUI). If you need to use opioids long-term, talk to your care team about driving.    DO NOT operate heavy machinery    DO NOT do any other dangerous activities while taking these medicines.    DO NOT drink any alcohol while taking these medicines.     If the opioid prescribed includes acetaminophen, DO NOT take with any other medicines that contain acetaminophen. Read all labels carefully. Look for the word  acetaminophen  or  Tylenol.  Ask your pharmacist if you have questions or are unsure.    You can get addicted to pain medicines, especially if you have a history of addiction (chemical, alcohol or substance dependence). Talk to your care team about ways to reduce this risk.    All opioids tend to cause constipation. Drink plenty of water and eat foods that have a lot of fiber, such as fruits, vegetables, prune juice, apple juice and high-fiber cereal. Take a laxative (Miralax, milk of magnesia, Colace, Senna) if you don t move your bowels at least every other day. Other side effects include upset stomach, sleepiness, dizziness, throwing up, tolerance (needing more of the medicine to have the same effect), physical dependence and slowed breathing.    Store your pills in a secure place, locked if possible. We will not replace any lost or stolen medicine. If you don t finish your medicine, please throw away (dispose) as directed by your pharmacist. The [place] [employer]ution [employer] has more information about safe disposal: https://www.pca.state.[place]/living-green/managing-unwanted-medications         Primary Care Provider Office Phone # Fax #    Tanner Metz MD  [phone]       [address] [place] [address]        Goals        General    Monitoring (pt-stated)     Notes - Note created  8/30/2018  4:11 PM by Dominga Oliveira RD    Goal Statement: I will check blood sugar 1-2 times daily    Measure of Success: meter memory or log of blood sugar results    Strengths: good understanding of using meter  Ideas to overcome barriers: no barriers noted    Date to Achieve By: 9/12          Equal Access to Services     CAMI SAUL AH: Hadii aad ku hadasho Soomaali, waaxda luqadaha, qaybta kaalmada adeegyada, waxay idiin hayaan adeeg kharash la'aan . So [facility] [phone].    ATENCIÓN: Si habla español, tiene a obrien disposición servicios gratuitos de asistencia lingüística. [facility] [phone].    We comply with applicable federal civil rights laws and [place] laws. We do not discriminate on the basis of race, color, national origin, age, disability, sex, sexual orientation, or gender identity.            Thank you!     Thank you for choosing [facility]  for your care. Our goal is always to provide you with excellent care. Hearing back from our patients is one way we can continue to improve our services. Please take a few minutes to complete the written survey that you may receive in the mail after your visit with us. Thank you!             Your Updated Medication List - Protect others around you: Learn how to safely use, store and throw away your medicines at www.disposemymeds.org.          This list is accurate as of 9/21/18  3:57 PM.  Always use your most recent med list.                   Brand Name Dispense Instructions for use Diagnosis    acetaminophen 325 MG tablet    TYLENOL    40 tablet    Take 2 tablets (650 mg) by mouth every 4 hours as needed for other (multimodal surgical pain management)    S/P hysterectomy       blood glucose monitoring lancets     102 each    1 each 2 times daily    Type 2 diabetes mellitus with  hyperglycemia, without long-term current use of insulin (H)       blood glucose monitoring test strip    ACCU-CHEK GUIDE    100 strip    Use to test blood sugar 2 times daily or as directed.    Type 2 diabetes mellitus with hyperglycemia, without long-term current use of insulin (H)       ferrous gluconate 324 (38 Fe) MG tablet    FERGON    60 tablet    Take 1 tablet (324 mg) by mouth daily (with breakfast)    Iron deficiency anemia, unspecified iron deficiency anemia type       glipiZIDE 10 MG 24 hr tablet    glipiZIDE XL    90 tablet    Take 1 tablet (10 mg) by mouth daily    Type 2 diabetes mellitus with hyperglycemia, without long-term current use of insulin (H)       ibuprofen 600 MG tablet    ADVIL/MOTRIN    40 tablet    Take 1 tablet (600 mg) by mouth every 6 hours as needed for moderate pain    S/P hysterectomy       lisinopril 10 MG tablet    PRINIVIL/ZESTRIL    30 tablet    Take 1 tablet (10 mg) by mouth daily    Benign essential hypertension       metFORMIN 500 MG 24 hr tablet    GLUCOPHAGE-XR    360 tablet    TAKE 4 TABLETS (2,000MG) BY MOUTH EVERY DAY WITH DINNER    Type 2 diabetes mellitus with hyperglycemia (H)       omeprazole 20 MG CR capsule    priLOSEC    90 capsule    Take 1 capsule (20 mg) by mouth daily    Gastroesophageal reflux disease without esophagitis       ondansetron 8 MG ODT tab    ZOFRAN ODT    20 tablet    Take 1 tablet (8 mg) by mouth 3 times daily (before meals)    S/P hysterectomy, Postoperative state, Leg swelling, Dependent edema, Nausea       oxyCODONE IR 5 MG tablet    ROXICODONE    18 tablet    Take 1 tablet (5 mg) by mouth every 4 hours as needed    S/P hysterectomy, Dependent edema, Leg swelling, Postoperative state       polyethylene glycol Packet    MIRALAX/GLYCOLAX    7 packet    Take 17 g by mouth daily as needed for constipation    S/P hysterectomy, Slow transit constipation       rosuvastatin 20 MG tablet    CRESTOR    90 tablet    TAKE 1 TABLET BY MOUTH EVERY DAY     Mixed hyperlipidemia, Type 2 diabetes mellitus with hyperglycemia, without long-term current use of insulin (H)       senna-docusate 8.6-50 MG per tablet    SENOKOT-S;PERICOLACE    30 tablet    Take 1 tablet by mouth 2 times daily as needed for constipation    S/P hysterectomy       sertraline 50 MG tablet    ZOLOFT    45 tablet    TAKE 1/2 TABLET (25 MG) BY MOUTH EVERY MORNING    PMS (premenstrual syndrome)

## 2018-09-22 NOTE — DISCHARGE INSTRUCTIONS
Elevate legs as much as possible.    Lasix/furosemide daily as needed for leg swelling and weight gain.  If you take the Lasix, also take 1 tablet of potassium to replace.    If possible, cut back on your ibuprofen and use Tylenol for pain.    Follow-up with Dr. Sherman as scheduled.    Be sure to start the iron.  You could also try Slow Fe which may be a little less hard on your stomach.    Return at anytime for concerns.    I hope you heal quickly!!

## 2018-09-24 ENCOUNTER — TELEPHONE (OUTPATIENT)
Dept: OBGYN | Facility: CLINIC | Age: 48
End: 2018-09-24

## 2018-09-24 NOTE — TELEPHONE ENCOUNTER
rico  Paperwork filled out and placed in Dr. Corado's room on desk  Signed faxewd and copied today

## 2018-09-26 ENCOUNTER — OFFICE VISIT (OUTPATIENT)
Dept: FAMILY MEDICINE | Facility: CLINIC | Age: 48
End: 2018-09-26
Payer: COMMERCIAL

## 2018-09-26 VITALS
HEIGHT: 66 IN | WEIGHT: 252 LBS | OXYGEN SATURATION: 100 % | HEART RATE: 68 BPM | DIASTOLIC BLOOD PRESSURE: 86 MMHG | BODY MASS INDEX: 40.5 KG/M2 | RESPIRATION RATE: 18 BRPM | SYSTOLIC BLOOD PRESSURE: 132 MMHG | TEMPERATURE: 97.5 F

## 2018-09-26 DIAGNOSIS — R60.0 BILATERAL LEG EDEMA: Primary | ICD-10-CM

## 2018-09-26 LAB
ANION GAP SERPL CALCULATED.3IONS-SCNC: 7 MMOL/L (ref 3–14)
BUN SERPL-MCNC: 12 MG/DL (ref 7–30)
CALCIUM SERPL-MCNC: 8.6 MG/DL (ref 8.5–10.1)
CHLORIDE SERPL-SCNC: 107 MMOL/L (ref 94–109)
CO2 SERPL-SCNC: 27 MMOL/L (ref 20–32)
CREAT SERPL-MCNC: 0.63 MG/DL (ref 0.52–1.04)
GFR SERPL CREATININE-BSD FRML MDRD: >90 ML/MIN/1.7M2
GLUCOSE SERPL-MCNC: 132 MG/DL (ref 70–99)
HGB BLD-MCNC: 9.1 G/DL (ref 11.7–15.7)
POTASSIUM SERPL-SCNC: 4.4 MMOL/L (ref 3.4–5.3)
SODIUM SERPL-SCNC: 141 MMOL/L (ref 133–144)

## 2018-09-26 PROCEDURE — 80048 BASIC METABOLIC PNL TOTAL CA: CPT | Performed by: OBSTETRICS & GYNECOLOGY

## 2018-09-26 PROCEDURE — 99214 OFFICE O/P EST MOD 30 MIN: CPT | Performed by: OBSTETRICS & GYNECOLOGY

## 2018-09-26 PROCEDURE — 36415 COLL VENOUS BLD VENIPUNCTURE: CPT | Performed by: OBSTETRICS & GYNECOLOGY

## 2018-09-26 PROCEDURE — 85018 HEMOGLOBIN: CPT | Performed by: OBSTETRICS & GYNECOLOGY

## 2018-09-26 ASSESSMENT — PAIN SCALES - GENERAL: PAINLEVEL: MODERATE PAIN (4)

## 2018-09-26 NOTE — MR AVS SNAPSHOT
After Visit Summary   9/26/2018    Lashawn Reddy    MRN: [id number]           Patient Information     Date Of Birth          1970        Visit Information        Provider Department      9/26/2018 9:10 AM Raoul Sherman MD [facility]        Today's Diagnoses     Bilateral leg edema    -  1       Follow-ups after your visit        Your next 10 appointments already scheduled     Oct 25, 2018 12:45 PM CDT   Return Visit with Temi Corado MD   [facility] ([facility])    [facility]  [address]  [address]  [facility] [address] [phone]              Who to contact     If you have questions or need follow up information about today's clinic visit or your schedule please contact [facility] directly at [phone].  Normal or non-critical lab and imaging results will be communicated to you by MyChart, letter or phone within 4 business days after the clinic has received the results. If you do not hear from us within 7 days, please contact the clinic through MyChart or phone. If you have a critical or abnormal lab result, we will notify you by phone as soon as possible.  Submit refill requests through [employer] or call your pharmacy and they will forward the refill request to us. Please allow 3 business days for your refill to be completed.          Additional Information About Your Visit        MyChart Information     [employer] gives you secure access to your electronic health record. If you see a primary care provider, you can also send messages to your care team and make appointments. If you have questions, please call your primary care clinic.  If you do not have a primary care provider, please call [phone] and they will assist you.        Care EveryWhere ID     This is your Care EveryWhere ID. This could be used by other organizations to access your [facility]  "records  [id number]        Your Vitals Were     Pulse Temperature Respirations Height Last Period Pulse Oximetry    68 97.5  F (36.4  C) (Temporal) 18 5' 6\" (1.676 m) 09/03/2018 100%    Breastfeeding? BMI (Body Mass Index)                No 40.67 kg/m2           Blood Pressure from Last 3 Encounters:   09/26/18 132/86   09/21/18 (!) 168/98   09/21/18 (!) 161/92    Weight from Last 3 Encounters:   09/26/18 252 lb (114.3 kg)   09/21/18 266 lb (120.7 kg)   09/21/18 264 lb 1.6 oz (119.8 kg)              We Performed the Following     Basic metabolic panel     Hemoglobin          Today's Medication Changes          These changes are accurate as of 9/26/18  9:44 AM.  If you have any questions, ask your nurse or doctor.               These medicines have changed or have updated prescriptions.        Dose/Directions    rosuvastatin 20 MG tablet   Commonly known as:  CRESTOR   This may have changed:  See the new instructions.   Used for:  Mixed hyperlipidemia, Type 2 diabetes mellitus with hyperglycemia, without long-term current use of insulin (H)        TAKE 1 TABLET BY MOUTH EVERY DAY   Quantity:  90 tablet   Refills:  1       sertraline 50 MG tablet   Commonly known as:  ZOLOFT   This may have changed:  See the new instructions.   Used for:  PMS (premenstrual syndrome)        TAKE 1/2 TABLET (25 MG) BY MOUTH EVERY MORNING   Quantity:  45 tablet   Refills:  1                Primary Care Provider Office Phone # Fax #    Raoul Ezequiel Sherman [phone]       [address] [place] [address]        Goals        General    Monitoring (pt-stated)     Notes - Note created  8/30/2018  4:11 PM by Dominga Oliveira, COLIN    Goal Statement: I will check blood sugar 1-2 times daily    Measure of Success: meter memory or log of blood sugar results    Strengths: good understanding of using meter  Ideas to overcome barriers: no barriers noted    Date to Achieve By: 9/12          Equal Access to Services  "    CAMI SAUL : Hadii aad marc ronaldo Munroe, waaxda luqadaha, qaybta kaalmada lavaishalisimon, waxjuhi yamile laraumershannen mckinney . So [facility] [phone].    ATENCIÓN: Si habla español, tiene a obrien disposición servicios gratuitos de asistencia lingüística. Llame al [phone].    We comply with applicable federal civil rights laws and [place] laws. We do not discriminate on the basis of race, color, national origin, age, disability, sex, sexual orientation, or gender identity.            Thank you!     Thank you for choosing [facility]  for your care. Our goal is always to provide you with excellent care. Hearing back from our patients is one way we can continue to improve our services. Please take a few minutes to complete the written survey that you may receive in the mail after your visit with us. Thank you!             Your Updated Medication List - Protect others around you: Learn how to safely use, store and throw away your medicines at www.disposemymeds.org.          This list is accurate as of 9/26/18  9:44 AM.  Always use your most recent med list.                   Brand Name Dispense Instructions for use Diagnosis    acetaminophen 325 MG tablet    TYLENOL    40 tablet    Take 2 tablets (650 mg) by mouth every 4 hours as needed for other (multimodal surgical pain management)    S/P hysterectomy       blood glucose monitoring lancets     102 each    1 each 2 times daily    Type 2 diabetes mellitus with hyperglycemia, without long-term current use of insulin (H)       blood glucose monitoring test strip    ACCU-CHEK GUIDE    100 strip    Use to test blood sugar 2 times daily or as directed.    Type 2 diabetes mellitus with hyperglycemia, without long-term current use of insulin (H)       ferrous gluconate 324 (38 Fe) MG tablet    FERGON    60 tablet    Take 1 tablet (324 mg) by mouth daily (with breakfast)    Iron deficiency anemia, unspecified iron deficiency anemia type        furosemide 20 MG tablet    LASIX    10 tablet    Take 1 tablet (20 mg) by mouth daily as needed (leg swelling)        glipiZIDE 10 MG 24 hr tablet    glipiZIDE XL    90 tablet    Take 1 tablet (10 mg) by mouth daily    Type 2 diabetes mellitus with hyperglycemia, without long-term current use of insulin (H)       ibuprofen 600 MG tablet    ADVIL/MOTRIN    40 tablet    Take 1 tablet (600 mg) by mouth every 6 hours as needed for moderate pain    S/P hysterectomy       lisinopril 10 MG tablet    PRINIVIL/ZESTRIL    30 tablet    Take 1 tablet (10 mg) by mouth daily    Benign essential hypertension       metFORMIN 500 MG 24 hr tablet    GLUCOPHAGE-XR    360 tablet    TAKE 4 TABLETS (2,000MG) BY MOUTH EVERY DAY WITH DINNER    Type 2 diabetes mellitus with hyperglycemia (H)       omeprazole 20 MG CR capsule    priLOSEC    90 capsule    Take 1 capsule (20 mg) by mouth daily    Gastroesophageal reflux disease without esophagitis       ondansetron 8 MG ODT tab    ZOFRAN ODT    20 tablet    Take 1 tablet (8 mg) by mouth 3 times daily (before meals)    S/P hysterectomy, Postoperative state, Leg swelling, Dependent edema, Nausea       oxyCODONE IR 5 MG tablet    ROXICODONE    18 tablet    Take 1 tablet (5 mg) by mouth every 4 hours as needed    S/P hysterectomy, Dependent edema, Leg swelling, Postoperative state       polyethylene glycol Packet    MIRALAX/GLYCOLAX    7 packet    Take 17 g by mouth daily as needed for constipation    S/P hysterectomy, Slow transit constipation       potassium chloride SA 20 MEQ CR tablet    KLOR-CON    10 tablet    Take 1 tablet (20 mEq) by mouth daily as needed for potassium supplementation ((take with furosemide/lasix))        rosuvastatin 20 MG tablet    CRESTOR    90 tablet    TAKE 1 TABLET BY MOUTH EVERY DAY    Mixed hyperlipidemia, Type 2 diabetes mellitus with hyperglycemia, without long-term current use of insulin (H)       senna-docusate 8.6-50 MG per tablet    SENOKOT-S;PERICOLACE    30  tablet    Take 1 tablet by mouth 2 times daily as needed for constipation    S/P hysterectomy       sertraline 50 MG tablet    ZOLOFT    45 tablet    TAKE 1/2 TABLET (25 MG) BY MOUTH EVERY MORNING    PMS (premenstrual syndrome)

## 2018-09-26 NOTE — PROGRESS NOTES
Subjective: here for rechekc from recent ER visit for edema.  She had significant bilateral leg edema and a cardiac workup was negative.  He was given IV Lasix in the ER and also started on oral Lasix for 4 days.  She has stopped that now.  Her leg edema has much improved and overall she feels much much better.  Her energy has improved.  Reports normal bowel movements is voiding without difficulty.  No chest pain or dyspnea.  Overall she feels recovering quite well abdominal hysterectomy she had 2 weeks ago at the [facility].      The past medical history, social history, past surgical history and family history as shown below have been reviewed by me today.  Past Medical History:   Diagnosis Date     Depressive disorder, not elsewhere classified     depression in the post partum period after her daughter     Diabetes mellitus, type 2 (H) 6/6/2013     Diffuse cystic mastopathy     fibrocystic breast disease     Tobacco use disorder     quit in 2008     Type 2 diabetes, HbA1c goal < 7% (H) 6/6/2013        Allergies   Allergen Reactions     No Known Drug Allergies      Current Outpatient Prescriptions   Medication Sig Dispense Refill     acetaminophen (TYLENOL) 325 MG tablet Take 2 tablets (650 mg) by mouth every 4 hours as needed for other (multimodal surgical pain management) 40 tablet 0     blood glucose monitoring (ACCU-CHEK FASTCLIX) lancets 1 each 2 times daily 102 each 3     blood glucose monitoring (ACCU-CHEK GUIDE) test strip Use to test blood sugar 2 times daily or as directed. 100 strip 11     ferrous gluconate (FERGON) 324 (38 Fe) MG tablet Take 1 tablet (324 mg) by mouth daily (with breakfast) 60 tablet 1     glipiZIDE (GLIPIZIDE XL) 10 MG 24 hr tablet Take 1 tablet (10 mg) by mouth daily 90 tablet 3     ibuprofen (ADVIL/MOTRIN) 600 MG tablet Take 1 tablet (600 mg) by mouth every 6 hours as needed for moderate pain 40 tablet 0     lisinopril (PRINIVIL/ZESTRIL) 10 MG tablet Take 1 tablet (10 mg) by mouth  daily 30 tablet 1     metFORMIN (GLUCOPHAGE-XR) 500 MG 24 hr tablet TAKE 4 TABLETS (2,000MG) BY MOUTH EVERY DAY WITH DINNER 360 tablet 1     omeprazole (PRILOSEC) 20 MG CR capsule Take 1 capsule (20 mg) by mouth daily 90 capsule 3     ondansetron (ZOFRAN ODT) 8 MG ODT tab Take 1 tablet (8 mg) by mouth 3 times daily (before meals) 20 tablet 1     oxyCODONE IR (ROXICODONE) 5 MG tablet Take 1 tablet (5 mg) by mouth every 4 hours as needed 18 tablet 0     polyethylene glycol (MIRALAX/GLYCOLAX) Packet Take 17 g by mouth daily as needed for constipation 7 packet 3     rosuvastatin (CRESTOR) 20 MG tablet TAKE 1 TABLET BY MOUTH EVERY DAY (Patient taking differently: TAKE 1 TABLET BY MOUTH EVERY EVENING) 90 tablet 1     senna-docusate (SENOKOT-S;PERICOLACE) 8.6-50 MG per tablet Take 1 tablet by mouth 2 times daily as needed for constipation 30 tablet 0     sertraline (ZOLOFT) 50 MG tablet TAKE 1/2 TABLET (25 MG) BY MOUTH EVERY MORNING (Patient taking differently: TAKE 1/2 TABLET (25 MG) BY MOUTH EVERY EVENING) 45 tablet 1     furosemide (LASIX) 20 MG tablet Take 1 tablet (20 mg) by mouth daily as needed (leg swelling) (Patient not taking: Reported on 9/26/2018) 10 tablet 0     potassium chloride SA (KLOR-CON) 20 MEQ CR tablet Take 1 tablet (20 mEq) by mouth daily as needed for potassium supplementation ((take with furosemide/lasix)) (Patient not taking: Reported on 9/26/2018) 10 tablet 0     Past Surgical History:   Procedure Laterality Date     C ANESTH,LOWER LEG VEIN SURG,NOS  2002    bilateral     C APPENDECTOMY  04/14/89     COLONOSCOPY  6/21/2013    Procedure: COLONOSCOPY;  colonoscopy;  Surgeon: Vamshi Loomis MD;  Location: [facility]     COLONOSCOPY N/A 2/22/2017    Procedure: COLONOSCOPY;  Surgeon: Raoul Sage MD;  Location:  GI     HC COLONOSCOPY W/WO BRUSH/WASH  06/17/2005     HC DILATION/CURETTAGE DIAG/THER NON OB  1986    after a miscarriage     HC REMOVAL OF TONSILS,<10 Y/O       HYSTERECTOMY TOTAL ABDOMINAL,  SALPINGECTOMY Bilateral 9/12/2018    Procedure: HYSTERECTOMY TOTAL ABDOMINAL, SALPINGECTOMY;  Total Abdominal Hysterectomy, left Salpingectomy;  Surgeon: Temi Corado MD;  Location: [facility]     TUBAL LIGATION  08/04/2006     Social History     Social History     Marital status:      Spouse name: Tim     Number of children: 2     Years of education: 12     Occupational History     labor      Social History Main Topics     Smoking status: Former Smoker     Years: 16.00     Types: Cigarettes     Quit date: 9/29/2008     Smokeless tobacco: Never Used     Alcohol use No      Comment: couple drinks per year     Drug use: No     Sexual activity: Yes     Partners: Male     Birth control/ protection: Surgical      Comment: tubal ligation     Other Topics Concern      Service No     Blood Transfusions No     Caffeine Concern No     Occupational Exposure No     Hobby Hazards No     Sleep Concern No     Stress Concern Yes     stress at home at work     Weight Concern Yes     has continued to gain weight     Special Diet Yes     trying to eat better     Back Care No     Exercise No     nothing in the past 3 months     Bike Helmet No     NA     Seat Belt Yes     Self-Exams Yes     sometimes     Social History Narrative     Family History   Problem Relation Age of Onset     Anesthesia Reaction Mother      Severe nausea     GASTROINTESTINAL DISEASE Mother      Partial colon removal secondary to a blockage     Gynecology Mother      hysterectomy     Lipids Mother      Lipids Father      Cancer - colorectal Father      dx Oct '03 (dx at age 57)     Cancer Father      skin     Hypertension Father      Cancer Maternal Grandmother      colon at age 68     Cerebrovascular Disease Paternal Grandfather      Cerebrovascular Disease Paternal Grandmother      brain aneurysm     Arthritis Sister      mainly affecting her legs     GASTROINTESTINAL DISEASE Sister      2 sisters with colon polyps     Cancer Sister       "cervical ca     Cancer Maternal Aunt      all over ? breast was the origin     Cancer Maternal Aunt      pancreatic     HEART DISEASE Maternal Aunt      MI 2010       ROS: A 12 point review of systems was done. Except for what is listed above in the HPI, the systems review is negative .      Objective: Vital signs: Blood pressure 132/86, pulse 68, temperature 97.5  F (36.4  C), temperature source Temporal, resp. rate 18, height 5' 6\" (1.676 m), weight 252 lb (114.3 kg), last menstrual period 09/03/2018, SpO2 100 %, not currently breastfeeding.    HEENT:    Sclerae and conjunctiva are normal.   Ear canals and TMs look normal.  Nasal mucosa is pink  - no polyps or masses seen.  sinuses are non tender to palpation.  Throat is unremarkable . Mucous membranes are moist.   Neck is supple, mobile, no adenopathy or masses palpable. The thyroid feels normal.   Normal range of motion noted.  Chest is clear to auscultation.  No wheezes, rales or rhonchi heard.  Cardiac exam is normal with s1, s2, no murmurs or adventitious sounds.Normal rate and rhythm is heard.     Abdomen is soft,  nondistended, No masses felt.No HSM. No guarding or rigidity or rebound   noted. Palpation reveals  no    tenderness   Normal bowel sounds heard.   She has a Pfannenstiel incision which is healing quite well.  There is no redness or discharge.    Extremities reveal trace nonpitting pretibial edema and 1+ ankle edema but this is much improved per patient as well as what I saw in the ER notes.  Is no redness or warmth in the calves nothing to suggest DVT she has normal pulses        Assessment/Plan:    1.  47-year-old female who is 2 weeks status post abdominal hysterectomy for large uterine fibroids is currently recovering quite well from that surgery    2.  She has bilateral leg edema which is much improved compared to when she was in the emergency room 5 days ago.  No evidence of congestive heart failure.  No evidence of DVT.    3.  She has a " plan to follow-up at the [facility] for her 6-week postop check 4 weeks from now.  I did tell her that I would be happy to see her in the interim if she develops any concerns whatsoever regarding her postoperative care.  Currently she appears to be on a good trajectory toward healing    4.  I will check a basic metabolic panel and hemoglobin today.  She has iron at home but has not been taking it.  IF The hemoglobin is low I may instruct her to take it every third or fourth day because it does make her constipated      5. Addendum: Basic panel normal except glucose 132- as expected- not fasting-    hgb is 9.1 so I have advised her to have iron but every 3rd day due to constipating effects.    MAGDALENA Sherman MD

## 2018-10-18 ENCOUNTER — TELEPHONE (OUTPATIENT)
Dept: FAMILY MEDICINE | Facility: CLINIC | Age: 48
End: 2018-10-18

## 2018-10-18 NOTE — TELEPHONE ENCOUNTER
Panel Management Review      Patient has the following on her problem list:     Diabetes    ASA: Failed    Last A1C  Lab Results   Component Value Date    A1C 8.2 07/02/2018    A1C 9.4 03/30/2018    A1C 8.5 10/16/2017    A1C 8.4 02/09/2017    A1C 8.6 11/29/2016     A1C tested: FAILED    Last LDL:    Lab Results   Component Value Date    CHOL 117 10/16/2017     Lab Results   Component Value Date    HDL 67 10/16/2017     Lab Results   Component Value Date    LDL 33 10/16/2017     Lab Results   Component Value Date    TRIG 87 10/16/2017     Lab Results   Component Value Date    CHOLHDLRATIO 3.8 06/29/2015     Lab Results   Component Value Date    NHDL 50 10/16/2017       Is the patient on a Statin? YES             Is the patient on Aspirin? NO    Medications     HMG CoA Reductase Inhibitors    rosuvastatin (CRESTOR) 20 MG tablet          Last three blood pressure readings:  BP Readings from Last 3 Encounters:   09/26/18 132/86   09/21/18 (!) 168/98   09/21/18 (!) 161/92       Date of last diabetes office visit: 8/30/18     Tobacco History:     History   Smoking Status     Former Smoker     Years: 16.00     Types: Cigarettes     Quit date: 9/29/2008   Smokeless Tobacco     Never Used         Hypertension   Last three blood pressure readings:  BP Readings from Last 3 Encounters:   09/26/18 132/86   09/21/18 (!) 168/98   09/21/18 (!) 161/92     Blood pressure: FAILED    HTN Guidelines:  Age 18-59 BP range:  Less than 140/90  Age 60-85 with Diabetes:  Less than 140/90  Age 60-85 without Diabetes:  less than 150/90      Composite cancer screening  Chart review shows that this patient is due/due soon for the following None  Summary:    Patient is due/failing the following:   LDL    Action needed:   Patient needs office visit for Physical.    Type of outreach:    Phone, spoke to patient.  rafa for a physical monday 10/22/18    Questions for provider review:    None                                                                                                                                     Raymond Oneal MA     Chart routed to  .

## 2018-10-25 ENCOUNTER — OFFICE VISIT (OUTPATIENT)
Dept: FAMILY MEDICINE | Facility: CLINIC | Age: 48
End: 2018-10-25
Payer: COMMERCIAL

## 2018-10-25 VITALS
SYSTOLIC BLOOD PRESSURE: 136 MMHG | RESPIRATION RATE: 16 BRPM | OXYGEN SATURATION: 100 % | WEIGHT: 251 LBS | TEMPERATURE: 99.1 F | HEART RATE: 88 BPM | DIASTOLIC BLOOD PRESSURE: 78 MMHG | BODY MASS INDEX: 40.51 KG/M2

## 2018-10-25 DIAGNOSIS — E66.01 MORBID OBESITY (H): ICD-10-CM

## 2018-10-25 DIAGNOSIS — I10 ESSENTIAL HYPERTENSION: ICD-10-CM

## 2018-10-25 DIAGNOSIS — E78.2 MIXED HYPERLIPIDEMIA: ICD-10-CM

## 2018-10-25 DIAGNOSIS — E11.65 TYPE 2 DIABETES MELLITUS WITH HYPERGLYCEMIA, WITHOUT LONG-TERM CURRENT USE OF INSULIN (H): ICD-10-CM

## 2018-10-25 DIAGNOSIS — Z00.01 ENCOUNTER FOR WELL ADULT EXAM WITH ABNORMAL FINDINGS: Primary | ICD-10-CM

## 2018-10-25 LAB
ALT SERPL W P-5'-P-CCNC: 18 U/L (ref 0–50)
AST SERPL W P-5'-P-CCNC: 11 U/L (ref 0–45)
CHOLEST SERPL-MCNC: 151 MG/DL
CK SERPL-CCNC: 69 U/L (ref 30–225)
CREAT UR-MCNC: 234 MG/DL
HBA1C MFR BLD: 6.8 % (ref 0–5.6)
HDLC SERPL-MCNC: 63 MG/DL
LDLC SERPL CALC-MCNC: 69 MG/DL
MICROALBUMIN UR-MCNC: 21 MG/L
MICROALBUMIN/CREAT UR: 8.97 MG/G CR (ref 0–25)
NONHDLC SERPL-MCNC: 88 MG/DL
TRIGL SERPL-MCNC: 95 MG/DL

## 2018-10-25 PROCEDURE — 84450 TRANSFERASE (AST) (SGOT): CPT | Performed by: OBSTETRICS & GYNECOLOGY

## 2018-10-25 PROCEDURE — 99214 OFFICE O/P EST MOD 30 MIN: CPT | Mod: 25 | Performed by: OBSTETRICS & GYNECOLOGY

## 2018-10-25 PROCEDURE — 80061 LIPID PANEL: CPT | Performed by: OBSTETRICS & GYNECOLOGY

## 2018-10-25 PROCEDURE — 83036 HEMOGLOBIN GLYCOSYLATED A1C: CPT | Performed by: OBSTETRICS & GYNECOLOGY

## 2018-10-25 PROCEDURE — 84460 ALANINE AMINO (ALT) (SGPT): CPT | Performed by: OBSTETRICS & GYNECOLOGY

## 2018-10-25 PROCEDURE — 99396 PREV VISIT EST AGE 40-64: CPT | Performed by: OBSTETRICS & GYNECOLOGY

## 2018-10-25 PROCEDURE — 82550 ASSAY OF CK (CPK): CPT | Performed by: OBSTETRICS & GYNECOLOGY

## 2018-10-25 PROCEDURE — 36415 COLL VENOUS BLD VENIPUNCTURE: CPT | Performed by: OBSTETRICS & GYNECOLOGY

## 2018-10-25 PROCEDURE — 82043 UR ALBUMIN QUANTITATIVE: CPT | Performed by: OBSTETRICS & GYNECOLOGY

## 2018-10-25 ASSESSMENT — PAIN SCALES - GENERAL: PAINLEVEL: NO PAIN (0)

## 2018-10-25 ASSESSMENT — ANXIETY QUESTIONNAIRES
IF YOU CHECKED OFF ANY PROBLEMS ON THIS QUESTIONNAIRE, HOW DIFFICULT HAVE THESE PROBLEMS MADE IT FOR YOU TO DO YOUR WORK, TAKE CARE OF THINGS AT HOME, OR GET ALONG WITH OTHER PEOPLE: NOT DIFFICULT AT ALL
2. NOT BEING ABLE TO STOP OR CONTROL WORRYING: NOT AT ALL
7. FEELING AFRAID AS IF SOMETHING AWFUL MIGHT HAPPEN: NOT AT ALL
6. BECOMING EASILY ANNOYED OR IRRITABLE: SEVERAL DAYS
3. WORRYING TOO MUCH ABOUT DIFFERENT THINGS: SEVERAL DAYS
GAD7 TOTAL SCORE: 2
5. BEING SO RESTLESS THAT IT IS HARD TO SIT STILL: NOT AT ALL
1. FEELING NERVOUS, ANXIOUS, OR ON EDGE: NOT AT ALL

## 2018-10-25 ASSESSMENT — ENCOUNTER SYMPTOMS
COUGH: 0
DIARRHEA: 0
ABDOMINAL PAIN: 0
EYE PAIN: 0
DIZZINESS: 0
FEVER: 0
FREQUENCY: 0
HEMATOCHEZIA: 0
HEMATURIA: 0
CHILLS: 0
CONSTIPATION: 0
NERVOUS/ANXIOUS: 0

## 2018-10-25 ASSESSMENT — PATIENT HEALTH QUESTIONNAIRE - PHQ9
5. POOR APPETITE OR OVEREATING: NOT AT ALL
SUM OF ALL RESPONSES TO PHQ QUESTIONS 1-9: 4

## 2018-10-25 NOTE — MR AVS SNAPSHOT
After Visit Summary   10/25/2018    Lashawn Reddy    MRN: [id number]           Patient Information     Date Of Birth          1970        Visit Information        Provider Department      10/25/2018 10:30 AM Raoul Sherman MD [facility]        Today's Diagnoses     Encounter for well adult exam with abnormal findings    -  1    Type 2 diabetes mellitus with hyperglycemia, without long-term current use of insulin (H)        Essential hypertension        Morbid obesity (H)        Mixed hyperlipidemia          Care Instructions      Preventive Health Recommendations  Female Ages 40 to 49    Yearly exam:     See your health care provider every year in order to  1. Review health changes.   2. Discuss preventive care.    3. Review your medicines if your doctor prescribed any.      Get a Pap test every three years (unless you have an abnormal result and your provider advises testing more often).      If you get Pap tests with HPV test, you only need to test every 5 years, unless you have an abnormal result. You do not need a Pap test if your uterus was removed (hysterectomy) and you have not had cancer.      You should be tested each year for STDs (sexually transmitted diseases), if you're at risk.     Ask your doctor if you should have a mammogram.      Have a colonoscopy (test for colon cancer) if someone in your family has had colon cancer or polyps before age 50.       Have a cholesterol test every 5 years.       Have a diabetes test (fasting glucose) after age 45. If you are at risk for diabetes, you should have this test every 3 years.    Shots: Get a flu shot each year. Get a tetanus shot every 10 years.     Nutrition:     Eat at least 5 servings of fruits and vegetables each day.    Eat whole-grain bread, whole-wheat pasta and brown rice instead of white grains and rice.    Get adequate Calcium and Vitamin D.      Lifestyle    Exercise at least 150 minutes a week (an  average of 30 minutes a day, 5 days a week). This will help you control your weight and prevent disease.    Limit alcohol to one drink per day.    No smoking.     Wear sunscreen to prevent skin cancer.    See your dentist every six months for an exam and cleaning.          Follow-ups after your visit        Follow-up notes from your care team     Return in about 1 year (around 10/25/2019) for Routine Visit.      Your next 10 appointments already scheduled     Oct 25, 2018 12:45 PM CDT   Return Visit with Temi Corado MD   [facility] ([facility])    [facility] [address]  [address]  [facility] [address] [phone]              Who to contact     If you have questions or need follow up information about today's clinic visit or your schedule please contact [facility] directly at [phone].  Normal or non-critical lab and imaging results will be communicated to you by MyChart, letter or phone within 4 business days after the clinic has received the results. If you do not hear from us within 7 days, please contact the clinic through [employer] or phone. If you have a critical or abnormal lab result, we will notify you by phone as soon as possible.  Submit refill requests through [employer] or call your pharmacy and they will forward the refill request to us. Please allow 3 business days for your refill to be completed.          Additional Information About Your Visit        [employer] Information     [employer] gives you secure access to your electronic health record. If you see a primary care provider, you can also send messages to your care team and make appointments. If you have questions, please call your primary care clinic.  If you do not have a primary care provider, please call [phone] and they will assist you.        Care EveryWhere ID     This is your Care EveryWhere ID. This could be used by other organizations to  access your [place] medical records  [id number]        Your Vitals Were     Pulse Temperature Respirations Last Period Pulse Oximetry Breastfeeding?    88 99.1  F (37.3  C) (Temporal) 16 09/03/2018 100% No    BMI (Body Mass Index)                   40.51 kg/m2            Blood Pressure from Last 3 Encounters:   10/25/18 136/78   09/26/18 132/86   09/21/18 (!) 168/98    Weight from Last 3 Encounters:   10/25/18 251 lb (113.9 kg)   09/26/18 252 lb (114.3 kg)   09/21/18 266 lb (120.7 kg)              We Performed the Following     Albumin Random Urine Quantitative with Creat Ratio     ALT     AST     CK total     Hemoglobin A1c     Lipid panel reflex to direct LDL Fasting     OFFICE/OUTPT VISIT,PRASAD ALBERT IV          Today's Medication Changes          These changes are accurate as of 10/25/18 11:25 AM.  If you have any questions, ask your nurse or doctor.               These medicines have changed or have updated prescriptions.        Dose/Directions    rosuvastatin 20 MG tablet   Commonly known as:  CRESTOR   This may have changed:  See the new instructions.   Used for:  Mixed hyperlipidemia, Type 2 diabetes mellitus with hyperglycemia, without long-term current use of insulin (H)        TAKE 1 TABLET BY MOUTH EVERY DAY   Quantity:  90 tablet   Refills:  1       sertraline 50 MG tablet   Commonly known as:  ZOLOFT   This may have changed:  See the new instructions.   Used for:  PMS (premenstrual syndrome)        TAKE 1/2 TABLET (25 MG) BY MOUTH EVERY MORNING   Quantity:  45 tablet   Refills:  1                Primary Care Provider Office Phone # Fax #    Raoul Sherman [phone]       [address] [place] [address]        Goals        General    Monitoring (pt-stated)     Notes - Note created  8/30/2018  4:11 PM by Dominga Oliveira RD    Goal Statement: I will check blood sugar 1-2 times daily    Measure of Success: meter memory or log of blood sugar results    Strengths:  good understanding of using meter  Ideas to overcome barriers: no barriers noted    Date to Achieve By: 9/12          Equal Access to Services     CAMI SAUL : Hadii ashley Munroe, miguel patiño, qaakilahbertin spannmarianelasimon morales, yoli rawls. So [facility] [phone].    ATENCIÓN: Si habla español, tiene a obrien disposición servicios gratuitos de asistencia lingüística. Llame al [phone].    We comply with applicable federal civil rights laws and [place] laws. We do not discriminate on the basis of race, color, national origin, age, disability, sex, sexual orientation, or gender identity.            Thank you!     Thank you for choosing [facility]  for your care. Our goal is always to provide you with excellent care. Hearing back from our patients is one way we can continue to improve our services. Please take a few minutes to complete the written survey that you may receive in the mail after your visit with us. Thank you!             Your Updated Medication List - Protect others around you: Learn how to safely use, store and throw away your medicines at www.disposemymeds.org.          This list is accurate as of 10/25/18 11:25 AM.  Always use your most recent med list.                   Brand Name Dispense Instructions for use Diagnosis    acetaminophen 325 MG tablet    TYLENOL    40 tablet    Take 2 tablets (650 mg) by mouth every 4 hours as needed for other (multimodal surgical pain management)    S/P hysterectomy       blood glucose monitoring lancets     102 each    1 each 2 times daily    Type 2 diabetes mellitus with hyperglycemia, without long-term current use of insulin (H)       blood glucose monitoring test strip    ACCU-CHEK GUIDE    100 strip    Use to test blood sugar 2 times daily or as directed.    Type 2 diabetes mellitus with hyperglycemia, without long-term current use of insulin (H)       ferrous gluconate 324 (38 Fe) MG tablet    FERGON    60  tablet    Take 1 tablet (324 mg) by mouth daily (with breakfast)    Iron deficiency anemia, unspecified iron deficiency anemia type       glipiZIDE 10 MG 24 hr tablet    glipiZIDE XL    90 tablet    Take 1 tablet (10 mg) by mouth daily    Type 2 diabetes mellitus with hyperglycemia, without long-term current use of insulin (H)       ibuprofen 600 MG tablet    ADVIL/MOTRIN    40 tablet    Take 1 tablet (600 mg) by mouth every 6 hours as needed for moderate pain    S/P hysterectomy       lisinopril 10 MG tablet    PRINIVIL/ZESTRIL    30 tablet    Take 1 tablet (10 mg) by mouth daily    Benign essential hypertension       metFORMIN 500 MG 24 hr tablet    GLUCOPHAGE-XR    360 tablet    TAKE 4 TABLETS (2,000MG) BY MOUTH EVERY DAY WITH DINNER    Type 2 diabetes mellitus with hyperglycemia (H)       omeprazole 20 MG CR capsule    priLOSEC    90 capsule    Take 1 capsule (20 mg) by mouth daily    Gastroesophageal reflux disease without esophagitis       ondansetron 8 MG ODT tab    ZOFRAN ODT    20 tablet    Take 1 tablet (8 mg) by mouth 3 times daily (before meals)    S/P hysterectomy, Postoperative state, Leg swelling, Dependent edema, Nausea       polyethylene glycol Packet    MIRALAX/GLYCOLAX    7 packet    Take 17 g by mouth daily as needed for constipation    S/P hysterectomy, Slow transit constipation       rosuvastatin 20 MG tablet    CRESTOR    90 tablet    TAKE 1 TABLET BY MOUTH EVERY DAY    Mixed hyperlipidemia, Type 2 diabetes mellitus with hyperglycemia, without long-term current use of insulin (H)       senna-docusate 8.6-50 MG per tablet    SENOKOT-S;PERICOLACE    30 tablet    Take 1 tablet by mouth 2 times daily as needed for constipation    S/P hysterectomy       sertraline 50 MG tablet    ZOLOFT    45 tablet    TAKE 1/2 TABLET (25 MG) BY MOUTH EVERY MORNING    PMS (premenstrual syndrome)

## 2018-10-25 NOTE — LETTER
[address] [address]  [phone]          October 25, 2018    Lashawn Reddy                                                                                                                     [address] [place] [id number]            To Whom it May Concerns,      The above named patient is able to return to work on 10/29/2018 without restrictions. Please contact our office with any further questions or concerns.      Sincerely,         Raoul Sherman MD

## 2018-10-25 NOTE — PROGRESS NOTES
SUBJECTIVE:   CC: Lashawn Reddy is an 48 year old woman who presents for preventive health visit.     Physical   Annual:     Getting at least 3 servings of Calcium per day:  Yes    Bi-annual eye exam:  Yes    Dental care twice a year:  Yes    Sleep apnea or symptoms of sleep apnea:  None    Diet:  Diabetic    Frequency of exercise:  1 day/week    Duration of exercise:  15-30 minutes    Taking medications regularly:  Yes    Medication side effects:  Other    Additional concerns today:  YES        Lisinopril makes her rebecca    Today's PHQ-2 Score:   PHQ-2 ( 1999 [employer]) 10/25/2018   Q1: Little interest or pleasure in doing things 0   Q2: Feeling down, depressed or hopeless 0   PHQ-2 Score 0   Q1: Little interest or pleasure in doing things Not at all   Q2: Feeling down, depressed or hopeless Not at all   PHQ-2 Score 0       Abuse: Current or Past(Physical, Sexual or Emotional)- No  Do you feel safe in your environment - Yes  Review of Systems  Physical Exam  Subjective:Lashawn is here for yearly physical. Current concerns are:  She had SOUTH 6 weeks ago at the  of -here for yearl;y physical but asking if international unit(s) could also do her postop eval for that surgery- she is feeling well.  No vaginal bleeding or dysuria.  sHe is having bowel movements.  No other complaints.         Past Medical History:   Diagnosis Date     Depressive disorder, not elsewhere classified     depression in the post partum period after her daughter     Diabetes mellitus, type 2 (H) 6/6/2013     Diffuse cystic mastopathy     fibrocystic breast disease     Tobacco use disorder     quit in 2008     Type 2 diabetes, HbA1c goal < 7% (H) 6/6/2013      Current Outpatient Prescriptions   Medication Sig Dispense Refill     acetaminophen (TYLENOL) 325 MG tablet Take 2 tablets (650 mg) by mouth every 4 hours as needed for other (multimodal surgical pain management) 40 tablet 0     blood glucose monitoring (ACCU-CHEK FASTCLIX) lancets 1 each 2  times daily 102 each 3     blood glucose monitoring (ACCU-CHEK GUIDE) test strip Use to test blood sugar 2 times daily or as directed. 100 strip 11     ferrous gluconate (FERGON) 324 (38 Fe) MG tablet Take 1 tablet (324 mg) by mouth daily (with breakfast) 60 tablet 1     glipiZIDE (GLIPIZIDE XL) 10 MG 24 hr tablet Take 1 tablet (10 mg) by mouth daily 90 tablet 3     ibuprofen (ADVIL/MOTRIN) 600 MG tablet Take 1 tablet (600 mg) by mouth every 6 hours as needed for moderate pain 40 tablet 0     metFORMIN (GLUCOPHAGE-XR) 500 MG 24 hr tablet TAKE 4 TABLETS (2,000MG) BY MOUTH EVERY DAY WITH DINNER 360 tablet 1     omeprazole (PRILOSEC) 20 MG CR capsule Take 1 capsule (20 mg) by mouth daily 90 capsule 3     rosuvastatin (CRESTOR) 20 MG tablet TAKE 1 TABLET BY MOUTH EVERY DAY (Patient taking differently: TAKE 1 TABLET BY MOUTH EVERY EVENING) 90 tablet 1     sertraline (ZOLOFT) 50 MG tablet TAKE 1/2 TABLET (25 MG) BY MOUTH EVERY MORNING (Patient taking differently: TAKE 1/2 TABLET (25 MG) BY MOUTH EVERY EVENING) 45 tablet 1     lisinopril (PRINIVIL/ZESTRIL) 10 MG tablet Take 1 tablet (10 mg) by mouth daily (Patient not taking: Reported on 10/25/2018) 30 tablet 1     ondansetron (ZOFRAN ODT) 8 MG ODT tab Take 1 tablet (8 mg) by mouth 3 times daily (before meals) (Patient not taking: Reported on 10/25/2018) 20 tablet 1     polyethylene glycol (MIRALAX/GLYCOLAX) Packet Take 17 g by mouth daily as needed for constipation (Patient not taking: Reported on 10/25/2018) 7 packet 3     senna-docusate (SENOKOT-S;PERICOLACE) 8.6-50 MG per tablet Take 1 tablet by mouth 2 times daily as needed for constipation (Patient not taking: Reported on 10/25/2018) 30 tablet 0      Allergies   Allergen Reactions     No Known Drug Allergies       History   Smoking Status     Former Smoker     Years: 16.00     Types: Cigarettes     Quit date: 9/29/2008   Smokeless Tobacco     Never Used      Past Surgical History:   Procedure Laterality Date     C  ANESTH,LOWER LEG VEIN SURG,NOS  2002    bilateral     C APPENDECTOMY  04/14/89     COLONOSCOPY  6/21/2013    Procedure: COLONOSCOPY;  colonoscopy;  Surgeon: Vamshi Loomis MD;  Location: [facility]     COLONOSCOPY N/A 2/22/2017    Procedure: COLONOSCOPY;  Surgeon: Raoul Sage MD;  Location:  GI     HC COLONOSCOPY W/WO BRUSH/WASH  06/17/2005     HC DILATION/CURETTAGE DIAG/THER NON OB  1986    after a miscarriage     HC REMOVAL OF TONSILS,<12 Y/O       HYSTERECTOMY TOTAL ABDOMINAL, SALPINGECTOMY Bilateral 9/12/2018    Procedure: HYSTERECTOMY TOTAL ABDOMINAL, SALPINGECTOMY;  Total Abdominal Hysterectomy, left Salpingectomy;  Surgeon: Temi Corado MD;  Location: [facility]     TUBAL LIGATION  08/04/2006      Social History   Substance Use Topics     Smoking status: Former Smoker     Years: 16.00     Types: Cigarettes     Quit date: 9/29/2008     Smokeless tobacco: Never Used     Alcohol use No      Comment: couple drinks per year      Family History   Problem Relation Age of Onset     Anesthesia Reaction Mother      Severe nausea     GASTROINTESTINAL DISEASE Mother      Partial colon removal secondary to a blockage     Gynecology Mother      hysterectomy     Lipids Mother      Lipids Father      Cancer - colorectal Father      dx Oct '03 (dx at age 57)     Cancer Father      skin     Hypertension Father      Cancer Maternal Grandmother      colon at age 68     Cerebrovascular Disease Paternal Grandfather      Cerebrovascular Disease Paternal Grandmother      brain aneurysm     Arthritis Sister      mainly affecting her legs     GASTROINTESTINAL DISEASE Sister      2 sisters with colon polyps     Cancer Sister      cervical ca     Cancer Maternal Aunt      all over ? breast was the origin     Cancer Maternal Aunt      pancreatic     HEART DISEASE Maternal Aunt      MI 2010       ROS: A 12 point review of systems is negative except for the following:  See above      Physical Exam: /78  Pulse 88  Temp 99.1   F (37.3  C) (Temporal)  Resp 16  Wt 251 lb (113.9 kg)  LMP 09/03/2018  SpO2 100%  Breastfeeding? No  BMI 40.51 kg/m2  HEENT:    Sclerae and conjunctiva are normal.   Ear canals and TMs look normal.  Nasal mucosa is pink  - no polyps or masses seen.  sinuses are non tender to palpation.  Throat is unremarkable . Mucous membranes are moist.   Fundi are benign- disc margins are sharp. No papilledema noted.    Neck is supple, mobile, no adenopathy or masses palpable. The thyroid feels normal.   Normal range of motion noted.  Chest is clear to auscultation.  No wheezes, rales or rhonchi heard.  Cardiac exam is normal with s1, s2, no murmurs or adventitious sounds.Normal rate and rhythm is heard.   Abdomen is soft,  nondistended, No masses felt.No HSM. No guarding or rigidity or rebound   noted. Palpation reveals  no    tenderness   Normal bowel sounds heard.   Her Pfannenstiel incision is healing well.  Pelvic exam:My nurse Raymond Martin was present to chaperone the exam.  The external genitalia appeared normal.    The vaginal vault was without bleeding  or   discharge   or odor.   The vaginal cuff is healing well.  No bleeding is noted.  No vaginal support defects were noted,    Bimanual exam revealed  No adnexal masses were felt.      Exam was MODERATELY   limited by the patient's body habitus.            The breast exam was declined.      We did discuss the option for an exam   and I suggested that she should be doing a self-breast exam   monthly and after the age of 39 a yearly mammogram   and she feels comfortable that this is sufficient.         Assessment/Plan:        The yearly exam is normal except for :    1. Type 2 diabetes- takes glipizide and metformin- will check A1C and urine microalbumin and call with results- refill meds as indicated-she continues to check sugars twice daily and the results have been satisfactory.    2. Benign essential hypertension. Will check lytes today- on lisinopril - stable    3.  Hyperlipidemia- stable on crestor- will check lipids and LFTs/CK today-  4. Morbid obesity- diet and exercise discussed  5. She had SOUTH 6 weeks ago- her exam today is reassuring-she is healing well-I spoke with Dr Corado and told her I have done the postop exam today and it is reassuring-  6. rechekc in 3 months.    Additional Plan:Diet and rest and exercise discussed.      I have advised going for a 5 mile walk daily if possible       See labs and orders.    .Immunizations reviewed(TDAP, pneumovax, shingles vaccine,etc)and discussed-including advice for yearly flu shot/tetanus update.     The following vaccines given today:       Vaccines were discussed and  declined        Calcium supplementation advised     Colonoscopy at age 49      Mammogram  Is advised beginning at age 40-pt to be responsible for getting this done       Labs done: see orders      I advised the following exams with specialists:    1. Dental evaluation yearly    2. Dermatology evaluation for mole exam yearly    3. Ophthalmology exam yearly to check for glaucoma, etc     Followup in 3 months, sooner if concerns arise.   MAGDALENA Sherman MD(electronic signature)

## 2018-10-26 ASSESSMENT — ANXIETY QUESTIONNAIRES: GAD7 TOTAL SCORE: 2

## 2018-10-26 NOTE — PROGRESS NOTES
Anette Please inform Lashawn/ or caretaker  that this result(s) is/are normal.   The A1C is also good-   Keep up the good work! rechekc in 3 months. Thanks. MAGDALENA Sherman MD

## 2018-11-13 DIAGNOSIS — E78.2 MIXED HYPERLIPIDEMIA: ICD-10-CM

## 2018-11-13 DIAGNOSIS — E11.65 TYPE 2 DIABETES MELLITUS WITH HYPERGLYCEMIA, WITHOUT LONG-TERM CURRENT USE OF INSULIN (H): ICD-10-CM

## 2018-11-13 DIAGNOSIS — N94.3 PMS (PREMENSTRUAL SYNDROME): ICD-10-CM

## 2018-11-14 ENCOUNTER — HOSPITAL ENCOUNTER (INPATIENT)
Facility: CLINIC | Age: 48
LOS: 2 days | Discharge: HOME | DRG: 872 | End: 2018-11-17
Attending: FAMILY MEDICINE | Admitting: INTERNAL MEDICINE
Payer: COMMERCIAL

## 2018-11-14 ENCOUNTER — APPOINTMENT (OUTPATIENT)
Dept: CT IMAGING | Facility: CLINIC | Age: 48
DRG: 872 | End: 2018-11-14
Attending: FAMILY MEDICINE
Payer: COMMERCIAL

## 2018-11-14 DIAGNOSIS — K57.32 DIVERTICULITIS OF SIGMOID COLON: ICD-10-CM

## 2018-11-14 DIAGNOSIS — A41.9 SEPSIS, DUE TO UNSPECIFIED ORGANISM: ICD-10-CM

## 2018-11-14 DIAGNOSIS — I10 ESSENTIAL HYPERTENSION: Primary | ICD-10-CM

## 2018-11-14 LAB
ALBUMIN UR-MCNC: NEGATIVE MG/DL
ANION GAP SERPL CALCULATED.3IONS-SCNC: 10 MMOL/L (ref 3–14)
APPEARANCE UR: CLEAR
BACTERIA #/AREA URNS HPF: ABNORMAL /HPF
BASOPHILS # BLD AUTO: 0 10E9/L (ref 0–0.2)
BASOPHILS NFR BLD AUTO: 0.2 %
BILIRUB UR QL STRIP: NEGATIVE
BUN SERPL-MCNC: 8 MG/DL (ref 7–30)
CALCIUM SERPL-MCNC: 8.6 MG/DL (ref 8.5–10.1)
CHLORIDE SERPL-SCNC: 104 MMOL/L (ref 94–109)
CO2 SERPL-SCNC: 24 MMOL/L (ref 20–32)
COLOR UR AUTO: YELLOW
CREAT SERPL-MCNC: 0.75 MG/DL (ref 0.52–1.04)
DIFFERENTIAL METHOD BLD: ABNORMAL
EOSINOPHIL NFR BLD AUTO: 0.9 %
ERYTHROCYTE [DISTWIDTH] IN BLOOD BY AUTOMATED COUNT: 17.5 % (ref 10–15)
GFR SERPL CREATININE-BSD FRML MDRD: 82 ML/MIN/1.7M2
GLUCOSE SERPL-MCNC: 230 MG/DL (ref 70–99)
GLUCOSE UR STRIP-MCNC: >499 MG/DL
HCT VFR BLD AUTO: 32.6 % (ref 35–47)
HGB BLD-MCNC: 10.3 G/DL (ref 11.7–15.7)
HGB UR QL STRIP: NEGATIVE
IMM GRANULOCYTES # BLD: 0.1 10E9/L (ref 0–0.4)
IMM GRANULOCYTES NFR BLD: 0.4 %
KETONES UR STRIP-MCNC: NEGATIVE MG/DL
LACTATE BLD-SCNC: 1.2 MMOL/L (ref 0.7–2)
LEUKOCYTE ESTERASE UR QL STRIP: NEGATIVE
LYMPHOCYTES # BLD AUTO: 1.7 10E9/L (ref 0.8–5.3)
LYMPHOCYTES NFR BLD AUTO: 8.8 %
MCH RBC QN AUTO: 24.1 PG (ref 26.5–33)
MCHC RBC AUTO-ENTMCNC: 31.6 G/DL (ref 31.5–36.5)
MCV RBC AUTO: 76 FL (ref 78–100)
MONOCYTES # BLD AUTO: 1.2 10E9/L (ref 0–1.3)
MONOCYTES NFR BLD AUTO: 6.3 %
MUCOUS THREADS #/AREA URNS LPF: PRESENT /LPF
NEUTROPHILS # BLD AUTO: 15.9 10E9/L (ref 1.6–8.3)
NEUTROPHILS NFR BLD AUTO: 83.4 %
NITRATE UR QL: NEGATIVE
NRBC # BLD AUTO: 0 10*3/UL
NRBC BLD AUTO-RTO: 0 /100
PH UR STRIP: 5 PH (ref 5–7)
PLATELET # BLD AUTO: 276 10E9/L (ref 150–450)
POTASSIUM SERPL-SCNC: 4.1 MMOL/L (ref 3.4–5.3)
RBC # BLD AUTO: 4.28 10E12/L (ref 3.8–5.2)
RBC #/AREA URNS AUTO: <1 /HPF (ref 0–2)
SODIUM SERPL-SCNC: 138 MMOL/L (ref 133–144)
SOURCE: ABNORMAL
SP GR UR STRIP: 1.02 (ref 1–1.03)
SQUAMOUS #/AREA URNS AUTO: <1 /HPF (ref 0–1)
UROBILINOGEN UR STRIP-MCNC: 0 MG/DL (ref 0–2)
WBC # BLD AUTO: 19.1 10E9/L (ref 4–11)
WBC #/AREA URNS AUTO: <1 /HPF (ref 0–5)

## 2018-11-14 PROCEDURE — 83605 ASSAY OF LACTIC ACID: CPT | Performed by: FAMILY MEDICINE

## 2018-11-14 PROCEDURE — 80048 BASIC METABOLIC PNL TOTAL CA: CPT | Performed by: FAMILY MEDICINE

## 2018-11-14 PROCEDURE — 81001 URINALYSIS AUTO W/SCOPE: CPT | Performed by: FAMILY MEDICINE

## 2018-11-14 PROCEDURE — 99285 EMERGENCY DEPT VISIT HI MDM: CPT | Mod: 25

## 2018-11-14 PROCEDURE — 25000128 H RX IP 250 OP 636: Performed by: FAMILY MEDICINE

## 2018-11-14 PROCEDURE — 99285 EMERGENCY DEPT VISIT HI MDM: CPT | Mod: 25 | Performed by: FAMILY MEDICINE

## 2018-11-14 PROCEDURE — 83036 HEMOGLOBIN GLYCOSYLATED A1C: CPT | Performed by: INTERNAL MEDICINE

## 2018-11-14 PROCEDURE — 96361 HYDRATE IV INFUSION ADD-ON: CPT

## 2018-11-14 PROCEDURE — 74177 CT ABD & PELVIS W/CONTRAST: CPT

## 2018-11-14 PROCEDURE — 96365 THER/PROPH/DIAG IV INF INIT: CPT

## 2018-11-14 PROCEDURE — 85025 COMPLETE CBC W/AUTO DIFF WBC: CPT | Performed by: FAMILY MEDICINE

## 2018-11-14 PROCEDURE — 96375 TX/PRO/DX INJ NEW DRUG ADDON: CPT

## 2018-11-14 PROCEDURE — 25000125 ZZHC RX 250: Performed by: FAMILY MEDICINE

## 2018-11-14 RX ORDER — KETOROLAC TROMETHAMINE 15 MG/ML
15 INJECTION, SOLUTION INTRAMUSCULAR; INTRAVENOUS ONCE
Status: COMPLETED | OUTPATIENT
Start: 2018-11-14 | End: 2018-11-14

## 2018-11-14 RX ORDER — SODIUM CHLORIDE 9 MG/ML
1000 INJECTION, SOLUTION INTRAVENOUS CONTINUOUS
Status: DISCONTINUED | OUTPATIENT
Start: 2018-11-14 | End: 2018-11-15

## 2018-11-14 RX ORDER — AMPICILLIN AND SULBACTAM 2; 1 G/1; G/1
3 INJECTION, POWDER, FOR SOLUTION INTRAMUSCULAR; INTRAVENOUS EVERY 6 HOURS
Status: DISCONTINUED | OUTPATIENT
Start: 2018-11-14 | End: 2018-11-15

## 2018-11-14 RX ORDER — IOPAMIDOL 755 MG/ML
100 INJECTION, SOLUTION INTRAVASCULAR ONCE
Status: COMPLETED | OUTPATIENT
Start: 2018-11-14 | End: 2018-11-14

## 2018-11-14 RX ORDER — ROSUVASTATIN CALCIUM 20 MG/1
20 TABLET, COATED ORAL DAILY
Qty: 90 TABLET | Refills: 1 | Status: SHIPPED | OUTPATIENT
Start: 2018-11-14 | End: 2019-04-23

## 2018-11-14 RX ORDER — ONDANSETRON 2 MG/ML
4 INJECTION INTRAMUSCULAR; INTRAVENOUS EVERY 30 MIN PRN
Status: DISCONTINUED | OUTPATIENT
Start: 2018-11-14 | End: 2018-11-15

## 2018-11-14 RX ADMIN — SODIUM CHLORIDE 1000 ML: 9 INJECTION, SOLUTION INTRAVENOUS at 21:31

## 2018-11-14 RX ADMIN — KETOROLAC TROMETHAMINE 15 MG: 15 INJECTION, SOLUTION INTRAMUSCULAR; INTRAVENOUS at 21:32

## 2018-11-14 RX ADMIN — IOPAMIDOL 100 ML: 755 INJECTION, SOLUTION INTRAVENOUS at 22:18

## 2018-11-14 RX ADMIN — SODIUM CHLORIDE 60 ML: 9 INJECTION, SOLUTION INTRAVENOUS at 22:18

## 2018-11-14 RX ADMIN — ONDANSETRON HYDROCHLORIDE 4 MG: 2 INJECTION, SOLUTION INTRAMUSCULAR; INTRAVENOUS at 21:31

## 2018-11-14 RX ADMIN — AMPICILLIN SODIUM AND SULBACTAM SODIUM 3 G: 2; 1 INJECTION, POWDER, FOR SOLUTION INTRAMUSCULAR; INTRAVENOUS at 23:23

## 2018-11-14 NOTE — IP AVS SNAPSHOT
MRN:[id number]                      After Visit Summary   11/14/2018    Lashawn Reddy    MRN: [id number]           Thank you!     Thank you for choosing [facility] for your care. Our goal is always to provide you with excellent care. Hearing back from our patients is one way we can continue to improve our services. Please take a few minutes to complete the written survey that you may receive in the mail after you visit with us. Thank you!        Patient Information     Date Of Birth          1970        Designated Caregiver       Most Recent Value    Caregiver    Will someone help with your care after discharge? yes    Name of designated caregiver meme    Phone number of caregiver [phone]    Caregiver address [address] [address]      About your hospital stay     You were admitted on:  November 15, 2018 You last received care in the:  [address]    You were discharged on:  November 17, 2018       Who to Call     For medical emergencies, please call 911.  For non-urgent questions about your medical care, please call your primary care provider or clinic, [phone]          Attending Provider     Provider Specialty    Ranulfo Huang MD Emergency Medicine    [facility], Yazan Leiva MD Internal Medicine       Primary Care Provider Office Phone # Fax #    Raoul Sherman [phone]      After Care Instructions     Activity       Your activity upon discharge: activity as tolerated            Diet       Follow this diet upon discharge: Low consistent carbohydrate (3598-0807 héctor / 3-5 CHO units per meal)            Monitor and record       blood glucose 4 times a day, before meals and at bedtime                  Follow-up Appointments     Follow-up and recommended labs and tests        Follow up with Dr. Sherman within one week                  Your next 10 appointments already scheduled     Nov 27, 2018 10:10 AM CST  "  Office Visit with Raoul Sherman MD   [facility]    [address] [address] [phone]           Bring a current list of meds and any records pertaining to this visit. For Physicals, please bring immunization records and any forms needing to be filled out. Please arrive 10 minutes early to complete paperwork.              Pending Results     No orders found from 11/12/2018 to 11/15/2018.            Statement of Approval     Ordered          11/17/18 0617  I have reviewed and agree with all the recommendations and orders detailed in this document.  EFFECTIVE NOW     Approved and electronically signed by:  Yazan Nair MD             Admission Information     Date & Time Provider Department Dept. Phone    11/14/2018 Yazan Nair MD [address] [facility] [phone]      Your Vitals Were     Blood Pressure Pulse Temperature Respirations Height Weight    133/73 (BP Location: Right arm) 64 97  F (36.1  C) (Oral) 18 1.676 m (5' 6\") 115.4 kg (254 lb 8 oz)    Last Period Pulse Oximetry BMI (Body Mass Index)             09/03/2018 98% 41.08 kg/m2         [facility] Information     [facility] gives you secure access to your electronic health record. If you see a primary care provider, you can also send messages to your care team and make appointments. If you have questions, please call your primary care clinic.  If you do not have a primary care provider, please call [phone] and they will assist you.        Care EveryWhere ID     This is your Care EveryWhere ID. This could be used by other organizations to access your [facility] medical records  [id number]        Equal Access to Services     [facility] : Linda Munroe, miguel patiño, yoli gardner. So [facility] [phone].    ATENCIÓN: Si habla español, tiene a obrien disposición servicios " josefina de asistencia lingüística. Hector jaramillo [phone].    We comply with applicable federal civil rights laws and [place] laws. We do not discriminate on the basis of race, color, national origin, age, disability, sex, sexual orientation, or gender identity.               Review of your medicines      START taking        Dose / Directions    ciprofloxacin 500 MG tablet   Commonly known as:  CIPRO   Indication:  Infection Within the Abdomen   Used for:  Diverticulitis of sigmoid colon        Dose:  500 mg   Take 1 tablet (500 mg) by mouth every 12 hours for 7 days   Quantity:  14 tablet   Refills:  0       metroNIDAZOLE 500 MG tablet   Commonly known as:  FLAGYL   Indication:  Infection Within the Abdomen   Used for:  Diverticulitis of sigmoid colon        Dose:  500 mg   Take 1 tablet (500 mg) by mouth every 8 hours for 7 days   Quantity:  21 tablet   Refills:  0         CONTINUE these medicines which may have CHANGED, or have new prescriptions. If we are uncertain of the size of tablets/capsules you have at home, strength may be listed as something that might have changed.        Dose / Directions    lisinopril 5 MG tablet   Commonly known as:  PRINIVIL/ZESTRIL   Indication:  1/2 tab HS   This may have changed:    - medication strength  - how much to take  - when to take this   Used for:  Essential hypertension        Dose:  5 mg   Take 1 tablet (5 mg) by mouth At Bedtime   Quantity:  30 tablet   Refills:  0         CONTINUE these medicines which have NOT CHANGED        Dose / Directions    acetaminophen 325 MG tablet   Commonly known as:  TYLENOL   Used for:  S/P hysterectomy        Dose:  650 mg   Take 2 tablets (650 mg) by mouth every 4 hours as needed for other (multimodal surgical pain management)   Quantity:  40 tablet   Refills:  0       blood glucose monitoring lancets   Used for:  Type 2 diabetes mellitus with hyperglycemia, without long-term current use of insulin (H)        Dose:  1 each   1 each  2 times daily   Quantity:  102 each   Refills:  3       blood glucose monitoring test strip   Commonly known as:  ACCU-CHEK GUIDE   Used for:  Type 2 diabetes mellitus with hyperglycemia, without long-term current use of insulin (H)        Use to test blood sugar 2 times daily or as directed.   Quantity:  100 strip   Refills:  11       ferrous gluconate 324 (38 Fe) MG tablet   Commonly known as:  FERGON   Used for:  Iron deficiency anemia, unspecified iron deficiency anemia type        Dose:  324 mg   Take 1 tablet (324 mg) by mouth daily (with breakfast)   Quantity:  60 tablet   Refills:  1       ibuprofen 600 MG tablet   Commonly known as:  ADVIL/MOTRIN   Used for:  S/P hysterectomy        Dose:  600 mg   Take 1 tablet (600 mg) by mouth every 6 hours as needed for moderate pain   Quantity:  40 tablet   Refills:  0       metFORMIN 500 MG 24 hr tablet   Commonly known as:  GLUCOPHAGE-XR   Used for:  Type 2 diabetes mellitus with hyperglycemia (H)        TAKE 4 TABLETS (2,000MG) BY MOUTH EVERY DAY WITH DINNER   Quantity:  360 tablet   Refills:  1       omeprazole 20 MG CR capsule   Commonly known as:  priLOSEC   Used for:  Gastroesophageal reflux disease without esophagitis        Dose:  20 mg   Take 1 capsule (20 mg) by mouth daily   Quantity:  90 capsule   Refills:  3       rosuvastatin 20 MG tablet   Commonly known as:  CRESTOR   Used for:  Mixed hyperlipidemia, Type 2 diabetes mellitus with hyperglycemia, without long-term current use of insulin (H)        Dose:  20 mg   Take 1 tablet (20 mg) by mouth daily   Quantity:  90 tablet   Refills:  1       senna-docusate 8.6-50 MG per tablet   Commonly known as:  SENOKOT-S;PERICOLACE   Used for:  S/P hysterectomy        Dose:  1 tablet   Take 1 tablet by mouth 2 times daily as needed for constipation   Quantity:  30 tablet   Refills:  0       sertraline 50 MG tablet   Commonly known as:  ZOLOFT   Used for:  PMS (premenstrual syndrome)        TAKE 1/2 TABLET (25 MG) BY  MOUTH EVERY MORNING   Quantity:  45 tablet   Refills:  1         STOP taking     glipiZIDE 10 MG 24 hr tablet   Commonly known as:  glipiZIDE XL                Where to get your medicines      These medications were sent to [facility] - [place], [place] - [address]  [address] [facility] [address]     Phone:  [phone]     ciprofloxacin 500 MG tablet    metroNIDAZOLE 500 MG tablet         Some of these will need a paper prescription and others can be bought over the counter. Ask your nurse if you have questions.     You don't need a prescription for these medications     lisinopril 5 MG tablet                Protect others around you: Learn how to safely use, store and throw away your medicines at www.disposemymeds.org.        ANTIBIOTIC INSTRUCTION     You've Been Prescribed an Antibiotic - Now What?  Your healthcare team thinks that you or your loved one might have an infection. Some infections can be treated with antibiotics, which are powerful, life-saving drugs. Like all medications, antibiotics have side effects and should only be used when necessary. There are some important things you should know about your antibiotic treatment.      Your healthcare team may run tests before you start taking an antibiotic.    Your team may take samples (e.g., from your blood, urine or other areas) to run tests to look for bacteria. These test can be important to determine if you need an antibiotic at all and, if you do, which antibiotic will work best.      Within a few days, your healthcare team might change or even stop your antibiotic.    Your team may start you on an antibiotic while they are working to find out what is making you sick.    Your team might change your antibiotic because test results show that a different antibiotic would be better to treat your infection.    In some cases, once your team has more information, they learn that you do not need an antibiotic at all. They  may find out that you don't have an infection, or that the antibiotic you're taking won't work against your infection. For example, an infection caused by a virus can't be treated with antibiotics. Staying on an antibiotic when you don't need it is more likely to be harmful than helpful.      You may experience side effects from your antibiotic.    Like all medications, antibiotics have side effects. Some of these can be serious.    Let you healthcare team know if you have any known allergies when you are admitted to the hospital.    One significant side effect of nearly all antibiotics is the risk of severe and sometimes deadly diarrhea caused by Clostridium difficile (C. Difficile). This occurs when a person takes antibiotics because some good germs are destroyed. Antibiotic use allows C. diificile to take over, putting patients at high risk for this serious infection.    As a patient or caregiver, it is important to understand your or your loved one's antibiotic treatment. It is especially important for caregivers to speak up when patients can't speak for themselves. Here are some important questions to ask your healthcare team.    What infection is this antibiotic treating and how do you know I have that infection?    What side effects might occur from this antibiotic?    How long will I need to take this antibiotic?    Is it safe to take this antibiotic with other medications or supplements (e.g., vitamins) that I am taking?     Are there any special directions I need to know about taking this antibiotic? For example, should I take it with food?    How will I be monitored to know whether my infection is responding to the antibiotic?    What tests may help to make sure the right antibiotic is prescribed for me?      Information provided by:  www.cdc.gov/getsmart  U.S. Department of Health and Human Services  Centers for disease Control and Prevention  National Center for Emerging and Zoonotic Infectious  Diseases  Division of Healthcare Quality Promotion             Medication List: This is a list of all your medications and when to take them. Check marks below indicate your daily home schedule. Keep this list as a reference.      Medications           Morning Afternoon Evening Bedtime As Needed    acetaminophen 325 MG tablet   Commonly known as:  TYLENOL   Take 2 tablets (650 mg) by mouth every 4 hours as needed for other (multimodal surgical pain management)                                   blood glucose monitoring lancets   1 each 2 times daily                                blood glucose monitoring test strip   Commonly known as:  ACCU-CHEK GUIDE   Use to test blood sugar 2 times daily or as directed.                                ciprofloxacin 500 MG tablet   Commonly known as:  CIPRO   Take 1 tablet (500 mg) by mouth every 12 hours for 7 days   Last time this was given:  500 mg on 11/17/2018  8:03 AM                                      ferrous gluconate 324 (38 Fe) MG tablet   Commonly known as:  FERGON   Take 1 tablet (324 mg) by mouth daily (with breakfast)                                   ibuprofen 600 MG tablet   Commonly known as:  ADVIL/MOTRIN   Take 1 tablet (600 mg) by mouth every 6 hours as needed for moderate pain                                   lisinopril 5 MG tablet   Commonly known as:  PRINIVIL/ZESTRIL   Take 1 tablet (5 mg) by mouth At Bedtime   Last time this was given:  5 mg on 11/16/2018  8:55 PM                                   metFORMIN 500 MG 24 hr tablet   Commonly known as:  GLUCOPHAGE-XR   TAKE 4 TABLETS (2,000MG) BY MOUTH EVERY DAY WITH DINNER                                   metroNIDAZOLE 500 MG tablet   Commonly known as:  FLAGYL   Take 1 tablet (500 mg) by mouth every 8 hours for 7 days   Last time this was given:  500 mg on 11/17/2018  6:46 AM                                         omeprazole 20 MG CR capsule   Commonly known as:  priLOSEC   Take 1 capsule (20 mg) by mouth  daily                                   rosuvastatin 20 MG tablet   Commonly known as:  CRESTOR   Take 1 tablet (20 mg) by mouth daily                                   senna-docusate 8.6-50 MG per tablet   Commonly known as:  SENOKOT-S;PERICOLACE   Take 1 tablet by mouth 2 times daily as needed for constipation                                      sertraline 50 MG tablet   Commonly known as:  ZOLOFT   TAKE 1/2 TABLET (25 MG) BY MOUTH EVERY MORNING                                             More Information        Patient Education    Butylparaben, Cetyl Alcohol, Methylparaben, Propylene Glycol, Propylparaben, Sodium Lauryl Sulfate, Stearyl Alcohol, Water Topical emulsion, Metronidazole Topical gel    Metronidazole Oral capsule    Metronidazole Oral tablet    Metronidazole Oral tablet, extended-release    Metronidazole Solution for injection    Metronidazole Topical cream    Metronidazole Topical gel    Metronidazole Topical lotion    Metronidazole Vaginal gel  Metronidazole Oral tablet  What is this medicine?  METRONIDAZOLE (me troe NI da zole) is an antiinfective. It is used to treat certain kinds of bacterial and protozoal infections. It will not work for colds, flu, or other viral infections.  This medicine may be used for other purposes; ask your health care provider or pharmacist if you have questions.  What should I tell my health care provider before I take this medicine?  They need to know if you have any of these conditions:    anemia or other blood disorders    disease of the nervous system    fungal or yeast infection    if you drink alcohol containing drinks    liver disease    seizures    an unusual or allergic reaction to metronidazole, or other medicines, foods, dyes, or preservatives    pregnant or trying to get pregnant    breast-feeding  How should I use this medicine?  Take this medicine by mouth with a full glass of water. Follow the directions on the prescription label. Take your medicine at  regular intervals. Do not take your medicine more often than directed. Take all of your medicine as directed even if you think you are better. Do not skip doses or stop your medicine early.  Talk to your pediatrician regarding the use of this medicine in children. Special care may be needed.  Overdosage: If you think you have taken too much of this medicine contact a poison control center or emergency room at once.  NOTE: This medicine is only for you. Do not share this medicine with others.  What if I miss a dose?  If you miss a dose, take it as soon as you can. If it is almost time for your next dose, take only that dose. Do not take double or extra doses.  What may interact with this medicine?  Do not take this medicine with any of the following medications:    alcohol or any product that contains alcohol    amprenavir oral solution    cisapride    disulfiram    dofetilide    dronedarone    paclitaxel injection    pimozide    ritonavir oral solution    sertraline oral solution    sulfamethoxazole-trimethoprim injection    thioridazine    ziprasidone  This medicine may also interact with the following medications:    birth control pills    cimetidine    lithium    other medicines that prolong the QT interval (cause an abnormal heart rhythm)    phenobarbital    phenytoin    warfarin  This list may not describe all possible interactions. Give your health care provider a list of all the medicines, herbs, non-prescription drugs, or dietary supplements you use. Also tell them if you smoke, drink alcohol, or use illegal drugs. Some items may interact with your medicine.  What should I watch for while using this medicine?  Tell your doctor or health care professional if your symptoms do not improve or if they get worse.  You may get drowsy or dizzy. Do not drive, use machinery, or do anything that needs mental alertness until you know how this medicine affects you. Do not stand or sit up quickly, especially if you are an  older patient. This reduces the risk of dizzy or fainting spells.  Avoid alcoholic drinks while you are taking this medicine and for three days afterward. Alcohol may make you feel dizzy, sick, or flushed.  If you are being treated for a sexually transmitted disease, avoid sexual contact until you have finished your treatment. Your sexual partner may also need treatment.  What side effects may I notice from receiving this medicine?  Side effects that you should report to your doctor or health care professional as soon as possible:    allergic reactions like skin rash or hives, swelling of the face, lips, or tongue    confusion, clumsiness    difficulty speaking    discolored or sore mouth    dizziness    fever, infection    numbness, tingling, pain or weakness in the hands or feet    trouble passing urine or change in the amount of urine    redness, blistering, peeling or loosening of the skin, including inside the mouth    seizures    unusually weak or tired    vaginal irritation, dryness, or discharge  Side effects that usually do not require medical attention (report to your doctor or health care professional if they continue or are bothersome):    diarrhea    headache    irritability    metallic taste    nausea    stomach pain or cramps    trouble sleeping  This list may not describe all possible side effects. Call your doctor for medical advice about side effects. You may report side effects to FDA at 5-002-FDA-2417.  Where should I keep my medicine?  Keep out of the reach of children.  Store at room temperature below 25 degrees C (77 degrees F). Protect from light. Keep container tightly closed. Throw away any unused medicine after the expiration date.  NOTE:This sheet is a summary. It may not cover all possible information. If you have questions about this medicine, talk to your doctor, pharmacist, or health care provider. Copyright  2016 Gold Standard                Ciprofloxacin tablets  Brand Name:  Cipro  What is this medicine?  CIPROFLOXACIN (sip silvana FLOX a sin) is a quinolone antibiotic. It is used to treat certain kinds of bacterial infections. It will not work for colds, flu, or other viral infections.  How should I use this medicine?  Take this medicine by mouth with a full glass of water. Follow the directions on the prescription label. You can take it with or without food. If it upsets your stomach, take it with food. Take your medicine at regular intervals. Do not take your medicine more often than directed. Take all of your medicine as directed even if you think you are better. Do not skip doses or stop your medicine early.  Avoid antacids, aluminum, calcium, iron, magnesium, and zinc products for 6 hours before and 2 hours after taking a dose of this medicine.  A special MedGuide will be given to you by the pharmacist with each prescription and refill. Be sure to read this information carefully each time.  Talk to your pediatrician regarding the use of this medicine in children. Special care may be needed.  What side effects may I notice from receiving this medicine?  Side effects that you should report to your doctor or health care professional as soon as possible:    allergic reactions like skin rash or hives, swelling of the face, lips, or tongue    anxious    bloody or watery diarrhea    confusion    depressed mood    fast, irregular heartbeat    fever    hallucination, loss of contact with reality    joint, muscle, or tendon pain or swelling    loss of memory    pain, tingling, numbness in the hands or feet    seizures    signs and symptoms of high blood sugar such as dizziness; dry mouth; dry skin; fruity breath; nausea; stomach pain; increased hunger or thirst; increased urination    signs and symptoms of liver injury like dark yellow or brown urine; general ill feeling or flu-like symptoms; light-colored stools; loss of appetite; nausea; right upper belly pain; unusually weak or tired;  yellowing of the eyes or skin    signs and symptoms of low blood sugar such as feeling anxious; confusion; dizziness; increased hunger; unusually weak or tired; sweating; shakiness; cold; irritable; headache; blurred vision; fast heartbeat; loss of consciousness; pale skin    suicidal thoughts or other mood changes    sunburn    unusually weak or tired  Side effects that usually do not require medical attention (report to your doctor or health care professional if they continue or are bothersome):    dry mouth    headache    nausea    trouble sleeping  What may interact with this medicine?  Do not take this medicine with any of the following medications:    cisapride    dofetilide    dronedarone    flibanserin    lomitapide    pimozide    thioridazine    tizanidine    ziprasidone  This medicine may also interact with the following medications:    antacids    birth control pills    caffeine    certain medicines for diabetes, like glipizide, glyburide, or insulin    certain medicines that treat or prevent blood clots like warfarin    clozapine    cyclosporine    didanosine buffered tablets or powder    duloxetine    lanthanum carbonate    lidocaine    methotrexate    multivitamins    NSAIDS, medicines for pain and inflammation, like ibuprofen or naproxen    olanzapine    omeprazole    other medicines that prolong the QT interval (cause an abnormal heart rhythm)    phenytoin    probenecid    ropinirole    sevelamer    sildenafil    sucralfate    theophylline    zolpidem  What if I miss a dose?  If you miss a dose, take it as soon as you can. If it is almost time for your next dose, take only that dose. Do not take double or extra doses.  Where should I keep my medicine?  Keep out of the reach of children.  Store at room temperature below 30 degrees C (86 degrees F). Keep container tightly closed. Throw away any unused medicine after the expiration date.  What should I tell my health care provider before I take this  medicine?  They need to know if you have any of these conditions:    bone problems    diabetes    history of irregular heartbeat    history of low levels of potassium in the blood    joint problems    kidney disease    myasthenia gravis    seizures    tendon problems    tingling of the fingers or toes, or other nerve disorder    an unusual or allergic reaction to ciprofloxacin, other antibiotics or medicines, foods, dyes, or preservatives    pregnant or trying to get pregnant    breast-feeding  What should I watch for while using this medicine?  Tell your doctor or healthcare professional if your symptoms do not start to get better or if they get worse.  Do not treat diarrhea with over the counter products. Contact your doctor if you have diarrhea that lasts more than 2 days or if it is severe and watery.  Check with your doctor or health care professional if you get an attack of severe diarrhea, nausea and vomiting, or if you sweat a lot. The loss of too much body fluid can make it dangerous for you to take this medicine.  This medicine may affect blood sugar levels. If you have diabetes, check with your doctor or health care professional before you change your diet or the dose of your diabetic medicine.  You may get drowsy or dizzy. Do not drive, use machinery, or do anything that needs mental alertness until you know how this medicine affects you. Do not sit or stand up quickly, especially if you are an older patient. This reduces the risk of dizzy or fainting spells.  This medicine can make you more sensitive to the sun. Keep out of the sun. If you cannot avoid being in the sun, wear protective clothing and use a sunscreen. Do not use sun lamps or tanning beds/booths.  NOTE:This sheet is a summary. It may not cover all possible information. If you have questions about this medicine, talk to your doctor, pharmacist, or health care provider. Copyright  2018 [employer]

## 2018-11-14 NOTE — TELEPHONE ENCOUNTER
"Rosuvastatin  Last Written Prescription Date:  05/24/2018  Last Fill Quantity: 90,  # refills: 1   Last office visit: 10/25/2018 with prescribing provider:  Lane   Future Office Visit:  None    Sertraline  Last Written Prescription Date:  05/24/2018  Last Fill Quantity: 45,  # refills: 1   Last office visit: 10/25/2018 with prescribing provider:  Lane Cai Office Visit:  None    Requested Prescriptions   Pending Prescriptions Disp Refills     sertraline (ZOLOFT) 50 MG tablet [Pharmacy Med Name: SERTRALINE 50MG TAB] 45 tablet      Sig: TAKE 1/2 TABLET (25 MG) BY MOUTH EVERY MORNING    SSRIs Protocol Passed    11/13/2018  1:03 AM       Passed - Recent (12 mo) or future (30 days) visit within the authorizing provider's specialty    Patient had office visit in the last 12 months or has a visit in the next 30 days with authorizing provider or within the authorizing provider's specialty.  See \"Patient Info\" tab in inbasket, or \"Choose Columns\" in Meds & Orders section of the refill encounter.         Passed - Patient is age 18 or older       Passed - No active pregnancy on record       Passed - No positive pregnancy test in last 12 months        rosuvastatin (CRESTOR) 20 MG tablet [Pharmacy Med Name: ROSUVASTATIN 20MG TABLET] 90 tablet      Sig: TAKE 1 TABLET BY MOUTH EVERY DAY    Statins Protocol Passed    11/13/2018  1:03 AM       Passed - LDL on file in past 12 months    Recent Labs   Lab Test  10/25/18   1126   LDL  69          Passed - No abnormal creatine kinase in past 12 months    Recent Labs   Lab Test  10/25/18   1126   CKT  69          Passed - Recent (12 mo) or future (30 days) visit within the authorizing provider's specialty    Patient had office visit in the last 12 months or has a visit in the next 30 days with authorizing provider or within the authorizing provider's specialty.  See \"Patient Info\" tab in inbasket, or \"Choose Columns\" in Meds & Orders section of the refill encounter.         " Passed - Patient is age 18 or older       Passed - No active pregnancy on record       Passed - No positive pregnancy test in past 12 months      Una Maria RN

## 2018-11-14 NOTE — IP AVS SNAPSHOT
[address] [facility]    [address] [place] [address]    Phone:  [phone]                                       After Visit Summary   11/14/2018    Lashawn Reddy    MRN: [id number]           After Visit Summary Signature Page     I have received my discharge instructions, and my questions have been answered. I have discussed any challenges I see with this plan with the nurse or doctor.    ..........................................................................................................................................  Patient/Patient Representative Signature      ..........................................................................................................................................  Patient Representative Print Name and Relationship to Patient    ..................................................               ................................................  Date                                   Time    ..........................................................................................................................................  Reviewed by Signature/Title    ...................................................              ..............................................  Date                                               Time          22EPIC Rev 08/18

## 2018-11-15 PROBLEM — D64.9 ANEMIA: Status: ACTIVE | Noted: 2018-11-15

## 2018-11-15 PROBLEM — K57.92 DIVERTICULITIS: Status: ACTIVE | Noted: 2018-11-15

## 2018-11-15 PROBLEM — K57.32 SIGMOID DIVERTICULITIS: Status: ACTIVE | Noted: 2018-11-15

## 2018-11-15 LAB
ANION GAP SERPL CALCULATED.3IONS-SCNC: 8 MMOL/L (ref 3–14)
BUN SERPL-MCNC: 7 MG/DL (ref 7–30)
CALCIUM SERPL-MCNC: 8.1 MG/DL (ref 8.5–10.1)
CHLORIDE SERPL-SCNC: 108 MMOL/L (ref 94–109)
CO2 SERPL-SCNC: 25 MMOL/L (ref 20–32)
CREAT SERPL-MCNC: 0.76 MG/DL (ref 0.52–1.04)
ERYTHROCYTE [DISTWIDTH] IN BLOOD BY AUTOMATED COUNT: 17.5 % (ref 10–15)
GFR SERPL CREATININE-BSD FRML MDRD: 81 ML/MIN/1.7M2
GLUCOSE BLDC GLUCOMTR-MCNC: 109 MG/DL (ref 70–99)
GLUCOSE BLDC GLUCOMTR-MCNC: 140 MG/DL (ref 70–99)
GLUCOSE BLDC GLUCOMTR-MCNC: 143 MG/DL (ref 70–99)
GLUCOSE BLDC GLUCOMTR-MCNC: 189 MG/DL (ref 70–99)
GLUCOSE SERPL-MCNC: 138 MG/DL (ref 70–99)
HBA1C MFR BLD: 7.3 % (ref 0–5.6)
HCT VFR BLD AUTO: 30.9 % (ref 35–47)
HGB BLD-MCNC: 9.5 G/DL (ref 11.7–15.7)
MCH RBC QN AUTO: 23.6 PG (ref 26.5–33)
MCHC RBC AUTO-ENTMCNC: 30.7 G/DL (ref 31.5–36.5)
MCV RBC AUTO: 77 FL (ref 78–100)
PLATELET # BLD AUTO: 240 10E9/L (ref 150–450)
POTASSIUM SERPL-SCNC: 3.7 MMOL/L (ref 3.4–5.3)
RBC # BLD AUTO: 4.03 10E12/L (ref 3.8–5.2)
SODIUM SERPL-SCNC: 141 MMOL/L (ref 133–144)
WBC # BLD AUTO: 13 10E9/L (ref 4–11)

## 2018-11-15 PROCEDURE — 36415 COLL VENOUS BLD VENIPUNCTURE: CPT | Performed by: INTERNAL MEDICINE

## 2018-11-15 PROCEDURE — 00000146 ZZHCL STATISTIC GLUCOSE BY METER IP

## 2018-11-15 PROCEDURE — 25000132 ZZH RX MED GY IP 250 OP 250 PS 637: Performed by: INTERNAL MEDICINE

## 2018-11-15 PROCEDURE — 25000131 ZZH RX MED GY IP 250 OP 636 PS 637: Performed by: INTERNAL MEDICINE

## 2018-11-15 PROCEDURE — 25000128 H RX IP 250 OP 636: Performed by: INTERNAL MEDICINE

## 2018-11-15 PROCEDURE — 85027 COMPLETE CBC AUTOMATED: CPT | Performed by: INTERNAL MEDICINE

## 2018-11-15 PROCEDURE — 25000128 H RX IP 250 OP 636: Performed by: FAMILY MEDICINE

## 2018-11-15 PROCEDURE — 80048 BASIC METABOLIC PNL TOTAL CA: CPT | Performed by: INTERNAL MEDICINE

## 2018-11-15 PROCEDURE — 99223 1ST HOSP IP/OBS HIGH 75: CPT | Mod: AI | Performed by: INTERNAL MEDICINE

## 2018-11-15 PROCEDURE — 99207 ZZC CDG-MDM COMPONENT: MEETS MODERATE - UP CODED: CPT | Performed by: INTERNAL MEDICINE

## 2018-11-15 PROCEDURE — 12000000 ZZH R&B MED SURG/OB

## 2018-11-15 RX ORDER — NALOXONE HYDROCHLORIDE 0.4 MG/ML
.1-.4 INJECTION, SOLUTION INTRAMUSCULAR; INTRAVENOUS; SUBCUTANEOUS
Status: DISCONTINUED | OUTPATIENT
Start: 2018-11-15 | End: 2018-11-17 | Stop reason: HOSPADM

## 2018-11-15 RX ORDER — NICOTINE POLACRILEX 4 MG
15-30 LOZENGE BUCCAL
Status: DISCONTINUED | OUTPATIENT
Start: 2018-11-15 | End: 2018-11-17 | Stop reason: HOSPADM

## 2018-11-15 RX ORDER — HYDROMORPHONE HYDROCHLORIDE 1 MG/ML
.3-.5 INJECTION, SOLUTION INTRAMUSCULAR; INTRAVENOUS; SUBCUTANEOUS EVERY 4 HOURS PRN
Status: DISCONTINUED | OUTPATIENT
Start: 2018-11-15 | End: 2018-11-17 | Stop reason: HOSPADM

## 2018-11-15 RX ORDER — PROCHLORPERAZINE MALEATE 5 MG
10 TABLET ORAL EVERY 6 HOURS PRN
Status: DISCONTINUED | OUTPATIENT
Start: 2018-11-15 | End: 2018-11-17 | Stop reason: HOSPADM

## 2018-11-15 RX ORDER — ONDANSETRON 2 MG/ML
4 INJECTION INTRAMUSCULAR; INTRAVENOUS EVERY 6 HOURS PRN
Status: DISCONTINUED | OUTPATIENT
Start: 2018-11-15 | End: 2018-11-17 | Stop reason: HOSPADM

## 2018-11-15 RX ORDER — PROCHLORPERAZINE 25 MG
25 SUPPOSITORY, RECTAL RECTAL EVERY 12 HOURS PRN
Status: DISCONTINUED | OUTPATIENT
Start: 2018-11-15 | End: 2018-11-17 | Stop reason: HOSPADM

## 2018-11-15 RX ORDER — LISINOPRIL 2.5 MG/1
5 TABLET ORAL AT BEDTIME
Status: DISCONTINUED | OUTPATIENT
Start: 2018-11-15 | End: 2018-11-17 | Stop reason: HOSPADM

## 2018-11-15 RX ORDER — OXYCODONE HYDROCHLORIDE 5 MG/1
5 TABLET ORAL EVERY 4 HOURS PRN
Status: DISCONTINUED | OUTPATIENT
Start: 2018-11-15 | End: 2018-11-17 | Stop reason: HOSPADM

## 2018-11-15 RX ORDER — ACETAMINOPHEN 325 MG/1
650 TABLET ORAL EVERY 4 HOURS PRN
Status: DISCONTINUED | OUTPATIENT
Start: 2018-11-15 | End: 2018-11-17 | Stop reason: HOSPADM

## 2018-11-15 RX ORDER — DEXTROSE MONOHYDRATE 25 G/50ML
25-50 INJECTION, SOLUTION INTRAVENOUS
Status: DISCONTINUED | OUTPATIENT
Start: 2018-11-15 | End: 2018-11-17 | Stop reason: HOSPADM

## 2018-11-15 RX ORDER — SODIUM CHLORIDE 9 MG/ML
INJECTION, SOLUTION INTRAVENOUS CONTINUOUS
Status: DISCONTINUED | OUTPATIENT
Start: 2018-11-15 | End: 2018-11-16

## 2018-11-15 RX ORDER — ONDANSETRON 4 MG/1
4 TABLET, ORALLY DISINTEGRATING ORAL EVERY 6 HOURS PRN
Status: DISCONTINUED | OUTPATIENT
Start: 2018-11-15 | End: 2018-11-17 | Stop reason: HOSPADM

## 2018-11-15 RX ADMIN — SODIUM CHLORIDE: 900 INJECTION INTRAVENOUS at 11:42

## 2018-11-15 RX ADMIN — ENOXAPARIN SODIUM 40 MG: 40 INJECTION SUBCUTANEOUS at 09:04

## 2018-11-15 RX ADMIN — PIPERACILLIN SODIUM AND TAZOBACTAM SODIUM 3.38 G: 3; .375 INJECTION, POWDER, LYOPHILIZED, FOR SOLUTION INTRAVENOUS at 14:41

## 2018-11-15 RX ADMIN — PIPERACILLIN SODIUM AND TAZOBACTAM SODIUM 3.38 G: 3; .375 INJECTION, POWDER, LYOPHILIZED, FOR SOLUTION INTRAVENOUS at 19:21

## 2018-11-15 RX ADMIN — SODIUM CHLORIDE: 900 INJECTION INTRAVENOUS at 02:07

## 2018-11-15 RX ADMIN — SODIUM CHLORIDE: 900 INJECTION INTRAVENOUS at 22:42

## 2018-11-15 RX ADMIN — PIPERACILLIN SODIUM AND TAZOBACTAM SODIUM 3.38 G: 3; .375 INJECTION, POWDER, LYOPHILIZED, FOR SOLUTION INTRAVENOUS at 08:48

## 2018-11-15 RX ADMIN — PIPERACILLIN SODIUM AND TAZOBACTAM SODIUM 3.38 G: 3; .375 INJECTION, POWDER, LYOPHILIZED, FOR SOLUTION INTRAVENOUS at 02:06

## 2018-11-15 RX ADMIN — LISINOPRIL 5 MG: 2.5 TABLET ORAL at 21:28

## 2018-11-15 RX ADMIN — SODIUM CHLORIDE 1000 ML: 9 INJECTION, SOLUTION INTRAVENOUS at 00:13

## 2018-11-15 RX ADMIN — INSULIN ASPART 1 UNITS: 100 INJECTION, SOLUTION INTRAVENOUS; SUBCUTANEOUS at 09:13

## 2018-11-15 RX ADMIN — LISINOPRIL 5 MG: 2.5 TABLET ORAL at 02:06

## 2018-11-15 ASSESSMENT — ACTIVITIES OF DAILY LIVING (ADL)
ADLS_ACUITY_SCORE: 11
AMBULATION: 0-->INDEPENDENT
TRANSFERRING: 0-->INDEPENDENT
BATHING: 0 - INDEPENDENT
RETIRED_COMMUNICATION: 0-->UNDERSTANDS/COMMUNICATES WITHOUT DIFFICULTY
CHANGE_IN_FUNCTIONAL_STATUS_SINCE_ONSET_OF_CURRENT_ILLNESS/INJURY: NO
ADLS_ACUITY_SCORE: 11
ADLS_ACUITY_SCORE: 11
FALL_HISTORY_WITHIN_LAST_SIX_MONTHS: NO
ADLS_ACUITY_SCORE: 11
DRESS: 0-->INDEPENDENT
TOILETING: 0-->INDEPENDENT
RETIRED_EATING: 0-->INDEPENDENT
TOILETING: 0 - INDEPENDENT
AMBULATION: 0 - INDEPENDENT
SWALLOWING: 0-->SWALLOWS FOODS/LIQUIDS WITHOUT DIFFICULTY
ADLS_ACUITY_SCORE: 11
DRESS: 0 - INDEPENDENT
BATHING: 0-->INDEPENDENT
EATING: 0 - INDEPENDENT
COMMUNICATION: 0 - UNDERSTANDS/COMMUNICATES WITHOUT DIFFICULTY
TRANSFERRING: 0 - INDEPENDENT

## 2018-11-15 NOTE — H&P
[facility]    History and Physical  Hospitalist       Date of Admission:  11/14/2018  Date of Service (when I saw the patient): 11/15/18    Assessment & Plan       Active Problems:    Sigmoid diverticulitis    Assessment: presents with a 5 day history of worsening pain in association with concern for early sepsis as manifested by leucocytosis and tachycardia.  Lactic acid, BP and mentation are all normal.  She has had one previous episode that required hospitalization as well.  She has no perforation or abscess identified on CT.  Due to risk of worsening sepsis will admit for inpatient management.    Plan: Admit to inpatient, start IV zosyn, follow VS and WBC, IV fluids, antiemetics as needed, dilaudid as needed for pain      Bacterial sepsis (H) -early    Assessment: presents ill appearing with tachycardia and leucocytosis     Plan: as above      Type 2 diabetes mellitus with hyperglycemia, without long-term current use of insulin (H)    Assessment: chronic and reasonably well controlled    Plan: hold po meds, clear liquid diet and cover with prn novolog      Essential hypertension    Assessment: controlled    Plan: continue lisinopril      Personal history of DVT (deep vein thrombosis)    Assessment: noted past history    Plan: lovenox prophylactically      Anemia    Assessment: secondary to vaginal bleeding but slowly improving since her hysterectomy    Plan: follow          DVT Prophylaxis: Enoxaparin (Lovenox) SQ  Code Status: Full Code    Disposition: Expected discharge in 2-3 days once pain controlled, no signs of worsening sepsis and tolerating an advancing diet.    Yazan Nair MD    Primary Care Physician   Raoul Sherman    Chief Complaint   Abdominal pain    History is obtained from the patient    History of Present Illness   Lashawn Reddy is a 48 year old female who presents with abdominal pain and feeling feverish.  She has had symptoms for 5 days.   Her first symptom was pain in her LLQ.  She has not had nausea or vomiting.  Her stools have been normal.  Over the past day she started to feel feverish and her pain has worsened.  She has had diverticulitis previously and became concerned she may have a recurrence.      Past Medical History    I have reviewed this patient's medical history and updated it with pertinent information if needed.   Past Medical History:   Diagnosis Date     Depressive disorder, not elsewhere classified     depression in the post partum period after her daughter     Diabetes mellitus, type 2 (H) 6/6/2013     Diffuse cystic mastopathy     fibrocystic breast disease     Tobacco use disorder     quit in 2008     Type 2 diabetes, HbA1c goal < 7% (H) 6/6/2013       Past Surgical History   I have reviewed this patient's surgical history and updated it with pertinent information if needed.  Past Surgical History:   Procedure Laterality Date     C ANESTH,LOWER LEG VEIN SURG,NOS  2002    bilateral     C APPENDECTOMY  04/14/89     COLONOSCOPY  6/21/2013    Procedure: COLONOSCOPY;  colonoscopy;  Surgeon: Vamshi Loomis MD;  Location: [facility]     COLONOSCOPY N/A 2/22/2017    Procedure: COLONOSCOPY;  Surgeon: Raoul Sage MD;  Location:  GI     HC COLONOSCOPY W/WO BRUSH/WASH  06/17/2005     HC DILATION/CURETTAGE DIAG/THER NON OB  1986    after a miscarriage     HC REMOVAL OF TONSILS,<10 Y/O       HYSTERECTOMY TOTAL ABDOMINAL, SALPINGECTOMY Bilateral 9/12/2018    Procedure: HYSTERECTOMY TOTAL ABDOMINAL, SALPINGECTOMY;  Total Abdominal Hysterectomy, left Salpingectomy;  Surgeon: Temi Corado MD;  Location: [facility]     TUBAL LIGATION  08/04/2006       Prior to Admission Medications   Prior to Admission Medications   Prescriptions Last Dose Informant Patient Reported? Taking?   acetaminophen (TYLENOL) 325 MG tablet   No Yes   Sig: Take 2 tablets (650 mg) by mouth every 4 hours as needed for other (multimodal surgical pain management)    blood glucose monitoring (ACCU-CHEK FASTCLIX) lancets   No No   Si each 2 times daily   blood glucose monitoring (ACCU-CHEK GUIDE) test strip   No No   Sig: Use to test blood sugar 2 times daily or as directed.   ferrous gluconate (FERGON) 324 (38 Fe) MG tablet 2018 at Unknown time  No Yes   Sig: Take 1 tablet (324 mg) by mouth daily (with breakfast)   glipiZIDE (GLIPIZIDE XL) 10 MG 24 hr tablet 2018 at 1000  No Yes   Sig: Take 1 tablet (10 mg) by mouth daily   ibuprofen (ADVIL/MOTRIN) 600 MG tablet   No Yes   Sig: Take 1 tablet (600 mg) by mouth every 6 hours as needed for moderate pain   lisinopril (PRINIVIL/ZESTRIL) 10 MG tablet 2018 at   No Yes   Sig: Take 1 tablet (10 mg) by mouth daily   Patient taking differently: Take 10 mg by mouth daily    metFORMIN (GLUCOPHAGE-XR) 500 MG 24 hr tablet 2018 at   No Yes   Sig: TAKE 4 TABLETS (2,000MG) BY MOUTH EVERY DAY WITH DINNER   omeprazole (PRILOSEC) 20 MG CR capsule 2018 at 1000  No Yes   Sig: Take 1 capsule (20 mg) by mouth daily   rosuvastatin (CRESTOR) 20 MG tablet 2018 at   No Yes   Sig: Take 1 tablet (20 mg) by mouth daily   senna-docusate (SENOKOT-S;PERICOLACE) 8.6-50 MG per tablet 2018 at Unknown time  No Yes   Sig: Take 1 tablet by mouth 2 times daily as needed for constipation   sertraline (ZOLOFT) 50 MG tablet 2018 at   No Yes   Sig: TAKE 1/2 TABLET (25 MG) BY MOUTH EVERY MORNING      Facility-Administered Medications: None     Allergies   Allergies   Allergen Reactions     No Known Drug Allergies        Social History   I have reviewed this patient's social history and updated it with pertinent information if needed. Lashawn MOYA Barry  reports that she quit smoking about 10 years ago. Her smoking use included Cigarettes. She quit after 16.00 years of use. She has never used smokeless tobacco. She reports that she does not drink alcohol or use illicit drugs.    Family History   I have reviewed  this patient's family history and updated it with pertinent information if needed.   Family History   Problem Relation Age of Onset     Anesthesia Reaction Mother      Severe nausea     GASTROINTESTINAL DISEASE Mother      Partial colon removal secondary to a blockage     Gynecology Mother      hysterectomy     Lipids Mother      Lipids Father      Cancer - colorectal Father      dx Oct '03 (dx at age 57)     Cancer Father      skin     Hypertension Father      Cancer Maternal Grandmother      colon at age 68     Cerebrovascular Disease Paternal Grandfather      Cerebrovascular Disease Paternal Grandmother      brain aneurysm     Arthritis Sister      mainly affecting her legs     GASTROINTESTINAL DISEASE Sister      2 sisters with colon polyps     Cancer Sister      cervical ca     Cancer Maternal Aunt      all over ? breast was the origin     Cancer Maternal Aunt      pancreatic     HEART DISEASE Maternal Aunt      MI 2010       Review of Systems   The 10 point Review of Systems is negative other than noted in the HPI or here.     Physical Exam   Temp: 99.1  F (37.3  C) Temp src: Oral BP: 118/77   Heart Rate: 106 Resp: 20 SpO2: 96 %      Vital Signs with Ranges  Temp:  [99.1  F (37.3  C)] 99.1  F (37.3  C)  Heart Rate:  [106] 106  Resp:  [20] 20  BP: (115-179)/() 118/77  SpO2:  [94 %-98 %] 96 %  0 lbs 0 oz    Constitutional:   awake, alert, cooperative, no apparent distress, and appears stated age     Eyes:   Lids and lashes normal, pupils equal, round and reactive to light, extra ocular muscles intact, sclera clear, conjunctiva normal     ENT:   Normocephalic, without obvious abnormality, atraumatic, sinuses nontender on palpation, external ears without lesions, oral pharynx with moist mucous membranes, tonsils without erythema or exudates, gums normal and good dentition.     Neck:   Supple, symmetrical, trachea midline, no adenopathy, thyroid symmetric, not enlarged and no tenderness, skin normal      Hematologic / Lymphatic:   no cervical lymphadenopathy and no supraclavicular lymphadenopathy     Back:   Symmetric, no curvature, spinous processes are non-tender on palpation, paraspinous muscles are non-tender on palpation, no costal vertebral tenderness     Lungs:   No increased work of breathing, good air exchange, clear to auscultation bilaterally, no crackles or wheezing     Cardiovascular:   Tachycardic but regular with no audible murmur, rub or gallop     Abdomen:   normal bowel sounds, soft, non-distended, mild LLQ tenderness without rebound or guarding, no masses palpated, no hepatosplenomegally     Neurologic:   Awake, alert, oriented to name, place and time.  Cranial nerves II-XII are grossly intact.  Motor is 5 out of 5 bilaterally.    Sensory is intact.         Data   Data reviewed today:  I personally reviewed no images or EKG's today.    Recent Labs  Lab 11/14/18  2140   WBC 19.1*   HGB 10.3*   MCV 76*         POTASSIUM 4.1   CHLORIDE 104   CO2 24   BUN 8   CR 0.75   ANIONGAP 10   MICHAELA 8.6   *       Recent Results (from the past 24 hour(s))   CT Abdomen Pelvis w Contrast    Narrative    CT ABDOMEN AND PELVIS WITH CONTRAST   11/14/2018 10:26 PM     HISTORY: Lower abdominal pain.    COMPARISON: 11/28/2016.    TECHNIQUE: Following the uneventful administration of 100 mL  Isovue-370 intravenous contrast, helical sections were acquired from  the top of the diaphragm through the pubic symphysis. Coronal  reconstructions were generated. Radiation dose for this scan was  reduced using automated exposure control, adjustment of the mA and/or  kV according to the patient's size, or iterative reconstruction  technique.    FINDINGS:     Abdomen: The liver, spleen, pancreas, adrenal glands and kidneys are  unremarkable. The gallbladder is present. Small hiatal hernia. No  enlarged lymph nodes or free fluid in the upper abdomen.  Atherosclerotic calcification in the abdominal aorta.    Scan  through the lower chest is significant for a 1.4 x 1.2 cm nodule  in the right lung base in addition to a 1.5 x 1.1 cm mildly enlarged  right infrahilar lymph node (series 2 image 2). These findings were  also present on the comparison study dated 11/28/2016 and are  therefore likely of benign etiology.    Pelvis: The small and large bowel are normal in caliber. Several  diverticula are present in the colon. Mild wall thickening of a  several centimeter long segment of the mid sigmoid colon. There is  mild haziness within the fat about a diverticulum in this region (for  example, series 2 image 69). These findings are consistent with  diverticulitis. The inflammatory changes also abut the right ovary. No  extraluminal gas or loculated fluid collections in the pelvis. The  appendix is not visualized. The uterus is not visualized. 2.9 x 2.6 cm  right adnexal cyst (series 2 image 71) and 3.7 x 3.0 cm left adnexal  cyst (series 2 image 73). These are nonspecific, but likely represent  functional ovarian cysts. No enlarged lymph nodes or free fluid in the  pelvis.      Impression    IMPRESSION: Diverticulitis of the mid sigmoid colon. No abscess.    MARY SANCHEZ MD

## 2018-11-15 NOTE — ED NOTES
ED Nursing criteria listed below was addressed during verbal handoff:     Abnormal vitals: Yes  Abnormal results: Yes  Med Reconciliation completed: Yes  Meds given in ED: Yes  Any Overdue Meds: Yes  Core Measures: Yes  Isolation: N/A  Special needs: Yes  Skin assessment: Yes    Observation Patient  Education provided: N/A

## 2018-11-15 NOTE — ED TRIAGE NOTES
Pt presents with concerns of abdominal pain.  Pt states that pain started on Friday.  Pain would ease, but has gotten worse.  Pain is bilateral lower quadrant pain.  Denies difficulty with urination.  Last bowel movement was this morning, pt is on stool softener.

## 2018-11-15 NOTE — PROGRESS NOTES
S-(situation): Patient arrives to room 256 via cart from ED    B-(background): Diverticulitis    A-(assessment): VSS on RA.  C/o 1-2/10 pain in lower abd.  Bowel sounds active.  Lung sounds clear/diminished.  A&O x4, calm and cooperative.  Generalized weakness/malaise, ambulates guarded but independently with SBA.  No skin issues present.  IVFs infusing.  BG-140, no insulin admin per parameters.  Lovenox re-timed for morning per Pt request.      R-(recommendations): Orders reviewed with Pt. Will monitor patient per MD orders.     Inpatient nursing criteria listed below were met:    Health care directives status obtained and documented: Yes  SCD's Documented: lovenox   Skin issues/needs documented:NA  Isolation needs addressed, if appropriate: NA  Fall Prevention: Care plan updated, Education given and documented NA  MRSA swab completed for patient 55 years and older (exclude COLE and TKA): NA  Care Plan initiated: Yes  Education Assessment documented:Yes  Education Documented (Pre-existing chronic infection such as, MRSA/VRE need education on admission): Yes  Admission Medication Reconciliation completed: Yes  New medication patient education completed and documented (Possible Side Effects of Common Medications handout): Yes  Home medications if not able to send immediately home with family stored here: NA  Reminder note placed in discharge instructions: NA  Discharge planning review completed (admission navigator) Yes

## 2018-11-15 NOTE — ED PROVIDER NOTES
History     Chief Complaint   Patient presents with     Abdominal Pain     The history is provided by the patient.     Lashawn Reddy is a 48 year old female who presents to the Emergency Department with abdominal pain. She has a history of diverticulitis, with 7 day hospital stay, and f/u colonoscopy.    Patient reports the pain started on Friday (11/9). Cramping pain in her lower abdomen that would come and go. Tonight, the pain became worse, and wouldn't let up. She then started getting cold. She reports similar symptoms occurred when she previously had diverticulitis. She has been able to eat today. She is on stool softeners and has been having regular BM, passed a normal stool today. No blood in stool. No recent NSAID use. Hx of hysterectomy 2 mo ago, no complications, no reported vaginal bleeding. Hx of appendectomy. No increase in urinary frequency or burning with urination.     Problem List:    Patient Active Problem List    Diagnosis Date Noted     Sigmoid diverticulitis 11/15/2018     Priority: Medium     Anemia 11/15/2018     Priority: Medium     S/P hysterectomy 09/12/2018     Priority: Medium     Personal history of DVT (deep vein thrombosis) 08/30/2018     Priority: Medium     Morbid obesity (H) 07/09/2018     Priority: Medium     Essential hypertension 10/23/2017     Priority: Medium     Type 2 diabetes mellitus with hyperglycemia, without long-term current use of insulin (H) 02/09/2017     Priority: Medium     History of diverticulitis 11/28/2016     Priority: Medium     Bacterial sepsis (H) -early 11/28/2016     Priority: Medium     Lung nodule 11/28/2016     Priority: Medium     Mixed hyperlipidemia 05/03/2016     Priority: Medium     Menorrhagia with regular cycle 04/29/2016     Priority: Medium     PMS (premenstrual syndrome) 04/28/2016     Priority: Medium     Hyperlipidemia with target LDL less than 100 06/06/2013     Priority: Medium     Diagnosis updated by automated process. Provider to  review and confirm.       Family history of malignant neoplasm of gastrointestinal tract 05/24/2005     Priority: Medium     Varicose veins of lower extremities with complications 05/13/2005     Priority: Medium     Problem list name updated by automated process. Provider to review       Slow transit constipation 02/24/2003     Priority: Medium        Past Medical History:    Past Medical History:   Diagnosis Date     Depressive disorder, not elsewhere classified      Diabetes mellitus, type 2 (H) 6/6/2013     Diffuse cystic mastopathy      Tobacco use disorder      Type 2 diabetes, HbA1c goal < 7% (H) 6/6/2013       Past Surgical History:    Past Surgical History:   Procedure Laterality Date     C ANESTH,LOWER LEG VEIN SURG,NOS  2002    bilateral     C APPENDECTOMY  04/14/89     COLONOSCOPY  6/21/2013    Procedure: COLONOSCOPY;  colonoscopy;  Surgeon: Vamshi Loomis MD;  Location: [facility]     COLONOSCOPY N/A 2/22/2017    Procedure: COLONOSCOPY;  Surgeon: Raoul Sage MD;  Location:  GI     HC COLONOSCOPY W/WO BRUSH/WASH  06/17/2005     HC DILATION/CURETTAGE DIAG/THER NON OB  1986    after a miscarriage     HC REMOVAL OF TONSILS,<10 Y/O       HYSTERECTOMY TOTAL ABDOMINAL, SALPINGECTOMY Bilateral 9/12/2018    Procedure: HYSTERECTOMY TOTAL ABDOMINAL, SALPINGECTOMY;  Total Abdominal Hysterectomy, left Salpingectomy;  Surgeon: Temi Corado MD;  Location: [facility]     TUBAL LIGATION  08/04/2006       Family History:    Family History   Problem Relation Age of Onset     Anesthesia Reaction Mother      Severe nausea     GASTROINTESTINAL DISEASE Mother      Partial colon removal secondary to a blockage     Gynecology Mother      hysterectomy     Lipids Mother      Lipids Father      Cancer - colorectal Father      dx Oct '03 (dx at age 57)     Cancer Father      skin     Hypertension Father      Cancer Maternal Grandmother      colon at age 68     Cerebrovascular Disease Paternal Grandfather      Cerebrovascular  Disease Paternal Grandmother      brain aneurysm     Arthritis Sister      mainly affecting her legs     GASTROINTESTINAL DISEASE Sister      2 sisters with colon polyps     Cancer Sister      cervical ca     Cancer Maternal Aunt      all over ? breast was the origin     Cancer Maternal Aunt      pancreatic     HEART DISEASE Maternal Aunt      MI 2010       Social History:  Marital Status:   [2]  Social History   Substance Use Topics     Smoking status: Former Smoker     Years: 16.00     Types: Cigarettes     Quit date: 9/29/2008     Smokeless tobacco: Never Used     Alcohol use No      Comment: couple drinks per year        Medications:      acetaminophen (TYLENOL) 325 MG tablet   ferrous gluconate (FERGON) 324 (38 Fe) MG tablet   glipiZIDE (GLIPIZIDE XL) 10 MG 24 hr tablet   ibuprofen (ADVIL/MOTRIN) 600 MG tablet   lisinopril (PRINIVIL/ZESTRIL) 10 MG tablet   metFORMIN (GLUCOPHAGE-XR) 500 MG 24 hr tablet   omeprazole (PRILOSEC) 20 MG CR capsule   rosuvastatin (CRESTOR) 20 MG tablet   senna-docusate (SENOKOT-S;PERICOLACE) 8.6-50 MG per tablet   sertraline (ZOLOFT) 50 MG tablet   blood glucose monitoring (ACCU-CHEK FASTCLIX) lancets   blood glucose monitoring (ACCU-CHEK GUIDE) test strip         Review of Systems   All other systems reviewed and are negative.      Physical Exam   BP: (!) 179/104  Heart Rate: 106  Temp: 99.1  F (37.3  C)  Resp: 20  SpO2: 97 %      Physical Exam   Constitutional: She is oriented to person, place, and time.   Laying in bed, covered in blanket, shivering.   HENT:   Head: Normocephalic and atraumatic.   Mouth/Throat: Oropharynx is clear and moist.   Eyes: Conjunctivae are normal.   Neck: Normal range of motion.   Cardiovascular: Normal rate and regular rhythm.    No murmur heard.  Pulmonary/Chest: Effort normal and breath sounds normal.   Abdominal: Soft. She exhibits no distension and no mass.   Tenderness to palpation of lower abdomen, worst in suprapubic region. (-) Manning's  sign.    Musculoskeletal: Normal range of motion. She exhibits no edema.   Neurological: She is alert and oriented to person, place, and time.   Skin: Skin is warm.   Clammy.       ED Course     ED Course     Procedures    Results for orders placed or performed during the hospital encounter of 11/14/18 (from the past 24 hour(s))   CBC with platelets differential   Result Value Ref Range    WBC 19.1 (H) 4.0 - 11.0 10e9/L    RBC Count 4.28 3.8 - 5.2 10e12/L    Hemoglobin 10.3 (L) 11.7 - 15.7 g/dL    Hematocrit 32.6 (L) 35.0 - 47.0 %    MCV 76 (L) 78 - 100 fl    MCH 24.1 (L) 26.5 - 33.0 pg    MCHC 31.6 31.5 - 36.5 g/dL    RDW 17.5 (H) 10.0 - 15.0 %    Platelet Count 276 150 - 450 10e9/L    Diff Method Automated Method     % Neutrophils 83.4 %    % Lymphocytes 8.8 %    % Monocytes 6.3 %    % Eosinophils 0.9 %    % Basophils 0.2 %    % Immature Granulocytes 0.4 %    Nucleated RBCs 0 0 /100    Absolute Neutrophil 15.9 (H) 1.6 - 8.3 10e9/L    Absolute Lymphocytes 1.7 0.8 - 5.3 10e9/L    Absolute Monocytes 1.2 0.0 - 1.3 10e9/L    Absolute Basophils 0.0 0.0 - 0.2 10e9/L    Abs Immature Granulocytes 0.1 0 - 0.4 10e9/L    Absolute Nucleated RBC 0.0    Basic metabolic panel   Result Value Ref Range    Sodium 138 133 - 144 mmol/L    Potassium 4.1 3.4 - 5.3 mmol/L    Chloride 104 94 - 109 mmol/L    Carbon Dioxide 24 20 - 32 mmol/L    Anion Gap 10 3 - 14 mmol/L    Glucose 230 (H) 70 - 99 mg/dL    Urea Nitrogen 8 7 - 30 mg/dL    Creatinine 0.75 0.52 - 1.04 mg/dL    GFR Estimate 82 >60 mL/min/1.7m2    GFR Estimate If Black >90 >60 mL/min/1.7m2    Calcium 8.6 8.5 - 10.1 mg/dL   Lactic acid whole blood   Result Value Ref Range    Lactic Acid 1.2 0.7 - 2.0 mmol/L   CT Abdomen Pelvis w Contrast    Narrative    CT ABDOMEN AND PELVIS WITH CONTRAST   11/14/2018 10:26 PM     HISTORY: Lower abdominal pain.    COMPARISON: 11/28/2016.    TECHNIQUE: Following the uneventful administration of 100 mL  Isovue-370 intravenous contrast, helical  sections were acquired from  the top of the diaphragm through the pubic symphysis. Coronal  reconstructions were generated. Radiation dose for this scan was  reduced using automated exposure control, adjustment of the mA and/or  kV according to the patient's size, or iterative reconstruction  technique.    FINDINGS:     Abdomen: The liver, spleen, pancreas, adrenal glands and kidneys are  unremarkable. The gallbladder is present. Small hiatal hernia. No  enlarged lymph nodes or free fluid in the upper abdomen.  Atherosclerotic calcification in the abdominal aorta.    Scan through the lower chest is significant for a 1.4 x 1.2 cm nodule  in the right lung base in addition to a 1.5 x 1.1 cm mildly enlarged  right infrahilar lymph node (series 2 image 2). These findings were  also present on the comparison study dated 11/28/2016 and are  therefore likely of benign etiology.    Pelvis: The small and large bowel are normal in caliber. Several  diverticula are present in the colon. Mild wall thickening of a  several centimeter long segment of the mid sigmoid colon. There is  mild haziness within the fat about a diverticulum in this region (for  example, series 2 image 69). These findings are consistent with  diverticulitis. The inflammatory changes also abut the right ovary. No  extraluminal gas or loculated fluid collections in the pelvis. The  appendix is not visualized. The uterus is not visualized. 2.9 x 2.6 cm  right adnexal cyst (series 2 image 71) and 3.7 x 3.0 cm left adnexal  cyst (series 2 image 73). These are nonspecific, but likely represent  functional ovarian cysts. No enlarged lymph nodes or free fluid in the  pelvis.      Impression    IMPRESSION: Diverticulitis of the mid sigmoid colon. No abscess.    MARY SANCHEZ MD   UA with Microscopic   Result Value Ref Range    Color Urine Yellow     Appearance Urine Clear     Glucose Urine >499 (A) NEG^Negative mg/dL    Bilirubin Urine Negative NEG^Negative     Ketones Urine Negative NEG^Negative mg/dL    Specific Gravity Urine 1.023 1.003 - 1.035    Blood Urine Negative NEG^Negative    pH Urine 5.0 5.0 - 7.0 pH    Protein Albumin Urine Negative NEG^Negative mg/dL    Urobilinogen mg/dL 0.0 0.0 - 2.0 mg/dL    Nitrite Urine Negative NEG^Negative    Leukocyte Esterase Urine Negative NEG^Negative    Source Midstream Urine     WBC Urine <1 0 - 5 /HPF    RBC Urine <1 0 - 2 /HPF    Bacteria Urine Few (A) NEG^Negative /HPF    Squamous Epithelial /HPF Urine <1 0 - 1 /HPF    Mucous Urine Present (A) NEG^Negative /LPF       Medications   0.9% sodium chloride BOLUS (0 mLs Intravenous Stopped 11/14/18 2233)     Followed by   sodium chloride 0.9% infusion (0 mLs Intravenous ED Infusing on Admission/transfer 11/15/18 0025)   ondansetron (ZOFRAN) injection 4 mg (4 mg Intravenous Given 11/14/18 2131)   ampicillin-sulbactam (UNASYN) 3 g vial to attach to  mL bag (0 g Intravenous Stopped 11/15/18 0013)   ketorolac (TORADOL) injection 15 mg (15 mg Intravenous Given 11/14/18 2132)   sodium chloride 0.9 % bag 500mL for CT scan flush use (60 mLs Intravenous Given 11/14/18 2218)   iopamidol (ISOVUE-370) solution 100 mL (100 mLs Intravenous Given 11/14/18 2218)   sodium chloride (PF) 0.9% PF flush 3 mL (3 mLs Intracatheter Given 11/14/18 2217)       Assessments & Plan (with Medical Decision Making)  Lashawn is a 49 yo female who presents to the ED with 6 day history of abdominal pain. Hx of diverticulitis, and now presents with similar symptoms. Has cramping lower abdominal pain and chills. Hx of hysterectomy, appendectomy, and has no urinary symptoms. She is hypertensive (179/104), tachycardic (106), but afebrile (99.1). Physical exam revealed lower abdominal tenderness worse in suprapubic region.   IV access was obtained and labs were drawn. Administered IVF and Toradol for pain, with some relief. UA and BMP unremarkable. Patient met sepsis criteria as she was tachycardic with elevated WBC  (19.1) with suspected infection. Lactic acid was normal.   CT of abdomen revealed diverticulitis of the mid sigmoid colon, no abscess. She was started on Unasyn. Patient was admitted for management of diverticulitis with IV antibiotics as patient meets sepsis criteria and has previous history of diverticulitis with 7 day hospital stay. Patient was staffed with Dr Dennis. Patient agreeable with this plan of care.   We spoke with Dr. Nair, the hospitalist on call.  He has assumed her care and write orders.     I have reviewed the nursing notes.    I have reviewed the findings, diagnosis, plan and need for follow up with the patient.    New Prescriptions    No medications on file       Final diagnoses:   Diverticulitis of sigmoid colon   Sepsis, due to unspecified organism (H) - SIRS + infection     Patient was seen and examined by myself and Dr. Huang. The note was then scribed by me.     Mari Ruggiero, MS3  November 14, 2018 11/14/2018   [facility] EMERGENCY DEPARTMENT     Ranulfo Huang MD  11/15/18 0109

## 2018-11-15 NOTE — PROGRESS NOTES
SPIRITUAL HEALTH SERVICES  SPIRITUAL ASSESSMENT Progress Note  [facility]      During Rounding,  introduced himself to Lashawn Reddy and informed her of his availability.    Eleazar Saxena M.Div., [facility]  Staff   Office tel: [phone]

## 2018-11-16 LAB
ERYTHROCYTE [DISTWIDTH] IN BLOOD BY AUTOMATED COUNT: 17.8 % (ref 10–15)
GLUCOSE BLDC GLUCOMTR-MCNC: 123 MG/DL (ref 70–99)
GLUCOSE BLDC GLUCOMTR-MCNC: 140 MG/DL (ref 70–99)
GLUCOSE BLDC GLUCOMTR-MCNC: 158 MG/DL (ref 70–99)
GLUCOSE BLDC GLUCOMTR-MCNC: 162 MG/DL (ref 70–99)
GLUCOSE BLDC GLUCOMTR-MCNC: 192 MG/DL (ref 70–99)
HCT VFR BLD AUTO: 30.2 % (ref 35–47)
HGB BLD-MCNC: 9.1 G/DL (ref 11.7–15.7)
MCH RBC QN AUTO: 23.6 PG (ref 26.5–33)
MCHC RBC AUTO-ENTMCNC: 30.1 G/DL (ref 31.5–36.5)
MCV RBC AUTO: 78 FL (ref 78–100)
PLATELET # BLD AUTO: 222 10E9/L (ref 150–450)
RBC # BLD AUTO: 3.86 10E12/L (ref 3.8–5.2)
WBC # BLD AUTO: 8.9 10E9/L (ref 4–11)

## 2018-11-16 PROCEDURE — 25000128 H RX IP 250 OP 636: Performed by: INTERNAL MEDICINE

## 2018-11-16 PROCEDURE — 99207 ZZC CDG-CHARGE REQUIRED MANUAL ENTRY: CPT | Performed by: HOSPITALIST

## 2018-11-16 PROCEDURE — 25000132 ZZH RX MED GY IP 250 OP 250 PS 637: Performed by: HOSPITALIST

## 2018-11-16 PROCEDURE — 00000146 ZZHCL STATISTIC GLUCOSE BY METER IP

## 2018-11-16 PROCEDURE — 12000000 ZZH R&B MED SURG/OB

## 2018-11-16 PROCEDURE — 25000132 ZZH RX MED GY IP 250 OP 250 PS 637: Performed by: INTERNAL MEDICINE

## 2018-11-16 PROCEDURE — 99232 SBSQ HOSP IP/OBS MODERATE 35: CPT | Performed by: HOSPITALIST

## 2018-11-16 PROCEDURE — 36415 COLL VENOUS BLD VENIPUNCTURE: CPT | Performed by: INTERNAL MEDICINE

## 2018-11-16 PROCEDURE — 85027 COMPLETE CBC AUTOMATED: CPT | Performed by: INTERNAL MEDICINE

## 2018-11-16 RX ORDER — CIPROFLOXACIN 500 MG/1
500 TABLET, FILM COATED ORAL EVERY 12 HOURS SCHEDULED
Status: DISCONTINUED | OUTPATIENT
Start: 2018-11-16 | End: 2018-11-17 | Stop reason: HOSPADM

## 2018-11-16 RX ORDER — METRONIDAZOLE 500 MG/1
500 TABLET ORAL EVERY 8 HOURS SCHEDULED
Status: DISCONTINUED | OUTPATIENT
Start: 2018-11-16 | End: 2018-11-17 | Stop reason: HOSPADM

## 2018-11-16 RX ADMIN — INSULIN ASPART 1 UNITS: 100 INJECTION, SOLUTION INTRAVENOUS; SUBCUTANEOUS at 09:32

## 2018-11-16 RX ADMIN — INSULIN ASPART 1 UNITS: 100 INJECTION, SOLUTION INTRAVENOUS; SUBCUTANEOUS at 16:52

## 2018-11-16 RX ADMIN — PIPERACILLIN SODIUM AND TAZOBACTAM SODIUM 3.38 G: 3; .375 INJECTION, POWDER, LYOPHILIZED, FOR SOLUTION INTRAVENOUS at 02:35

## 2018-11-16 RX ADMIN — METRONIDAZOLE 500 MG: 500 TABLET, FILM COATED ORAL at 14:20

## 2018-11-16 RX ADMIN — LISINOPRIL 5 MG: 2.5 TABLET ORAL at 20:55

## 2018-11-16 RX ADMIN — CIPROFLOXACIN HYDROCHLORIDE 500 MG: 500 TABLET, FILM COATED ORAL at 13:17

## 2018-11-16 RX ADMIN — ENOXAPARIN SODIUM 40 MG: 40 INJECTION SUBCUTANEOUS at 09:33

## 2018-11-16 RX ADMIN — PIPERACILLIN SODIUM AND TAZOBACTAM SODIUM 3.38 G: 3; .375 INJECTION, POWDER, LYOPHILIZED, FOR SOLUTION INTRAVENOUS at 07:55

## 2018-11-16 RX ADMIN — CIPROFLOXACIN HYDROCHLORIDE 500 MG: 500 TABLET, FILM COATED ORAL at 20:55

## 2018-11-16 RX ADMIN — METRONIDAZOLE 500 MG: 500 TABLET, FILM COATED ORAL at 21:51

## 2018-11-16 ASSESSMENT — ACTIVITIES OF DAILY LIVING (ADL)
ADLS_ACUITY_SCORE: 11

## 2018-11-16 NOTE — PLAN OF CARE
Problem: Patient Care Overview  Goal: Plan of Care/Patient Progress Review  Patient has denied pain. No complaints of nausea. She reports passing gas but no stools. Pt has been up ambulating independently and tolerating well. Vitals stable.

## 2018-11-16 NOTE — PLAN OF CARE
Problem: Patient Care Overview  Goal: Plan of Care/Patient Progress Review  Outcome: Improving  Tolerated diabetic diet well, has denied pain or discomforts. Passing flatus, had BM x1. Up ambulating in halls independently. Oral antibiotics started, no complications noted. Will continue with POC.

## 2018-11-16 NOTE — PLAN OF CARE
Problem: Patient Care Overview  Goal: Plan of Care/Patient Progress Review  Outcome: Therapy, progress toward functional goals as expected  S-(situation): shift note    B-(background): diverticulitis    A-(assessment): Pt is A&O.  VSS.  Afebrile.  Denies pain at this time.  Has not needed medication.  Up and walking about room per self.  Tolerates her advance diet.  No nausea noted.  Bowel sounds present.      R-(recommendations): Will cont to monitor the above.

## 2018-11-16 NOTE — PROGRESS NOTES
[facility]    Hospitalist Progress Note      Assessment & Plan   Lashawn Reddy is a 48 year old female who was admitted on 11/14/2018. She was admitted due to diverticulitis. Pt was put on zosyn. Pt able to tolerate diet. Pt has no fever overnight. Vital stable overnight.     Principal Problem:    Sigmoid diverticulitis    Assessment: on zosyn, wbc is normal today.     Plan: will change zosyn to oral cipro and flagyl.continue diet. Possible discharge in AM    Active Problems:    Bacterial sepsis (H) -early    Assessment: due to diverticulitis     Plan: as above      Type 2 diabetes mellitus with hyperglycemia, without long-term current use of insulin (H)    Assessment: on metformin and glipizide at home    Plan: hold metformin and glipizide. Continue iss novolog      Essential hypertension    Assessment: on lisinopril     Plan: continue lisinopril      Personal history of DVT (deep vein thrombosis)    Assessment: on no anticoagulant    Plan: continue lovenox dvt prophylaxis only      Anemia    Assessment: hb is 9.1, seem to be baseline    Plan: monitor only       # Pain Assessment:  Current Pain Score 11/16/2018   Patient currently in pain? denies   Pain score (0-10) -   Pain location -   Pain descriptors -   - Lashawn is experiencing pain due to diverticulits. Pain management was discussed and the plan was created in a collaborative fashion.  Lashawn's response to the current recommendations: engaged  - Please see the plan for pain management as documented above        DVT Prophylaxis: Enoxaparin (Lovenox) SQ  Code Status: Full Code    Disposition: Expected discharge in 1 days once abd pain improved.    Saranya Peña    Interval History   Doing better today. No fever overnight.     -Data reviewed today: I reviewed all new labs and imaging results over the last 24 hours. I personally reviewed no images or EKG's today.  Recent Results (from the past 168 hour(s))   Hemoglobin A1c     Collection Time: 11/14/18  9:39 PM   Result Value Ref Range    Hemoglobin A1C 7.3 (H) 0 - 5.6 %   CBC with platelets differential    Collection Time: 11/14/18  9:40 PM   Result Value Ref Range    WBC 19.1 (H) 4.0 - 11.0 10e9/L    RBC Count 4.28 3.8 - 5.2 10e12/L    Hemoglobin 10.3 (L) 11.7 - 15.7 g/dL    Hematocrit 32.6 (L) 35.0 - 47.0 %    MCV 76 (L) 78 - 100 fl    MCH 24.1 (L) 26.5 - 33.0 pg    MCHC 31.6 31.5 - 36.5 g/dL    RDW 17.5 (H) 10.0 - 15.0 %    Platelet Count 276 150 - 450 10e9/L    Diff Method Automated Method     % Neutrophils 83.4 %    % Lymphocytes 8.8 %    % Monocytes 6.3 %    % Eosinophils 0.9 %    % Basophils 0.2 %    % Immature Granulocytes 0.4 %    Nucleated RBCs 0 0 /100    Absolute Neutrophil 15.9 (H) 1.6 - 8.3 10e9/L    Absolute Lymphocytes 1.7 0.8 - 5.3 10e9/L    Absolute Monocytes 1.2 0.0 - 1.3 10e9/L    Absolute Basophils 0.0 0.0 - 0.2 10e9/L    Abs Immature Granulocytes 0.1 0 - 0.4 10e9/L    Absolute Nucleated RBC 0.0    Basic metabolic panel    Collection Time: 11/14/18  9:40 PM   Result Value Ref Range    Sodium 138 133 - 144 mmol/L    Potassium 4.1 3.4 - 5.3 mmol/L    Chloride 104 94 - 109 mmol/L    Carbon Dioxide 24 20 - 32 mmol/L    Anion Gap 10 3 - 14 mmol/L    Glucose 230 (H) 70 - 99 mg/dL    Urea Nitrogen 8 7 - 30 mg/dL    Creatinine 0.75 0.52 - 1.04 mg/dL    GFR Estimate 82 >60 mL/min/1.7m2    GFR Estimate If Black >90 >60 mL/min/1.7m2    Calcium 8.6 8.5 - 10.1 mg/dL   Lactic acid whole blood    Collection Time: 11/14/18  9:40 PM   Result Value Ref Range    Lactic Acid 1.2 0.7 - 2.0 mmol/L   UA with Microscopic    Collection Time: 11/14/18 10:36 PM   Result Value Ref Range    Color Urine Yellow     Appearance Urine Clear     Glucose Urine >499 (A) NEG^Negative mg/dL    Bilirubin Urine Negative NEG^Negative    Ketones Urine Negative NEG^Negative mg/dL    Specific Gravity Urine 1.023 1.003 - 1.035    Blood Urine Negative NEG^Negative    pH Urine 5.0 5.0 - 7.0 pH    Protein Albumin  Urine Negative NEG^Negative mg/dL    Urobilinogen mg/dL 0.0 0.0 - 2.0 mg/dL    Nitrite Urine Negative NEG^Negative    Leukocyte Esterase Urine Negative NEG^Negative    Source Midstream Urine     WBC Urine <1 0 - 5 /HPF    RBC Urine <1 0 - 2 /HPF    Bacteria Urine Few (A) NEG^Negative /HPF    Squamous Epithelial /HPF Urine <1 0 - 1 /HPF    Mucous Urine Present (A) NEG^Negative /LPF   Glucose by meter    Collection Time: 11/15/18  1:51 AM   Result Value Ref Range    Glucose 140 (H) 70 - 99 mg/dL   Basic metabolic panel    Collection Time: 11/15/18  5:13 AM   Result Value Ref Range    Sodium 141 133 - 144 mmol/L    Potassium 3.7 3.4 - 5.3 mmol/L    Chloride 108 94 - 109 mmol/L    Carbon Dioxide 25 20 - 32 mmol/L    Anion Gap 8 3 - 14 mmol/L    Glucose 138 (H) 70 - 99 mg/dL    Urea Nitrogen 7 7 - 30 mg/dL    Creatinine 0.76 0.52 - 1.04 mg/dL    GFR Estimate 81 >60 mL/min/1.7m2    GFR Estimate If Black >90 >60 mL/min/1.7m2    Calcium 8.1 (L) 8.5 - 10.1 mg/dL   CBC with platelets    Collection Time: 11/15/18  5:13 AM   Result Value Ref Range    WBC 13.0 (H) 4.0 - 11.0 10e9/L    RBC Count 4.03 3.8 - 5.2 10e12/L    Hemoglobin 9.5 (L) 11.7 - 15.7 g/dL    Hematocrit 30.9 (L) 35.0 - 47.0 %    MCV 77 (L) 78 - 100 fl    MCH 23.6 (L) 26.5 - 33.0 pg    MCHC 30.7 (L) 31.5 - 36.5 g/dL    RDW 17.5 (H) 10.0 - 15.0 %    Platelet Count 240 150 - 450 10e9/L   Glucose by meter    Collection Time: 11/15/18  7:49 AM   Result Value Ref Range    Glucose 143 (H) 70 - 99 mg/dL   Glucose by meter    Collection Time: 11/15/18  4:45 PM   Result Value Ref Range    Glucose 109 (H) 70 - 99 mg/dL   Glucose by meter    Collection Time: 11/15/18  9:00 PM   Result Value Ref Range    Glucose 189 (H) 70 - 99 mg/dL   Glucose by meter    Collection Time: 11/16/18  3:30 AM   Result Value Ref Range    Glucose 140 (H) 70 - 99 mg/dL   CBC with platelets    Collection Time: 11/16/18  5:38 AM   Result Value Ref Range    WBC 8.9 4.0 - 11.0 10e9/L    RBC Count 3.86  3.8 - 5.2 10e12/L    Hemoglobin 9.1 (L) 11.7 - 15.7 g/dL    Hematocrit 30.2 (L) 35.0 - 47.0 %    MCV 78 78 - 100 fl    MCH 23.6 (L) 26.5 - 33.0 pg    MCHC 30.1 (L) 31.5 - 36.5 g/dL    RDW 17.8 (H) 10.0 - 15.0 %    Platelet Count 222 150 - 450 10e9/L   Glucose by meter    Collection Time: 11/16/18  7:40 AM   Result Value Ref Range    Glucose 158 (H) 70 - 99 mg/dL   Glucose by meter    Collection Time: 11/16/18 11:34 AM   Result Value Ref Range    Glucose 123 (H) 70 - 99 mg/dL        Physical Exam   Temp: 97.6  F (36.4  C) Temp src: Oral BP: 147/78 Pulse: 63   Resp: 16 SpO2: 97 % O2 Device: None (Room air)    Vitals:    11/15/18 0130   Weight: 115.4 kg (254 lb 8 oz)     Vital Signs with Ranges  Temp:  [96.1  F (35.6  C)-97.7  F (36.5  C)] 97.6  F (36.4  C)  Pulse:  [63-71] 63  Resp:  [16-18] 16  BP: (121-147)/(65-78) 147/78  SpO2:  [97 %-99 %] 97 %  I/O last 3 completed shifts:  In: 3771 [P.O.:1780; I.V.:1991]  Out: 1475 [Urine:1475]    Constitutional: Alert oriented in minimal distress  Respiratory: Clear to auscultation bilaterally  Cardiovascular: regular rate and rhythm  GI: bowel sound normal. Minimal tenderness on suprapubic area  Skin/Integumen: no rash or bruise  Other:      Medications       ciprofloxacin  500 mg Oral Q12H ANGIE     enoxaparin  40 mg Subcutaneous Daily     insulin aspart  1-7 Units Subcutaneous TID AC     insulin aspart  1-5 Units Subcutaneous At Bedtime     lisinopril  5 mg Oral At Bedtime     metroNIDAZOLE  500 mg Oral Q8H ANGIE       No results found for this or any previous visit (from the past 24 hour(s)).

## 2018-11-17 VITALS
HEIGHT: 66 IN | SYSTOLIC BLOOD PRESSURE: 133 MMHG | RESPIRATION RATE: 18 BRPM | BODY MASS INDEX: 40.9 KG/M2 | DIASTOLIC BLOOD PRESSURE: 73 MMHG | WEIGHT: 254.5 LBS | OXYGEN SATURATION: 98 % | HEART RATE: 64 BPM | TEMPERATURE: 97 F

## 2018-11-17 LAB
ANION GAP SERPL CALCULATED.3IONS-SCNC: 9 MMOL/L (ref 3–14)
BASOPHILS # BLD AUTO: 0 10E9/L (ref 0–0.2)
BASOPHILS NFR BLD AUTO: 0.4 %
BUN SERPL-MCNC: 8 MG/DL (ref 7–30)
CALCIUM SERPL-MCNC: 8.2 MG/DL (ref 8.5–10.1)
CHLORIDE SERPL-SCNC: 106 MMOL/L (ref 94–109)
CO2 SERPL-SCNC: 25 MMOL/L (ref 20–32)
CREAT SERPL-MCNC: 0.76 MG/DL (ref 0.52–1.04)
DIFFERENTIAL METHOD BLD: ABNORMAL
EOSINOPHIL NFR BLD AUTO: 3.8 %
ERYTHROCYTE [DISTWIDTH] IN BLOOD BY AUTOMATED COUNT: 17.5 % (ref 10–15)
GFR SERPL CREATININE-BSD FRML MDRD: 80 ML/MIN/1.7M2
GLUCOSE BLDC GLUCOMTR-MCNC: 157 MG/DL (ref 70–99)
GLUCOSE BLDC GLUCOMTR-MCNC: 165 MG/DL (ref 70–99)
GLUCOSE SERPL-MCNC: 173 MG/DL (ref 70–99)
HCT VFR BLD AUTO: 30.5 % (ref 35–47)
HGB BLD-MCNC: 9.2 G/DL (ref 11.7–15.7)
IMM GRANULOCYTES # BLD: 0 10E9/L (ref 0–0.4)
IMM GRANULOCYTES NFR BLD: 0.2 %
LYMPHOCYTES # BLD AUTO: 2.3 10E9/L (ref 0.8–5.3)
LYMPHOCYTES NFR BLD AUTO: 25.8 %
MCH RBC QN AUTO: 23.8 PG (ref 26.5–33)
MCHC RBC AUTO-ENTMCNC: 30.2 G/DL (ref 31.5–36.5)
MCV RBC AUTO: 79 FL (ref 78–100)
MONOCYTES # BLD AUTO: 0.5 10E9/L (ref 0–1.3)
MONOCYTES NFR BLD AUTO: 5.8 %
NEUTROPHILS # BLD AUTO: 5.8 10E9/L (ref 1.6–8.3)
NEUTROPHILS NFR BLD AUTO: 64 %
NRBC # BLD AUTO: 0 10*3/UL
NRBC BLD AUTO-RTO: 0 /100
PLATELET # BLD AUTO: 236 10E9/L (ref 150–450)
POTASSIUM SERPL-SCNC: 4 MMOL/L (ref 3.4–5.3)
RBC # BLD AUTO: 3.87 10E12/L (ref 3.8–5.2)
SODIUM SERPL-SCNC: 140 MMOL/L (ref 133–144)
WBC # BLD AUTO: 9.1 10E9/L (ref 4–11)

## 2018-11-17 PROCEDURE — 85025 COMPLETE CBC W/AUTO DIFF WBC: CPT | Performed by: INTERNAL MEDICINE

## 2018-11-17 PROCEDURE — 80048 BASIC METABOLIC PNL TOTAL CA: CPT | Performed by: INTERNAL MEDICINE

## 2018-11-17 PROCEDURE — 99238 HOSP IP/OBS DSCHRG MGMT 30/<: CPT | Performed by: INTERNAL MEDICINE

## 2018-11-17 PROCEDURE — 36415 COLL VENOUS BLD VENIPUNCTURE: CPT | Performed by: INTERNAL MEDICINE

## 2018-11-17 PROCEDURE — 00000146 ZZHCL STATISTIC GLUCOSE BY METER IP

## 2018-11-17 PROCEDURE — 25000132 ZZH RX MED GY IP 250 OP 250 PS 637: Performed by: HOSPITALIST

## 2018-11-17 RX ORDER — LISINOPRIL 5 MG/1
5 TABLET ORAL AT BEDTIME
Qty: 30 TABLET | Status: ON HOLD
Start: 2018-11-17 | End: 2019-03-04

## 2018-11-17 RX ORDER — METRONIDAZOLE 500 MG/1
500 TABLET ORAL EVERY 8 HOURS
Qty: 21 TABLET | Refills: 0 | Status: ON HOLD | OUTPATIENT
Start: 2018-11-17 | End: 2019-03-04

## 2018-11-17 RX ORDER — CIPROFLOXACIN 500 MG/1
500 TABLET, FILM COATED ORAL EVERY 12 HOURS
Qty: 14 TABLET | Refills: 0 | Status: ON HOLD | OUTPATIENT
Start: 2018-11-17 | End: 2019-03-04

## 2018-11-17 RX ADMIN — CIPROFLOXACIN HYDROCHLORIDE 500 MG: 500 TABLET, FILM COATED ORAL at 08:03

## 2018-11-17 RX ADMIN — METRONIDAZOLE 500 MG: 500 TABLET, FILM COATED ORAL at 06:46

## 2018-11-17 RX ADMIN — INSULIN ASPART 1 UNITS: 100 INJECTION, SOLUTION INTRAVENOUS; SUBCUTANEOUS at 07:53

## 2018-11-17 ASSESSMENT — ACTIVITIES OF DAILY LIVING (ADL)
ADLS_ACUITY_SCORE: 11

## 2018-11-17 NOTE — PLAN OF CARE
"Problem: Patient Care Overview  Goal: Plan of Care/Patient Progress Review  Outcome: Improving  Blood pressure 133/73, pulse 74, temperature 97.3  F (36.3  C), temperature source Oral, resp. rate 18, height 1.676 m (5' 6\"), weight 115.4 kg (254 lb 8 oz), last menstrual period 09/03/2018, SpO2 98 %, not currently breastfeeding.    Pt is alert and oriented. VSS. Afebrile. Denies having pain, SOB and N/V. Pt up independent. Saline locked. Continue to monitor.      "

## 2018-11-17 NOTE — PROGRESS NOTES
S-(situation): 4 hour shift note    B-(background): sigmoid diverticulitis    A-(assessment): HR and BP controlled RA afebrile patient tolerating a CCDiet without s/s of n/v and or pain. Patient ambulating in the room independently. Good uop.     R-(recommendations):  Will continue to monitor per POC.

## 2018-11-17 NOTE — DISCHARGE SUMMARY
[facility]    Discharge Summary  Hospitalist    Date of Admission:  11/14/2018  Date of Discharge:  11/17/2018  Discharging Provider: Yazan Nair MD  Date of Service (when I saw the patient): 11/17/18    Discharge Diagnoses   Principal Problem:    Sigmoid diverticulitis  Active Problems:    Bacterial sepsis (H) -early    Type 2 diabetes mellitus with hyperglycemia, without long-term current use of insulin (H)    Essential hypertension    Personal history of DVT (deep vein thrombosis)    Anemia       History of Present Illness   Copied from H&P:   Lashawn Reddy is a 48 year old female who presents with abdominal pain and feeling feverish.  She has had symptoms for 5 days.  Her first symptom was pain in her LLQ.  She has not had nausea or vomiting.  Her stools have been normal.  Over the past day she started to feel feverish and her pain has worsened.  She has had diverticulitis previously and became concerned she may have a recurrence    Hospital Course   Lashawn Reddy was admitted on 11/14/2018.  The following problems were addressed during her hospitalization:    Principal Problem:    Sigmoid diverticulitis    Assessment: Treated with IV zosyn and then as her signs of sepsis resolved her diet was advanced and she was started on cipro and flagyl.  She tolerated an advancing diet with no pain or nausea and no recurrent signs of sepsis even after being on po meds for 24 hours and was therefore felt safe for discharge    Plan: discharge home on 7 days of cipro and flagyl and recheck within one week    Active Problems:    Bacterial sepsis (H) -early    Assessment: had leucocytosis and tachycardia but never developed severe sepsis    Plan: resolved      Type 2 diabetes mellitus with hyperglycemia, without long-term current use of insulin (H)    Assessment: BS controlled with restricted diet and novolog.  BS reasonably controlled at discharge    Plan: discharge on metformin but  will hold glipizide for now while she is on the quinolone in order to avoid hypoglycemia.  Recheck with PMD and monitor BS closely      Essential hypertension    Assessment: controlled on ACEI    Plan: no change      Personal history of DVT (deep vein thrombosis)    Assessment: prophylaxed with lovenox    Plan: ambulate at discharge      Anemia    Assessment: slowly coming up since having her hysterectomy and being on iron    Plan: outpatient recheck      Yazan Nair MD    Significant Results and Procedures   No procedures performed during this admission    Pending Results   These results will be followed up by   Unresulted Labs Ordered in the Past 30 Days of this Admission     No orders found from 9/15/2018 to 11/15/2018.          Code Status   Full Code       Primary Care Physician   Raoul Sherman    Physical Exam   Temp: 97.3  F (36.3  C) Temp src: Oral BP: 133/73 Pulse: 74 Heart Rate: 74 Resp: 18 SpO2: 98 % O2 Device: None (Room air)    Vitals:    11/15/18 0130   Weight: 115.4 kg (254 lb 8 oz)     Vital Signs with Ranges  Temp:  [97.3  F (36.3  C)-98.2  F (36.8  C)] 97.3  F (36.3  C)  Pulse:  [63-75] 74  Heart Rate:  [73-74] 74  Resp:  [16-18] 18  BP: (133-147)/(73-80) 133/73  SpO2:  [97 %-99 %] 98 %  I/O last 3 completed shifts:  In: 2731 [P.O.:1540; I.V.:1191]  Out: 2200 [Urine:2200]    Lungs:  CTA throughout  CV:  RRR without murmur  Abdomen: +BS, Soft, NT        Discharge Disposition   Discharged to home  Condition at discharge: Stable    Consultations This Hospital Stay   None    Time Spent on this Encounter   I, Yazan Nair MD, personally saw the patient today and spent less than or equal to 30 minutes discharging this patient.    Discharge Orders     Follow-up and recommended labs and tests    Follow up with Dr. Sherman within one week     Activity   Your activity upon discharge: activity as tolerated     Monitor and record   blood glucose 4 times a day, before meals and at  bedtime     Diet   Follow this diet upon discharge: Low consistent carbohydrate (4932-1574 héctor / 3-5 CHO units per meal)       Discharge Medications   Current Discharge Medication List      START taking these medications    Details   ciprofloxacin (CIPRO) 500 MG tablet Take 1 tablet (500 mg) by mouth every 12 hours for 7 days  Qty: 14 tablet, Refills: 0    Associated Diagnoses: Diverticulitis of sigmoid colon      metroNIDAZOLE (FLAGYL) 500 MG tablet Take 1 tablet (500 mg) by mouth every 8 hours for 7 days  Qty: 21 tablet, Refills: 0    Associated Diagnoses: Diverticulitis of sigmoid colon         CONTINUE these medications which have CHANGED    Details   lisinopril (PRINIVIL/ZESTRIL) 5 MG tablet Take 1 tablet (5 mg) by mouth At Bedtime  Qty: 30 tablet    Associated Diagnoses: Essential hypertension         CONTINUE these medications which have NOT CHANGED    Details   acetaminophen (TYLENOL) 325 MG tablet Take 2 tablets (650 mg) by mouth every 4 hours as needed for other (multimodal surgical pain management)  Qty: 40 tablet, Refills: 0    Associated Diagnoses: S/P hysterectomy      ferrous gluconate (FERGON) 324 (38 Fe) MG tablet Take 1 tablet (324 mg) by mouth daily (with breakfast)  Qty: 60 tablet, Refills: 1    Associated Diagnoses: Iron deficiency anemia, unspecified iron deficiency anemia type      ibuprofen (ADVIL/MOTRIN) 600 MG tablet Take 1 tablet (600 mg) by mouth every 6 hours as needed for moderate pain  Qty: 40 tablet, Refills: 0    Associated Diagnoses: S/P hysterectomy      metFORMIN (GLUCOPHAGE-XR) 500 MG 24 hr tablet TAKE 4 TABLETS (2,000MG) BY MOUTH EVERY DAY WITH DINNER  Qty: 360 tablet, Refills: 1    Associated Diagnoses: Type 2 diabetes mellitus with hyperglycemia (H)      omeprazole (PRILOSEC) 20 MG CR capsule Take 1 capsule (20 mg) by mouth daily  Qty: 90 capsule, Refills: 3    Associated Diagnoses: Gastroesophageal reflux disease without esophagitis      rosuvastatin (CRESTOR) 20 MG tablet  Take 1 tablet (20 mg) by mouth daily  Qty: 90 tablet, Refills: 1    Comments: Patient enrolled in our Rx Med Sync service to improveadherence. We are requesting a refill authorization inadvance to ensure an active prescription is on file.  Associated Diagnoses: Mixed hyperlipidemia; Type 2 diabetes mellitus with hyperglycemia, without long-term current use of insulin (H)      senna-docusate (SENOKOT-S;PERICOLACE) 8.6-50 MG per tablet Take 1 tablet by mouth 2 times daily as needed for constipation  Qty: 30 tablet, Refills: 0    Associated Diagnoses: S/P hysterectomy      sertraline (ZOLOFT) 50 MG tablet TAKE 1/2 TABLET (25 MG) BY MOUTH EVERY MORNING  Qty: 45 tablet, Refills: 1    Comments: Patient enrolled in our Rx Med Sync service to improveadherence. We are requesting a refill authorization inadvance to ensure an active prescription is on file.  Associated Diagnoses: PMS (premenstrual syndrome)      blood glucose monitoring (ACCU-CHEK FASTCLIX) lancets 1 each 2 times daily  Qty: 102 each, Refills: 3    Associated Diagnoses: Type 2 diabetes mellitus with hyperglycemia, without long-term current use of insulin (H)      blood glucose monitoring (ACCU-CHEK GUIDE) test strip Use to test blood sugar 2 times daily or as directed.  Qty: 100 strip, Refills: 11    Associated Diagnoses: Type 2 diabetes mellitus with hyperglycemia, without long-term current use of insulin (H)         STOP taking these medications       glipiZIDE (GLIPIZIDE XL) 10 MG 24 hr tablet Comments:   Reason for Stopping:             Allergies   Allergies   Allergen Reactions     No Known Drug Allergies      Data   Most Recent 3 CBC's:  Recent Labs   Lab Test  11/17/18   0521  11/16/18   0538  11/15/18   0513   WBC  9.1  8.9  13.0*   HGB  9.2*  9.1*  9.5*   MCV  79  78  77*   PLT  236  222  240      Most Recent 3 BMP's:  Recent Labs   Lab Test  11/17/18   0521  11/15/18   0513  11/14/18   2140   NA  140  141  138   POTASSIUM  4.0  3.7  4.1   CHLORIDE   106  108  104   CO2  25  25  24   BUN  8  7  8   CR  0.76  0.76  0.75   ANIONGAP  9  8  10   MICHAELA  8.2*  8.1*  8.6   GLC  173*  138*  230*     Most Recent 2 LFT's:  Recent Labs   Lab Test  10/25/18   1126  09/21/18   1722  09/21/18   1509   AST  11  12  11   ALT  18  18  20   ALKPHOS   --   92  92   BILITOTAL   --   0.3  0.3     Most Recent INR's and Anticoagulation Dosing History:  Anticoagulation Dose History     Recent Dosing and Labs Latest Ref Rng & Units 12/27/2013 1/9/2014 1/22/2014 2/6/2014 2/28/2014 4/11/2014 9/21/2018    INR 0.86 - 1.14 - - - - - - -    INR 0.86 - 1.14 - - - - - - -    INR Point of Care 0.86 - 1.14 1.9(H) 1.9(H) 2.2(H) 2.3(H) 2.5(H) 2.3(H) 1.1        Most Recent 3 Troponin's:  Recent Labs   Lab Test  09/21/18   1722   TROPI  <0.015     Most Recent Cholesterol Panel:  Recent Labs   Lab Test  10/25/18   1126   CHOL  151   LDL  69   HDL  63   TRIG  95     Most Recent 6 Bacteria Isolates From Any Culture (See EPIC Reports for Culture Details):  Recent Labs   Lab Test  09/14/18   0245  09/14/18   0132  09/14/18   0118  11/30/16   1245  11/28/16   2105  11/28/16   2042   CULT  <10,000 colonies/mL  mixed urogenital matt    No growth  No growth  No growth  No growth after 6 days  No growth after 6 days     Most Recent TSH, T4 and A1c Labs:  Recent Labs   Lab Test  11/14/18   2139   09/21/18   1722   TSH   --    --   1.57   A1C  7.3*   < >   --     < > = values in this interval not displayed.

## 2018-11-17 NOTE — PROGRESS NOTES
S-(situation): Patient discharged to home with     B-(background): sigmoid diverticulitis    A-(assessment): HR and BP controlled,afebrile and patient is on RA. TOlerating a regular diet without any s/s of n/v and or pain, Lungs clear.    R-(recommendations): Discharge instructions reviewed with patient. Listed belongings gathered and returned to patient.          Discharge Nursing Criteria:     Care Plan and Patient education resolved: Yes    New Medications- pt has been educated about purpose and side effects: Yes    Vaccines  Influenza status verified at discharge:  Yes

## 2018-11-19 ENCOUNTER — TELEPHONE (OUTPATIENT)
Dept: FAMILY MEDICINE | Facility: CLINIC | Age: 48
End: 2018-11-19

## 2018-11-19 NOTE — TELEPHONE ENCOUNTER
Patient called to schedule an appointment for a hospital follow-up or appeared on a report showing that they were recently discharged from the hospital.    Patient was admitted to [facility]:  11/14/18  Discharged date: 11/17/18  Reason for hospital admission:  Diverticulitis of Sigmoid Colon, HTN  Does patient have future appointment scheduled with provider? Yes Dr. Sherman  Date of future appointment:  11/27/18      This information will be used to help the care team plan for the patients upcoming visit.  The triage RN may determine that a follow up call is necessary and reach out to the patient via the phone number listed in the chart.     Please route this message on routine priority to the Triage RN pool.

## 2018-11-19 NOTE — TELEPHONE ENCOUNTER
Follow-up and recommended labs and tests    Follow up with Dr. Sherman within one week      Activity   Your activity upon discharge: activity as tolerated      Monitor and record   blood glucose 4 times a day, before meals and at bedtime      Diet   Follow this diet upon discharge: Low consistent carbohydrate (2396-3676 héctor / 3-5 CHO units per meal)     ED / Discharge Outreach Protocol    Patient Contact    Attempt # 1    Was call answered?  No.  Left message on voicemail with information to call me back.  Veronika Grande, TARANN, RN

## 2018-11-20 NOTE — TELEPHONE ENCOUNTER
"Hospital/TCU/ED for chronic condition Discharge Protocol    \"Hi, my name is Lyn Anna, a registered nurse, and I am calling from [facility].  I am calling to follow up and see how things are going for you after your recent emergency visit/hospital/TCU stay.\"    Tell me how you are doing now that you are home?\" I am ok. I am having a lot of diarrhea.  I think its due to all of the antibiotics I am taking.\"      Discharge Instructions    \"Let's review your discharge instructions.  What is/are the follow-up recommendations?  Pt. Response: Low carb diet but I am so nauseated.     \"Has an appointment with your primary care provider been scheduled?\"   Yes. (confirm)    \"When you see the provider, I would recommend that you bring your medications with you.\"    Medications    \"Tell me what changed about your medicines when you discharged?\"    Changes to chronic meds?    0-1    \"What questions do you have about your medications?\"    None     New diagnoses of heart failure, COPD, diabetes, or MI?    No     On insulin: \"Did you start on insulin in the hospital or did you have your insulin dose changed?\"  No         Medication reconciliation completed? Yes  Was MTM referral placed (*Make sure to put transitions as reason for referral)?   No    Call Summary    \"What questions or concerns do you have about your recent visit and your follow-up care?\"     none    \"If you have questions or things don't continue to improve, we encourage you contact us through the main clinic number (give number).  Even if the clinic is not open, triage nurses are available 24/7 to help you.     We would like you to know that our clinic has extended hours (provide information).  We also have urgent care (provide details on closest location and hours/contact info)\"      \"Thank you for your time and take care!\"             "

## 2018-11-20 NOTE — TELEPHONE ENCOUNTER
"Hospital/TCU/ED for chronic condition Discharge Protocol    \"Hi, my name is Lyn Anna, a registered nurse, and I am calling from [facility].  I am calling to follow up and see how things are going for you after your recent emergency visit/hospital/TCU stay.\"    Tell me how you are doing now that you are home?\" ***      Discharge Instructions    \"Let's review your discharge instructions.  What is/are the follow-up recommendations?  Pt. Response: ***    \"Has an appointment with your primary care provider been scheduled?\"   {yes/no:954193}    \"When you see the provider, I would recommend that you bring your medications with you.\"    Medications    \"Tell me what changed about your medicines when you discharged?\"    Changes to chronic meds?    {CTSMEDS:767842}    \"What questions do you have about your medications?\"    {none/yes MTM:021834}     New diagnoses of heart failure, COPD, diabetes, or MI?    {CTS DIAGNOSES:178273}     {insulin?:320696}     {warfarin?:488351}    Medication reconciliation completed? {[facility] MED REC:143691}  Was MTM referral placed (*Make sure to put transitions as reason for referral)?   {YES NO MTM REFERRAL:805900}    Call Summary    \"What questions or concerns do you have about your recent visit and your follow-up care?\"     {none/advice:759735}    \"If you have questions or things don't continue to improve, we encourage you contact us through the main clinic number (give number).  Even if the clinic is not open, triage nurses are available 24/7 to help you.     We would like you to know that our clinic has extended hours (provide information).  We also have urgent care (provide details on closest location and hours/contact info)\"      \"Thank you for your time and take care!\"             "

## 2018-11-21 ENCOUNTER — HOSPITAL ENCOUNTER (EMERGENCY)
Facility: CLINIC | Age: 48
Discharge: HOME | End: 2018-11-21
Attending: EMERGENCY MEDICINE | Admitting: EMERGENCY MEDICINE
Payer: COMMERCIAL

## 2018-11-21 ENCOUNTER — NURSE TRIAGE (OUTPATIENT)
Dept: NURSING | Facility: CLINIC | Age: 48
End: 2018-11-21

## 2018-11-21 VITALS
HEART RATE: 88 BPM | RESPIRATION RATE: 18 BRPM | DIASTOLIC BLOOD PRESSURE: 72 MMHG | SYSTOLIC BLOOD PRESSURE: 148 MMHG | OXYGEN SATURATION: 99 % | TEMPERATURE: 98.2 F

## 2018-11-21 DIAGNOSIS — B96.89 BACTERIAL VAGINOSIS: ICD-10-CM

## 2018-11-21 DIAGNOSIS — B35.9 TINEA: ICD-10-CM

## 2018-11-21 DIAGNOSIS — N76.0 BACTERIAL VAGINOSIS: ICD-10-CM

## 2018-11-21 LAB
SPECIMEN SOURCE: ABNORMAL
WET PREP SPEC: ABNORMAL

## 2018-11-21 PROCEDURE — 87210 SMEAR WET MOUNT SALINE/INK: CPT | Performed by: EMERGENCY MEDICINE

## 2018-11-21 PROCEDURE — 87491 CHLMYD TRACH DNA AMP PROBE: CPT | Performed by: EMERGENCY MEDICINE

## 2018-11-21 PROCEDURE — 87591 N.GONORRHOEAE DNA AMP PROB: CPT | Performed by: EMERGENCY MEDICINE

## 2018-11-21 PROCEDURE — 99284 EMERGENCY DEPT VISIT MOD MDM: CPT

## 2018-11-21 PROCEDURE — 99284 EMERGENCY DEPT VISIT MOD MDM: CPT | Mod: Z6 | Performed by: EMERGENCY MEDICINE

## 2018-11-21 RX ORDER — METRONIDAZOLE 7.5 MG/G
1 GEL VAGINAL AT BEDTIME
Qty: 25 G | Refills: 0 | Status: ON HOLD | OUTPATIENT
Start: 2018-11-21 | End: 2019-03-04

## 2018-11-21 RX ORDER — FLUCONAZOLE 150 MG/1
TABLET ORAL
Qty: 2 TABLET | Refills: 0 | Status: ON HOLD | OUTPATIENT
Start: 2018-11-21 | End: 2019-03-04

## 2018-11-21 NOTE — ED AVS SNAPSHOT
[facility]    [address] [place] [address]    Phone:  [phone]    Fax:  [phone]                                       Lashawn Reddy   MRN: [id number]    Department:  [facility]   Date of Visit:  11/21/2018           After Visit Summary Signature Page     I have received my discharge instructions, and my questions have been answered. I have discussed any challenges I see with this plan with the nurse or doctor.    ..........................................................................................................................................  Patient/Patient Representative Signature      ..........................................................................................................................................  Patient Representative Print Name and Relationship to Patient    ..................................................               ................................................  Date                                   Time    ..........................................................................................................................................  Reviewed by Signature/Title    ...................................................              ..............................................  Date                                               Time          22EPIC Rev 08/18

## 2018-11-21 NOTE — ED AVS SNAPSHOT
[facility]    [address] [place] [address]    Phone:  [phone]    Fax:  [phone]                                       Lashawn Reddy   MRN: [id number]    Department:  [facility]   Date of Visit:  11/21/2018           Patient Information     Date Of Birth          1970        Your diagnoses for this visit were:     Bacterial vaginosis     Tinea        You were seen by Jorge Alberto Beverly MD.      Follow-up Information     Follow up with Raoul Sherman MD In 5 days.    Specialties:  Family Practice, OB/Gyn    Contact information:    [address] [place] [address] [phone]          Follow up with [facility].    Specialty:  EMERGENCY MEDICINE    Why:  If symptoms worsen or any other concern    Contact information:    [address] [place] [address] [phone]    Additional information:    From [facility] [address]: Exit at [address] on [place] side of [place]. Turn right on [address]. Turn left at stoplight on [address]. [facility] will be in view two blocks ahead        Discharge Instructions         Bacterial Vaginosis    You have a vaginal infection called bacterial vaginosis (BV). Both good and bad bacteria are present in a healthy vagina. BV occurs when these bacteria get out of balance. The number of bad bacteria increase. And the number of good bacteria decrease. Although BV is associated with sexual activity, it is not a sexually transmitted disease.  BV may or may not cause symptoms. If symptoms do occur, they can include:    Thin, gray, milky-white, or sometimes green discharge    Unpleasant odor or  fishy  smell    Itching, burning, or pain in or around the vagina  It is not known what causes BV, but certain factors can make the problem more likely. This can include:    Douching    Having sex with a new partner    Having sex with more  than one partner  BV will sometimes go away on its own. But treatment is usually recommended. This is because untreated BV can increase the risk of more serious health problems such as:    Pelvic inflammatory disease (PID)     delivery (giving birth to a baby early if you re pregnant)    HIV and certain other sexually transmitted diseases (STDs)    Infection after surgery on the reproductive organs  Home care  General care    BV is most often treated with medicines called antibiotics. These may be given as pills or as a vaginal cream. If antibiotics are prescribed, be sure to use them exactly as directed. Also, be sure to complete all of the medicine, even if your symptoms go away.    Don't douche or having sex during treatment.    If you have sex with a female partner, ask your healthcare provider if she should also be treated.  Prevention    Don't douche.    Don't have sex. If you do have sex, then take steps to lower your risk:  ? Use condoms when having sex.  ? Limit the number of sexual partners you have.  Follow-up care  Follow up with your healthcare provider, or as advised.  When to seek medical advice  Call your healthcare provider right away if:    You have a fever of 100.4 F (38 C) or higher, or as directed by your provider.    Your symptoms worsen, or they don t go away within a few days of starting treatment.    You have new pain in the lower belly or pelvic region.    You have side effects that bother you or a reaction to the pills or cream you re prescribed.    You or any partners you have sex with have new symptoms, such as a rash, joint pain, or sores.  Date Last Reviewed: 10/1/2017    7422-8749 [employer]. [address] [place] [address], [place] [address]. All rights reserved. This information is not intended as a substitute for professional medical care. Always follow your healthcare professional's instructions.          Fungal Skin Infection (Tinea)  A fungal infection occurs when  too much fungus grows on or in the body. Fungus normally lives on the skin in small amounts and does not cause harm. But when too much grows on the skin, it causes an infection. This is also known as tinea. Fungal skin infections are common and not usually serious.  The infection often starts as a small red area the size of a pea. The skin may turn dry and flaky. The area may itch. As the fungus grows, it spreads out in a red [place]. Because of how it looks, fungal skin infection is often called ringworm, but it is not caused by a worm. Fungal skin infections can occur on many parts of the body. They can grow on the head, chest, arms, or legs. They can occur on the buttocks. On the feet, fungal infection is known as  athlete s foot.  It causes itchy, sometimes painful sores between the toes and the bottom or sides of the feet. In the groin, the rash is called  jock itch.   People with weak immune systems can get a fungal infection more easily. This includes people with diabetes or HIV, or who are being treated for cancer. In these cases, the fungal infection can spread and cause severe illness. Fungal infections are also more common in people who are overweight.  In most cases, treatment is done with antifungal cream or ointment. If the infection is on your scalp, you may take oral medicine. In some cases, a tiny piece of the skin (biopsy) may be taken. This is so it can be tested in a lab.  Common fungal infections are treated with creams on the skin or oral medicine.  Home care  Follow all instructions when using antifungal cream or ointment on your skin. Your healthcare provider may advise using cornstarch powder to keep your skin dry or petroleum jelly to provide a barrier.  General care:    If you were prescribed an oral medicine, read the patient information. Talk with your healthcare provider about the risks and side effects.    Let your skin dry completely after bathing. Carefully dry your feet and between  your toes.    Dress in loose cotton clothing.    Don t scratch the affected area. This can delay healing and may spread the infection. It can also cause a bacterial infection.    Keep your skin clean, but don t wash the skin too much. This can irritate your skin.    Keep in mind that it may take a week before the fungus starts to go away. It can take 2 to 4 weeks to fully clear. To prevent it from coming back, use the medicine until the rash is all gone.  Follow-up care  Follow up with your healthcare provider if the rash does not get better after 10 days of treatment. Also follow up if the rash spreads to other parts of your body.  When to seek medical advice  Call your healthcare provider right away if any of these occur:    Fever of 100.4 F (38 C) or higher    Redness or swelling that gets worse    Pain that gets worse    Foul-smelling fluid leaking from the skin  Date Last Reviewed: 11/1/2016 2000-2018 [employer]. [address] [place] [address]. All rights reserved. This information is not intended as a substitute for professional medical care. Always follow your healthcare professional's instructions.      May also  an over the counter topical anti-fungal, such as Lotrimin, and apply to irritated skin around bottom 2-3 x/day.      Your next 10 appointments already scheduled     Nov 27, 2018 10:10 AM CST   Office Visit with Raoul Sherman MD   [facility] ([facility]    [address] [place] [id number] [phone]           Bring a current list of meds and any records pertaining to this visit. For Physicals, please bring immunization records and any forms needing to be filled out. Please arrive 10 minutes early to complete paperwork.              24 Hour Appointment Hotline       To make an appointment at any [facility], call [phone] ([phone]). If you don't have a family doctor or clinic, we will help  you find one. [facility] are conveniently located to serve the needs of you and your family.             Review of your medicines      START taking        Dose / Directions Last dose taken    fluconazole 150 MG tablet   Commonly known as:  DIFLUCAN   Quantity:  2 tablet        Take one tablet now, and one tablet in three days   Refills:  0        metroNIDAZOLE 0.75 % vaginal gel   Commonly known as:  METROGEL-VAGINAL   Dose:  1 applicator   Quantity:  25 g        Place 1 applicator (5 g) vaginally At Bedtime for 5 days   Refills:  0          Our records show that you are taking the medicines listed below. If these are incorrect, please call your family doctor or clinic.        Dose / Directions Last dose taken    acetaminophen 325 MG tablet   Commonly known as:  TYLENOL   Dose:  650 mg   Quantity:  40 tablet        Take 2 tablets (650 mg) by mouth every 4 hours as needed for other (multimodal surgical pain management)   Refills:  0        blood glucose monitoring lancets   Dose:  1 each   Quantity:  102 each        1 each 2 times daily   Refills:  3        blood glucose monitoring test strip   Commonly known as:  ACCU-CHEK GUIDE   Quantity:  100 strip        Use to test blood sugar 2 times daily or as directed.   Refills:  11        ciprofloxacin 500 MG tablet   Commonly known as:  CIPRO   Dose:  500 mg   Indication:  Infection Within the Abdomen   Quantity:  14 tablet        Take 1 tablet (500 mg) by mouth every 12 hours for 7 days   Refills:  0        ferrous gluconate 324 (38 Fe) MG tablet   Commonly known as:  FERGON   Dose:  324 mg   Quantity:  60 tablet        Take 1 tablet (324 mg) by mouth daily (with breakfast)   Refills:  1        ibuprofen 600 MG tablet   Commonly known as:  ADVIL/MOTRIN   Dose:  600 mg   Quantity:  40 tablet        Take 1 tablet (600 mg) by mouth every 6 hours as needed for moderate pain   Refills:  0        lisinopril 5 MG tablet   Commonly known as:  PRINIVIL/ZESTRIL   Dose:  5  mg   Indication:  1/2 tab HS   Quantity:  30 tablet        Take 1 tablet (5 mg) by mouth At Bedtime   Refills:  0        metFORMIN 500 MG 24 hr tablet   Commonly known as:  GLUCOPHAGE-XR   Quantity:  360 tablet        TAKE 4 TABLETS (2,000MG) BY MOUTH EVERY DAY WITH DINNER   Refills:  1        metroNIDAZOLE 500 MG tablet   Commonly known as:  FLAGYL   Dose:  500 mg   Indication:  Infection Within the Abdomen   Quantity:  21 tablet        Take 1 tablet (500 mg) by mouth every 8 hours for 7 days   Refills:  0        omeprazole 20 MG CR capsule   Commonly known as:  priLOSEC   Dose:  20 mg   Quantity:  90 capsule        Take 1 capsule (20 mg) by mouth daily   Refills:  3        rosuvastatin 20 MG tablet   Commonly known as:  CRESTOR   Dose:  20 mg   Quantity:  90 tablet        Take 1 tablet (20 mg) by mouth daily   Refills:  1        senna-docusate 8.6-50 MG per tablet   Commonly known as:  SENOKOT-S;PERICOLACE   Dose:  1 tablet   Quantity:  30 tablet        Take 1 tablet by mouth 2 times daily as needed for constipation   Refills:  0        sertraline 50 MG tablet   Commonly known as:  ZOLOFT   Quantity:  45 tablet        TAKE 1/2 TABLET (25 MG) BY MOUTH EVERY MORNING   Refills:  1                Prescriptions were sent or printed at these locations (2 Prescriptions)                   [facility] - [address]   [address] [place] [address]    Telephone:  [phone]   Fax:  [phone]   Hours:                  E-Prescribed (2 of 2)         fluconazole (DIFLUCAN) 150 MG tablet               metroNIDAZOLE (METROGEL-VAGINAL) 0.75 % vaginal gel                Procedures and tests performed during your visit     Chlamydia trachomatis PCR    Neisseria gonorrhoeae PCR    Wet prep      Orders Needing Specimen Collection     None      Pending Results     Date and Time Order Name Status Description    11/21/2018 2254 Neisseria gonorrhoeae PCR In process     11/21/2018  2254 Chlamydia trachomatis PCR In process             Pending Culture Results     Date and Time Order Name Status Description    11/21/2018 2254 Neisseria gonorrhoeae PCR In process     11/21/2018 2254 Chlamydia trachomatis PCR In process             Pending Results Instructions     If you had any lab results that were not finalized at the time of your Discharge, you can call the ED Lab Result RN at [phone]. You will be contacted by this team for any positive Lab results or changes in treatment. The nurses are available 7 days a week from 10A to 6:30P.  You can leave a message 24 hours per day and they will return your call.        Thank you for choosing [facility]       Thank you for choosing [facility] for your care. Our goal is always to provide you with excellent care. Hearing back from our patients is one way we can continue to improve our services. Please take a few minutes to complete the written survey that you may receive in the mail after you visit with us. Thank you!        [employer] Information     [employer] gives you secure access to your electronic health record. If you see a primary care provider, you can also send messages to your care team and make appointments. If you have questions, please call your primary care clinic.  If you do not have a primary care provider, please call [phone] and they will assist you.        Care EveryWhere ID     This is your Care EveryWhere ID. This could be used by other organizations to access your [facility] medical records  [id number]        Equal Access to Services     CAMI SAUL : Linda Munroe, waaxda luqadaha, qaybta kaalyoli cheek. So [facility] [phone].    ATENCIÓN: Si habla español, tiene a obrien disposición servicios gratuitos de asistencia lingüística. Llame al [phone].    We comply with applicable federal civil rights laws and [place] laws. We do not discriminate on the basis of race,  color, national origin, age, disability, sex, sexual orientation, or gender identity.            After Visit Summary       This is your record. Keep this with you and show to your community pharmacist(s) and doctor(s) at your next visit.

## 2018-11-22 LAB
C TRACH DNA SPEC QL NAA+PROBE: NEGATIVE
N GONORRHOEA DNA SPEC QL NAA+PROBE: NEGATIVE
SPECIMEN SOURCE: NORMAL
SPECIMEN SOURCE: NORMAL

## 2018-11-22 NOTE — TELEPHONE ENCOUNTER
Patient is on Cipro and Flagyl for diverticulitis and has developed what she is calling groin/pelivic pain. It hurts to stand up. She thinks it might be a UTI. Has some urinary incontinence with certain movments more than usual. Denies fever, pain with urination, increased vaginal discharge. Doesn't think it is a vaginal yeast infection, since it goes beyond the vaginal area. She has had a yeast infection before and this seems different to her. Informed she would need to be seen in the ED now for evaluation and treatment as needed.  Pt doesn't want to go to the emergency room at this time, would like treatment over the phone from the on call provider. Informed the on call provider has not seen her before and she would need to be seen in person tonight with this amount of discomfort for the correct diagnosis and treatment as needed. She says she will thing about it. [facility] is the closest ED to her.     Caroline Badillo RN, [facility]    Reason for Disposition    [1] SEVERE pelvic pain AND [2] present > 1 hour    Additional Information    Negative: Shock suspected (e.g., cold/pale/clammy skin, too weak to stand, low BP, rapid pulse)    Negative: Passed out (i.e., lost consciousness, collapsed and was not responding)    Negative: Sounds like a life-threatening emergency to the triager    Negative: Menstrual cramps is main concern    Negative: Abdominal (stomach) pain is main concern    Negative: Vulvar (external genital area) pain or symptoms (e.g., dryness, sores, redness, blisters, bumps) is main concern    Negative: Rectal pain is main concern    Negative: Hip pain is main concern    Negative: Urination pain is main concern (pain with passing urine)    Negative: [1] Pelvic pain AND [2] pregnant < 20 weeks    Negative: [1] Pelvic pain AND [2] pregnant > 20 weeks    Negative: [1] Pelvic pain AND [2] postpartum < 1 month since delivery    Negative: Pain associated with known hernia or new-onset hernia  suspected (reducible bulge in groin/abdomen)    Negative: Followed an abdomen (stomach) injury    Negative: Followed a genital area injury    Negative: [1] SEVERE pelvic pain (e.g., excruciating) AND [2] vomiting    Protocols used: PELVIC PAIN - FEMALE-ADULT-

## 2018-11-22 NOTE — DISCHARGE INSTRUCTIONS
Bacterial Vaginosis    You have a vaginal infection called bacterial vaginosis (BV). Both good and bad bacteria are present in a healthy vagina. BV occurs when these bacteria get out of balance. The number of bad bacteria increase. And the number of good bacteria decrease. Although BV is associated with sexual activity, it is not a sexually transmitted disease.  BV may or may not cause symptoms. If symptoms do occur, they can include:    Thin, gray, milky-white, or sometimes green discharge    Unpleasant odor or  fishy  smell    Itching, burning, or pain in or around the vagina  It is not known what causes BV, but certain factors can make the problem more likely. This can include:    Douching    Having sex with a new partner    Having sex with more than one partner  BV will sometimes go away on its own. But treatment is usually recommended. This is because untreated BV can increase the risk of more serious health problems such as:    Pelvic inflammatory disease (PID)     delivery (giving birth to a baby early if you re pregnant)    HIV and certain other sexually transmitted diseases (STDs)    Infection after surgery on the reproductive organs  Home care  General care    BV is most often treated with medicines called antibiotics. These may be given as pills or as a vaginal cream. If antibiotics are prescribed, be sure to use them exactly as directed. Also, be sure to complete all of the medicine, even if your symptoms go away.    Don't douche or having sex during treatment.    If you have sex with a female partner, ask your healthcare provider if she should also be treated.  Prevention    Don't douche.    Don't have sex. If you do have sex, then take steps to lower your risk:  ? Use condoms when having sex.  ? Limit the number of sexual partners you have.  Follow-up care  Follow up with your healthcare provider, or as advised.  When to seek medical advice  Call your healthcare provider right away if:    You  have a fever of 100.4 F (38 C) or higher, or as directed by your provider.    Your symptoms worsen, or they don t go away within a few days of starting treatment.    You have new pain in the lower belly or pelvic region.    You have side effects that bother you or a reaction to the pills or cream you re prescribed.    You or any partners you have sex with have new symptoms, such as a rash, joint pain, or sores.  Date Last Reviewed: 10/1/2017    4406-2023 [employer]. [address] [place] [address]. All rights reserved. This information is not intended as a substitute for professional medical care. Always follow your healthcare professional's instructions.          Fungal Skin Infection (Tinea)  A fungal infection occurs when too much fungus grows on or in the body. Fungus normally lives on the skin in small amounts and does not cause harm. But when too much grows on the skin, it causes an infection. This is also known as tinea. Fungal skin infections are common and not usually serious.  The infection often starts as a small red area the size of a pea. The skin may turn dry and flaky. The area may itch. As the fungus grows, it spreads out in a red [place]. Because of how it looks, fungal skin infection is often called ringworm, but it is not caused by a worm. Fungal skin infections can occur on many parts of the body. They can grow on the head, chest, arms, or legs. They can occur on the buttocks. On the feet, fungal infection is known as  athlete s foot.  It causes itchy, sometimes painful sores between the toes and the bottom or sides of the feet. In the groin, the rash is called  jock itch.   People with weak immune systems can get a fungal infection more easily. This includes people with diabetes or HIV, or who are being treated for cancer. In these cases, the fungal infection can spread and cause severe illness. Fungal infections are also more common in people who are overweight.  In most  cases, treatment is done with antifungal cream or ointment. If the infection is on your scalp, you may take oral medicine. In some cases, a tiny piece of the skin (biopsy) may be taken. This is so it can be tested in a lab.  Common fungal infections are treated with creams on the skin or oral medicine.  Home care  Follow all instructions when using antifungal cream or ointment on your skin. Your healthcare provider may advise using cornstarch powder to keep your skin dry or petroleum jelly to provide a barrier.  General care:    If you were prescribed an oral medicine, read the patient information. Talk with your healthcare provider about the risks and side effects.    Let your skin dry completely after bathing. Carefully dry your feet and between your toes.    Dress in loose cotton clothing.    Don t scratch the affected area. This can delay healing and may spread the infection. It can also cause a bacterial infection.    Keep your skin clean, but don t wash the skin too much. This can irritate your skin.    Keep in mind that it may take a week before the fungus starts to go away. It can take 2 to 4 weeks to fully clear. To prevent it from coming back, use the medicine until the rash is all gone.  Follow-up care  Follow up with your healthcare provider if the rash does not get better after 10 days of treatment. Also follow up if the rash spreads to other parts of your body.  When to seek medical advice  Call your healthcare provider right away if any of these occur:    Fever of 100.4 F (38 C) or higher    Redness or swelling that gets worse    Pain that gets worse    Foul-smelling fluid leaking from the skin  Date Last Reviewed: 11/1/2016 2000-2018 [employer]. [address] [place] [address], [place] [address]. All rights reserved. This information is not intended as a substitute for professional medical care. Always follow your healthcare professional's instructions.      May also  an over the  counter topical anti-fungal, such as Lotrimin, and apply to irritated skin around bottom 2-3 x/day.

## 2018-11-22 NOTE — ED PROVIDER NOTES
"  History     Chief Complaint   Patient presents with     Groin Pain     The history is provided by the patient.     Lashawn Reddy is a 48 year old female who presents to the ED complaining of bilateral groin pain that started on Monday. Patient is especially sore to the left side of her genitalia and states it is \"uncomfortable and sore\". Patient was discharged from the hospital on Saturday for diverticulitis. She thinks her symptoms are from a reaction to the antibiotics that were prescribed to her. Patient denies external sores on the skin, vaginal discharge, or fever. Patient has not had a catheter placed. This to her feels like a yeast infection. She denies hx of UTI. She does not have stand out pain when urinating. Patient states she had a hysterectomy 2 months ago. Lashawn's diverticulitis is doing fine.    Problem List:    Patient Active Problem List    Diagnosis Date Noted     Sigmoid diverticulitis 11/15/2018     Priority: Medium     Anemia 11/15/2018     Priority: Medium     S/P hysterectomy 09/12/2018     Priority: Medium     Personal history of DVT (deep vein thrombosis) 08/30/2018     Priority: Medium     Morbid obesity (H) 07/09/2018     Priority: Medium     Essential hypertension 10/23/2017     Priority: Medium     Type 2 diabetes mellitus with hyperglycemia, without long-term current use of insulin (H) 02/09/2017     Priority: Medium     History of diverticulitis 11/28/2016     Priority: Medium     Bacterial sepsis (H) -early 11/28/2016     Priority: Medium     Lung nodule 11/28/2016     Priority: Medium     Mixed hyperlipidemia 05/03/2016     Priority: Medium     Menorrhagia with regular cycle 04/29/2016     Priority: Medium     PMS (premenstrual syndrome) 04/28/2016     Priority: Medium     Hyperlipidemia with target LDL less than 100 06/06/2013     Priority: Medium     Diagnosis updated by automated process. Provider to review and confirm.       Family history of malignant neoplasm of " gastrointestinal tract 05/24/2005     Priority: Medium     Varicose veins of lower extremities with complications 05/13/2005     Priority: Medium     Problem list name updated by automated process. Provider to review       Slow transit constipation 02/24/2003     Priority: Medium        Past Medical History:    Past Medical History:   Diagnosis Date     Depressive disorder, not elsewhere classified      Diabetes mellitus, type 2 (H) 6/6/2013     Diffuse cystic mastopathy      Tobacco use disorder      Type 2 diabetes, HbA1c goal < 7% (H) 6/6/2013       Past Surgical History:    Past Surgical History:   Procedure Laterality Date     C ANESTH,LOWER LEG VEIN SURG,NOS  2002    bilateral     C APPENDECTOMY  04/14/89     COLONOSCOPY  6/21/2013    Procedure: COLONOSCOPY;  colonoscopy;  Surgeon: Vamshi Loomis MD;  Location: [facility]     COLONOSCOPY N/A 2/22/2017    Procedure: COLONOSCOPY;  Surgeon: Raoul Sage MD;  Location:  GI     HC COLONOSCOPY W/WO BRUSH/WASH  06/17/2005     HC DILATION/CURETTAGE DIAG/THER NON OB  1986    after a miscarriage     HC REMOVAL OF TONSILS,<11 Y/O       HYSTERECTOMY TOTAL ABDOMINAL, SALPINGECTOMY Bilateral 9/12/2018    Procedure: HYSTERECTOMY TOTAL ABDOMINAL, SALPINGECTOMY;  Total Abdominal Hysterectomy, left Salpingectomy;  Surgeon: Temi Corado MD;  Location: [facility]     TUBAL LIGATION  08/04/2006       Family History:    Family History   Problem Relation Age of Onset     Anesthesia Reaction Mother      Severe nausea     GASTROINTESTINAL DISEASE Mother      Partial colon removal secondary to a blockage     Gynecology Mother      hysterectomy     Lipids Mother      Lipids Father      Cancer - colorectal Father      dx Oct '03 (dx at age 57)     Cancer Father      skin     Hypertension Father      Cancer Maternal Grandmother      colon at age 68     Cerebrovascular Disease Paternal Grandfather      Cerebrovascular Disease Paternal Grandmother      brain aneurysm     Arthritis  Sister      mainly affecting her legs     GASTROINTESTINAL DISEASE Sister      2 sisters with colon polyps     Cancer Sister      cervical ca     Cancer Maternal Aunt      all over ? breast was the origin     Cancer Maternal Aunt      pancreatic     HEART DISEASE Maternal Aunt      MI 2010       Social History:  Marital Status:   [2]  Social History   Substance Use Topics     Smoking status: Former Smoker     Years: 16.00     Types: Cigarettes     Quit date: 9/29/2008     Smokeless tobacco: Never Used     Alcohol use No      Comment: couple drinks per year        Medications:      ciprofloxacin (CIPRO) 500 MG tablet   fluconazole (DIFLUCAN) 150 MG tablet   lisinopril (PRINIVIL/ZESTRIL) 5 MG tablet   metFORMIN (GLUCOPHAGE-XR) 500 MG 24 hr tablet   metroNIDAZOLE (FLAGYL) 500 MG tablet   metroNIDAZOLE (METROGEL-VAGINAL) 0.75 % vaginal gel   omeprazole (PRILOSEC) 20 MG CR capsule   rosuvastatin (CRESTOR) 20 MG tablet   sertraline (ZOLOFT) 50 MG tablet   acetaminophen (TYLENOL) 325 MG tablet   blood glucose monitoring (ACCU-CHEK FASTCLIX) lancets   blood glucose monitoring (ACCU-CHEK GUIDE) test strip   ferrous gluconate (FERGON) 324 (38 Fe) MG tablet   ibuprofen (ADVIL/MOTRIN) 600 MG tablet   senna-docusate (SENOKOT-S;PERICOLACE) 8.6-50 MG per tablet         Review of Systems   All other systems reviewed and are negative.      Physical Exam   BP: (!) 169/94  Pulse: 88  Temp: 98.3  F (36.8  C)  Resp: 18  SpO2: 97 %      Physical Exam   Constitutional: She appears well-developed and well-nourished. No distress.   HENT:   Head: Normocephalic and atraumatic.   Mouth/Throat: Oropharynx is clear and moist.   Eyes: Pupils are equal, round, and reactive to light. No scleral icterus.   Neck: Normal range of motion. Neck supple.   Cardiovascular: Normal rate, regular rhythm, normal heart sounds and intact distal pulses.    No murmur heard.  Pulmonary/Chest: No stridor. No respiratory distress. She has no wheezes. She has  no rales.   Abdominal: Soft. There is no tenderness.   Genitourinary: No bleeding in the vagina. Vaginal discharge found.   Genitourinary Comments: Perianal soft tissue extending towards the vulva regionl  is inflamed and erythematous consistent with tinea.  Labia without lesions.   Musculoskeletal: She exhibits no edema or tenderness.   Neurological: She is alert.   Skin: Skin is warm and dry. No rash noted. She is not diaphoretic. No erythema. No pallor.   Psychiatric: She has a normal mood and affect.   Nursing note and vitals reviewed.      ED Course     ED Course     Procedures               Critical Care time:  none               Results for orders placed or performed during the hospital encounter of 11/21/18 (from the past 24 hour(s))   Wet prep   Result Value Ref Range    Specimen Description Vagina     Wet Prep No Trichomonas seen     Wet Prep Clue cells seen  Few   (A)     Wet Prep No yeast seen     Wet Prep PMNs seen  Moderate          Medications - No data to display    Assessments & Plan (with Medical Decision Making)  Lashawn Reddy is a 48 year old female who is improving from treatment from diverticulitis.  She comes in tonight complaining of irritation in the  perineal region.  Perianal soft tissue appears inflamed consistent with possible tinea.  Wet prep also of interest shows clue cells.  Gonorrhea chlamydia pending.  She had a good deal of diarrhea associated with the diverticulitis and suspect tissue may have stayed quite moist inducing a tinea infection.  Will place on 2 doses of oral Diflucan.  Will treat the vaginal discharge with MetroGel.  In addition may use some topical antifungals in the region.  Keep appointment as scheduled for follow-up with her primary physician, Dr. Sherman in 5 days.   return to the emergency department anytime sooner if condition worsens or other concern     I have reviewed the nursing notes.    I have reviewed the findings, diagnosis, plan and need for follow  up with the patient.          Discharge Medication List as of 11/21/2018 11:44 PM      START taking these medications    Details   fluconazole (DIFLUCAN) 150 MG tablet Take one tablet now, and one tablet in three days, Disp-2 tablet, R-0, E-Prescribe      metroNIDAZOLE (METROGEL-VAGINAL) 0.75 % vaginal gel Place 1 applicator (5 g) vaginally At Bedtime for 5 daysDisp-25 g, [id number]             Final diagnoses:   Bacterial vaginosis   Tinea     This document serves as a record of services personally performed by Jorge Alberto Beverly MD. It was created on their behalf by Dawit Stiles, a trained medical scribe. The creation of this record is based on the provider's personal observations and the statements of the patient. This document has been checked and approved by the attending provider.    Note: Chart documentation done in part with Dragon Voice Recognition software. Although reviewed after completion, some word and grammatical errors may remain.    11/21/2018   [facility] EMERGENCY DEPARTMENT     Jorge Alberto Beverly MD  11/22/18 0049

## 2018-11-27 ENCOUNTER — OFFICE VISIT (OUTPATIENT)
Dept: FAMILY MEDICINE | Facility: CLINIC | Age: 48
End: 2018-11-27
Payer: COMMERCIAL

## 2018-11-27 VITALS
OXYGEN SATURATION: 98 % | RESPIRATION RATE: 18 BRPM | HEART RATE: 84 BPM | TEMPERATURE: 96.9 F | HEIGHT: 66 IN | BODY MASS INDEX: 41.14 KG/M2 | WEIGHT: 256 LBS | DIASTOLIC BLOOD PRESSURE: 88 MMHG | SYSTOLIC BLOOD PRESSURE: 158 MMHG

## 2018-11-27 DIAGNOSIS — I10 BENIGN ESSENTIAL HYPERTENSION: ICD-10-CM

## 2018-11-27 DIAGNOSIS — E66.01 MORBID OBESITY (H): ICD-10-CM

## 2018-11-27 DIAGNOSIS — E11.65 TYPE 2 DIABETES MELLITUS WITH HYPERGLYCEMIA, WITHOUT LONG-TERM CURRENT USE OF INSULIN (H): ICD-10-CM

## 2018-11-27 DIAGNOSIS — D50.9 IRON DEFICIENCY ANEMIA, UNSPECIFIED IRON DEFICIENCY ANEMIA TYPE: ICD-10-CM

## 2018-11-27 DIAGNOSIS — K57.32 DIVERTICULITIS OF COLON: Primary | ICD-10-CM

## 2018-11-27 PROCEDURE — 99214 OFFICE O/P EST MOD 30 MIN: CPT | Performed by: OBSTETRICS & GYNECOLOGY

## 2018-11-27 ASSESSMENT — PAIN SCALES - GENERAL: PAINLEVEL: MILD PAIN (3)

## 2018-11-27 NOTE — PROGRESS NOTES
Subjective: She is here for hospital follow-up.  She was admitted for sigmoid diverticulitis.  She was treated with IV Zosyn and then changed to oral Cipro and Flagyl.  She was discharged with 7 days of both medications and advised to recheck now.    Her bowel movements are regular now and her pain has resolved.  She had stopped the glipizide while on the antibiotics.      The past medical history, social history, past surgical history and family history as shown below have been reviewed by me today.  Past Medical History:   Diagnosis Date     Depressive disorder, not elsewhere classified     depression in the post partum period after her daughter     Diabetes mellitus, type 2 (H) 6/6/2013     Diffuse cystic mastopathy     fibrocystic breast disease     Tobacco use disorder     quit in 2008     Type 2 diabetes, HbA1c goal < 7% (H) 6/6/2013        Allergies   Allergen Reactions     No Known Drug Allergies      Current Outpatient Prescriptions   Medication Sig Dispense Refill     acetaminophen (TYLENOL) 325 MG tablet Take 2 tablets (650 mg) by mouth every 4 hours as needed for other (multimodal surgical pain management) 40 tablet 0     blood glucose monitoring (ACCU-CHEK FASTCLIX) lancets 1 each 2 times daily 102 each 3     blood glucose monitoring (ACCU-CHEK GUIDE) test strip Use to test blood sugar 2 times daily or as directed. 100 strip 11     ibuprofen (ADVIL/MOTRIN) 600 MG tablet Take 1 tablet (600 mg) by mouth every 6 hours as needed for moderate pain 40 tablet 0     lisinopril (PRINIVIL/ZESTRIL) 5 MG tablet Take 1 tablet (5 mg) by mouth At Bedtime 30 tablet      metFORMIN (GLUCOPHAGE-XR) 500 MG 24 hr tablet TAKE 4 TABLETS (2,000MG) BY MOUTH EVERY DAY WITH DINNER 360 tablet 1     omeprazole (PRILOSEC) 20 MG CR capsule Take 1 capsule (20 mg) by mouth daily 90 capsule 3     rosuvastatin (CRESTOR) 20 MG tablet Take 1 tablet (20 mg) by mouth daily 90 tablet 1     sertraline (ZOLOFT) 50 MG tablet TAKE 1/2 TABLET (25  MG) BY MOUTH EVERY MORNING 45 tablet 1     ferrous gluconate (FERGON) 324 (38 Fe) MG tablet Take 1 tablet (324 mg) by mouth daily (with breakfast) (Patient not taking: Reported on 11/27/2018) 60 tablet 1     senna-docusate (SENOKOT-S;PERICOLACE) 8.6-50 MG per tablet Take 1 tablet by mouth 2 times daily as needed for constipation (Patient not taking: Reported on 11/27/2018) 30 tablet 0     Past Surgical History:   Procedure Laterality Date     C ANESTH,LOWER LEG VEIN SURG,NOS  2002    bilateral     C APPENDECTOMY  04/14/89     COLONOSCOPY  6/21/2013    Procedure: COLONOSCOPY;  colonoscopy;  Surgeon: Vamshi Loomis MD;  Location: [facility]     COLONOSCOPY N/A 2/22/2017    Procedure: COLONOSCOPY;  Surgeon: Raoul Sage MD;  Location:  GI     HC COLONOSCOPY W/WO BRUSH/WASH  06/17/2005     HC DILATION/CURETTAGE DIAG/THER NON OB  1986    after a miscarriage     HC REMOVAL OF TONSILS,<11 Y/O       HYSTERECTOMY TOTAL ABDOMINAL, SALPINGECTOMY Bilateral 9/12/2018    Procedure: HYSTERECTOMY TOTAL ABDOMINAL, SALPINGECTOMY;  Total Abdominal Hysterectomy, left Salpingectomy;  Surgeon: Temi Corado MD;  Location: [facility]     TUBAL LIGATION  08/04/2006     Social History     Social History     Marital status:      Spouse name: Tim     Number of children: 2     Years of education: 12     Occupational History     labor      Social History Main Topics     Smoking status: Former Smoker     Years: 16.00     Types: Cigarettes     Quit date: 9/29/2008     Smokeless tobacco: Never Used     Alcohol use No      Comment: couple drinks per year     Drug use: No     Sexual activity: Yes     Partners: Male     Birth control/ protection: Surgical      Comment: tubal ligation     Other Topics Concern      Service No     Blood Transfusions No     Caffeine Concern No     Occupational Exposure No     Hobby Hazards No     Sleep Concern No     Stress Concern Yes     stress at home at work     Weight Concern Yes     has  "continued to gain weight     Special Diet Yes     trying to eat better     Back Care No     Exercise No     nothing in the past 3 months     Bike Helmet No     NA     Seat Belt Yes     Self-Exams Yes     sometimes     Social History Narrative     Family History   Problem Relation Age of Onset     Anesthesia Reaction Mother      Severe nausea     GASTROINTESTINAL DISEASE Mother      Partial colon removal secondary to a blockage     Gynecology Mother      hysterectomy     Lipids Mother      Lipids Father      Cancer - colorectal Father      dx Oct '03 (dx at age 57)     Cancer Father      skin     Hypertension Father      Cancer Maternal Grandmother      colon at age 68     Cerebrovascular Disease Paternal Grandfather      Cerebrovascular Disease Paternal Grandmother      brain aneurysm     Arthritis Sister      mainly affecting her legs     GASTROINTESTINAL DISEASE Sister      2 sisters with colon polyps     Cancer Sister      cervical ca     Cancer Maternal Aunt      all over ? breast was the origin     Cancer Maternal Aunt      pancreatic     HEART DISEASE Maternal Aunt      MI 2010       ROS: A 12 point review of systems was done. Except for what is listed above in the HPI, the systems review is negative .      Objective: Vital signs: Pulse 84, temperature 96.9  F (36.1  C), temperature source Temporal, resp. rate 18, height 5' 6\" (1.676 m), weight 256 lb (116.1 kg), last menstrual period 09/03/2018, SpO2 98 %, not currently breastfeeding.    HEENT:    Sclerae and conjunctiva are normal.   Ear canals and TMs look normal.  Nasal mucosa is pink  - no polyps or masses seen.  sinuses are non tender to palpation.  Throat is unremarkable . Mucous membranes are moist.   Neck is supple, mobile, no adenopathy or masses palpable. The thyroid feels normal.   Normal range of motion noted.  Chest is clear to auscultation.  No wheezes, rales or rhonchi heard.  Cardiac exam is normal with s1, s2, no murmurs or adventitious " sounds.Normal rate and rhythm is heard.   Fundi are benign- disc margins are sharp. No papilledema noted.  Abdomen is soft,  nondistended, No masses felt.No HSM. No guarding or rigidity or rebound   noted. Palpation reveals  no    tenderness   Normal bowel sounds heard.   Extremities are without edema and she is wearing compression stockings on both legs.   She has normal palpable pulses in both feet and normal sensation in both feet.        Assessment/Plan:    1.  Sigmoid diverticulitis-apparently resolved after antibiotics    2.  Type 2 diabetes mellitus-she has recently seen an ophthalmologist 2 weeks ago.  I advised her to start a baby aspirin every day or every other day if she cannot tolerate every day.  Enteric-coated 81 mg.  She will resume glipizide.  Start checking her sugars again.  We will repeat A1c in several months.    3.  Benign essential hypertension-her blood pressure recheck is 158/88.  She has only been taking half of a 5 mg lisinopril tablet.  I advised her to take the whole tablet as prescribed initially.  Recheck here in 2 weeks.  If the blood pressure is still not controlled will consider adding a different medication since she starts to gag when she goes up on the lisinopril dose.    4.  Morbid obesity-I advised walking 5 miles a day.  She needs to set a target of losing weight and get down to 200 pounds.  It would help her diabetes and the high blood pressure.  She might be able to cut down her medications or stop them.      5.  Her hospital hemoglobin was 9.2.  I think she needs to be worked up for why she is anemic.  I will consider a colonoscopy unless this comes up at the next visit in 3-4 weeks- she last had one 2 years ago..  Continue on iron.        MAGDALENA Sherman MD

## 2018-11-27 NOTE — MR AVS SNAPSHOT
After Visit Summary   11/27/2018    Lashawn Reddy    MRN: [id number]           Patient Information     Date Of Birth          1970        Visit Information        Provider Department      11/27/2018 10:10 AM Raoul Sherman MD [facility]        Today's Diagnoses     Diverticulitis of colon    -  1    Type 2 diabetes mellitus with hyperglycemia, without long-term current use of insulin (H)        Benign essential hypertension        Morbid obesity (H)        Iron deficiency anemia, unspecified iron deficiency anemia type           Follow-ups after your visit        Follow-up notes from your care team     Return in about 3 weeks (around 12/18/2018) for Routine Visit.      Your next 10 appointments already scheduled     Jan 02, 2019  7:50 AM CST   Office Visit with Raoul Sherman MD   [facility]    [address] [place] [id number] [phone]           Bring a current list of meds and any records pertaining to this visit. For Physicals, please bring immunization records and any forms needing to be filled out. Please arrive 10 minutes early to complete paperwork.              Who to contact     If you have questions or need follow up information about today's clinic visit or your schedule please contact [facility] directly at [phone].  Normal or non-critical lab and imaging results will be communicated to you by MyChart, letter or phone within 4 business days after the clinic has received the results. If you do not hear from us within 7 days, please contact the clinic through [facility] or phone. If you have a critical or abnormal lab result, we will notify you by phone as soon as possible.  Submit refill requests through Webcollage or call your pharmacy and they will forward the refill request to us. Please allow 3 business days for your refill to be completed.          Additional  "Information About Your Visit        MyChart Information     [facility] gives you secure access to your electronic health record. If you see a primary care provider, you can also send messages to your care team and make appointments. If you have questions, please call your primary care clinic.  If you do not have a primary care provider, please call [phone] and they will assist you.        Care EveryWhere ID     This is your Care EveryWhere ID. This could be used by other organizations to access your [facility] medical records  [id number]        Your Vitals Were     Pulse Temperature Respirations Height Last Period Pulse Oximetry    84 96.9  F (36.1  C) (Temporal) 18 5' 6\" (1.676 m) 09/03/2018 98%    Breastfeeding? BMI (Body Mass Index)                No 41.32 kg/m2           Blood Pressure from Last 3 Encounters:   11/27/18 158/88   11/21/18 148/72   11/17/18 133/73    Weight from Last 3 Encounters:   11/27/18 256 lb (116.1 kg)   11/15/18 254 lb 8 oz (115.4 kg)   10/25/18 251 lb (113.9 kg)              Today, you had the following     No orders found for display       Primary Care Provider Office Phone # Fax #    Raoul Ezequiel Sherman [phone]       [address] [place] [id number]        Goals        General    Monitoring (pt-stated)     Notes - Note created  8/30/2018  4:11 PM by Dominga Oliveira, COLIN    Goal Statement: I will check blood sugar 1-2 times daily    Measure of Success: meter memory or log of blood sugar results    Strengths: good understanding of using meter  Ideas to overcome barriers: no barriers noted    Date to Achieve By: 9/12          Equal Access to Services     CAMI SAUL AH: Hadii ashley Munroe, miguel patiño, yoli gardner. So [facility] [phone].    ATENCIÓN: Si habla español, tiene a obrien disposición servicios gratuitos de asistencia lingüística. Llame al [phone].    We comply with " applicable federal civil rights laws and [place] laws. We do not discriminate on the basis of race, color, national origin, age, disability, sex, sexual orientation, or gender identity.            Thank you!     Thank you for choosing [facility]  for your care. Our goal is always to provide you with excellent care. Hearing back from our patients is one way we can continue to improve our services. Please take a few minutes to complete the written survey that you may receive in the mail after your visit with us. Thank you!             Your Updated Medication List - Protect others around you: Learn how to safely use, store and throw away your medicines at www.disposemymeds.org.          This list is accurate as of 11/27/18 11:42 AM.  Always use your most recent med list.                   Brand Name Dispense Instructions for use Diagnosis    acetaminophen 325 MG tablet    TYLENOL    40 tablet    Take 2 tablets (650 mg) by mouth every 4 hours as needed for other (multimodal surgical pain management)    S/P hysterectomy       blood glucose monitoring lancets     102 each    1 each 2 times daily    Type 2 diabetes mellitus with hyperglycemia, without long-term current use of insulin (H)       blood glucose monitoring test strip    ACCU-CHEK GUIDE    100 strip    Use to test blood sugar 2 times daily or as directed.    Type 2 diabetes mellitus with hyperglycemia, without long-term current use of insulin (H)       ferrous gluconate 324 (38 Fe) MG tablet    FERGON    60 tablet    Take 1 tablet (324 mg) by mouth daily (with breakfast)    Iron deficiency anemia, unspecified iron deficiency anemia type       ibuprofen 600 MG tablet    ADVIL/MOTRIN    40 tablet    Take 1 tablet (600 mg) by mouth every 6 hours as needed for moderate pain    S/P hysterectomy       lisinopril 5 MG tablet    PRINIVIL/ZESTRIL    30 tablet    Take 1 tablet (5 mg) by mouth At Bedtime    Essential hypertension       metFORMIN 500 MG 24  hr tablet    GLUCOPHAGE-XR    360 tablet    TAKE 4 TABLETS (2,000MG) BY MOUTH EVERY DAY WITH DINNER    Type 2 diabetes mellitus with hyperglycemia (H)       omeprazole 20 MG DR capsule    priLOSEC    90 capsule    Take 1 capsule (20 mg) by mouth daily    Gastroesophageal reflux disease without esophagitis       rosuvastatin 20 MG tablet    CRESTOR    90 tablet    Take 1 tablet (20 mg) by mouth daily    Mixed hyperlipidemia, Type 2 diabetes mellitus with hyperglycemia, without long-term current use of insulin (H)       senna-docusate 8.6-50 MG tablet    SENOKOT-S/PERICOLACE    30 tablet    Take 1 tablet by mouth 2 times daily as needed for constipation    S/P hysterectomy       sertraline 50 MG tablet    ZOLOFT    45 tablet    TAKE 1/2 TABLET (25 MG) BY MOUTH EVERY MORNING    PMS (premenstrual syndrome)

## 2018-12-04 DIAGNOSIS — K21.9 GASTROESOPHAGEAL REFLUX DISEASE WITHOUT ESOPHAGITIS: ICD-10-CM

## 2018-12-05 NOTE — TELEPHONE ENCOUNTER
"Omeprazole  Last Written Prescription Date:  02/09/2017  Last Fill Quantity: 90,  # refills: 3   Last office visit: 11/27/2018 with prescribing provider:  Lane   Future Office Visit:   Next 5 appointments (look out 90 days)     Jan 02, 2019  7:50 AM CST   Office Visit with Raoul Sherman MD   [facility]    [address] [place] [id number] [phone]              Prescription approved per [facility] Refill Protocol.     Requested Prescriptions   Pending Prescriptions Disp Refills     omeprazole (PRILOSEC) 20 MG DR capsule [Pharmacy Med Name: OMEPRAZOLE 20MG DR CAP] 90 capsule      Sig: TAKE 1 CAPSULE BY MOUTH EVERY DAY    PPI Protocol Passed    12/4/2018  4:56 PM       Passed - Not on Clopidogrel (unless Pantoprazole ordered)       Passed - No diagnosis of osteoporosis on record       Passed - Recent (12 mo) or future (30 days) visit within the authorizing provider's specialty    Patient had office visit in the last 12 months or has a visit in the next 30 days with authorizing provider or within the authorizing provider's specialty.  See \"Patient Info\" tab in inbasket, or \"Choose Columns\" in Meds & Orders section of the refill encounter.         Passed - Patient is age 18 or older       Passed - No active pregnacy on record       Passed - No positive pregnancy test in past 12 months      Una Maria RN   "

## 2018-12-20 ENCOUNTER — PATIENT OUTREACH (OUTPATIENT)
Dept: CARE COORDINATION | Facility: CLINIC | Age: 48
End: 2018-12-20

## 2018-12-20 NOTE — PROGRESS NOTES
Clinical Product Navigator RN reviewed chart; patient on payer product coverage.  Review results: Met referral criteria for Care Coordinator; referral to be sent.      Pt has had multiple ED/IP visits and during her follow up appointment, PCP instructed pt to start checking her blood sugar, increase her BP med and to start a walking program.  Please assess for compliance with plan and if any other concerns/question.    Manda Freed RN/Clinical Product Navigator

## 2018-12-21 ASSESSMENT — ACTIVITIES OF DAILY LIVING (ADL): DEPENDENT_IADLS:: INDEPENDENT

## 2018-12-21 NOTE — PROGRESS NOTES
Clinic Care Coordination Contact  [facility]/Voicemail    Referral Source: Health Plan  Clinical Data: Care Coordinator Outreach  Outreach attempted x 1.  Left message on voicemail with call back information and requested return call.  Plan: Care Coordinator will try to reach patient again in 1-2 business days.      Rosie WILSON, RN, PHN  Care Coordination    [facility]  [address] [place] [address]  Office: [phone]  Fax [phone]   [facility]  [address] [place] [address]  Office: [phone] Fax [phone]  [email or web address]   [email or web address]   Connect with [employer] on social media.

## 2018-12-21 NOTE — PROGRESS NOTES
Clinic Care Coordination Contact    Clinic Care Coordination Contact  OUTREACH    Referral Information:  Referral Source: Health Plan    Primary Diagnosis: Diabetes    Chief Complaint   Patient presents with     Clinic Care Coordination - Initial     RN assessment     Universal Utilization:   Clinic Utilization  Difficulty keeping appointments:: No  Compliance Concerns: No  No-Show Concerns: No  No PCP office visit in Past Year: No  Utilization    Last refreshed: 12/20/2018 12:44 PM:  Hospital Admissions 2           Last refreshed: 12/20/2018 12:44 PM:  ED Visits 2           Last refreshed: 12/20/2018 12:44 PM:  No Show Count (past year) 1              Current as of: 12/20/2018 12:44 PM            Clinical Concerns:  Current Medical Concerns: Called and spoke with pt, introduced self and role. Pt states she feels she has been doing better with checking her blood sugars. States she does not have many highs or lows at this time. She also started the increase of her blood pressure medication. States she does not have a blood pressure machine but denies any dizziness.  Pt does have a follow up appt 12/31/18. Pt also reports she has a good support system at home with her . She states she did start walking but is not able to walk 5 miles a day as recommended.     Current Behavioral Concerns: no current concerns    Education Provided to patient: continue to monitor blood sugars and follow up at scheduled appt. Call with any question or concerns before that appt.    Pain  Pain (GOAL):: No  Health Maintenance Reviewed: Due/Overdue    Clinical Pathway: None    Medication Management:  Self management     Functional Status:  Dependent ADLs:: Independent  Dependent IADLs:: Independent  Bed or wheelchair confined:: No  Mobility Status: Independent  Fallen 2 or more times in the past year?: No  Any fall with injury in the past year?: No    Living Situation:  Current living arrangement:: I live in a private home with  family    Diet/Exercise/Sleep:  Diet:: Diabetic diet  Inadequate nutrition (GOAL):: No  Food Insecurity: No  Tube Feeding: No  Exercise:: Yes  Days per week of moderate to strenuous exercise (like a brisk walk): 3  On average, minutes per day of exercise at this level: 30  How intense was your typical exercise? : Light (like stretching or slow walking)  Exercise Minutes per Week: 90  Inadequate activity/exercise (GOAL):: No  Significant changes in sleep pattern (GOAL): No    Transportation:  Transportation concerns (GOAL):: No  Transportation means:: Regular car     Psychosocial:  Mental health DX:: No  Mental health management concern (GOAL):: No  Informal Support system:: Family, Spouse     Financial/Insurance:   Financial/Insurance concerns (GOAL):: No     Resources and Interventions:  Current Resources:   Community Resources: None  Supplies used at home:: Diabetic Supplies  Equipment Currently Used at Home: glucometer    Advance Care Plan/Directive  Advanced Care Plans/Directives on file:: No  Advanced Care Plan/Directive Status: Not Applicable    Referrals Placed: None     Goals:   Goals        General    Monitoring (pt-stated)     Notes - Note created  8/30/2018  4:11 PM by Dominga Oliveira RD    Goal Statement: I will check blood sugar 1-2 times daily    Measure of Success: meter memory or log of blood sugar results    Strengths: good understanding of using meter  Ideas to overcome barriers: no barriers noted    Date to Achieve By: 9/12            Patient/Caregiver understanding: Pt verbalizes understanding of follow up instructions and when scheduled appt date and times.        Future Appointments              In 1 week Raoul Sherman MD [facility]          Plan:   Pt will continue to check blood sugars and record numbers  Pt will take medications as prescribed  Pt will attend schedule appts  No further outreach by RN CC at this time pt declined needs.       Rosie  Jo-Ann WILSON, RN, PHN  Care Coordination    [facility]  [address] [place] [address]  Office: [phone]  Fax [phone]   [facility]  [address] [place] [address]  Office: [phone] Fax [phone]  [email or web address]   [email or web address]   Connect with [facility] on social media.

## 2018-12-31 ENCOUNTER — OFFICE VISIT (OUTPATIENT)
Dept: FAMILY MEDICINE | Facility: CLINIC | Age: 48
End: 2018-12-31
Payer: COMMERCIAL

## 2018-12-31 VITALS
DIASTOLIC BLOOD PRESSURE: 84 MMHG | TEMPERATURE: 97.2 F | HEIGHT: 66 IN | BODY MASS INDEX: 41.14 KG/M2 | OXYGEN SATURATION: 98 % | WEIGHT: 256 LBS | RESPIRATION RATE: 16 BRPM | HEART RATE: 80 BPM | SYSTOLIC BLOOD PRESSURE: 122 MMHG

## 2018-12-31 DIAGNOSIS — D64.9 ANEMIA, UNSPECIFIED TYPE: Primary | ICD-10-CM

## 2018-12-31 DIAGNOSIS — N89.8 VAGINAL ITCHING: ICD-10-CM

## 2018-12-31 DIAGNOSIS — L30.4 INTERTRIGO: ICD-10-CM

## 2018-12-31 LAB
HGB BLD-MCNC: 11.6 G/DL (ref 11.7–15.7)
SPECIMEN SOURCE: NORMAL
WET PREP SPEC: NORMAL

## 2018-12-31 PROCEDURE — 99214 OFFICE O/P EST MOD 30 MIN: CPT | Performed by: OBSTETRICS & GYNECOLOGY

## 2018-12-31 PROCEDURE — 87210 SMEAR WET MOUNT SALINE/INK: CPT | Performed by: OBSTETRICS & GYNECOLOGY

## 2018-12-31 PROCEDURE — 85018 HEMOGLOBIN: CPT | Performed by: OBSTETRICS & GYNECOLOGY

## 2018-12-31 PROCEDURE — 36415 COLL VENOUS BLD VENIPUNCTURE: CPT | Performed by: OBSTETRICS & GYNECOLOGY

## 2018-12-31 RX ORDER — GLIPIZIDE 10 MG/1
10 TABLET, FILM COATED, EXTENDED RELEASE ORAL DAILY
COMMUNITY
End: 2019-06-27

## 2018-12-31 RX ORDER — FLUCONAZOLE 150 MG/1
150 TABLET ORAL
Qty: 10 TABLET | Refills: 0 | Status: ON HOLD | OUTPATIENT
Start: 2018-12-31 | End: 2019-03-04

## 2018-12-31 RX ORDER — NYSTATIN AND TRIAMCINOLONE ACETONIDE 100000; 1 [USP'U]/G; MG/G
CREAM TOPICAL DAILY PRN
Qty: 60 G | Refills: 3 | Status: SHIPPED | OUTPATIENT
Start: 2018-12-31 | End: 2019-09-12

## 2018-12-31 ASSESSMENT — PAIN SCALES - GENERAL: PAINLEVEL: NO PAIN (0)

## 2018-12-31 ASSESSMENT — MIFFLIN-ST. JEOR: SCORE: 1802.96

## 2018-12-31 NOTE — RESULT ENCOUNTER NOTE
Anette Please inform Lashawn/ or caretaker  that this result(s) is/are normal.  The test of the vaginal discharge is normal-Thanks. MAGDALENA Sherman MD

## 2018-12-31 NOTE — PROGRESS NOTES
Subjective: here with 2 concerns.  The first is to recheck her anemia.  She has been taking iron 5 days a week.  She feels much much better.  No melena or hematochezia.    2.  She has some vaginal dryness.  She was in the hospital with diverticulitis and took antibiotics.  She got a yeast infection and took Diflucan.  Still has some vaginal itching and irritation.      The past medical history, social history, past surgical history and family history as shown below have been reviewed by me today.  Past Medical History:   Diagnosis Date     Depressive disorder, not elsewhere classified     depression in the post partum period after her daughter     Diabetes mellitus, type 2 (H) 6/6/2013     Diffuse cystic mastopathy     fibrocystic breast disease     Tobacco use disorder     quit in 2008     Type 2 diabetes, HbA1c goal < 7% (H) 6/6/2013        Allergies   Allergen Reactions     No Known Drug Allergies      Current Outpatient Medications   Medication Sig Dispense Refill     acetaminophen (TYLENOL) 325 MG tablet Take 2 tablets (650 mg) by mouth every 4 hours as needed for other (multimodal surgical pain management) 40 tablet 0     blood glucose monitoring (ACCU-CHEK FASTCLIX) lancets 1 each 2 times daily 102 each 3     blood glucose monitoring (ACCU-CHEK GUIDE) test strip Use to test blood sugar 2 times daily or as directed. 100 strip 11     ferrous gluconate (FERGON) 324 (38 Fe) MG tablet Take 1 tablet (324 mg) by mouth daily (with breakfast) 60 tablet 1     glipiZIDE (GLUCOTROL XL) 10 MG 24 hr tablet Take 10 mg by mouth daily       ibuprofen (ADVIL/MOTRIN) 600 MG tablet Take 1 tablet (600 mg) by mouth every 6 hours as needed for moderate pain 40 tablet 0     lisinopril (PRINIVIL/ZESTRIL) 5 MG tablet Take 1 tablet (5 mg) by mouth At Bedtime 30 tablet      metFORMIN (GLUCOPHAGE-XR) 500 MG 24 hr tablet TAKE 4 TABLETS (2,000MG) BY MOUTH EVERY DAY WITH DINNER 360 tablet 1     omeprazole (PRILOSEC) 20 MG DR capsule TAKE 1  CAPSULE BY MOUTH EVERY DAY 90 capsule 3     rosuvastatin (CRESTOR) 20 MG tablet Take 1 tablet (20 mg) by mouth daily 90 tablet 1     sertraline (ZOLOFT) 50 MG tablet TAKE 1/2 TABLET (25 MG) BY MOUTH EVERY MORNING 45 tablet 1     Past Surgical History:   Procedure Laterality Date     C ANESTH,LOWER LEG VEIN SURG,NOS  2002    bilateral     C APPENDECTOMY  04/14/89     COLONOSCOPY  6/21/2013    Procedure: COLONOSCOPY;  colonoscopy;  Surgeon: Vamshi Loomis MD;  Location: [facility]     COLONOSCOPY N/A 2/22/2017    Procedure: COLONOSCOPY;  Surgeon: Raoul Sage MD;  Location: [facility]     HC COLONOSCOPY W/WO BRUSH/WASH  06/17/2005     HC DILATION/CURETTAGE DIAG/THER NON OB  1986    after a miscarriage     HC REMOVAL OF TONSILS,<10 Y/O       HYSTERECTOMY TOTAL ABDOMINAL, SALPINGECTOMY Bilateral 9/12/2018    Procedure: HYSTERECTOMY TOTAL ABDOMINAL, SALPINGECTOMY;  Total Abdominal Hysterectomy, left Salpingectomy;  Surgeon: Temi Corado MD;  Location: [facility]     TUBAL LIGATION  08/04/2006     Social History     Socioeconomic History     Marital status:      Spouse name: Tim     Number of children: 2     Years of education: 12     Highest education level: None   Social Needs     Financial resource strain: None     Food insecurity - worry: None     Food insecurity - inability: None     Transportation needs - medical: None     Transportation needs - non-medical: None   Occupational History     Occupation: labor   Tobacco Use     Smoking status: Former Smoker     Years: 16.00     Types: Cigarettes     Last attempt to quit: 9/29/2008     Years since quitting: 10.2     Smokeless tobacco: Never Used   Substance and Sexual Activity     Alcohol use: No     Alcohol/week: 0.0 oz     Comment: couple drinks per year     Drug use: No     Sexual activity: Yes     Partners: Male     Birth control/protection: Surgical     Comment: tubal ligation   Other Topics Concern      Service No     Blood Transfusions No      "Caffeine Concern No     Occupational Exposure No     Hobby Hazards No     Sleep Concern No     Stress Concern Yes     Comment: stress at home at work     Weight Concern Yes     Comment: has continued to gain weight     Special Diet Yes     Comment: trying to eat better     Back Care No     Exercise No     Comment: nothing in the past 3 months     Bike Helmet No     Comment: NA     Seat Belt Yes     Self-Exams Yes     Comment: sometimes     Parent/sibling w/ CABG, MI or angioplasty before 65F 55M? Not Asked   Social History Narrative     None     Family History   Problem Relation Age of Onset     Anesthesia Reaction Mother         Severe nausea     Gastrointestinal Disease Mother         Partial colon removal secondary to a blockage     Gynecology Mother         hysterectomy     Lipids Mother      Lipids Father      Cancer - colorectal Father         dx Oct '03 (dx at age 57)     Cancer Father         skin     Hypertension Father      Cancer Maternal Grandmother         colon at age 68     Cerebrovascular Disease Paternal Grandfather      Cerebrovascular Disease Paternal Grandmother         brain aneurysm     Arthritis Sister         mainly affecting her legs     Gastrointestinal Disease Sister         2 sisters with colon polyps     Cancer Sister         cervical ca     Cancer Maternal Aunt         all over ? breast was the origin     Cancer Maternal Aunt         pancreatic     Heart Disease Maternal Aunt         MI 2010       ROS: A 12 point review of systems was done. Except for what is listed above in the HPI, the systems review is negative .      Objective: Vital signs: Blood pressure 122/84, pulse 80, temperature 97.2  F (36.2  C), temperature source Temporal, resp. rate 16, height 1.676 m (5' 6\"), weight 116.1 kg (256 lb), last menstrual period 09/03/2018, SpO2 98 %, not currently breastfeeding.    HEENT:    Sclerae and conjunctiva are normal.   Ear canals and TMs look normal.  Nasal mucosa is pink  - no " polyps or masses seen.  sinuses are non tender to palpation.  Throat is unremarkable . Mucous membranes are moist.   Neck is supple, mobile, no adenopathy or masses palpable. The thyroid feels normal.   Normal range of motion noted.  Chest is clear to auscultation.  No wheezes, rales or rhonchi heard.  Cardiac exam is normal with s1, s2, no murmurs or adventitious sounds.Normal rate and rhythm is heard.       Pelvic exam:My nurse Anette   was present to chaperone the exam.  The external genitalia appeared normal.  Except that there is significant redness/scaling in the groin folds- consistent with yeast and eczema- from sweat where the skin folds touch-it appears quite irritated.  The vaginal vault was without bleeding    or odor. Mild vaginal discharge noted- it is whitish-  Wet prep sent.    No vaginal support defects were noted,    Bimanual exam revealed  No adnexal masses were felt.      Exam was MODERATELY   limited by the patient's body habitus.          hgb is improved to 11.6        Assessment/Plan:    1.  Anemia has essentially resolved.-Therefore we will not evaluate this further except that I advised her to take the iron every third day or the next 2 months until I see her back.  At that point we will recheck CBC along with her diabetic labs.    2.  Vaginal itching is minimal but the pain /soreness over her groin folds is significant- probably related to yeast intertrigo/eczema- see rx for diflucan as well as mycolog cream to these areas- advised to keep them open to air whenever possible-     3. Flu vax offered- declined by pt.       4. rechekc for diabetes exam in 2 months and we will reassess the anemia and the intertrigo at that point- sooner if worse.    MAGDALENA Sherman MD

## 2019-01-05 DIAGNOSIS — I10 BENIGN ESSENTIAL HYPERTENSION: ICD-10-CM

## 2019-01-07 NOTE — TELEPHONE ENCOUNTER
"Routing refill request to provider for review/approval because:  Drug not active on patient's medication list  Medication discontinued on 11/17/2018  Pt med list has lisinopril 5 mg tabs listed from Dr. Nair    lisinopril  Last Written Prescription Date: 8/30/2018  Last Fill Quantity:30 ,  # refills: 1  Last office visit: 12/31/2018 with prescribing provider:  12/31/2018   Future Office Visit:      Requested Prescriptions   Pending Prescriptions Disp Refills     lisinopril (PRINIVIL/ZESTRIL) 10 MG tablet [Pharmacy Med Name: LISINOPRIL 10MG TABLET] 30 tablet 1     Sig: TAKE 1 TABLET BY MOUTH ONCE DAILY    ACE Inhibitors (Including Combos) Protocol Passed - 1/5/2019 11:37 AM       Passed - Blood pressure under 140/90 in past 12 months    BP Readings from Last 3 Encounters:   12/31/18 122/84   11/27/18 158/88   11/21/18 148/72                Passed - Recent (12 mo) or future (30 days) visit within the authorizing provider's specialty    Patient had office visit in the last 12 months or has a visit in the next 30 days with authorizing provider or within the authorizing provider's specialty.  See \"Patient Info\" tab in inbasket, or \"Choose Columns\" in Meds & Orders section of the refill encounter.             Passed - Medication is active on med list       Passed - Patient is age 18 or older       Passed - No active pregnancy on record       Passed - Normal serum creatinine on file in past 12 months    Recent Labs   Lab Test 11/17/18  0521   CR 0.76            Passed - Normal serum potassium on file in past 12 months    Recent Labs   Lab Test 11/17/18  0521   POTASSIUM 4.0            Passed - No positive pregnancy test within past 12 months        Katherine Kaiser RN on 1/7/2019 at 12:26 PM    "

## 2019-01-09 RX ORDER — LISINOPRIL 10 MG/1
TABLET ORAL
Qty: 30 TABLET | Refills: 1 | Status: SHIPPED | OUTPATIENT
Start: 2019-01-09 | End: 2019-02-08

## 2019-02-08 DIAGNOSIS — I10 BENIGN ESSENTIAL HYPERTENSION: ICD-10-CM

## 2019-02-11 RX ORDER — LISINOPRIL 10 MG/1
TABLET ORAL
Qty: 30 TABLET | Refills: 3 | Status: ON HOLD | OUTPATIENT
Start: 2019-02-11 | End: 2019-03-06

## 2019-02-11 NOTE — TELEPHONE ENCOUNTER
"Requested Prescriptions   Pending Prescriptions Disp Refills     lisinopril (PRINIVIL/ZESTRIL) 10 MG tablet [Pharmacy Med Name: LISINOPRIL 10MG TABLET] 30 tablet 1    Last Written Prescription Date:  1/9/19  Last Fill Quantity: 30,  # refills: 1   Last office visit: 11/27/2018 with prescribing provider:     Future Office Visit:     Sig: TAKE 1 TABLET BY MOUTH EVERY DAY    ACE Inhibitors (Including Combos) Protocol Passed - 2/8/2019  2:58 PM       Passed - Blood pressure under 140/90 in past 12 months    BP Readings from Last 3 Encounters:   12/31/18 122/84   11/27/18 158/88   11/21/18 148/72          Passed - Recent (12 mo) or future (30 days) visit within the authorizing provider's specialty    Patient had office visit in the last 12 months or has a visit in the next 30 days with authorizing provider or within the authorizing provider's specialty.  See \"Patient Info\" tab in inbasket, or \"Choose Columns\" in Meds & Orders section of the refill encounter.             Passed - Medication is active on med list       Passed - Patient is age 18 or older       Passed - No active pregnancy on record       Passed - Normal serum creatinine on file in past 12 months    Recent Labs   Lab Test 11/17/18  0521   CR 0.76          Passed - Normal serum potassium on file in past 12 months    Recent Labs   Lab Test 11/17/18  0521   POTASSIUM 4.0          Passed - No positive pregnancy test within past 12 months      Prescription approved per [facility] [place].  Krystal Oneal RN      "

## 2019-02-13 DIAGNOSIS — E11.65 TYPE 2 DIABETES MELLITUS WITH HYPERGLYCEMIA (H): ICD-10-CM

## 2019-02-13 RX ORDER — METFORMIN HCL 500 MG
TABLET, EXTENDED RELEASE 24 HR ORAL
Qty: 360 TABLET | Refills: 0 | Status: SHIPPED | OUTPATIENT
Start: 2019-02-13 | End: 2019-05-07

## 2019-02-13 NOTE — TELEPHONE ENCOUNTER
"Requested Prescriptions   Pending Prescriptions Disp Refills     metFORMIN (GLUCOPHAGE-XR) 500 MG 24 hr tablet [Pharmacy Med Name: METFORMIN 500MG ER TABLET] 360 tablet 1    Last Written Prescription Date:  8/20/18  Last Fill Quantity: 360,  # refills: 1   Last office visit: 12/31/2018 with prescribing provider:     Future Office Visit:     Sig: TAKE 4 TABLETS (2,000MG) BY MOUTH EVERY DAY WITH DINNER    Biguanide Agents Passed - 2/13/2019  1:03 AM       Passed - Blood pressure less than 140/90 in past 6 months    BP Readings from Last 3 Encounters:   12/31/18 122/84   11/27/18 158/88   11/21/18 148/72          Passed - Patient has documented LDL within the past 12 mos.    Recent Labs   Lab Test 10/25/18  1126   LDL 69   +       Passed - Patient has had a Microalbumin in the past 15 mos.    Recent Labs   Lab Test 10/25/18  1143   MICROL 21   UMALCR 8.97          Passed - Patient is age 10 or older       Passed - Patient has documented A1c within the specified period of time.    If HgbA1C is 8 or greater, it needs to be on file within the past 3 months.  If less than 8, must be on file within the past 6 months.     Recent Labs   Lab Test 11/14/18  2139   A1C 7.3*            Passed - Patient's CR is NOT>1.4 OR Patient's EGFR is NOT<45 within past 12 mos.    Recent Labs   Lab Test 11/17/18  0521   GFRESTIMATED 80   GFRESTBLACK >90       Recent Labs   Lab Test 11/17/18  0521   CR 0.76          Passed - Patient does NOT have a diagnosis of CHF.       Passed - Medication is active on med list       Passed - Patient is not pregnant       Passed - Patient has not had a positive pregnancy test within the past 12 mos.        Passed - Recent (6 mo) or future (30 days) visit within the authorizing provider's specialty    Patient had office visit in the last 6 months or has a visit in the next 30 days with authorizing provider or within the authorizing provider's specialty.  See \"Patient Info\" tab in inbasket, or \"Choose Columns\" " in Meds & Orders section of the refill encounter.          Prescription approved per [facility] Refill Protocol.  Krystal Oneal RN

## 2019-03-03 ENCOUNTER — HOSPITAL ENCOUNTER (EMERGENCY)
Facility: CLINIC | Age: 49
Discharge: ANOTHER HOSPITAL | End: 2019-03-04
Attending: FAMILY MEDICINE | Admitting: FAMILY MEDICINE
Payer: COMMERCIAL

## 2019-03-03 ENCOUNTER — APPOINTMENT (OUTPATIENT)
Dept: GENERAL RADIOLOGY | Facility: CLINIC | Age: 49
End: 2019-03-03
Attending: FAMILY MEDICINE
Payer: COMMERCIAL

## 2019-03-03 DIAGNOSIS — J81.0 FLASH PULMONARY EDEMA (H): ICD-10-CM

## 2019-03-03 DIAGNOSIS — R09.02 HYPOXIA: ICD-10-CM

## 2019-03-03 DIAGNOSIS — J90 BILATERAL PLEURAL EFFUSION: ICD-10-CM

## 2019-03-03 PROCEDURE — 96366 THER/PROPH/DIAG IV INF ADDON: CPT | Performed by: FAMILY MEDICINE

## 2019-03-03 PROCEDURE — 99291 CRITICAL CARE FIRST HOUR: CPT | Mod: 25 | Performed by: FAMILY MEDICINE

## 2019-03-03 PROCEDURE — 71045 X-RAY EXAM CHEST 1 VIEW: CPT | Mod: TC

## 2019-03-03 PROCEDURE — 96365 THER/PROPH/DIAG IV INF INIT: CPT | Performed by: FAMILY MEDICINE

## 2019-03-03 PROCEDURE — 93308 TTE F-UP OR LMTD: CPT | Performed by: FAMILY MEDICINE

## 2019-03-03 PROCEDURE — 93308 TTE F-UP OR LMTD: CPT | Mod: 26 | Performed by: FAMILY MEDICINE

## 2019-03-03 PROCEDURE — 93010 ELECTROCARDIOGRAM REPORT: CPT | Mod: 59 | Performed by: FAMILY MEDICINE

## 2019-03-03 PROCEDURE — 93005 ELECTROCARDIOGRAM TRACING: CPT | Performed by: FAMILY MEDICINE

## 2019-03-03 PROCEDURE — 99292 CRITICAL CARE ADDL 30 MIN: CPT | Mod: 25 | Performed by: FAMILY MEDICINE

## 2019-03-03 PROCEDURE — 25000128 H RX IP 250 OP 636: Performed by: FAMILY MEDICINE

## 2019-03-03 PROCEDURE — 96375 TX/PRO/DX INJ NEW DRUG ADDON: CPT | Performed by: FAMILY MEDICINE

## 2019-03-03 RX ORDER — NITROGLYCERIN 20 MG/100ML
.07-1.76 INJECTION INTRAVENOUS CONTINUOUS
Status: DISCONTINUED | OUTPATIENT
Start: 2019-03-03 | End: 2019-03-04 | Stop reason: HOSPADM

## 2019-03-03 RX ADMIN — NITROGLYCERIN 0.71 MCG/KG/MIN: 20 INJECTION INTRAVENOUS at 23:55

## 2019-03-04 ENCOUNTER — APPOINTMENT (OUTPATIENT)
Dept: CT IMAGING | Facility: CLINIC | Age: 49
End: 2019-03-04
Attending: FAMILY MEDICINE
Payer: COMMERCIAL

## 2019-03-04 ENCOUNTER — APPOINTMENT (OUTPATIENT)
Dept: CARDIOLOGY | Facility: CLINIC | Age: 49
DRG: 286 | End: 2019-03-04
Attending: HOSPITALIST
Payer: COMMERCIAL

## 2019-03-04 ENCOUNTER — APPOINTMENT (OUTPATIENT)
Dept: ULTRASOUND IMAGING | Facility: CLINIC | Age: 49
DRG: 286 | End: 2019-03-04
Attending: HOSPITALIST
Payer: COMMERCIAL

## 2019-03-04 ENCOUNTER — HOSPITAL ENCOUNTER (INPATIENT)
Facility: CLINIC | Age: 49
LOS: 2 days | Discharge: HOME | DRG: 286 | End: 2019-03-06
Attending: HOSPITALIST | Admitting: HOSPITALIST
Payer: COMMERCIAL

## 2019-03-04 VITALS
OXYGEN SATURATION: 95 % | HEART RATE: 87 BPM | DIASTOLIC BLOOD PRESSURE: 85 MMHG | SYSTOLIC BLOOD PRESSURE: 127 MMHG | TEMPERATURE: 98 F | WEIGHT: 250 LBS | BODY MASS INDEX: 40.35 KG/M2 | RESPIRATION RATE: 26 BRPM

## 2019-03-04 DIAGNOSIS — I24.9 ACS (ACUTE CORONARY SYNDROME) (H): Primary | ICD-10-CM

## 2019-03-04 DIAGNOSIS — I51.81 STRESS-INDUCED CARDIOMYOPATHY: ICD-10-CM

## 2019-03-04 DIAGNOSIS — I10 ESSENTIAL HYPERTENSION: ICD-10-CM

## 2019-03-04 LAB
ALBUMIN SERPL-MCNC: 3.1 G/DL (ref 3.4–5)
ALP SERPL-CCNC: 96 U/L (ref 40–150)
ALT SERPL W P-5'-P-CCNC: 28 U/L (ref 0–50)
ANION GAP SERPL CALCULATED.3IONS-SCNC: 8 MMOL/L (ref 3–14)
AST SERPL W P-5'-P-CCNC: 22 U/L (ref 0–45)
BASE EXCESS BLDV CALC-SCNC: 0 MMOL/L
BASOPHILS # BLD AUTO: 0 10E9/L (ref 0–0.2)
BASOPHILS NFR BLD AUTO: 0.3 %
BILIRUB SERPL-MCNC: 0.2 MG/DL (ref 0.2–1.3)
BUN SERPL-MCNC: 8 MG/DL (ref 7–30)
CALCIUM SERPL-MCNC: 7.8 MG/DL (ref 8.5–10.1)
CHLORIDE SERPL-SCNC: 105 MMOL/L (ref 94–109)
CO2 SERPL-SCNC: 25 MMOL/L (ref 20–32)
CREAT SERPL-MCNC: 0.71 MG/DL (ref 0.52–1.04)
D DIMER PPP FEU-MCNC: 1.2 UG/ML FEU (ref 0–0.5)
DIFFERENTIAL METHOD BLD: ABNORMAL
EOSINOPHIL NFR BLD AUTO: 2 %
ERYTHROCYTE [DISTWIDTH] IN BLOOD BY AUTOMATED COUNT: 17.3 % (ref 10–15)
GFR SERPL CREATININE-BSD FRML MDRD: >90 ML/MIN/{1.73_M2}
GLUCOSE BLDC GLUCOMTR-MCNC: 127 MG/DL (ref 70–99)
GLUCOSE BLDC GLUCOMTR-MCNC: 130 MG/DL (ref 70–99)
GLUCOSE BLDC GLUCOMTR-MCNC: 146 MG/DL (ref 70–99)
GLUCOSE BLDC GLUCOMTR-MCNC: 164 MG/DL (ref 70–99)
GLUCOSE BLDC GLUCOMTR-MCNC: 228 MG/DL (ref 70–99)
GLUCOSE SERPL-MCNC: 265 MG/DL (ref 70–99)
HBA1C MFR BLD: 8.6 % (ref 0–5.6)
HCO3 BLDV-SCNC: 27 MMOL/L (ref 21–28)
HCT VFR BLD AUTO: 34.9 % (ref 35–47)
HGB BLD-MCNC: 10.7 G/DL (ref 11.7–15.7)
IMM GRANULOCYTES # BLD: 0.1 10E9/L (ref 0–0.4)
IMM GRANULOCYTES NFR BLD: 0.5 %
LACTATE BLD-SCNC: 1.6 MMOL/L (ref 0.7–2)
LMWH PPP CHRO-ACNC: 0.1 IU/ML
LMWH PPP CHRO-ACNC: 0.28 IU/ML
LYMPHOCYTES # BLD AUTO: 2.7 10E9/L (ref 0.8–5.3)
LYMPHOCYTES NFR BLD AUTO: 18.4 %
MCH RBC QN AUTO: 24.6 PG (ref 26.5–33)
MCHC RBC AUTO-ENTMCNC: 30.7 G/DL (ref 31.5–36.5)
MCV RBC AUTO: 80 FL (ref 78–100)
MONOCYTES # BLD AUTO: 0.7 10E9/L (ref 0–1.3)
MONOCYTES NFR BLD AUTO: 4.9 %
NEUTROPHILS # BLD AUTO: 10.8 10E9/L (ref 1.6–8.3)
NEUTROPHILS NFR BLD AUTO: 73.9 %
NRBC # BLD AUTO: 0 10*3/UL
NRBC BLD AUTO-RTO: 0 /100
NT-PROBNP SERPL-MCNC: 579 PG/ML (ref 0–450)
O2/TOTAL GAS SETTING VFR VENT: 45 %
PCO2 BLDV: 50 MM HG (ref 40–50)
PH BLDV: 7.33 PH (ref 7.32–7.43)
PLATELET # BLD AUTO: 302 10E9/L (ref 150–450)
PO2 BLDV: 50 MM HG (ref 25–47)
POTASSIUM SERPL-SCNC: 3.7 MMOL/L (ref 3.4–5.3)
POTASSIUM SERPL-SCNC: 3.9 MMOL/L (ref 3.4–5.3)
PROT SERPL-MCNC: 6.9 G/DL (ref 6.8–8.8)
RBC # BLD AUTO: 4.35 10E12/L (ref 3.8–5.2)
SODIUM SERPL-SCNC: 138 MMOL/L (ref 133–144)
TROPONIN I SERPL-MCNC: 0.07 UG/L (ref 0–0.04)
TROPONIN I SERPL-MCNC: 0.18 UG/L (ref 0–0.04)
TROPONIN I SERPL-MCNC: 0.34 UG/L (ref 0–0.04)
TROPONIN I SERPL-MCNC: 0.37 UG/L (ref 0–0.04)
TSH SERPL DL<=0.005 MIU/L-ACNC: 3.97 MU/L (ref 0.4–4)
WBC # BLD AUTO: 14.6 10E9/L (ref 4–11)

## 2019-03-04 PROCEDURE — 93971 EXTREMITY STUDY: CPT | Mod: RT

## 2019-03-04 PROCEDURE — 99207 ZZC APP CREDIT; MD BILLING SHARED VISIT: CPT | Performed by: HOSPITALIST

## 2019-03-04 PROCEDURE — 84484 ASSAY OF TROPONIN QUANT: CPT | Performed by: INTERNAL MEDICINE

## 2019-03-04 PROCEDURE — 85520 HEPARIN ASSAY: CPT | Performed by: HOSPITALIST

## 2019-03-04 PROCEDURE — 93306 TTE W/DOPPLER COMPLETE: CPT | Mod: 26 | Performed by: INTERNAL MEDICINE

## 2019-03-04 PROCEDURE — 85379 FIBRIN DEGRADATION QUANT: CPT | Performed by: FAMILY MEDICINE

## 2019-03-04 PROCEDURE — 25000125 ZZHC RX 250: Performed by: FAMILY MEDICINE

## 2019-03-04 PROCEDURE — 25000131 ZZH RX MED GY IP 250 OP 636 PS 637: Performed by: HOSPITALIST

## 2019-03-04 PROCEDURE — 36415 COLL VENOUS BLD VENIPUNCTURE: CPT | Performed by: HOSPITALIST

## 2019-03-04 PROCEDURE — 25000128 H RX IP 250 OP 636: Performed by: FAMILY MEDICINE

## 2019-03-04 PROCEDURE — 21000001 ZZH R&B HEART CARE

## 2019-03-04 PROCEDURE — 85025 COMPLETE CBC W/AUTO DIFF WBC: CPT | Performed by: FAMILY MEDICINE

## 2019-03-04 PROCEDURE — 40000264 ECHOCARDIOGRAM COMPLETE

## 2019-03-04 PROCEDURE — 80053 COMPREHEN METABOLIC PANEL: CPT | Performed by: FAMILY MEDICINE

## 2019-03-04 PROCEDURE — 83036 HEMOGLOBIN GLYCOSYLATED A1C: CPT | Performed by: HOSPITALIST

## 2019-03-04 PROCEDURE — 84484 ASSAY OF TROPONIN QUANT: CPT | Performed by: FAMILY MEDICINE

## 2019-03-04 PROCEDURE — 00000146 ZZHCL STATISTIC GLUCOSE BY METER IP

## 2019-03-04 PROCEDURE — 84443 ASSAY THYROID STIM HORMONE: CPT | Performed by: FAMILY MEDICINE

## 2019-03-04 PROCEDURE — 84484 ASSAY OF TROPONIN QUANT: CPT | Performed by: HOSPITALIST

## 2019-03-04 PROCEDURE — 25000132 ZZH RX MED GY IP 250 OP 250 PS 637: Performed by: HOSPITALIST

## 2019-03-04 PROCEDURE — 25500064 ZZH RX 255 OP 636: Performed by: HOSPITALIST

## 2019-03-04 PROCEDURE — 25000128 H RX IP 250 OP 636: Performed by: HOSPITALIST

## 2019-03-04 PROCEDURE — 99221 1ST HOSP IP/OBS SF/LOW 40: CPT | Mod: 25 | Performed by: INTERNAL MEDICINE

## 2019-03-04 PROCEDURE — 82803 BLOOD GASES ANY COMBINATION: CPT | Performed by: FAMILY MEDICINE

## 2019-03-04 PROCEDURE — 36415 COLL VENOUS BLD VENIPUNCTURE: CPT | Performed by: INTERNAL MEDICINE

## 2019-03-04 PROCEDURE — 83605 ASSAY OF LACTIC ACID: CPT | Performed by: FAMILY MEDICINE

## 2019-03-04 PROCEDURE — 71260 CT THORAX DX C+: CPT

## 2019-03-04 PROCEDURE — 83880 ASSAY OF NATRIURETIC PEPTIDE: CPT | Performed by: FAMILY MEDICINE

## 2019-03-04 PROCEDURE — 99223 1ST HOSP IP/OBS HIGH 75: CPT | Mod: AI | Performed by: HOSPITALIST

## 2019-03-04 PROCEDURE — 84132 ASSAY OF SERUM POTASSIUM: CPT | Performed by: INTERNAL MEDICINE

## 2019-03-04 RX ORDER — DEXTROSE MONOHYDRATE 25 G/50ML
25-50 INJECTION, SOLUTION INTRAVENOUS
Status: DISCONTINUED | OUTPATIENT
Start: 2019-03-04 | End: 2019-03-05

## 2019-03-04 RX ORDER — POTASSIUM CHLORIDE 1500 MG/1
20 TABLET, EXTENDED RELEASE ORAL
Status: DISCONTINUED | OUTPATIENT
Start: 2019-03-05 | End: 2019-03-05 | Stop reason: HOSPADM

## 2019-03-04 RX ORDER — LIDOCAINE 40 MG/G
CREAM TOPICAL
Status: DISCONTINUED | OUTPATIENT
Start: 2019-03-04 | End: 2019-03-04

## 2019-03-04 RX ORDER — ASPIRIN 81 MG/1
324 TABLET, CHEWABLE ORAL ONCE
Status: COMPLETED | OUTPATIENT
Start: 2019-03-04 | End: 2019-03-04

## 2019-03-04 RX ORDER — NICOTINE POLACRILEX 4 MG
15-30 LOZENGE BUCCAL
Status: DISCONTINUED | OUTPATIENT
Start: 2019-03-04 | End: 2019-03-05

## 2019-03-04 RX ORDER — ONDANSETRON 2 MG/ML
4 INJECTION INTRAMUSCULAR; INTRAVENOUS EVERY 6 HOURS PRN
Status: DISCONTINUED | OUTPATIENT
Start: 2019-03-04 | End: 2019-03-06 | Stop reason: HOSPADM

## 2019-03-04 RX ORDER — POLYETHYLENE GLYCOL 3350 17 G/17G
17 POWDER, FOR SOLUTION ORAL DAILY PRN
Status: DISCONTINUED | OUTPATIENT
Start: 2019-03-04 | End: 2019-03-06 | Stop reason: HOSPADM

## 2019-03-04 RX ORDER — POTASSIUM CHLORIDE 1500 MG/1
20 TABLET, EXTENDED RELEASE ORAL 2 TIMES DAILY
Status: DISCONTINUED | OUTPATIENT
Start: 2019-03-04 | End: 2019-03-06 | Stop reason: HOSPADM

## 2019-03-04 RX ORDER — NALOXONE HYDROCHLORIDE 0.4 MG/ML
.1-.4 INJECTION, SOLUTION INTRAMUSCULAR; INTRAVENOUS; SUBCUTANEOUS
Status: DISCONTINUED | OUTPATIENT
Start: 2019-03-04 | End: 2019-03-06 | Stop reason: HOSPADM

## 2019-03-04 RX ORDER — MULTIVIT-MIN/IRON/FOLIC ACID/K 18-600-40
2000 CAPSULE ORAL DAILY
COMMUNITY

## 2019-03-04 RX ORDER — ASPIRIN 325 MG
325 TABLET ORAL DAILY
Status: DISCONTINUED | OUTPATIENT
Start: 2019-03-05 | End: 2019-03-04

## 2019-03-04 RX ORDER — NITROGLYCERIN 20 MG/100ML
.07-1.76 INJECTION INTRAVENOUS CONTINUOUS
Status: DISCONTINUED | OUTPATIENT
Start: 2019-03-04 | End: 2019-03-05

## 2019-03-04 RX ORDER — LISINOPRIL 5 MG/1
5 TABLET ORAL AT BEDTIME
Status: DISCONTINUED | OUTPATIENT
Start: 2019-03-04 | End: 2019-03-06

## 2019-03-04 RX ORDER — MORPHINE SULFATE 2 MG/ML
2-4 INJECTION, SOLUTION INTRAMUSCULAR; INTRAVENOUS
Status: DISCONTINUED | OUTPATIENT
Start: 2019-03-04 | End: 2019-03-06 | Stop reason: HOSPADM

## 2019-03-04 RX ORDER — LORAZEPAM 2 MG/ML
0.5 INJECTION INTRAMUSCULAR ONCE
Status: COMPLETED | OUTPATIENT
Start: 2019-03-04 | End: 2019-03-04

## 2019-03-04 RX ORDER — FUROSEMIDE 10 MG/ML
40 INJECTION INTRAMUSCULAR; INTRAVENOUS
Status: DISCONTINUED | OUTPATIENT
Start: 2019-03-04 | End: 2019-03-05

## 2019-03-04 RX ORDER — ONDANSETRON 4 MG/1
4 TABLET, ORALLY DISINTEGRATING ORAL EVERY 6 HOURS PRN
Status: DISCONTINUED | OUTPATIENT
Start: 2019-03-04 | End: 2019-03-06 | Stop reason: HOSPADM

## 2019-03-04 RX ORDER — HEPARIN SODIUM 10000 [USP'U]/100ML
0-3500 INJECTION, SOLUTION INTRAVENOUS CONTINUOUS
Status: DISCONTINUED | OUTPATIENT
Start: 2019-03-04 | End: 2019-03-04 | Stop reason: HOSPADM

## 2019-03-04 RX ORDER — LORAZEPAM 2 MG/ML
0.5 INJECTION INTRAMUSCULAR
Status: DISCONTINUED | OUTPATIENT
Start: 2019-03-05 | End: 2019-03-05 | Stop reason: HOSPADM

## 2019-03-04 RX ORDER — LORAZEPAM 0.5 MG/1
0.5 TABLET ORAL
Status: DISCONTINUED | OUTPATIENT
Start: 2019-03-05 | End: 2019-03-05 | Stop reason: HOSPADM

## 2019-03-04 RX ORDER — ROSUVASTATIN CALCIUM 20 MG/1
20 TABLET, COATED ORAL DAILY
Status: DISCONTINUED | OUTPATIENT
Start: 2019-03-04 | End: 2019-03-06 | Stop reason: HOSPADM

## 2019-03-04 RX ORDER — FUROSEMIDE 10 MG/ML
40 INJECTION INTRAMUSCULAR; INTRAVENOUS ONCE
Status: COMPLETED | OUTPATIENT
Start: 2019-03-04 | End: 2019-03-04

## 2019-03-04 RX ORDER — IOPAMIDOL 755 MG/ML
100 INJECTION, SOLUTION INTRAVASCULAR ONCE
Status: COMPLETED | OUTPATIENT
Start: 2019-03-04 | End: 2019-03-04

## 2019-03-04 RX ORDER — ACETAMINOPHEN 650 MG/1
650 SUPPOSITORY RECTAL EVERY 4 HOURS PRN
Status: DISCONTINUED | OUTPATIENT
Start: 2019-03-04 | End: 2019-03-05

## 2019-03-04 RX ORDER — SODIUM CHLORIDE 9 MG/ML
INJECTION, SOLUTION INTRAVENOUS CONTINUOUS
Status: DISCONTINUED | OUTPATIENT
Start: 2019-03-05 | End: 2019-03-05 | Stop reason: HOSPADM

## 2019-03-04 RX ORDER — SERTRALINE HYDROCHLORIDE 25 MG/1
25 TABLET, FILM COATED ORAL DAILY
Status: DISCONTINUED | OUTPATIENT
Start: 2019-03-04 | End: 2019-03-06 | Stop reason: HOSPADM

## 2019-03-04 RX ORDER — LIDOCAINE 40 MG/G
CREAM TOPICAL
Status: DISCONTINUED | OUTPATIENT
Start: 2019-03-05 | End: 2019-03-05 | Stop reason: HOSPADM

## 2019-03-04 RX ORDER — CARVEDILOL 3.12 MG/1
3.12 TABLET ORAL 2 TIMES DAILY
Status: DISCONTINUED | OUTPATIENT
Start: 2019-03-04 | End: 2019-03-05

## 2019-03-04 RX ORDER — FERROUS GLUCONATE 324(38)MG
324 TABLET ORAL
Status: DISCONTINUED | OUTPATIENT
Start: 2019-03-04 | End: 2019-03-06 | Stop reason: HOSPADM

## 2019-03-04 RX ORDER — ACETAMINOPHEN 325 MG/1
650 TABLET ORAL EVERY 4 HOURS PRN
Status: DISCONTINUED | OUTPATIENT
Start: 2019-03-04 | End: 2019-03-05

## 2019-03-04 RX ADMIN — OMEPRAZOLE 20 MG: 20 CAPSULE, DELAYED RELEASE ORAL at 09:17

## 2019-03-04 RX ADMIN — FUROSEMIDE 40 MG: 10 INJECTION, SOLUTION INTRAVENOUS at 16:55

## 2019-03-04 RX ADMIN — HEPARIN SODIUM 1200 UNITS/HR: 10000 INJECTION, SOLUTION INTRAVENOUS at 17:34

## 2019-03-04 RX ADMIN — LORAZEPAM 0.5 MG: 2 INJECTION INTRAMUSCULAR; INTRAVENOUS at 00:08

## 2019-03-04 RX ADMIN — IOPAMIDOL 80 ML: 755 INJECTION, SOLUTION INTRAVENOUS at 01:35

## 2019-03-04 RX ADMIN — CARVEDILOL 3.12 MG: 3.12 TABLET, FILM COATED ORAL at 21:25

## 2019-03-04 RX ADMIN — ROSUVASTATIN CALCIUM 20 MG: 20 TABLET, FILM COATED ORAL at 21:26

## 2019-03-04 RX ADMIN — CARVEDILOL 3.12 MG: 3.12 TABLET, FILM COATED ORAL at 09:17

## 2019-03-04 RX ADMIN — HUMAN ALBUMIN MICROSPHERES AND PERFLUTREN 7 ML: 10; .22 INJECTION, SOLUTION INTRAVENOUS at 10:20

## 2019-03-04 RX ADMIN — POTASSIUM CHLORIDE 20 MEQ: 1500 TABLET, EXTENDED RELEASE ORAL at 21:25

## 2019-03-04 RX ADMIN — INSULIN GLARGINE 6 UNITS: 100 INJECTION, SOLUTION SUBCUTANEOUS at 09:24

## 2019-03-04 RX ADMIN — Medication 2400 UNITS: at 22:11

## 2019-03-04 RX ADMIN — POTASSIUM CHLORIDE 20 MEQ: 1500 TABLET, EXTENDED RELEASE ORAL at 09:17

## 2019-03-04 RX ADMIN — HEPARIN SODIUM 1200 UNITS/HR: 10000 INJECTION, SOLUTION INTRAVENOUS at 02:33

## 2019-03-04 RX ADMIN — FERROUS GLUCONATE 324 MG: 324 TABLET ORAL at 21:25

## 2019-03-04 RX ADMIN — ASPIRIN 81 MG 324 MG: 81 TABLET ORAL at 05:17

## 2019-03-04 RX ADMIN — NITROGLYCERIN 0.07 MCG/KG/MIN: 20 INJECTION INTRAVENOUS at 05:20

## 2019-03-04 RX ADMIN — FUROSEMIDE 40 MG: 10 INJECTION, SOLUTION INTRAVENOUS at 02:05

## 2019-03-04 RX ADMIN — FUROSEMIDE 40 MG: 10 INJECTION, SOLUTION INTRAVENOUS at 09:17

## 2019-03-04 RX ADMIN — LISINOPRIL 5 MG: 5 TABLET ORAL at 21:25

## 2019-03-04 RX ADMIN — INSULIN ASPART 1 UNITS: 100 INJECTION, SOLUTION INTRAVENOUS; SUBCUTANEOUS at 09:24

## 2019-03-04 RX ADMIN — SODIUM CHLORIDE 70 ML: 9 INJECTION, SOLUTION INTRAVENOUS at 01:35

## 2019-03-04 RX ADMIN — SERTRALINE HYDROCHLORIDE 25 MG: 25 TABLET ORAL at 21:25

## 2019-03-04 ASSESSMENT — MIFFLIN-ST. JEOR: SCORE: 1853.31

## 2019-03-04 NOTE — CONSULTS
"Consult Date:  03/04/2019      PATIENT HISTORY:  Ms. Lashawn Reddy was seen in Cardiology consultation at the request of Dr. Crystal of the Hospitalist Service.  She was transferred from [facility] last evening with acute on subacute shortness of breath.      Ms. Reddy has no prior history of heart disease.  She does have a history of right lower extremity lymphedema and prior deep venous thrombosis about 6 years ago.  She was transiently on anticoagulation.  She continues to have lymphedema but a planned venous procedure was canceled with the onset of the DVT, so she does have chronic lymphedema.  That has not changed recently.      She notes that she became ill about 4-5 days ago.  She describes this as \"the flu.\"  She had diarrhea, decreased appetite and some nausea with a fever.  She then developed upper airway congestion.  She noted that she was somewhat short of breath on the stairs but thought this was the result of her respiratory illness.  Last evening, she became suddenly short of breath.  She was seen in the Emergency Department and her systolic blood pressure was 210 mmHg.      She denies chest discomfort.  Her  reminded her that she had right arm discomfort for 2 days.  It was apparently continuous and she had difficulty raising her arm.  It was uncomfortable when she used her arm.  That symptom resolved and now she has no discomfort.  Her EKG does not demonstrate acute ischemic changes.  Her initial troponin was 0.3 and further troponins are pending.  Her blood pressure is now about 125/90 mmHg and she denies shortness of breath.  She is weak.      She denies a family history of heart disease except a maternal aunt who may have had coronary artery disease.  Neither parent has coronary disease.  She smoked until about 14 years ago when she stopped.  She has diabetes mellitus.  She has been anemic but her last hemoglobin was higher than 10.      A chest CT was done and did not demonstrate a " pulmonary embolism.  There were ground-glass infiltrates which were thought likely the result of pulmonary edema.  An echocardiogram is pending.  She is on antihypertensive therapy with lisinopril and her blood pressures would appear to have been controlled in clinic.      With her transfer, she was initially hypoxic to an oxygen saturation of 82% on room air.  CPAP was initiated and she received intravenous diuresis when she arrived.  A nitroglycerin drip had been initiated to bring down her blood pressure from that initial 210/130 mmHg.  Her D-dimer was increased.  That is why the chest CT was done.  She diuresed about 2 liters from the time she presented to the Emergency Department until she completed the initial care at [facility].      REVIEW OF SYSTEMS:  Negative except as noted above.      PAST MEDICAL HISTORY:   1.  Anemia with apparent iron deficiency.   2.  Chronic right leg lymphedema with history of right leg venous insufficiency.   3.  Deep venous thrombosis in the right leg, lymphedema, preceded the thrombus.   4.  Diabetes mellitus, type 2.   5.  Hypertension.   6.  History of diverticulitis, recently hospitalized.   7.  History of uterine fibroid disease.   8.  Fibrocystic breast disease.   9.  History of tobacco use, none for 14 years.   10.  History of tubal ligation.   11.  Hysterectomy and salpingectomy.   12.  Appendectomy, prior completion.   13.  Prior reported leg vein surgery.      ALLERGIES:  NONE KNOWN.      FAMILY HISTORY:  Colon cancer in her maternal grandmother and in her father.  Coronary artery disease in a maternal aunt.      MEDICATIONS:  On arrival (home):   1.  Sertraline 25 mg p.o. daily.   2.  Rosuvastatin 20 mg p.o. daily.   3.  Omeprazole 20 mg p.o. p.r.n.   4.  Metronidazole vaginal gel, topical as prescribed.   5.  Metformin 2 grams p.o. daily.   6.  Lisinopril 5 mg p.o. nightly.   7.  Ibuprofen 600 mg p.o. p.r.n.   8.  Glipizide XL 10 mg p.o. daily.   9.   Ferrous gluconate 324 mg p.o. daily.   10.  Tylenol 650 mg p.o. every 4 hours p.r.n.   11.  Mycolog external cream, topical p.r.n.      PHYSICAL EXAMINATION:   GENERAL:  This is a woman in no apparent distress.  She was alert and oriented to person, place and time.   VITAL SIGNS:  Blood pressure was 125/90 mmHg, heart rate 90 beats per minute and regular, respiratory rate 14-20 per minute.   SKIN:  Warm and dry.   HEAD:  Normocephalic.   EYES:  Notable for an absence of icterus with clear sclerae.   NECK:  Notable for an absence of thyromegaly.  The carotid upstrokes were normal without carotid bruits.   LUNGS:  On auscultation of the lungs, there were no crackles or wheezes.  There were no crackles though breath sounds were decreased at the bases.   CARDIAC:  On cardiac auscultation, there was an S1 and an S2 without extra sounds.  There was no murmur auscultated.  The rhythm was regular.   ABDOMEN:  Bowel sounds were present but not hyperactive or hypoactive.  There was no obvious tenderness.   EXTREMITIES:  There were venous stasis changes of the right leg with approximately 1-2+ lower extremity edema of the right ankle and calf but only trace edema on the left.      LABORATORY DATA:  EKG was as described.  The troponin was 0.34.  The lactic acid was normal at 1.6.  The BNP was elevated at 579 (450 upper limits of normal).  The TSH was 3.97.  The initial troponin at [facility] was 0.073.  D-dimer was 1.2.  The sodium was 138, potassium 3.9, BUN 8 and creatinine 0.71.  The carbon dioxide was 25.  The hemoglobin was 10.7, white blood cell count 14.6 and platelet count 302,000.  The neutrophils were 74%, lymphocytes 18%, eosinophils 2%.      ASSESSMENT AND RECOMMENDATIONS:  This is an individual with marked hypertension which could be the etiology or a stress response to the dyspnea.  I discussed this with Ms. Reddy and her .  Further troponins are pending.  An echocardiogram is pending and the  echocardiography laboratory has been asked to perform the study more rapidly.  If there is evidence of left ventricular dysfunction, coronary angiography would likely be recommended to day.  If left ventricular systolic performance is normal, coronary angiography will be recommended to be performed prior to discharge.      Appropriately, a venous study has been ordered of the lower extremity.  Aggressive antihypertensive control will be necessary.  The patient is currently on intravenous heparin, which is very appropriate given the evidence of a non-ST elevation myocardial infarct.  The troponin rise could be secondary to demand or it could be indicative of an underlying etiology of obstructive coronary artery disease as Ms. Reddy does have risk factors including diabetes mellitus and a past history of tobacco use.  She has dyslipidemia which has been treated.  She also has hypertension and as such, has multiple risk factors for coronary disease.  This was also discussed with she and her .      Further recommendations will depend upon the outcome of the evaluation.  Clearly, she could also have evidence of a viral myocarditis and this was further discussed.  The results of the echocardiogram and serial troponins will be of benefit.         NADIYA JENSEN MD, FACC             D: 2019   T: 2019   MT: [id number]      Name:     FLORY REDDY   MRN:      [id number]        Account:       [id number]   :      1970           Consult Date:  2019      Document: [id number]

## 2019-03-04 NOTE — H&P
Admitted:     03/04/2019      PRIMARY CARE PHYSICIAN:  Raoul Sherman MD      CHIEF COMPLAINT:  Shortness of breath.      HISTORY OF PRESENT ILLNESS:  Lashawn Reddy is a 48-year-old pleasant woman with medical history significant for DM 2,  remote history of tobacco use, depression, DVT, presented to the [facility] Emergency Department for shortness of breath.  I discussed with the patient and her  present in the room and discussed with Dr. Huang, who evaluated the patient in [facility] ER and reviewed the patient's chart for further information.      The patient reports that she had flu-like symptoms, including fever, cough, vomiting and fatigue last week, which lasted for about 4-5 days and it subsided but she was still feeling somewhat tired.  She went to bed last night and was fine, but woke up after midnight with acute shortness of breath.  She felt like she could not catch up her breath, so  called 911.  The patient denies chest pain but reported right shoulder pain.  She was coughing, diaphoretic and was dizzy.     EMS noted that she was dyspneic, tachypneic and hypoxic to 82 on room air.  They put her on CPAP, and she was transferred to [facility] ER.      At [facility] ER, she was hypertensive with blood pressure of 210/130 with wet sounding lungs and increased work of breathing.  The patient was then placed on BiPAP and received 2 sublingual nitroglycerin, then nitroglycerin drip was started and her blood pressure gradually trended down.  Further workup revealed she had mildly detectable troponin. ProBNP was only 579.  CMP normal and lactic acid otherwise was negative.  She had mild leukocytosis of 14.6.  Chest x-ray showed bilateral pulmonary congestion.  D-dimer was elevated and a CT PE study was done, which was negative for PE, but showed mild ground-glass opacities of the central aspect of both lungs and interstitial thickening bilateral lung bases with  tiny bilateral pleural effusions.  The patient received 40 mg IV Lasix and has put out at least 2 liters of urine (1.5 liter was emptied prior to transfer and another 500 mL as soon as she arrived here).  A 12-lead EKG showed poor R-wave progression.  She was then transferred to [facility] for further management of flash pulmonary edema and possible NSTEMI and respiratory failure.      REVIEW OF SYSTEMS:  All 10-point systems were reviewed and are negative, other than mentioned in the HPI.      Currently, the patient feels her breathing is much better and she is down to 4 liters nasal cannula oxygen and is comfortable talking in full sentences.      PAST MEDICAL HISTORY:   1.  Diabetes mellitus type 2.   2.  Previous tobacco abuse.   3.  History of trauma to her right leg and DVT, which was treated with warfarin for 6 months, and this was around 6 years ago.   4.  Chronic leg edema and venous stasis.   5.  Fibrocystic breast disease.   6.  Depression.   7.  Diverticulitis.   8.  Fibroid uterus.      PAST SURGICAL HISTORY:   1.  Tubal ligation.   2.  Hysterectomy and salpingectomy.   3.  Tonsillectomy.   4.  D and C.   5.  Colonoscopy.   6.  Appendectomy.   7.  Bilateral leg vein surgery.      ALLERGIES:  NO KNOWN DRUG ALLERGIES.      FAMILY HISTORY:  Maternal grandmother and father with colon cancer.  Father had colon cancer in his 50s.  Mom had diverticulosis requiring colon surgery.  Four uncles  of cancers, including lung, colon, and bone cancers.  Two aunts had pancreatic cancer and 1 aunt had MI in her late 50s.      SOCIAL HISTORY: Former smoker, quit about 10 years ago, no alcohol or recreational drug use.     HOME MEDICATIONS:  Reconciliation pending, but it looks like she takes:   1.  Sertraline 25 mg by mouth daily.   2.  Rosuvastatin 20 mg p.o. daily.   3.  Omeprazole 20 mg by mouth as needed.   4.  Metronidazole vaginal gel topically.   5.  Metformin 2 grams by mouth daily.   6.   Lisinopril 5 mg by mouth at bedtime.   7.  Ibuprofen 600 mg p.o. as needed.   8.  Glipizide XL 10 mg p.o. daily.   9.  Ferrous gluconate 324 mg by mouth daily.   10.  Tylenol 650 mg every 4 hours as needed.   11.  Mycolog II external cream topically as needed.      PHYSICAL EXAMINATION:   GENERAL:  The patient is alert, awake, oriented, appears comfortable.   VITAL SIGNS:  Blood pressure 126/89, heart rate 89, temperature 98.1, respirations 22, pulse ox 98 on 2 liters nasal cannula oxygen.   HEENT:  PERRLA, EOMI.  Oral mucosa moist.   NECK:  Supple.   LUNGS:  Bilateral equal air entry.  Clear to auscultation anteriorly.  Very fine crepitations on lung bases bilaterally, left slightly more than right.  No wheezing.  Normal work of breathing.  Currently on 2 liter nasal cannula oxygen.   CARDIOVASCULAR:  S1, S2, regular.  No murmur, rub, gallop.   ABDOMEN:  Soft, obese/distended, nontender.  Bowel sounds active.   MUSCULOSKELETAL:  Bilateral leg edema, right greater than left.  Right leg has changes of chronic venous stasis.   NEUROLOGIC:  Alert, oriented, nonfocal.      LABORATORY DATA:  As above.      ASSESSMENT AND PLAN:       Lashawn Reddy is a 48-year-old pleasant woman with medical history significant for diabetes mellitus type 2, hyperlipidemia, prior tobacco abuse, history of deep venous thrombosis after trauma, completed 6 months of anticoagulation, was transferred to [facility] from [facility] for evaluation of shortness of breath and possible non-ST elevation myocardial infarction and congestive heart failure exacerbation.   a.  Acute hypoxic respiratory failure with flash pulmonary edema, suspect acute congestive heart failure exacerbation.   b.  Non-ST elevation myocardial infarction versus demand ischemia.   c.  Hypertensive emergency.   -- Patient with acute onset shortness of breath and hypoxia needing BiPAP.  Chest x-ray and CT with pulmonary edema.  ProBNP mildly  elevated and troponin is detectable.  The patient without chest pain.  Blood pressure as high as 210/130 on initial presentation.   -- Admit to CICU.   -- Was started on nitro and heparin drips, continue.   -- Serial troponin.   -- Telemetry monitor.     -- Aspirin daily.   -- Add low-dose Coreg and continue lisinopril and statin.   -- Check TSH, fasting lipid panel.     -- A 2D echo to evaluate for LVEF and wall motion abnormalities.   -- Received 40 mg Lasix IV and has put out about 2 liter urine so far.  Continue Lasix 40 mg IV b.i.d. with KCl 20 mEq p.o. b.i.d.  Monitor daily intake and output and weight and BMP.   -- Cardiology consult.  Likely needs angiogram, but will defer further work up to Cardiology. I will keep her n.p.o. for now.  The patient is able to lie flat currently.     2.  Diabetes mellitus type 2.     -- Prior to admission glipizide 10 mg p.o. daily and metformin 2 grams p.o. daily will be held.    -- Her blood sugar is already 265. I will start her on 6 units of Lantus, even though she is n.p.o. and place her on insulin sliding scale.   -- Check hemoglobin A1c.     3.  Bilateral leg edema, right greater than left.  The patient with personal history of deep venous thrombosis after trauma and was on anticoagulation for 6 months and completed it about 6 years ago and has right leg slightly bigger than left since then.  She reports leg swelling is slightly worse now.     -- Suspect this is due to congestive heart failure, but will get right lower extremity vascular ultrasound to rule out DVT.  D-dimer was positive, PE was ruled out.     4.  Depression:  Resume PTA sertraline 25 mg p.o. daily.     5.  Leukocytosis:  Suspect this is due to stress reaction.  The patient had flu-like symptoms last week, which have mostly resolved, except some fatigue.  No cough, purulent sputum or pleuritic chest pain now.    -- If left ventricular function has significantly decreased, may consider doing viral titers  to make sure this is not viral cardiomyopathy.     -- Chest x-ray is mostly symmetrically congested, unlikely pneumonia.  Follow WBC count.     6.  Fibrocystic breast disease.  The patient is known to have fibrocystic breast disease.  CT shows a cystic lesion.     -- Recommend followup with primary care provider.     7.  Chronic anemia.  Hemoglobin is stable.     -- She takes iron supplement which will be continued.     8.  Reflux disease.  The patient reports reflux type of symptoms with certain foods takes omeprazole p.r.n.   -- Continued on omeprazole daily.     9.  Deep venous thrombosis prophylaxis:  On heparin drip.     10.  Code status:  FULL CODE by default.      Anticipate >2 days of hospital stay.  Inpatient orders entered and care plan discussed with the patient and her  in the room.         TONIO MURPHY MD             D: 2019   T: 2019   MT: JAIDA      Name:     FLORY LÓPEZ   MRN:      [id number]        Account:      [id number]   :      1970        Admitted:     2019                   Document: [id number]       cc: Raoul Sherman MD

## 2019-03-04 NOTE — PROGRESS NOTES
Lab called with critical value trop of 0.374, pt already has previously documented trop pf 0.341 and MD already aware, pt on hep gtt, continue to monitor closely for changes.

## 2019-03-04 NOTE — PHARMACY-ADMISSION MEDICATION HISTORY
Admission medication history interview status for the 3/4/2019  admission is complete. See EPIC admission navigator for prior to admission medications     Medication history source reliability:Good    Actions taken by pharmacist (provider contacted, etc): Interviewed patient.      Additional medication history information not noted on PTA med list :None    Medication reconciliation/reorder completed by provider prior to medication history? Yes    Time spent in this activity: 20 minutes    Prior to Admission medications    Medication Sig Last Dose Taking? Auth Provider   glipiZIDE (GLUCOTROL XL) 10 MG 24 hr tablet Take 10 mg by mouth daily 3/3/2019 at AM Yes Reported, Patient   ibuprofen (ADVIL/MOTRIN) 600 MG tablet Take 1 tablet (600 mg) by mouth every 6 hours as needed for moderate pain Past Week at Unknown time Yes Temi Corado MD   metFORMIN (GLUCOPHAGE-XR) 500 MG 24 hr tablet TAKE 4 TABLETS (2,000MG) BY MOUTH EVERY DAY WITH DINNER 3/3/2019 at Dinner Yes Tanner Metz MD   nystatin-triamcinolone (MYCOLOG II) 969850-5.1 UNIT/GM-% external cream Apply topically daily as needed (rash or itching) Past Week at Unknown time Yes Raoul Sherman MD   omeprazole (PRILOSEC) 20 MG DR capsule Take 20 mg by mouth daily as needed Past Week at Unknown time Yes Unknown, Entered By History   rosuvastatin (CRESTOR) 20 MG tablet Take 1 tablet (20 mg) by mouth daily 3/3/2019 at PM Yes Tanner Metz MD   sertraline (ZOLOFT) 50 MG tablet TAKE 1/2 TABLET (25 MG) BY MOUTH EVERY MORNING  Patient taking differently: TAKE 1/2 TABLET (25 MG) BY MOUTH EVERY EVENING 3/3/2019 at HS Yes Tanner Metz MD   blood glucose monitoring (ACCU-CHEK FASTCLIX) lancets 1 each 2 times daily   Mu Stiles MD   blood glucose monitoring (ACCU-CHEK GUIDE) test strip Use to test blood sugar 2 times daily or as directed.   Mu Stiles MD   lisinopril (PRINIVIL/ZESTRIL) 10 MG tablet TAKE 1 TABLET BY  MOUTH EVERY DAY 3/3/2019 at PM  Raoul Sherman MD Sharon Chandler, PharmD, BCPS

## 2019-03-04 NOTE — ED NOTES
Assisted patient to CT for PE study. Taken off bipap and has been maintaining 02 sats 95% or greater on 4 L 02 without increase in work of breathing. BP stable at 125/82, remains on nitroglycerin gtt. Plan to transfer to [facility]. Patient denies any chest pain/discomfort and is resting comfortably.

## 2019-03-04 NOTE — LETTER
[facility]  [address]  [place] [address]  [phone]          March 6, 2019    RE:  Lashawn Reddy                                                                                                                                                       [address] [facility] [address]            To whom it may concern:    Lashawn Reddy is under my professional care for her heart. She was hospitalized at [facility] this week and should stay off work the rest of the week.    Sincerely,      KRISIT Alvarado, CNP  Cardiology

## 2019-03-04 NOTE — PROGRESS NOTES
[facility]  Hospitalist Progress Note        Dmitrilexii Roblero, DO  03/04/2019        Interval History:      Patient states she is feeling better and her breathing is improved. States that she is not having questions at this time.          Assessment and Plan:        48-year-old pleasant woman with medical history significant for diabetes mellitus type 2, hyperlipidemia, prior tobacco abuse, history of deep venous thrombosis after trauma, completed 6 months of anticoagulation, was transferred to [facility] from [facility] ER for evaluation of shortness of breath and possible non-ST elevation myocardial infarction and congestive heart failure exacerbation.     Reverse takotsubo's, Acute hypoxic respiratory failure with flash pulmonary edema, suspect acute congestive heart failure exacerbation. Non-ST elevation myocardial infarction versus demand ischemia. Hypertensive emergency. Patient with acute onset shortness of breath and hypoxia needing BiPAP.  Chest x-ray and CT with pulmonary edema.  ProBNP mildly elevated and troponin is detectable.  The patient without chest pain.  Blood pressure as high as 210/130 on initial presentation.   - ASA 325mg.   - Coreg.   - Lasix IV 40mg BID.   - Lisinopril.   - Crestor.   - Heparin drip.   - Nitroglycerin gtt.   - Echocardiogram.   - Cards following.     Diabetes mellitus type 2.     - Prior to admission glipizide 10 mg p.o. daily and metformin 2 grams p.o. daily will be held.    - insulin sliding scale.     Bilateral leg edema, right greater than left.  The patient with personal history of deep venous thrombosis after trauma and was on anticoagulation for 6 months and completed it about 6 years ago and has right leg slightly bigger than left since then.  She reports leg swelling is slightly worse now.     - Right lower extremity vascular ultrasound to rule out DVT.      Depression: PTA sertraline 25 mg p.o. daily.     Leukocytosis:   "Suspect this is due to stress reaction.  The patient had flu-like symptoms last week, which have mostly resolved, except some fatigue.  No cough, purulent sputum or pleuritic chest pain now.    - Consider viral titers to make sure this is not viral cardiomyopathy.       Fibrocystic breast disease.  The patient is known to have fibrocystic breast disease.  CT shows a cystic lesion.     - Recommend followup with primary care provider.     Chronic anemia.  Hemoglobin is stable.     - iron supplement     Reflux disease.  The patient reports reflux type of symptoms with certain foods takes omeprazole p.r.n.   - Continued on omeprazole daily.     Deep venous thrombosis prophylaxis:  On heparin drip.     Code: FULL      Disposition: Anticipate 2-4 days of hospital stay.                     Physical Exam:      Heart Rate: 77    Blood pressure 133/88, pulse 78, temperature 98  F (36.7  C), resp. rate 18, height 1.676 m (5' 6\"), weight 121.2 kg (267 lb 1.6 oz), last menstrual period 2018, SpO2 92 %, not currently breastfeeding.    Vitals:    19 0445   Weight: 121.2 kg (267 lb 1.6 oz)       Vital Sign Ranges  Temperature Temp  Av  F (36.7  C)  Min: 98  F (36.7  C)  Max: 98.1  F (36.7  C)   Blood pressure Systolic (24hrs), Av , Min:117 , Max:136        Diastolic (24hrs), Av, Min:82, Max:100      Pulse Pulse  Av.9  Min: 70  Max: 109   Respirations Resp  Av.1  Min: 12  Max: 36   Pulse oximetry SpO2  Av.4 %  Min: 92 %  Max: 99 %     Vital Signs with Ranges  Temp:  [98  F (36.7  C)-98.1  F (36.7  C)] 98  F (36.7  C)  Pulse:  [] 78  Heart Rate:  [] 77  Resp:  [12-36] 18  BP: (117-136)/() 133/88  SpO2:  [92 %-99 %] 92 %    I/O Last 3 Shifts:   I/O last 3 completed shifts:  In: -   Out: 550 [Urine:550]    I/O past 24 hours:     Intake/Output Summary (Last 24 hours) at 3/4/2019 1114  Last data filed at 3/4/2019 1059  Gross per 24 hour   Intake --   Output 2475 ml   Net -2475 ml "     GENERAL: Alert and oriented. NAD. Conversational, appropriate.   HEENT: Normocephalic. EOMI. No icterus or injection. Nares normal. NC in place.   LUNGS: Decrement to bases. No dyspnea at rest.   HEART: Regular rate. Extremities perfused.   ABDOMEN: Obese. Soft, nontender, and nondistended. Positive bowel sounds.   EXTREMITIES: Bilateral LE edema noted.   NEUROLOGIC: Moves extremities x4. Follows commands.          Prior to Admission Medications:        Medications Prior to Admission   Medication Sig Dispense Refill Last Dose     glipiZIDE (GLUCOTROL XL) 10 MG 24 hr tablet Take 10 mg by mouth daily   3/3/2019 at AM     ibuprofen (ADVIL/MOTRIN) 600 MG tablet Take 1 tablet (600 mg) by mouth every 6 hours as needed for moderate pain 40 tablet 0 Past Week at Unknown time     metFORMIN (GLUCOPHAGE-XR) 500 MG 24 hr tablet TAKE 4 TABLETS (2,000MG) BY MOUTH EVERY DAY WITH DINNER 360 tablet 0 3/3/2019 at Dinner     nystatin-triamcinolone (MYCOLOG II) 538228-3.1 UNIT/GM-% external cream Apply topically daily as needed (rash or itching) 60 g 3 Past Week at Unknown time     omeprazole (PRILOSEC) 20 MG DR capsule Take 20 mg by mouth daily as needed   Past Week at Unknown time     rosuvastatin (CRESTOR) 20 MG tablet Take 1 tablet (20 mg) by mouth daily 90 tablet 1 3/3/2019 at PM     sertraline (ZOLOFT) 50 MG tablet TAKE 1/2 TABLET (25 MG) BY MOUTH EVERY MORNING (Patient taking differently: TAKE 1/2 TABLET (25 MG) BY MOUTH EVERY EVENING) 45 tablet 1 3/3/2019 at HS     Vitamin D, Cholecalciferol, 1000 units TABS Take 4,000 Units by mouth daily   3/3/2019 at AM     blood glucose monitoring (ACCU-CHEK FASTCLIX) lancets 1 each 2 times daily 102 each 3 Taking     blood glucose monitoring (ACCU-CHEK GUIDE) test strip Use to test blood sugar 2 times daily or as directed. 100 strip 11 Taking     lisinopril (PRINIVIL/ZESTRIL) 10 MG tablet TAKE 1 TABLET BY MOUTH EVERY DAY 30 tablet 3 3/3/2019 at PM            Medications:        Current  Facility-Administered Medications   Medication Last Rate     Continuing ACE inhibitor/ARB/ARNI from home medication list OR ACE inhibitor/ARB order already placed during this visit       Continuing ACE inhibitor/ARB/ARNI from home medication list OR ACE inhibitor/ARB order already placed during this visit       - MEDICATION INSTRUCTIONS -       - MEDICATION INSTRUCTIONS -       HEParin 1,200 Units/hr (03/04/19 1107)     - MEDICATION INSTRUCTIONS -       nitroGLYcerin 0.07 mcg/kg/min (03/04/19 0902)     - MEDICATION INSTRUCTIONS -       Current Facility-Administered Medications   Medication Dose Route Frequency     [START ON 3/5/2019] aspirin  325 mg Oral Daily     carvedilol  3.125 mg Oral BID     ferrous gluconate  324 mg Oral Daily with breakfast     furosemide  40 mg Intravenous BID     insulin aspart  1-6 Units Subcutaneous Q4H     insulin glargine  6 Units Subcutaneous QAM AC     lisinopril  5 mg Oral At Bedtime     omeprazole  20 mg Oral QAM AC     potassium chloride  20 mEq Oral BID     rosuvastatin  20 mg Oral Daily     sertraline  25 mg Oral Daily     sodium chloride (PF)  3 mL Intracatheter Q8H     Current Facility-Administered Medications   Medication Dose Route Frequency     acetaminophen  650 mg Rectal Q4H PRN     acetaminophen  650 mg Oral Q4H PRN     Continuing ACE inhibitor/ARB/ARNI from home medication list OR ACE inhibitor/ARB order already placed during this visit   Does not apply DOES NOT GO TO MAR     Continuing ACE inhibitor/ARB/ARNI from home medication list OR ACE inhibitor/ARB order already placed during this visit   Does not apply DOES NOT GO TO MAR     - MEDICATION INSTRUCTIONS -   Does not apply DOES NOT GO TO MAR     - MEDICATION INSTRUCTIONS -   Does not apply DOES NOT GO TO MAR     glucose  15-30 g Oral Q15 Min PRN    Or     dextrose  25-50 mL Intravenous Q15 Min PRN    Or     glucagon  1 mg Subcutaneous Q15 Min PRN     HOLD MEDICATION   Does not apply HOLD     HOLD MEDICATION   Does  not apply HOLD     lidocaine 4%   Topical Q1H PRN     lidocaine (buffered or not buffered)  0.1-1 mL Other Q1H PRN     - MEDICATION INSTRUCTIONS -   Does not apply Continuous PRN     morphine  2-4 mg Intravenous Q2H PRN     naloxone  0.1-0.4 mg Intravenous Q2 Min PRN     ondansetron  4 mg Oral Q6H PRN    Or     ondansetron  4 mg Intravenous Q6H PRN     - MEDICATION INSTRUCTIONS -   Does not apply Continuous PRN     polyethylene glycol  17 g Oral Daily PRN     sodium chloride (PF)  3 mL Intracatheter q1 min prn            Data:      Lab data reviewed.   Recent Labs   Lab 03/04/19  0827 03/04/19  0529 03/04/19  0000   HGB  --   --  10.7*   MCV  --   --  80   PLT  --   --  302   NA  --   --  138   POTASSIUM  --   --  3.9   CHLORIDE  --   --  105   CO2  --   --  25   BUN  --   --  8   CR  --   --  0.71   ANIONGAP  --   --  8   MICHAELA  --   --  7.8*   GLC  --   --  265*   TROPI 0.374* 0.341* 0.073*           Imaging:      Imaging data reviewed.     LANCE MohanO.  [facility] Hospitalist  Pager [phone]

## 2019-03-04 NOTE — ED NOTES
Patient up to use bedside commode x 2 since IV Lasix given. (800cc out so far). Tolerates getting up fairly well, slightly dyspneic.

## 2019-03-04 NOTE — ED PROVIDER NOTES
"  History     Chief Complaint   Patient presents with     Respiratory Distress     HPI  Lashawn Reddy is a 48 year old female who resents to the ED by ambulance after waking up about 30 minutes prior to presenting with severe shortness of breath.  Medics arrived and she was 82% on room air.  Lungs sounded wet and her blood pressure was 210/130.  They suspected flash pulmonary edema.  Sats up to 86% on a nonrebreather mask and then up to 96% on CPAP.  She was given 2 doses of sublingual nitroglycerin and then started on a nitro drip currently at 80 mcg/min.  Blood pressure has come down to 160/100.  She is tolerating the CPAP.    Further history obtained from her .  He states she has had \"flu symptoms\" for a couple of weeks with severe cough sometimes to the point of vomiting.  Had fevers early on but not recently.    Allergies:  Allergies   Allergen Reactions     No Known Drug Allergies        Problem List:    Patient Active Problem List    Diagnosis Date Noted     Sigmoid diverticulitis 11/15/2018     Priority: Medium     Anemia 11/15/2018     Priority: Medium     S/P hysterectomy 09/12/2018     Priority: Medium     Personal history of DVT (deep vein thrombosis) 08/30/2018     Priority: Medium     Morbid obesity (H) 07/09/2018     Priority: Medium     Essential hypertension 10/23/2017     Priority: Medium     Type 2 diabetes mellitus with hyperglycemia, without long-term current use of insulin (H) 02/09/2017     Priority: Medium     History of diverticulitis 11/28/2016     Priority: Medium     Bacterial sepsis (H) -early 11/28/2016     Priority: Medium     Lung nodule 11/28/2016     Priority: Medium     Mixed hyperlipidemia 05/03/2016     Priority: Medium     Menorrhagia with regular cycle 04/29/2016     Priority: Medium     PMS (premenstrual syndrome) 04/28/2016     Priority: Medium     Hyperlipidemia with target LDL less than 100 06/06/2013     Priority: Medium     Diagnosis updated by automated process. " Provider to review and confirm.       Family history of malignant neoplasm of gastrointestinal tract 05/24/2005     Priority: Medium     Varicose veins of lower extremities with complications 05/13/2005     Priority: Medium     Problem list name updated by automated process. Provider to review       Slow transit constipation 02/24/2003     Priority: Medium        Past Medical History:    Past Medical History:   Diagnosis Date     Depressive disorder, not elsewhere classified      Diabetes mellitus, type 2 (H) 6/6/2013     Diffuse cystic mastopathy      Tobacco use disorder      Type 2 diabetes, HbA1c goal < 7% (H) 6/6/2013       Past Surgical History:    Past Surgical History:   Procedure Laterality Date     C ANESTH,LOWER LEG VEIN SURG,NOS  2002    bilateral     C APPENDECTOMY  04/14/89     COLONOSCOPY  6/21/2013    Procedure: COLONOSCOPY;  colonoscopy;  Surgeon: Vamshi Loomis MD;  Location: [facility]     COLONOSCOPY N/A 2/22/2017    Procedure: COLONOSCOPY;  Surgeon: Raoul Sage MD;  Location:  GI     HC COLONOSCOPY W/WO BRUSH/WASH  06/17/2005     HC DILATION/CURETTAGE DIAG/THER NON OB  1986    after a miscarriage     HC REMOVAL OF TONSILS,<13 Y/O       HYSTERECTOMY TOTAL ABDOMINAL, SALPINGECTOMY Bilateral 9/12/2018    Procedure: HYSTERECTOMY TOTAL ABDOMINAL, SALPINGECTOMY;  Total Abdominal Hysterectomy, left Salpingectomy;  Surgeon: Temi Corado MD;  Location: [facility]     TUBAL LIGATION  08/04/2006       Family History:    Family History   Problem Relation Age of Onset     Anesthesia Reaction Mother         Severe nausea     Gastrointestinal Disease Mother         Partial colon removal secondary to a blockage     Gynecology Mother         hysterectomy     Lipids Mother      Lipids Father      Cancer - colorectal Father         dx Oct '03 (dx at age 57)     Cancer Father         skin     Hypertension Father      Cancer Maternal Grandmother         colon at age 68     Cerebrovascular Disease Paternal  Grandfather      Cerebrovascular Disease Paternal Grandmother         brain aneurysm     Arthritis Sister         mainly affecting her legs     Gastrointestinal Disease Sister         2 sisters with colon polyps     Cancer Sister         cervical ca     Cancer Maternal Aunt         all over ? breast was the origin     Cancer Maternal Aunt         pancreatic     Heart Disease Maternal Aunt         MI 2010       Social History:  Marital Status:   [2]  Social History     Tobacco Use     Smoking status: Former Smoker     Years: 16.00     Types: Cigarettes     Last attempt to quit: 9/29/2008     Years since quitting: 10.4     Smokeless tobacco: Never Used   Substance Use Topics     Alcohol use: No     Alcohol/week: 0.0 oz     Comment: couple drinks per year     Drug use: No        Medications:      acetaminophen (TYLENOL) 325 MG tablet   blood glucose monitoring (ACCU-CHEK FASTCLIX) lancets   blood glucose monitoring (ACCU-CHEK GUIDE) test strip   ferrous gluconate (FERGON) 324 (38 Fe) MG tablet   fluconazole (DIFLUCAN) 150 MG tablet   glipiZIDE (GLUCOTROL XL) 10 MG 24 hr tablet   ibuprofen (ADVIL/MOTRIN) 600 MG tablet   lisinopril (PRINIVIL/ZESTRIL) 10 MG tablet   lisinopril (PRINIVIL/ZESTRIL) 5 MG tablet   metFORMIN (GLUCOPHAGE-XR) 500 MG 24 hr tablet   nystatin-triamcinolone (MYCOLOG II) 418421-8.1 UNIT/GM-% external cream   omeprazole (PRILOSEC) 20 MG DR capsule   rosuvastatin (CRESTOR) 20 MG tablet   sertraline (ZOLOFT) 50 MG tablet         Review of Systems   Unable to perform ROS: Severe respiratory distress       Physical Exam   BP: (!) 132/100  Pulse: 106  Heart Rate: 106  Temp: 98  F (36.7  C)  Resp: (!) 36  Weight: 113.4 kg (250 lb)  SpO2: 96 %      Physical Exam   Constitutional: She appears well-developed and well-nourished. She appears distressed (moderate).   HENT:   Head: Normocephalic.   Eyes: EOM are normal.   Neck: Neck supple.   Cardiovascular: Regular rhythm. Tachycardia present.    Pulmonary/Chest: She is in respiratory distress (moderate). She has no wheezes. She has rales ( mod bilateral).   Musculoskeletal: She exhibits edema (1+ bilateral). She exhibits no tenderness.   Skin: Skin is warm and dry.       ED Course  (with Medical Decision Making)      48-year-old female woke about 30 minutes prior to presenting to the ED with severe shortness of breath.  Was hypoxic upon medics arrival with stream the elevated blood pressure.  She has improved with CPAP and IV nitro drip.  She was seen immediately upon arrival and report received from medics.  She sounds wet on exam and the working diagnosis is flash pulmonary edema.  I see that she has a history of DVT in the past (after a leg injury), but would not expect wet lungs with a PE.  Will titrate up on the nitro drip to get her pressure under better control.  Labs have been sent.  Chest x-ray ordered.      Initial EKG in the ambulance showed a sinus tachycardia at just over 100 bpm.  I see no acute ischemic changes.           ED Course as of Mar 04 0243   Mon Mar 04, 2019   0015 Blood pressure has come down to 128/80.  We will start backing off on her nitro drip.    0101 Troponin is up slightly at 0.073.  BNP up slightly at 579.  Her d-dimer is up at 1.2 but I do not suspect a PE with her pulmonary edema and bilateral pleural effusions.  When I asked her more details, she had a DVT after a leg injury so there was a reason for that.  Her white count is up at 14.6.  Hemoglobin 10.7.  She does have a history of anemia.  TSH normal.    0102 She is much more comfortable now on the BiPAP.  We are unable to cut back on her nitro drip systolic blood pressure is 136.  Limited bedside ultrasound shows B-lines diffusely with bilateral pleural effusions consistent with pulmonary edema/CHF.  I think she warrants further cardiac workup especially in light of new onset, her young age and her elevated troponin.  I placed a call to [facility]  to inquire about transfer.    0105 Question if she could have a cardiomyopathy secondary to her recent viral illness.    0110 I spoke with Dr. Crystal, as per the list at [facility] who graciously accepted in transfer.  He would like us to get the CT scan to rule out PE as that would change our heparin dosing.  Either way she will go down to [facility] on nitro and heparin drips.  We will also give her a dose of IV Lasix to start her diuresis.    0231 CT was negative for PE.  Changes consistent with pulmonary edema.  She has bilateral pleural effusions.  She is started on the low-dose heparin bolus and drip.  She actually tolerated being off of BiPAP during the CT scan.  Will continue the nitro and heparin drips during transport.      Procedures    Results for orders placed during the hospital encounter of 03/03/19   POC US CHEST B-SCAN    Impression Limited Bedside Lung Ultrasound, performed and interpreted by me.   Indication: shortness of breath and hypoxia    With the patient positioned upright, bilateral anterior, posterior and lateral lung fields were examined for evidence of thoracic free fluid, pulmonary consolidation, and pulmonary edema.       Findings: She has B-lines and bilateral pleural effusions suggesting pulmonary edema/CHF.     IMPRESSION: Pulmonary edema/CHF with diffuse B-lines and bilateral pleural effusions.               EKG Interpretation:      Interpreted by Ranulfo Huang  Time reviewed: 2358  Symptoms at time of EKG: severe dyspnea   Rhythm: sinus tachycardia  Rate: 116  Axis: Normal  Ectopy: none  Conduction: normal  ST Segments/ T Waves: Nonspecific ST depression V4-6  Q Waves: none  Comparison to prior: Slight ST/J-point depression in V4-6.  V3 is more negative today but I suspect it may just be due to lead placement as the rest of her precordial leads are identical.    Clinical Impression: Sinus tachycardia at 116 bpm.  Somewhat slow R wave progression across the  precordium with some mild J-point/ST depression in leads 4-6.                Critical Care time:  was 120 minutes for this patient excluding procedures.               Results for orders placed or performed during the hospital encounter of 03/03/19 (from the past 24 hour(s))   POC US CHEST B-SCAN    Impression    Limited Bedside Lung Ultrasound, performed and interpreted by me.   Indication: shortness of breath and hypoxia    With the patient positioned upright, bilateral anterior, posterior and lateral lung fields were examined for evidence of thoracic free fluid, pulmonary consolidation, and pulmonary edema.       Findings: She has B-lines and bilateral pleural effusions suggesting pulmonary edema/CHF.     IMPRESSION: Pulmonary edema/CHF with diffuse B-lines and bilateral pleural effusions.     CBC with platelets differential   Result Value Ref Range    WBC 14.6 (H) 4.0 - 11.0 10e9/L    RBC Count 4.35 3.8 - 5.2 10e12/L    Hemoglobin 10.7 (L) 11.7 - 15.7 g/dL    Hematocrit 34.9 (L) 35.0 - 47.0 %    MCV 80 78 - 100 fl    MCH 24.6 (L) 26.5 - 33.0 pg    MCHC 30.7 (L) 31.5 - 36.5 g/dL    RDW 17.3 (H) 10.0 - 15.0 %    Platelet Count 302 150 - 450 10e9/L    Diff Method Automated Method     % Neutrophils 73.9 %    % Lymphocytes 18.4 %    % Monocytes 4.9 %    % Eosinophils 2.0 %    % Basophils 0.3 %    % Immature Granulocytes 0.5 %    Nucleated RBCs 0 0 /100    Absolute Neutrophil 10.8 (H) 1.6 - 8.3 10e9/L    Absolute Lymphocytes 2.7 0.8 - 5.3 10e9/L    Absolute Monocytes 0.7 0.0 - 1.3 10e9/L    Absolute Basophils 0.0 0.0 - 0.2 10e9/L    Abs Immature Granulocytes 0.1 0 - 0.4 10e9/L    Absolute Nucleated RBC 0.0    Comprehensive metabolic panel   Result Value Ref Range    Sodium 138 133 - 144 mmol/L    Potassium 3.9 3.4 - 5.3 mmol/L    Chloride 105 94 - 109 mmol/L    Carbon Dioxide 25 20 - 32 mmol/L    Anion Gap 8 3 - 14 mmol/L    Glucose 265 (H) 70 - 99 mg/dL    Urea Nitrogen 8 7 - 30 mg/dL    Creatinine 0.71 0.52 - 1.04  mg/dL    GFR Estimate >90 >60 mL/min/[1.73_m2]    GFR Estimate If Black >90 >60 mL/min/[1.73_m2]    Calcium 7.8 (L) 8.5 - 10.1 mg/dL    Bilirubin Total 0.2 0.2 - 1.3 mg/dL    Albumin 3.1 (L) 3.4 - 5.0 g/dL    Protein Total 6.9 6.8 - 8.8 g/dL    Alkaline Phosphatase 96 40 - 150 U/L    ALT 28 0 - 50 U/L    AST 22 0 - 45 U/L   D dimer quantitative   Result Value Ref Range    D Dimer 1.2 (H) 0.0 - 0.50 ug/ml FEU   Troponin I   Result Value Ref Range    Troponin I ES 0.073 (H) 0.000 - 0.045 ug/L   TSH with free T4 reflex   Result Value Ref Range    TSH 3.97 0.40 - 4.00 mU/L   Nt probnp inpatient   Result Value Ref Range    N-Terminal Pro BNP Inpatient 579 (H) 0 - 450 pg/mL   Blood gas venous   Result Value Ref Range    Ph Venous 7.33 7.32 - 7.43 pH    PCO2 Venous 50 40 - 50 mm Hg    PO2 Venous 50 (H) 25 - 47 mm Hg    Bicarbonate Venous 27 21 - 28 mmol/L    Base Excess Venous 0.0 mmol/L    FIO2 45    Lactic acid whole blood   Result Value Ref Range    Lactic Acid 1.6 0.7 - 2.0 mmol/L   XR Chest Port 1 View    Narrative    CHEST SINGLE VIEW PORTABLE   3/4/2019 12:06 AM     HISTORY: Severe shortness of breath. Recent cough/viral illness.  Severe hypertension.    COMPARISON: 9/21/2018.    FINDINGS: Relatively increased haziness projecting over both lung  bases. No convincing pulmonary opacities. Normal-sized cardiac  silhouette.      Impression    IMPRESSION:   1. Relatively increased haziness projecting over both lung bases. This  could be due to overlying soft tissues or bilateral pleural effusions.  2. No other findings suspicious for active cardiopulmonary disease.    MARY SANCHEZ MD   Chest CT - IV contrast only - PE protocoll    Narrative    CT CHEST WITH CONTRAST   3/4/2019 1:54 AM     HISTORY: Shortness of breath. Elevated D-dimer.    COMPARISON: 11/14/2018 and 11/28/2016-CT abdomen and pelvis.    TECHNIQUE: Following the uneventful administration of 80 mL Isovue-370  intravenous contrast, helical sections were  acquired through the lungs  according to the pulmonary embolism protocol. Coronal reconstructions  were generated. Radiation dose for this scan was reduced using  automated exposure control, adjustment of the mA and/or kV according  to the patient's size, or iterative reconstruction technique.    FINDINGS: No visualized pulmonary embolus. The thoracic aorta is  normal in caliber without dissection.    Minimal emphysematous changes in the lungs. Mild groundglass opacities  in the central aspects of both lungs. Mild interstitial thickening in  bilateral lung bases. 1.4 x 1.3 cm right lung base nodule, not  convincingly changed in size since 11/28/2016 and therefore likely of  benign etiology. Tiny bilateral pleural effusions. No pericardial  effusion. A few nonspecific borderline enlarged mediastinal and  bilateral hilar lymph nodes. A mildly enlarged right infrahilar lymph  node was also present on 11/28/2016 and is therefore likely of benign  etiology. 1.2 x 1.0 cm ovoid soft tissue attenuation nodule in the  subareolar region of the right breast (series 4, image 58).    Scan through the upper abdomen is unremarkable.      Impression    IMPRESSION:   1. Mild groundglass opacities of the central aspects of both lungs,  mild interstitial thickening in bilateral lung bases, and tiny  bilateral pleural effusions, likely all relating to pulmonary edema.  2. No visualized pulmonary embolus.  3. Indeterminate 1 cm nodule in the right breast. Recommend comparison  with breast imaging studies.       Medications   nitroGLYcerin 50 mg in D5W 250 mL (adult std) infusion (0.35 mcg/kg/min × 113.4 kg Intravenous Rate/Dose Change 3/4/19 0014)   heparin  drip 25,000 units in 0.45% NaCl 250 mL (see additional administration details for dose) (1,200 Units/hr Intravenous New Bag 3/4/19 0233)   LORazepam (ATIVAN) injection 0.5 mg (0.5 mg Intravenous Given 3/4/19 0008)   furosemide (LASIX) injection 40 mg (40 mg Intravenous Given 3/4/19  0205)   iopamidol (ISOVUE-370) solution 100 mL (80 mLs Intravenous Given 3/4/19 0135)   sodium chloride 0.9 % bag 500mL for CT scan flush use (70 mLs Intravenous Given 3/4/19 0135)   sodium chloride (PF) 0.9% PF flush 3 mL (10 mLs Intracatheter Given 3/4/19 0133)   heparin Loading Dose from infusion pump *Give when STARTING heparin infusion 6,000 Units (6,000 Units Intravenous Given 3/4/19 0232)       Assessments & Plan     I have reviewed the nursing notes.    I have reviewed the findings, diagnosis, plan and need for follow up with the patient.     Critical Care Addendum    My initial assessment, based on my review of prehospital provider report, review of nursing observations, review of vital signs, physical exam and 12 lead ECG analysis, established that Lashawn Reddy has severe hypertension, respiratory failure and and flash pulmonary edema, which requires immediate intervention, and therefore she is critically ill.     After the initial assessment, the care team initiated multiple lab tests, initiated medication therapy with IV nitro, IV Lasix and initiated intensive non-invasive respiratory support to provide stabilization care. Due to the critical nature of this patient, I reassessed nursing observations, vital signs, physical exam, 12 lead ECG analysis, interpretation of POCUS of the chest and respiratory status multiple times prior to her disposition.     Time also spent performing documentation, discussion with family to obtain medical information for decision making, reviewing test results and discussion with consultants. And arranging transfer     Critical care time (excluding teaching time and procedures): 120 minutes.        Medication List      ASK your doctor about these medications    ciprofloxacin 500 MG tablet  Commonly known as:  CIPRO  500 mg, Oral, EVERY 12 HOURS  Ask about: Should I take this medication?     fluconazole 150 MG tablet  Commonly known as:  DIFLUCAN  Take one tablet now, and  one tablet in three days  Ask about: Should I take this medication?     metroNIDAZOLE 0.75 % vaginal gel  Commonly known as:  METROGEL-VAGINAL  1 applicator, Vaginal, AT BEDTIME  Ask about: Should I take this medication?     metroNIDAZOLE 500 MG tablet  Commonly known as:  FLAGYL  500 mg, Oral, EVERY 8 HOURS  Ask about: Should I take this medication?            Final diagnoses:   Flash pulmonary edema (H)   Bilateral pleural effusion   Hypoxia       3/3/2019   [facility] EMERGENCY DEPARTMENT     Ranulfo Huang MD  03/04/19 0241

## 2019-03-04 NOTE — PROGRESS NOTES
Pt admitted to unit. Denies pain. Vitals stable on 2 liters NC, heparin and nitroglycerin running. Up w/ A-1 pivot to BSC.  at bedside. Following trops. R/O DVT w/ US. Echo and cards consult.  continue to monotr closely.

## 2019-03-04 NOTE — PLAN OF CARE
OT/CR: order received, chart review. Per discussion with RN, pending further workup and ECHO results. Per RN, hold CR evaluation at this time.

## 2019-03-04 NOTE — LETTER
Transition Communication Hand-off for Care Transitions to Next Level of Care Provider    Name: Lashawn Reddy  : 1970  MRN #: [id number]  Primary Care Provider: Raoul Sherman  Primary Care MD Name: Dr. Wesley  Primary Clinic: [address] [place] [address] [place] [address]  Primary Care Clinic Name: [facility]  Reason for Hospitalization:  Respiratory Distress.  ACS (acute coronary syndrome) (H)  Admit Date/Time: 3/4/2019  4:36 AM  Discharge Date: 3/6/2019  Payor Source: Payor: PREFERREDONE / Plan: PREFERREDONE NON [place] [facility]ERRED[employer] / Product Type: HMO /     Readmission Assessment Measure (HERO) Risk Score/category:  Elevated         Reason for Communication Hand-off Referral: Admission diagnoses: CHF  Avoidable readmission within 30 days    Discharge Plan:       Concern for non-adherence with plan of care:   Y/N no  Discharge Needs Assessment:  Needs      Most Recent Value   # of Referrals Placed by CTS  Scheduled Follow-up appointments          Already enrolled in Tele-monitoring program and name of program:  no  Follow-up specialty is recommended: Yes  (see below for appointments)    Follow-up plan:    Future Appointments   Date Time Provider Department Center   3/6/2019  1:30 PM Mari Lr, OT [facility]   3/12/2019 11:10 AM Raoul Sherman MD [facility]   3/25/2019  3:00 PM [facility]   3/27/2019  2:45 PM Jaimie Pedroza, APRN CNP [facility]       Any outstanding tests or procedures:  Echo (scheduled in [place])      Radiology & Cardiology Orders     Future Labs/Procedures Complete By Expires    Echocardiogram Complete  3/27/2019 (Approximate) 3/6/2020    Process Instructions:    Administration of IV contrast will be tailored to this examination per the appropriate written protocol listed in the Echocardiography department Protocol Book, or by the supervising Cardiologist. This may result in an order change.    Use  of contrast is at the discretion of the supervising Cardiologist.    Scheduling Instructions:    Please call your clinic of choice to schedule this procedure:    [facility]:   [phone]  [facility]:   [phone]  [facility]:  [phone]  [facility] [phone]  [facility]:   [phone]  [facility]:  [phone]  [facility]): [phone]  [facility] [phone]    Comments:    Administration of IV contrast will be tailored to this examination per the appropriate written protocol listed in the Echocardiography department Protocol Book, or by the supervising Cardiologist. This may result in an order change.    Use of contrast is at the discretion of the supervising Cardiologist.        Referrals     Future Labs/Procedures    CARDIAC REHAB REFERRAL     Process Instructions:    Advance to Wellness and Exercise for Life (WEL) Program or to another maintenance exercise program upon completion of supervised exercise therapy.    Weight mgt program is only offered at [facility].    *This therapy referral will be filtered to a centralized scheduling office at [facility] and the patient will receive a call to schedule an appointment at a [place] location most convenient for them. *        Comments:    If you have not heard from the scheduling office within 2 business days, please call [phone] for all locations, with the exception of [place], please call [phone] and [place], please call [phone].    Please be aware that coverage of these services is subject to the terms and limitations of your health insurance plan.  Call member services at your health plan with any benefit or coverage questions.            Key Recommendations:     Follow up with primary care provider, Raoul Sherman, within 7 days to evaluate medication change, for hospital follow- up and BP evaluation.  The following labs/tests are  recommended: BP check.       Gladis Chaudhry    AVS/Discharge Summary is the source of truth; this is a helpful guide for improved communication of patient story

## 2019-03-04 NOTE — ED NOTES
Patient arrived on 80mcg nitroglycerin, current /86. Per Dr. Huang, decrease rate in half. See MAR for changes.

## 2019-03-04 NOTE — LETTER
[facility] CORONARY CARE UNIT  [address]  [place] [address]  [phone]          March 6, 2019    RE:  Lashawn Reddy                                                                                                                                                       [address] [facility] [address]            To whom it may concern:    Lashawn Reddy is under my professional care for    ACS (acute coronary syndrome) (H)  Stress-induced cardiomyopathy  Essential hypertension She  may return to work with the following: March 11, 2019.     She will have no restrictions at the time of her return to work.     Sincerely,    Dr. Geno Hall

## 2019-03-04 NOTE — PLAN OF CARE
VSS, remains on hep and ntg gtt, echo shows decreased ef and areas of hypokinesis, plan for angio prior to discharge but not for today, diuresing well on lasix, continue to monitor closely.

## 2019-03-05 ENCOUNTER — SURGERY (OUTPATIENT)
Age: 49
End: 2019-03-05
Payer: COMMERCIAL

## 2019-03-05 LAB
ANION GAP SERPL CALCULATED.3IONS-SCNC: 6 MMOL/L (ref 3–14)
BUN SERPL-MCNC: 12 MG/DL (ref 7–30)
CALCIUM SERPL-MCNC: 8.6 MG/DL (ref 8.5–10.1)
CHLORIDE SERPL-SCNC: 103 MMOL/L (ref 94–109)
CHOLEST SERPL-MCNC: 117 MG/DL
CO2 SERPL-SCNC: 28 MMOL/L (ref 20–32)
CREAT SERPL-MCNC: 0.82 MG/DL (ref 0.52–1.04)
ERYTHROCYTE [DISTWIDTH] IN BLOOD BY AUTOMATED COUNT: 17.2 % (ref 10–15)
GFR SERPL CREATININE-BSD FRML MDRD: 83 ML/MIN/{1.73_M2}
GLUCOSE BLDC GLUCOMTR-MCNC: 107 MG/DL (ref 70–99)
GLUCOSE BLDC GLUCOMTR-MCNC: 141 MG/DL (ref 70–99)
GLUCOSE BLDC GLUCOMTR-MCNC: 144 MG/DL (ref 70–99)
GLUCOSE BLDC GLUCOMTR-MCNC: 145 MG/DL (ref 70–99)
GLUCOSE BLDC GLUCOMTR-MCNC: 184 MG/DL (ref 70–99)
GLUCOSE SERPL-MCNC: 163 MG/DL (ref 70–99)
HCT VFR BLD AUTO: 33.7 % (ref 35–47)
HDLC SERPL-MCNC: 47 MG/DL
HGB BLD-MCNC: 10.8 G/DL (ref 11.7–15.7)
INR PPP: 1.12 (ref 0.86–1.14)
LDLC SERPL CALC-MCNC: 40 MG/DL
LMWH PPP CHRO-ACNC: 0.2 IU/ML
MCH RBC QN AUTO: 25.1 PG (ref 26.5–33)
MCHC RBC AUTO-ENTMCNC: 32 G/DL (ref 31.5–36.5)
MCV RBC AUTO: 78 FL (ref 78–100)
NONHDLC SERPL-MCNC: 70 MG/DL
PLATELET # BLD AUTO: 238 10E9/L (ref 150–450)
POTASSIUM SERPL-SCNC: 3.7 MMOL/L (ref 3.4–5.3)
RBC # BLD AUTO: 4.31 10E12/L (ref 3.8–5.2)
SODIUM SERPL-SCNC: 137 MMOL/L (ref 133–144)
TRIGL SERPL-MCNC: 151 MG/DL
TROPONIN I SERPL-MCNC: 0.06 UG/L (ref 0–0.04)
WBC # BLD AUTO: 10.3 10E9/L (ref 4–11)

## 2019-03-05 PROCEDURE — 99232 SBSQ HOSP IP/OBS MODERATE 35: CPT | Mod: 25 | Performed by: INTERNAL MEDICINE

## 2019-03-05 PROCEDURE — 99152 MOD SED SAME PHYS/QHP 5/>YRS: CPT | Performed by: INTERNAL MEDICINE

## 2019-03-05 PROCEDURE — 25000131 ZZH RX MED GY IP 250 OP 636 PS 637: Performed by: INTERNAL MEDICINE

## 2019-03-05 PROCEDURE — 25000128 H RX IP 250 OP 636: Performed by: INTERNAL MEDICINE

## 2019-03-05 PROCEDURE — 93458 L HRT ARTERY/VENTRICLE ANGIO: CPT | Mod: 26 | Performed by: INTERNAL MEDICINE

## 2019-03-05 PROCEDURE — 93010 ELECTROCARDIOGRAM REPORT: CPT | Performed by: INTERNAL MEDICINE

## 2019-03-05 PROCEDURE — 21000001 ZZH R&B HEART CARE

## 2019-03-05 PROCEDURE — 25000125 ZZHC RX 250: Performed by: INTERNAL MEDICINE

## 2019-03-05 PROCEDURE — C1894 INTRO/SHEATH, NON-LASER: HCPCS | Performed by: INTERNAL MEDICINE

## 2019-03-05 PROCEDURE — 27210794 ZZH OR GENERAL SUPPLY STERILE: Performed by: INTERNAL MEDICINE

## 2019-03-05 PROCEDURE — 25000132 ZZH RX MED GY IP 250 OP 250 PS 637: Performed by: INTERNAL MEDICINE

## 2019-03-05 PROCEDURE — 40000852 ZZH STATISTIC HEART CATH LAB OR EP LAB

## 2019-03-05 PROCEDURE — 00000146 ZZHCL STATISTIC GLUCOSE BY METER IP

## 2019-03-05 PROCEDURE — 25000132 ZZH RX MED GY IP 250 OP 250 PS 637: Performed by: HOSPITALIST

## 2019-03-05 PROCEDURE — 25000128 H RX IP 250 OP 636

## 2019-03-05 PROCEDURE — 85610 PROTHROMBIN TIME: CPT | Performed by: HOSPITALIST

## 2019-03-05 PROCEDURE — 4A023N7 MEASUREMENT OF CARDIAC SAMPLING AND PRESSURE, LEFT HEART, PERCUTANEOUS APPROACH: ICD-10-PCS | Performed by: INTERNAL MEDICINE

## 2019-03-05 PROCEDURE — 36415 COLL VENOUS BLD VENIPUNCTURE: CPT | Performed by: HOSPITALIST

## 2019-03-05 PROCEDURE — B2111ZZ FLUOROSCOPY OF MULTIPLE CORONARY ARTERIES USING LOW OSMOLAR CONTRAST: ICD-10-PCS | Performed by: INTERNAL MEDICINE

## 2019-03-05 PROCEDURE — 85520 HEPARIN ASSAY: CPT | Performed by: HOSPITALIST

## 2019-03-05 PROCEDURE — 93458 L HRT ARTERY/VENTRICLE ANGIO: CPT | Performed by: INTERNAL MEDICINE

## 2019-03-05 PROCEDURE — C1769 GUIDE WIRE: HCPCS | Performed by: INTERNAL MEDICINE

## 2019-03-05 PROCEDURE — 84484 ASSAY OF TROPONIN QUANT: CPT | Performed by: HOSPITALIST

## 2019-03-05 PROCEDURE — 85027 COMPLETE CBC AUTOMATED: CPT | Performed by: HOSPITALIST

## 2019-03-05 PROCEDURE — 99233 SBSQ HOSP IP/OBS HIGH 50: CPT | Performed by: INTERNAL MEDICINE

## 2019-03-05 PROCEDURE — 25800030 ZZH RX IP 258 OP 636: Performed by: INTERNAL MEDICINE

## 2019-03-05 PROCEDURE — 93005 ELECTROCARDIOGRAM TRACING: CPT

## 2019-03-05 PROCEDURE — 80061 LIPID PANEL: CPT | Performed by: HOSPITALIST

## 2019-03-05 PROCEDURE — 80048 BASIC METABOLIC PNL TOTAL CA: CPT | Performed by: HOSPITALIST

## 2019-03-05 RX ORDER — FENTANYL CITRATE 50 UG/ML
INJECTION, SOLUTION INTRAMUSCULAR; INTRAVENOUS
Status: DISCONTINUED | OUTPATIENT
Start: 2019-03-05 | End: 2019-03-05 | Stop reason: HOSPADM

## 2019-03-05 RX ORDER — ATROPINE SULFATE 0.1 MG/ML
0.5 INJECTION INTRAVENOUS EVERY 5 MIN PRN
Status: ACTIVE | OUTPATIENT
Start: 2019-03-05 | End: 2019-03-06

## 2019-03-05 RX ORDER — VERAPAMIL HYDROCHLORIDE 2.5 MG/ML
INJECTION, SOLUTION INTRAVENOUS
Status: DISCONTINUED | OUTPATIENT
Start: 2019-03-05 | End: 2019-03-05 | Stop reason: HOSPADM

## 2019-03-05 RX ORDER — LORAZEPAM 2 MG/ML
0.5 INJECTION INTRAMUSCULAR
Status: DISCONTINUED | OUTPATIENT
Start: 2019-03-05 | End: 2019-03-05 | Stop reason: HOSPADM

## 2019-03-05 RX ORDER — NITROGLYCERIN 5 MG/ML
VIAL (ML) INTRAVENOUS
Status: DISCONTINUED | OUTPATIENT
Start: 2019-03-05 | End: 2019-03-05 | Stop reason: HOSPADM

## 2019-03-05 RX ORDER — NALOXONE HYDROCHLORIDE 0.4 MG/ML
.2-.4 INJECTION, SOLUTION INTRAMUSCULAR; INTRAVENOUS; SUBCUTANEOUS
Status: ACTIVE | OUTPATIENT
Start: 2019-03-05 | End: 2019-03-06

## 2019-03-05 RX ORDER — FENTANYL CITRATE 50 UG/ML
25-50 INJECTION, SOLUTION INTRAMUSCULAR; INTRAVENOUS
Status: ACTIVE | OUTPATIENT
Start: 2019-03-05 | End: 2019-03-06

## 2019-03-05 RX ORDER — FLUMAZENIL 0.1 MG/ML
0.2 INJECTION, SOLUTION INTRAVENOUS
Status: ACTIVE | OUTPATIENT
Start: 2019-03-05 | End: 2019-03-06

## 2019-03-05 RX ORDER — ASPIRIN 81 MG/1
81 TABLET ORAL DAILY
Status: DISCONTINUED | OUTPATIENT
Start: 2019-03-06 | End: 2019-03-06 | Stop reason: HOSPADM

## 2019-03-05 RX ORDER — HEPARIN SODIUM 1000 [USP'U]/ML
INJECTION, SOLUTION INTRAVENOUS; SUBCUTANEOUS
Status: DISCONTINUED | OUTPATIENT
Start: 2019-03-05 | End: 2019-03-05 | Stop reason: HOSPADM

## 2019-03-05 RX ORDER — LIDOCAINE 40 MG/G
CREAM TOPICAL
Status: DISCONTINUED | OUTPATIENT
Start: 2019-03-05 | End: 2019-03-05 | Stop reason: HOSPADM

## 2019-03-05 RX ORDER — IOPAMIDOL 755 MG/ML
INJECTION, SOLUTION INTRAVASCULAR
Status: DISCONTINUED | OUTPATIENT
Start: 2019-03-05 | End: 2019-03-05 | Stop reason: HOSPADM

## 2019-03-05 RX ORDER — POTASSIUM CHLORIDE 1500 MG/1
20 TABLET, EXTENDED RELEASE ORAL
Status: DISCONTINUED | OUTPATIENT
Start: 2019-03-05 | End: 2019-03-05 | Stop reason: HOSPADM

## 2019-03-05 RX ORDER — SODIUM CHLORIDE 9 MG/ML
INJECTION, SOLUTION INTRAVENOUS CONTINUOUS
Status: DISCONTINUED | OUTPATIENT
Start: 2019-03-05 | End: 2019-03-05 | Stop reason: HOSPADM

## 2019-03-05 RX ORDER — NALOXONE HYDROCHLORIDE 0.4 MG/ML
.1-.4 INJECTION, SOLUTION INTRAMUSCULAR; INTRAVENOUS; SUBCUTANEOUS
Status: DISCONTINUED | OUTPATIENT
Start: 2019-03-05 | End: 2019-03-05

## 2019-03-05 RX ORDER — LORAZEPAM 0.5 MG/1
0.5 TABLET ORAL
Status: DISCONTINUED | OUTPATIENT
Start: 2019-03-05 | End: 2019-03-05 | Stop reason: HOSPADM

## 2019-03-05 RX ORDER — HYDROCODONE BITARTRATE AND ACETAMINOPHEN 5; 325 MG/1; MG/1
1-2 TABLET ORAL EVERY 4 HOURS PRN
Status: DISCONTINUED | OUTPATIENT
Start: 2019-03-05 | End: 2019-03-06 | Stop reason: HOSPADM

## 2019-03-05 RX ORDER — CARVEDILOL 6.25 MG/1
6.25 TABLET ORAL 2 TIMES DAILY
Status: DISCONTINUED | OUTPATIENT
Start: 2019-03-05 | End: 2019-03-06 | Stop reason: HOSPADM

## 2019-03-05 RX ORDER — LIDOCAINE 40 MG/G
CREAM TOPICAL
Status: DISCONTINUED | OUTPATIENT
Start: 2019-03-05 | End: 2019-03-06 | Stop reason: HOSPADM

## 2019-03-05 RX ORDER — SODIUM CHLORIDE 9 MG/ML
INJECTION, SOLUTION INTRAVENOUS CONTINUOUS
Status: DISCONTINUED | OUTPATIENT
Start: 2019-03-05 | End: 2019-03-05

## 2019-03-05 RX ORDER — DEXTROSE MONOHYDRATE 25 G/50ML
25-50 INJECTION, SOLUTION INTRAVENOUS
Status: DISCONTINUED | OUTPATIENT
Start: 2019-03-05 | End: 2019-03-06 | Stop reason: HOSPADM

## 2019-03-05 RX ORDER — ACETAMINOPHEN 325 MG/1
325-650 TABLET ORAL EVERY 4 HOURS PRN
Status: DISCONTINUED | OUTPATIENT
Start: 2019-03-05 | End: 2019-03-06 | Stop reason: HOSPADM

## 2019-03-05 RX ORDER — NICOTINE POLACRILEX 4 MG
15-30 LOZENGE BUCCAL
Status: DISCONTINUED | OUTPATIENT
Start: 2019-03-05 | End: 2019-03-06 | Stop reason: HOSPADM

## 2019-03-05 RX ADMIN — ROSUVASTATIN CALCIUM 20 MG: 20 TABLET, FILM COATED ORAL at 20:38

## 2019-03-05 RX ADMIN — CARVEDILOL 3.12 MG: 3.12 TABLET, FILM COATED ORAL at 09:25

## 2019-03-05 RX ADMIN — NITROGLYCERIN 200 MCG: 5 INJECTION, SOLUTION INTRAVENOUS at 13:40

## 2019-03-05 RX ADMIN — CARVEDILOL 6.25 MG: 6.25 TABLET, FILM COATED ORAL at 20:37

## 2019-03-05 RX ADMIN — HEPARIN SODIUM 5000 UNITS: 1000 INJECTION, SOLUTION INTRAVENOUS; SUBCUTANEOUS at 13:40

## 2019-03-05 RX ADMIN — POTASSIUM CHLORIDE 20 MEQ: 1500 TABLET, EXTENDED RELEASE ORAL at 20:39

## 2019-03-05 RX ADMIN — ASPIRIN 325 MG: 325 TABLET, DELAYED RELEASE ORAL at 09:25

## 2019-03-05 RX ADMIN — HEPARIN SODIUM 1350 UNITS/HR: 10000 INJECTION, SOLUTION INTRAVENOUS at 06:58

## 2019-03-05 RX ADMIN — LISINOPRIL 5 MG: 5 TABLET ORAL at 20:38

## 2019-03-05 RX ADMIN — INSULIN GLARGINE 6 UNITS: 100 INJECTION, SOLUTION SUBCUTANEOUS at 09:26

## 2019-03-05 RX ADMIN — FENTANYL CITRATE 50 MCG: 50 INJECTION, SOLUTION INTRAMUSCULAR; INTRAVENOUS at 13:36

## 2019-03-05 RX ADMIN — VERAPAMIL HYDROCHLORIDE 2.5 MG: 2.5 INJECTION, SOLUTION INTRAVENOUS at 13:40

## 2019-03-05 RX ADMIN — OMEPRAZOLE 20 MG: 20 CAPSULE, DELAYED RELEASE ORAL at 06:59

## 2019-03-05 RX ADMIN — MIDAZOLAM 1 MG: 1 INJECTION INTRAMUSCULAR; INTRAVENOUS at 13:18

## 2019-03-05 RX ADMIN — INSULIN GLARGINE 6 UNITS: 100 INJECTION, SOLUTION SUBCUTANEOUS at 22:03

## 2019-03-05 RX ADMIN — SERTRALINE HYDROCHLORIDE 25 MG: 25 TABLET ORAL at 20:38

## 2019-03-05 RX ADMIN — SODIUM CHLORIDE 150 ML: 9 INJECTION, SOLUTION INTRAVENOUS at 09:24

## 2019-03-05 RX ADMIN — LIDOCAINE HYDROCHLORIDE 1 ML: 10 INJECTION, SOLUTION INFILTRATION; PERINEURAL at 13:35

## 2019-03-05 RX ADMIN — IOPAMIDOL 60 ML: 755 INJECTION, SOLUTION INTRAVENOUS at 13:55

## 2019-03-05 RX ADMIN — POTASSIUM CHLORIDE 20 MEQ: 1500 TABLET, EXTENDED RELEASE ORAL at 09:25

## 2019-03-05 RX ADMIN — FENTANYL CITRATE 50 MCG: 50 INJECTION, SOLUTION INTRAMUSCULAR; INTRAVENOUS at 13:18

## 2019-03-05 RX ADMIN — MIDAZOLAM 1 MG: 1 INJECTION INTRAMUSCULAR; INTRAVENOUS at 13:33

## 2019-03-05 ASSESSMENT — MIFFLIN-ST. JEOR: SCORE: 1822.92

## 2019-03-05 NOTE — PROGRESS NOTES
[facility].  Patient is awake and alert, skin is warm and dry and color is good.  Pt is in NSR and VSS.

## 2019-03-05 NOTE — PROGRESS NOTES
[facility]    Medicine Progress Note - Hospitalist Service       Date of Admission:  3/4/2019  Assessment & Plan   48-year-old [place] woman with medical history significant for diabetes mellitus type 2, hyperlipidemia, prior tobacco abuse, history of deep venous thrombosis after trauma, completed 6 months of anticoagulation, was transferred to [facility] from [facility] ER for evaluation of shortness of breath and possible non-ST elevation myocardial infarction and congestive heart failure exacerbation.     1.) Acute hypoxic respiratory failure with flash pulmonary edema, suspect acute congestive heart failure exacerbation, Non-ST elevation myocardial infarction versus demand ischemia and Hypertensive emergency.   Patient with acute onset shortness of breath and hypoxia needing BiPAP.  Chest x-ray and CT with pulmonary edema.  ProBNP mildly elevated and troponin is detectable.  The patient without chest pain.  Blood pressure as high as 210/130 on initial presentation.   -- Admitted to CICU, was started on nitro and heparin drips   -- echo shows reverse takotsubos, EF of 40-45%  -- cardiology following, appears plan for today is coronary angiogram  -- symptoms improved and BP more stable on nitro gtt   -- also diuresis with Lasix 40mg IV BID, total out so far is 2.8L, wt is down from 121kg to 118kg    2.  Diabetes mellitus type 2.     Per H and P note: Prior to admission glipizide 10 mg p.o. daily and metformin 2 grams p.o. daily will be held.  -- Her blood sugar is already 265.  We will give her 6 units of Lantus, even though she is n.p.o. and place her on insulin sliding scale.   -- Checked hemoglobin A1c, is 8.6.   Recent Labs   Lab 03/05/19  0809 03/05/19  0510 03/05/19  0207 03/04/19  2117 03/04/19  1431 03/04/19  1224 03/04/19  0804  03/04/19  0000   GLC  --  163*  --   --   --   --   --   --  265*   *  --  145* 228* 127* 130* 146*   < >  --     < > = values in  this interval not displayed.     - is NPO now for angio, did not get AM Lantus  - glucose stable at this point, as timing of angio uncertain not sure if she will get Lantus today, may have to wait until in AM  - follow glucose q4 with NPO sliding scale insulin    3.  Bilateral leg edema, right greater than left.   The patient with personal history of deep venous thrombosis after trauma and was on anticoagulation for 6 months and completed it about 6 years ago and has right leg slightly bigger than left since then.  She reports leg swelling is slightly worse now.     -- Suspect this is due to congestive heart failure.  --  D-dimer was positive, PE was ruled out, US LE negative for DVTs.     4.  Depression:    -- Resume PTA sertraline 25 mg p.o. daily.     5.  Leukocytosis:   -- Suspect this is due to stress reaction.  The patient had flu-like symptoms last week, which have mostly resolved, except some fatigue.  No cough, purulent sputum or pleuritic chest pain now.    -- If left ventricular function has significantly decreased, may consider doing viral titers to make sure this is not viral cardiomyopathy.     -- Chest x-ray is mostly symmetrically congested, unlikely pneumonia.  Follow WBC count.     6.  Fibrocystic breast disease.    -- The patient is known to have fibrocystic breast disease.  CT shows a cystic lesion.     -- Recommend followup with primary care provider.     7.  Chronic anemia.    -- Hemoglobin is stable.     -- She takes iron supplement which will be continued.     8.  Reflux disease.    -- The patient reports reflux type of symptoms with certain foods takes omeprazole p.r.n.   -- Continued on omeprazole daily.           Diet: Combination Diet 4384-4263 Calories: Moderate Consistent CHO (4-6 CHO units/meal); Low Saturated Fat Na <2400mg Diet  NPO for Medical/Clinical Reasons Except for: Meds    DVT Prophylaxis: Heparin gtt  Mckeon Catheter: not present  Code Status: Full Code      Disposition Plan    Expected discharge: 2 - 3 days, recommended to prior living arrangement once safe disposition plan/ TCU bed available.  Entered: Bob Dee MD 03/05/2019, 8:23 AM       The patient's care was discussed with the Patient and Patient's Family.    Bob Dee MD  Hospitalist Service  [facility]    ______________________________________________________________________    Interval History   Patient reports she is feeling fine at rest, hasnt been very active thought, says SOB really comes on with activity.  No other complaints this AM.  We talked about angio this AM, she seems to understand what is going on and medical plans, no further questions at this time.    Data reviewed today: I reviewed all medications, new labs and imaging results over the last 24 hours. I personally reviewed no images or EKG's today.    Physical Exam   Vital Signs: Temp: 97.9  F (36.6  C) Temp src: Oral BP: 143/89 Pulse: 73 Heart Rate: 80 Resp: 20 SpO2: 93 % O2 Device: None (Room air)    Weight: 260 lbs 6.4 oz  Constitutional: awake, alert, cooperative, no apparent distress, and appears stated age  Respiratory: no increased work of breathing, good air exchange and diminished breath sounds right base and left base  Cardiovascular: Normal apical impulse, regular rate and rhythm, normal S1 and S2, no S3 or S4, and no murmur noted  GI: No scars, normal bowel sounds, soft, non-distended, non-tender, no masses palpated, no hepatosplenomegally  Musculoskeletal: 5/5 strength in all 4 extremties, non-pitting edema bilateral ankles and shins  Neurologic: Cranial Nerves:  cranial nerves II-XII are grossly intact  Sensory:  Sensory intact    Data   Recent Labs   Lab 03/05/19  0510 03/04/19  1820 03/04/19  0827  03/04/19  0000   WBC 10.3  --   --   --  14.6*   HGB 10.8*  --   --   --  10.7*   MCV 78  --   --   --  80     --   --   --  302   INR 1.12  --   --   --   --      --   --   --  138   POTASSIUM 3.7 3.7   --   --  3.9   CHLORIDE 103  --   --   --  105   CO2 28  --   --   --  25   BUN 12  --   --   --  8   CR 0.82  --   --   --  0.71   ANIONGAP 6  --   --   --  8   MICHAELA 8.6  --   --   --  7.8*   *  --   --   --  265*   ALBUMIN  --   --   --   --  3.1*   PROTTOTAL  --   --   --   --  6.9   BILITOTAL  --   --   --   --  0.2   ALKPHOS  --   --   --   --  96   ALT  --   --   --   --  28   AST  --   --   --   --  22   TROPI 0.059* 0.180* 0.374*   < > 0.073*    < > = values in this interval not displayed.     No results found for this or any previous visit (from the past 24 hour(s)).

## 2019-03-05 NOTE — PLAN OF CARE
OT: Orders rec'd, pt transferred to [facility] from [facility] due to NSTEMI for angio. Pt currently at cath lab,  not appropriate for therapy today.

## 2019-03-05 NOTE — PLAN OF CARE
VSS, no c/o pain, up to bathroom with SBA, angio today, possible discharge tomorrow pending angiogram results, continue to monitor closely.     Update:  Pt has left radial site, angiogram shows NCA, continue to monitor.

## 2019-03-05 NOTE — PROGRESS NOTES
Cardiology Progress Note          Assessment and Plan:     1. NSTEMI - likely this is a stress response (stress cardiomyopathy) causing the small troponin rise, non-coronary territory of dysfunction on the echo and the pulmonary edema .   Discussed this with Ms. Reddy and her /mother-in-law.    Coronary angiography has been recommended today - risks, benefits and alternatives to the procedure and possible intervention were discussed and she would like to proceed.      Lab Results   Component Value Date    TROPI 0.059 (H) 03/05/2019    TROPI 0.180 (HH) 03/04/2019    TROPI 0.374 (HH) 03/04/2019    TROPI 0.341 (HH) 03/04/2019    TROPI 0.073 (H) 03/04/2019       2. Hypertension - needs aggressive therapy.    3.  Chronic right leg lymphedema - Appropriately, a venous study has been ordered of the lower extremity.    Discussed outpatient evaluation again for ablation - on chronic compression therapy.    4. Chronic anemia - improving since hysterectomy, > 11.    Ms. Reddy does have risk factors including diabetes mellitus and a past history of tobacco use. She has dyslipidemia which has been treated.  She also has hypertension and as such, has multiple risk factors for coronary disease.             Significant Problems:     Patient Active Problem List   Diagnosis     Slow transit constipation     Varicose veins of lower extremities with complications     Family history of malignant neoplasm of gastrointestinal tract     Hyperlipidemia with target LDL less than 100     PMS (premenstrual syndrome)     Menorrhagia with regular cycle     Mixed hyperlipidemia     History of diverticulitis     Bacterial sepsis (H) -early     Lung nodule     Type 2 diabetes mellitus with hyperglycemia, without long-term current use of insulin (H)     Essential hypertension     Morbid obesity (H)     Personal history of DVT (deep vein thrombosis)     S/P hysterectomy     Sigmoid diverticulitis     Anemia     ACS (acute coronary syndrome)  (H)               Subjective:   No chest pain, dyspnea. Chronic right leg edema again discussed.          Physical Exam:   All vitals have been reviewed  Temp:  [97.8  F (36.6  C)-98.2  F (36.8  C)] 97.8  F (36.6  C)  Pulse:  [72-94] 72  Heart Rate:  [74-95] 76  Resp:  [12-24] 16  BP: (117-143)/(67-99) 138/97  SpO2:  [91 %-97 %] 94 %    I/O last 3 completed shifts:  In: 933.4 [P.O.:690; I.V.:243.4]  Out: 3250 [Urine:3250]    Neck:   No JVD.     Lungs:   Clear without wheezes, rhonchi.     Cardiovascular:   S1, S2, regular, no murmur.     Ext's: no edema.          Data:     .last           Last Basic Metabolic Panel:  Lab Results   Component Value Date     03/05/2019     03/04/2019     11/17/2018      Lab Results   Component Value Date    POTASSIUM 3.7 03/05/2019    POTASSIUM 3.7 03/04/2019    POTASSIUM 3.9 03/04/2019     Lab Results   Component Value Date    CHLORIDE 103 03/05/2019    CHLORIDE 105 03/04/2019    CHLORIDE 106 11/17/2018     Lab Results   Component Value Date    MICHAELA 8.6 03/05/2019    MICHAELA 7.8 (L) 03/04/2019    MICHAELA 8.2 (L) 11/17/2018     Lab Results   Component Value Date    CO2 28 03/05/2019    CO2 25 03/04/2019    CO2 25 11/17/2018     Lab Results   Component Value Date    BUN 12 03/05/2019    BUN 8 03/04/2019    BUN 8 11/17/2018     Lab Results   Component Value Date    CR 0.82 03/05/2019    CR 0.71 03/04/2019    CR 0.76 11/17/2018     Lab Results   Component Value Date     (H) 03/05/2019     (H) 03/04/2019     (H) 11/17/2018     No results found for: CKTOTAL, CKMB  Lab Results   Component Value Date    HGB 10.8 (L) 03/05/2019    HGB 10.7 (L) 03/04/2019    HGB 11.6 (L) 12/31/2018     Lab Results   Component Value Date     03/05/2019     03/04/2019     11/17/2018     Lab Results   Component Value Date    WBC 10.3 03/05/2019     Lab Results   Component Value Date    RBC 4.31 03/05/2019     Lab Results   Component Value Date    HCT 33.7 (L)  03/05/2019     No components found for: MCT  Lab Results   Component Value Date    MCV 78 03/05/2019     Lab Results   Component Value Date    MCH 25.1 (L) 03/05/2019     Lab Results   Component Value Date    MCHC 32.0 03/05/2019     Lab Results   Component Value Date    RDW 17.2 (H) 03/05/2019              Patricia Hall MD

## 2019-03-05 NOTE — CONSULTS
"Standard CHF consult received for 2gm Na diet education  Diet: Moderate CHO, 2400 mg Na, LSF  Pt is currently NPO for angio today  Visited with pt this morning  She tells me that she doesn't \"count CHOs\" but does try to limit intake  3/4/19: HgbA1C 8.6  Pt doesn't use a salt shaker  Admits that sometimes she buys frozen meals - \"I will get sick of salads for lunch and will buy [employer] frozen meals\"  Tries to buy more fresh foods and limited packaged items  Has not been reading labels - \"I have never been told I needed to watch salt\"  Provided written handouts:   Tips for Low Na   Label Reading   Seasoning Your Food Without Salt   Low Na Foods/Drinks   Heart Healthy Eating Guidelines      Manda Slater RD, LD  Clinical Dietitian - [facility]  Pager - [phone]    "

## 2019-03-05 NOTE — PLAN OF CARE
Pt remains A&O x 4. BP's improved to 118/70. VSS. Tele shows SR/ST, HR 90's-100's. Pt denies any chest pain or SOB. Heparin drip increased to 1350u/hr, with next hep10A due 4:30am. Trop trending down at 0.180. BG at , sliding scale insulin given. Plan is for Angio tomorrow, consent to be signed tomorrow. Will continue to monitor pt.

## 2019-03-05 NOTE — PLAN OF CARE
Pt stable over night, VSS, no c/o Chest pain or sob, on Heparin gtt without issues. Pt will have an Angio today, will continue to monitor.

## 2019-03-06 ENCOUNTER — APPOINTMENT (OUTPATIENT)
Dept: OCCUPATIONAL THERAPY | Facility: CLINIC | Age: 49
DRG: 286 | End: 2019-03-06
Attending: HOSPITALIST
Payer: COMMERCIAL

## 2019-03-06 VITALS
HEIGHT: 66 IN | WEIGHT: 263 LBS | RESPIRATION RATE: 13 BRPM | BODY MASS INDEX: 42.27 KG/M2 | HEART RATE: 71 BPM | OXYGEN SATURATION: 96 % | SYSTOLIC BLOOD PRESSURE: 143 MMHG | TEMPERATURE: 97.4 F | DIASTOLIC BLOOD PRESSURE: 92 MMHG

## 2019-03-06 LAB
GLUCOSE BLDC GLUCOMTR-MCNC: 161 MG/DL (ref 70–99)
GLUCOSE BLDC GLUCOMTR-MCNC: 219 MG/DL (ref 70–99)

## 2019-03-06 PROCEDURE — 25000132 ZZH RX MED GY IP 250 OP 250 PS 637: Performed by: INTERNAL MEDICINE

## 2019-03-06 PROCEDURE — 97535 SELF CARE MNGMENT TRAINING: CPT | Mod: GO | Performed by: OCCUPATIONAL THERAPIST

## 2019-03-06 PROCEDURE — 99232 SBSQ HOSP IP/OBS MODERATE 35: CPT | Performed by: INTERNAL MEDICINE

## 2019-03-06 PROCEDURE — 97110 THERAPEUTIC EXERCISES: CPT | Mod: GO | Performed by: OCCUPATIONAL THERAPIST

## 2019-03-06 PROCEDURE — 00000146 ZZHCL STATISTIC GLUCOSE BY METER IP

## 2019-03-06 PROCEDURE — 25000132 ZZH RX MED GY IP 250 OP 250 PS 637: Performed by: HOSPITALIST

## 2019-03-06 PROCEDURE — 97165 OT EVAL LOW COMPLEX 30 MIN: CPT | Mod: GO | Performed by: OCCUPATIONAL THERAPIST

## 2019-03-06 PROCEDURE — 99239 HOSP IP/OBS DSCHRG MGMT >30: CPT | Performed by: INTERNAL MEDICINE

## 2019-03-06 RX ORDER — HYDROCHLOROTHIAZIDE 12.5 MG/1
12.5 CAPSULE ORAL ONCE
Status: COMPLETED | OUTPATIENT
Start: 2019-03-06 | End: 2019-03-06

## 2019-03-06 RX ORDER — LISINOPRIL 10 MG/1
10 TABLET ORAL DAILY
Status: COMPLETED | OUTPATIENT
Start: 2019-03-06 | End: 2019-03-06

## 2019-03-06 RX ORDER — LISINOPRIL/HYDROCHLOROTHIAZIDE 10-12.5 MG
1 TABLET ORAL DAILY
Status: DISCONTINUED | OUTPATIENT
Start: 2019-03-07 | End: 2019-03-06 | Stop reason: HOSPADM

## 2019-03-06 RX ORDER — LISINOPRIL/HYDROCHLOROTHIAZIDE 10-12.5 MG
1 TABLET ORAL DAILY
Qty: 30 TABLET | Refills: 1 | Status: SHIPPED | OUTPATIENT
Start: 2019-03-07 | End: 2019-04-23

## 2019-03-06 RX ORDER — CARVEDILOL 6.25 MG/1
6.25 TABLET ORAL 2 TIMES DAILY
Qty: 60 TABLET | Refills: 1 | Status: SHIPPED | OUTPATIENT
Start: 2019-03-06 | End: 2019-04-23

## 2019-03-06 RX ADMIN — ASPIRIN 81 MG: 81 TABLET, COATED ORAL at 08:33

## 2019-03-06 RX ADMIN — HYDROCHLOROTHIAZIDE 12.5 MG: 12.5 CAPSULE ORAL at 14:08

## 2019-03-06 RX ADMIN — LISINOPRIL 10 MG: 10 TABLET ORAL at 14:08

## 2019-03-06 RX ADMIN — POTASSIUM CHLORIDE 20 MEQ: 1500 TABLET, EXTENDED RELEASE ORAL at 08:33

## 2019-03-06 RX ADMIN — OMEPRAZOLE 20 MG: 20 CAPSULE, DELAYED RELEASE ORAL at 08:33

## 2019-03-06 RX ADMIN — CARVEDILOL 6.25 MG: 6.25 TABLET, FILM COATED ORAL at 08:33

## 2019-03-06 ASSESSMENT — MIFFLIN-ST. JEOR: SCORE: 1834.71

## 2019-03-06 NOTE — PLAN OF CARE
Discharge Planner OT   Patient plan for discharge: home  Current status: Eval completed and treatment initiated. Pt lives in a house with  her , adult son, adult daughter, her fiancee and 1 yo granddaughter. Pt I with ADL/IADL's and works full time in customer services. Pt admitted from [facility] due to dyspnea and troponin rise for possible NSTEMI, s/p angio, reveals Non-obstructive CAD. Medical management of stress-induced  Cardiomyopathy, EF 40%. Pt ambulated approx 900 ft independently and completed 15 stairs with no reported symptoms and stable CV response. Pt educated in CAD risk factors with focus on relaxation for stress, exercise and low sodium and low cholesterol diet and mgmt of diabetes and recommend dietician as pt concerned about mgmt of diabetes, etc.   Barriers to return to prior living situation: none with family A with heaver IADL's as needed ie vacuuming, etc.  Recommendations for discharge: home with family A with heavier IADL's as above and OP CR at [facility], pending on pt's insurance and MD approval.   Rationale for recommendations: OP CR for monitored progressive exercise and risk factor education and modification.        Entered by: Mari Lr 03/06/2019 9:17 AM     Occupational Therapy Discharge Summary    Reason for therapy discharge:    Discharged to home with outpatient therapy.    Progress towards therapy goal(s). See goals on Care Plan in [facility] electronic health record for goal details.  Goals partially met.  Barriers to achieving goals:   discharge from facility.    Therapy recommendation(s):    Continued therapy is recommended.  Rationale/Recommendations:  home with family A as needed for heavier IADL's ie vacuuming, etc and OP CR at [facility], per MD and pt's insurance for monitored progressive exercise and risk factor education and modification. .

## 2019-03-06 NOTE — PLAN OF CARE
Alert and oriented x 4. VS stable, on RA and no complaints of pain. Patient was discharged this afternoon with family as transport home. 3 new medications were filled at an outside pharmacy and will be picked up by the patient. I reviewed all DC paperwork, new medications, orders and appointments with the patient and family, all were able to verbalize understanding of teaching. All questions answered. All IVs were removed and patient had all belongings at the time of DC.

## 2019-03-06 NOTE — PLAN OF CARE
Pt denied pain. VSS. independent. Tele SR. L radial site CDI, WDL. Plan for possible discharge today pending cardiology. Continue to monitor.

## 2019-03-06 NOTE — PROGRESS NOTES
[facility]    Cardiology Progress Note    Date of Service (when I saw the patient): 03/06/2019     Assessment & Plan   Lashawn Reddy is a 48 year old female who was admitted on 3/4/2019 from [facility] for acute onsent SOB. Hx of DVT, chronic lymphedema, htn, former smoker, DM2    Takosubo  -Initial /130  -peak troponin 0.374  -EF 40-45% with severe hypokinesis of b roshan to mid segments of LV with hypercontractility of apical segment consistent with reverse takotsubo CM  -coronary angiogram found no obstructive disease, there was mild apical anterior hypokinesis with EF 45%  -recent bout of flu per pt  -PTA lisinopril 10 was cut down to 5  Plan  -Needs good BP control, ideally <130 systolic  -Started on coreg this admission  -Increase lisinopril back to PTA dose of 10  -Echo in three weeks with [facility] in [place]  -Should stay off work the rest of the week.  -See PMD next week for BP       Hx of DVT  -LE us negative for DVT  -inner aspect of right ankle is warm to touch, venous stasis changes    Interval History   No CP, SOB improved on RA. Has a warm area on inner aspect of right ankle.    Physical Exam   Temp: 97.4  F (36.3  C) Temp src: Oral BP: (!) 143/92 Pulse: 71 Heart Rate: 98 Resp: 13 SpO2: 96 % O2 Device: None (Room air) Oxygen Delivery: 2 LPM  Vitals:    03/04/19 0445 03/05/19 0500 03/06/19 0540   Weight: 121.2 kg (267 lb 1.6 oz) 118.1 kg (260 lb 6.4 oz) 119.3 kg (263 lb)     Vital Signs with Ranges  Temp:  [97.4  F (36.3  C)-97.8  F (36.6  C)] 97.4  F (36.3  C)  Pulse:  [64-82] 71  Heart Rate:  [64-98] 98  Resp:  [12-21] 13  BP: (100-143)/() 143/92  SpO2:  [90 %-97 %] 96 %  I/O last 3 completed shifts:  In: 2188.85 [P.O.:1220; I.V.:968.85]  Out: 425 [Urine:425]    Constitutional     alert and oriented, in no acute distress, tearful     Skin     warm and dry to touch    ENT     no pallor or cyanosis    Neck    Supple, JVP normal, no carotid bruit    Chest     no tenderness  to palpation    Lungs  clear to auscultation     Cardiac  regular rhythm, S1 normal, S2 normal    Abdomen     abdomen soft    Extremities and Back     Trace puffy LE edema      Neurological     no gross motor deficits noted, affect appropriate, oriented to time, person and place.    Medications     Continuing ACE inhibitor/ARB/ARNI from home medication list OR ACE inhibitor/ARB order already placed during this visit       - MEDICATION INSTRUCTIONS -       - MEDICATION INSTRUCTIONS -       - MEDICATION INSTRUCTIONS -       - MEDICATION INSTRUCTIONS -         aspirin  81 mg Oral Daily     carvedilol  6.25 mg Oral BID     ferrous gluconate  324 mg Oral Daily with breakfast     insulin aspart  1-7 Units Subcutaneous TID AC     insulin aspart  1-5 Units Subcutaneous At Bedtime     insulin glargine  6 Units Subcutaneous At Bedtime     lisinopril  5 mg Oral At Bedtime     omeprazole  20 mg Oral QAM AC     potassium chloride  20 mEq Oral BID     rosuvastatin  20 mg Oral Daily     sertraline  25 mg Oral Daily     sodium chloride (PF)  3 mL Intracatheter Q8H       Data   Results for orders placed or performed during the hospital encounter of 03/04/19 (from the past 24 hour(s))   Glucose by meter   Result Value Ref Range    Glucose 144 (H) 70 - 99 mg/dL   Cardiac Catheterization    Narrative    48-year-old woman with medical history significant for diabetes  mellitus type 2, hyperlipidemia, prior tobacco abuse, transferred to  [facility] from [facility] for evaluation  of shortness of breath and troponin elevation to 0.3. Echo showed EF  40% with Takotsubo pattern.  She is referred for coronary angiogram.    SUMMARY OF PROCEDURES:  1) Coronary angiogram  2) [facility]    Procedure note:  6F slender sheath in the left radial artery. 2.5  Verapamil and 2 nitroglycerin given through the side arm of the sheath  and 5000U of heparin given. Conscience sedation was used throughout  the procedure with 2mg Versed  and 100mcg Fentanyl given over 20  minutes under my supervision. There were no complications. 4F JR4 and  JL3.5 diagnostic catheters were used.  I assessed her just prior to  the procedure and felt she was an acceptable candidate for moderate  sedation.     SUMMARY OF CORONARY ANGIOGRAM:  1) Normal left main  2) LAD is a large vessel without disease. Gives rise to D1 and D2  without disease.   3) Circumflex gives rise to OM1 without disease  4) RCA is dominant.  Gives rise to PDA and PLB without disease  5) There is a small ramus without disease    SUMMARY OF LHC:  1) LVEDP 17 mmHg, no gradient on pull back  2) Mild apical anterior hypokinesis with EF 45%    PLAN OF CARE:   Non-obstructive CAD. Medical management of stress-induced  cardiomyopathy    OLMAN AMBROSE MD   Glucose by meter   Result Value Ref Range    Glucose 107 (H) 70 - 99 mg/dL   Glucose by meter   Result Value Ref Range    Glucose 141 (H) 70 - 99 mg/dL   Glucose by meter   Result Value Ref Range    Glucose 161 (H) 70 - 99 mg/dL       KRISTI Bedolla, CNP  Cardiology  Pager:  [phone]

## 2019-03-06 NOTE — DISCHARGE INSTRUCTIONS
Discharge Instructions for Cardiac Catheterization  Cardiac catheterization is a procedure to look for blocked areas in the blood vessels that send blood to the heart. A thin, flexible tube (catheter) is put in a blood vessel in your arm. The healthcare provider injects contrast fluid into your blood, which then flows to your heart. X-rays pictures are taken of your heart. Your provider will review the results with you. Be sure to ask any questions you have before you leave. This sheet will help you take care of yourself at home.  Home care    Don't drive or make any important decisions for at least 24 hours after receiving any type of sedation or anesthesia.     Arrange to have a responsible adult drive you home after your procedure.    Only do light and easy activities for the next 2 to 3 days. Ask for help with chores and errands while you recover. Have someone drive you to your appointments.    Don't lift anything heavy for a while. Your healthcare team will tell you when it's safe to lift again.    Ask your healthcare team when you can expect to return to work. Unless your job involves lifting, you may be able to return to your normal activities within a couple of days.    Take your medicines as directed. Don't skip doses.    Drink 6 to 8 glasses of water a day. This is to help flush the contrast dye out of your body. Call your healthcare team if your urine has any change in color.    Take your temperature each day for 7 days. If you feel cold and clammy or start sweating, take your temperature right away and call your healthcare team.    Check your incisions every day for signs of infection. These include redness, swelling, and drainage. It is normal to have a small bruise or bump where the catheter was inserted. A bruise that is getting larger is not normal and should be reported to your healthcare team. If you see blood forming in the incision, call your healthcare team. Go to the emergency department if  you have uncontrolled bleeding from the artery site. This is especially true if you take medicines that make it hard for your blood to clot. Examples are aspirin, clopidogrel, and warfarin.    Eat a healthy diet. Make sure it is low in fat, salt, and cholesterol. Ask your healthcare team for diet information.    Stop smoking. Enroll in a stop-smoking program or ask your healthcare team for help. Stop-smoking programs can be life saving.    Exercise as your healthcare team tells you to. Your healthcare team may recommend you start a cardiac rehabilitation program. Cardiac rehab is an exercise program in which trained healthcare staff watch your progress and stress on your heart while you exercise. Ask your team how to enroll.    Don't swim or take baths until your healthcare team says it s OK. You can shower the day after the procedure. Keep the site clean and dry. This keeps the incision from getting wet and infected until the skin and artery can heal.    Be sure to follow all after-care instructions.   Follow-up care    Make a follow-up appointment as advised by our staff. It's common to have a follow-up appointment 2 to 4 weeks after an angioplasty or coronary stent procedure.    Make a yearly appointment, too. This is to make sure you are still doing well and not having any new symptoms.    Don't wait for a follow-up appointment if your medicines aren't working or you are having heart-related symptoms.  When to seek medical care  Call your healthcare provider right away if you have any of the following:    Chest pain    Constant or increasing pain or numbness in your leg    Fever of 100.4 F (38.0 C) or higher, or as directed by your healthcare provider    Symptoms of infection. These include redness, swelling, drainage, or warmth at the incision site.    Shortness of breath    A leg that feels cold or appears blue    Bleeding, bruising, or a lot of swelling where the catheter was inserted    Blood in your  urine    Black or tarry stools    Any unusual bleeding   Date Last Reviewed: 10/1/2016    4872-6655 [employer]. [address] [place] [address], [place] [address]. All rights reserved. This information is not intended as a substitute for professional medical care. Always follow your healthcare professional's instructions.

## 2019-03-06 NOTE — DISCHARGE SUMMARY
[facility]  Hospitalist Discharge Summary       Date of Admission:  3/4/2019  Date of Discharge:  3/6/2019  Discharging Provider: Bob Dee MD      Discharge Diagnoses   Takosubo Cardiomyopathy    Follow-ups Needed After Discharge   Follow-up Appointments     Follow-up and recommended labs and tests       Follow up with primary care provider, Raoul Sherman, within 7   days to evaluate medication change, for hospital follow- up and BP   evaluation.  The following labs/tests are recommended: BP check.             Unresulted Labs Ordered in the Past 30 Days of this Admission     No orders found from 1/3/2019 to 3/5/2019.        Discharge Disposition   Discharged to home  Condition at discharge: Good    Hospital Course   48-year-old pleasant woman with medical history significant for diabetes mellitus type 2, hyperlipidemia, prior tobacco abuse, history of deep venous thrombosis after trauma, completed 6 months of anticoagulation, was transferred to [facility] from [facility] ER for evaluation of shortness of breath and possible non-ST elevation myocardial infarction and congestive heart failure exacerbation.      1.) Takosubo Cardiomyopathy.  - Initially diagnosed with acute hypoxic respiratory failure with flash pulmonary edema, suspect acute congestive heart failure exacerbation, Non-ST elevation myocardial infarction versus demand ischemia and Hypertensive emergency.   Patient with acute onset shortness of breath and hypoxia needing BiPAP.  Chest x-ray and CT with pulmonary edema.  ProBNP mildly elevated and troponin is detectable.  The patient without chest pain.  Blood pressure as high as 210/130 on initial presentation.   -- Admitted to CICU, was started on nitro and heparin drips   -- diuresed with Lasix 40mg IV BID, total out so far is 2.8L, wt is down from 121kg to 118kg  -- echo shows reverse takotsubos, EF of 40-45%  -- cardiology consulted, took  for coronary angiogram on 3/5/19  -- symptoms improved and BP more stable on nitro gtt, this was stopped and PO regimen has begun  -- Angio showed no coronary artery disease and no need for intervention  - BP control is paramount, was started on coreg BID, will need f/u with cardiology for repeat TTE in 3 weeks       2.  Diabetes mellitus type 2.     Per H and P note: Prior to admission glipizide 10 mg p.o. daily and metformin 2 grams p.o. daily will be held.  -- Her blood sugar is already 265.  We will give her 6 units of Lantus, even though she is n.p.o. and place her on insulin sliding scale.   -- Checked hemoglobin A1c, is 8.6.               Recent Labs   Lab 03/05/19  0809 03/05/19  0510 03/05/19  0207 03/04/19  2117 03/04/19  1431 03/04/19  1224 03/04/19  0804   03/04/19  0000   GLC  --  163*  --   --   --   --   --   --  265*   *  --  145* 228* 127* 130* 146*   < >  --     < > = values in this interval not displayed.      - stable, will put back on home regimen, good goal HGBA1c would be <7.5, referred to PCP for changes to PTA regimen     3.  Bilateral leg edema, right greater than left.   The patient with personal history of deep venous thrombosis after trauma and was on anticoagulation for 6 months and completed it about 6 years ago and has right leg slightly bigger than left since then.  She reports leg swelling is slightly worse now.     -- Suspect this is due to congestive heart failure.  --  D-dimer was positive, PE was ruled out, US LE negative for DVTs.  - will add diuretic for home use     4. HTN  - poorly controlled up to this point on PTA 5mg Lisinopril  - cardiology added coreg 12.5mg BID  - I discussed at length with patient, will change lisinopril to prinizide 10/12.5mg tab daily  - close f/u with PCP, needs to be seen within a week for BP check     5. Depression:    -- Resume PTA sertraline 25 mg p.o. daily.      6.  Leukocytosis:   -- Suspect this is due to stress reaction.  The  patient had flu-like symptoms last week, which have mostly resolved, except some fatigue.  No cough, purulent sputum or pleuritic chest pain now.    -- If left ventricular function has significantly decreased, may consider doing viral titers to make sure this is not viral cardiomyopathy.     -- Chest x-ray is mostly symmetrically congested, unlikely pneumonia.  Follow WBC count.     7.  Fibrocystic breast disease.    -- The patient is known to have fibrocystic breast disease.  CT shows a cystic lesion.     -- Recommend followup with primary care provider.      8.  Chronic anemia.    -- Hemoglobin is stable.     -- She takes iron supplement which will be continued.     9.  Reflux disease.    -- The patient reports reflux type of symptoms with certain foods takes omeprazole p.r.n.   -- Continued on omeprazole daily.          Consultations This Hospital Stay   CORE CLINIC EVALUATION IP CONSULT  CARDIAC REHAB IP CONSULT  CARE COORDINATOR IP CONSULT  NUTRITION SERVICES ADULT IP CONSULT  CARDIAC REHAB IP CONSULT  CARDIOLOGY IP CONSULT  PHARMACY TO DOSE HEPARIN  PHARMACY IP CONSULT  PHARMACY IP CONSULT  SMOKING CESSATION PROGRAM IP CONSULT  SMOKING CESSATION PROGRAM IP CONSULT    Code Status   Full Code    Time Spent on this Encounter   I, Bob Dee, personally saw the patient today and spent greater than 30 minutes discharging this patient.       Bob Dee MD  [facility]  ______________________________________________________________________    Physical Exam   Vital Signs: Temp: 97.4  F (36.3  C) Temp src: Oral BP: (!) 143/92 Pulse: 71 Heart Rate: 98 Resp: 13 SpO2: 96 % O2 Device: None (Room air) Oxygen Delivery: 2 LPM  Weight: 263 lbs 0 oz  Constitutional: awake, alert, cooperative, no apparent distress, and appears stated age  Respiratory: No increased work of breathing, good air exchange, clear to auscultation bilaterally, no crackles or wheezing  Cardiovascular: Normal apical  impulse, regular rate and rhythm, normal S1 and S2, no S3 or S4, and no murmur noted  GI: No scars, normal bowel sounds, soft, non-distended, non-tender, no masses palpated, no hepatosplenomegally  Musculoskeletal: there is no redness, warmth, or swelling of the joints  full range of motion noted  motor strength is 5 out of 5 all extremities bilaterally  tone is normal  2+ lower extremity edema to the upper shins  Neurologic: Awake, alert, oriented to name, place and time.  Cranial nerves II-XII are grossly intact.  Motor is 5 out of 5 bilaterally.  Cerebellar finger to nose, heel to shin intact.  Sensory is intact.  Babinski down going, Romberg negative, and gait is normal.       Primary Care Physician   Raoul Sherman    Immunizations       Discharge Orders      CARDIAC REHAB REFERRAL      Reason for your hospital stay    takosubo cardiomyopathy     Follow-up and recommended labs and tests     Follow up with primary care provider, Raoul Sherman, within 7 days to evaluate medication change, for hospital follow- up and BP evaluation.  The following labs/tests are recommended: BP check.     Activity    Your activity upon discharge: activity as tolerated     Full Code     Echocardiogram Complete    Administration of IV contrast will be tailored to this examination per the appropriate written protocol listed in the Echocardiography department Protocol Book, or by the supervising Cardiologist. This may result in an order change.    Use of contrast is at the discretion of the supervising Cardiologist.     Diet    Follow this diet upon discharge: Orders Placed This Encounter      Combination Diet 5545-5324 Calories: Moderate Consistent CHO (4-6 CHO units/meal); Low Saturated Fat Na <2400mg Diet       Significant Results and Procedures   Results for orders placed or performed during the hospital encounter of 03/04/19   US Lower Extremity Venous Duplex Right    Narrative    ULTRASOUND RIGHT LOWER  EXTREMITY VENOUS DUPLEX March 4, 2019 6:10 AM     HISTORY: Swollen legs, right greater than left. History of deep vein  thrombosis.      COMPARISON: Bilateral lower extremity venous Doppler ultrasound  9/21/2018.    FINDINGS: The deep veins in the right and left lower extremity are  compressible throughout. The deep veins demonstrate normal venous  augmentation, waveforms and color Doppler flow. No evidence of  superficial thrombophlebitis.      Impression    IMPRESSION: No evidence of deep vein thrombosis.         SUSHIL JUAREZ MD       Discharge Medications   Current Discharge Medication List      START taking these medications    Details   aspirin (ASA) 81 MG EC tablet Take 1 tablet (81 mg) by mouth daily  Qty: 30 tablet, Refills: 0    Associated Diagnoses: Stress-induced cardiomyopathy      carvedilol (COREG) 6.25 MG tablet Take 1 tablet (6.25 mg) by mouth 2 times daily  Qty: 60 tablet, Refills: 1    Associated Diagnoses: Stress-induced cardiomyopathy      lisinopril-hydrochlorothiazide (PRINZIDE/ZESTORETIC) 10-12.5 MG tablet Take 1 tablet by mouth daily  Qty: 30 tablet, Refills: 1    Associated Diagnoses: Essential hypertension         CONTINUE these medications which have NOT CHANGED    Details   glipiZIDE (GLUCOTROL XL) 10 MG 24 hr tablet Take 10 mg by mouth daily      metFORMIN (GLUCOPHAGE-XR) 500 MG 24 hr tablet TAKE 4 TABLETS (2,000MG) BY MOUTH EVERY DAY WITH DINNER  Qty: 360 tablet, Refills: 0    Comments: Patient enrolled in our Rx Med Sync service to improveadherence. We are requesting a refill authorization inadvance to ensure an active prescription is on file.  Associated Diagnoses: Type 2 diabetes mellitus with hyperglycemia (H)      nystatin-triamcinolone (MYCOLOG II) 268231-9.1 UNIT/GM-% external cream Apply topically daily as needed (rash or itching)  Qty: 60 g, Refills: 3    Associated Diagnoses: Intertrigo      omeprazole (PRILOSEC) 20 MG DR capsule Take 20 mg by mouth daily as needed       rosuvastatin (CRESTOR) 20 MG tablet Take 1 tablet (20 mg) by mouth daily  Qty: 90 tablet, Refills: 1    Comments: Patient enrolled in our Rx Med Sync service to improveadherence. We are requesting a refill authorization inadvance to ensure an active prescription is on file.  Associated Diagnoses: Mixed hyperlipidemia; Type 2 diabetes mellitus with hyperglycemia, without long-term current use of insulin (H)      sertraline (ZOLOFT) 50 MG tablet TAKE 1/2 TABLET (25 MG) BY MOUTH EVERY MORNING  Qty: 45 tablet, Refills: 1    Comments: Patient enrolled in our Rx Med Sync service to improveadherence. We are requesting a refill authorization inadvance to ensure an active prescription is on file.  Associated Diagnoses: PMS (premenstrual syndrome)      Vitamin D, Cholecalciferol, 1000 units TABS Take 4,000 Units by mouth daily      blood glucose monitoring (ACCU-CHEK FASTCLIX) lancets 1 each 2 times daily  Qty: 102 each, Refills: 3    Associated Diagnoses: Type 2 diabetes mellitus with hyperglycemia, without long-term current use of insulin (H)      blood glucose monitoring (ACCU-CHEK GUIDE) test strip Use to test blood sugar 2 times daily or as directed.  Qty: 100 strip, Refills: 11    Associated Diagnoses: Type 2 diabetes mellitus with hyperglycemia, without long-term current use of insulin (H)         STOP taking these medications       ferrous gluconate (FERGON) 324 (38 Fe) MG tablet Comments:   Reason for Stopping:         ibuprofen (ADVIL/MOTRIN) 600 MG tablet Comments:   Reason for Stopping:         lisinopril (PRINIVIL/ZESTRIL) 10 MG tablet Comments:   Reason for Stopping:         lisinopril (PRINIVIL/ZESTRIL) 5 MG tablet Comments:   Reason for Stopping:             Allergies   Allergies   Allergen Reactions     No Known Drug Allergies

## 2019-03-06 NOTE — PROGRESS NOTES
03/06/19 0841   Quick Adds   Type of Visit Initial Occupational Therapy Evaluation   Living Environment   Lives With spouse;child(brittny), adult  (son 18 yo and daughter, her finance and granddtr 3 yo)   Living Arrangements house   Home Accessibility stairs to enter home  (basement unfinished and doesn't go down. )   Number of Stairs, Main Entrance two   Transportation Anticipated car, drives self   Self-Care   Regular Exercise No   Functional Level   Ambulation 0-->independent   Transferring 0-->independent   Toileting 0-->independent   Bathing 0-->independent   Dressing 0-->independent   Eating 0-->independent   Communication 0-->understands/communicates without difficulty   Swallowing 0-->swallows foods/liquids without difficulty   Cognition 0 - no cognition issues reported   Fall history within last six months no   Prior Functional Level Comment Pt works in [employer] full time.    General Information   Onset of Illness/Injury or Date of Surgery - Date 03/04/19   Referring Physician Adonay Crystal MD   Patient/Family Goals Statement home   Additional Occupational Profile Info/Pertinent History of Current Problem 48-year-old pleasant woman with medical history significant for diabetes mellitus type 2, hyperlipidemia, prior tobacco abuse, history of deep venous thrombosis after trauma, completed 6 months of anticoagulation, was transferred to [facility] from [facility] for evaluation of shortness of breath and possible non-ST elevation myocardial infarction and congestive heart failure exacerbatio, s/p angio, revealing Non-obstructive CAD. Medical management of stress-induced, EF 40%   Heart Disease Risk Factors Diabetes;High blood pressure;Lack of physical activity;Dislipidemia;Overweight;Stress;Family history   Cognitive Status Examination   Orientation orientation to person, place and time   Level of Consciousness alert   Follows Commands (Cognition) WNL   Memory intact  "  Attention No deficits were identified   Organization/Problem Solving No deficits were identified   Executive Function No deficits were identified   Visual Perception   Visual Perception Wears glasses   Sensory Examination   Sensory Quick Adds No deficits were identified   Pain Assessment   Patient Currently in Pain No   Transfer Skills   Transfer Comments Pt I with ADL's and functional mobility.    Activities of Daily Living Analysis   Impairments Contributing to Impaired Activities of Daily Living post surgical precautions   General Therapy Interventions   Planned Therapy Interventions progressive activity/exercise;home program guidelines;risk factor education   Clinical Impression   Criteria for Skilled Therapeutic Interventions Met yes, treatment indicated   OT Diagnosis decreased IADL'   Influenced by the following impairments post angio precautions   Assessment of Occupational Performance 1-3 Performance Deficits   Identified Performance Deficits impaired heavier IADL's ie vacuuming, heavier laundry, driving, work   Clinical Decision Making (Complexity) Low complexity   Therapy Frequency 2 times/day   Predicted Duration of Therapy Intervention (days/wks) 2 days   Anticipated Discharge Disposition Home with Outpatient Therapy;Home with Assist  (OP CR at [facility], family A with heavier IADL's)   Risks and Benefits of Treatment have been explained. Yes   Patient, Family & other staff in agreement with plan of care Yes   [facility] AM-PAC  \"6 Clicks\" Daily Activity Inpatient Short Form   1. Putting on and taking off regular lower body clothing? 4 - None   2. Bathing (including washing, rinsing, drying)? 4 - None   3. Toileting, which includes using toilet, bedpan or urinal? 4 - None   4. Putting on and taking off regular upper body clothing? 4 - None   5. Taking care of personal grooming such as brushing teeth? 4 - None   6. Eating meals? 4 - None   Daily Activity Raw Score (Score out of 24.Lower " scores equate to lower levels of function) 24   Total Evaluation Time   Total Evaluation Time (Minutes) 10

## 2019-03-07 ENCOUNTER — TELEPHONE (OUTPATIENT)
Dept: CARDIOLOGY | Facility: CLINIC | Age: 49
End: 2019-03-07

## 2019-03-07 ENCOUNTER — PATIENT OUTREACH (OUTPATIENT)
Dept: CARE COORDINATION | Facility: CLINIC | Age: 49
End: 2019-03-07

## 2019-03-07 LAB — INTERPRETATION ECG - MUSE: NORMAL

## 2019-03-07 ASSESSMENT — ACTIVITIES OF DAILY LIVING (ADL): DEPENDENT_IADLS:: INDEPENDENT

## 2019-03-07 NOTE — TELEPHONE ENCOUNTER
Patient was evaluated by cardiology while inpatient for SOB and elevated Troponin. Pt transferred from [facility]. 3/5/19 coronary angiogram showed non obstructive CAD. Diagnosed with Takotsubo Cardiomyopathy with EF 45%. Dr. Hall's discharge plan of care as below. Called patient to discuss any post hospital d/c questions she may have, review medication changes, and confirm f/u appts.  Patient denied any questions regarding new medications or changes with their PTA medications. Patient denied any SOB, chest pain, or light headedness. States does have a mild foreign body sensation in her throat, but denies any SOB, difficulty swallowing, swelling of lips or tongue, itchiness or hives. Instructed if any of these symptoms should develop, go to the nearest ED for evaluation. LRA cardiac cath site is without bleeding, swelling, redness or tenderness. Denies fever. RN confirmed with patient that she has an apt scheduled on 3/27/19 with SUDHIR Jaimie Ekman. 3/25/19 Echo. Patient advised to call clinic with any cardiac related questions or concerns prior to this sudhir't. Patient verbalized understanding and agreed with plan. LANCE Lawson RN.

## 2019-03-07 NOTE — PROGRESS NOTES
Clinic Care Coordination Contact    Clinic Care Coordination Contact  OUTREACH    Referral Information:  Referral Source: IP Handoff    Primary Diagnosis: Cardiovascular - other    Chief Complaint   Patient presents with     Clinic Care Coordination - Post Hospital     RN assessment        Universal Utilization:   Clinic Utilization  Difficulty keeping appointments:: No  Compliance Concerns: No  No-Show Concerns: No  No PCP office visit in Past Year: No  Utilization    Last refreshed: 3/7/2019 12:30 PM:  Hospital Admissions 3           Last refreshed: 3/7/2019 12:30 PM:  ED Visits 3           Last refreshed: 3/7/2019 12:30 PM:  No Show Count (past year) 1              Current as of: 3/7/2019 12:30 PM            Clinical Concerns:  Current Medical Concerns:  Called and spoke with pt, states she is doing ok. States her insertion site looks good.  She will be starting Cardiac Rehab next week.  Pt states she did start taking the new medication they gave her and she was nauseated and threw up shortly after she took it. She also states she has a cough that has never seemed to go away. Pt is currently on lisinopril.  Pt has a follow up appt with her PCP.  Current Behavioral Concerns: no current concerns    Education Provided to patient: discuss cough with pcp.    Pain  Pain (GOAL):: No  Health Maintenance Reviewed: Due/Overdue not discussed  Clinical Pathway: None    Medication Management:  Self management     Functional Status:  Dependent ADLs:: Independent  Dependent IADLs:: Independent  Bed or wheelchair confined:: No  Mobility Status: Independent  Fallen 2 or more times in the past year?: No  Any fall with injury in the past year?: No    Living Situation:  Current living arrangement:: I live in a private home with family  Type of residence:: Private home - stairs    Diet/Exercise/Sleep:  Diet:: Diabetic diet  Inadequate nutrition (GOAL):: No  Food Insecurity: No  Tube Feeding: No  Exercise:: Currently not  exercising  Inadequate activity/exercise (GOAL):: No  Significant changes in sleep pattern (GOAL): No    Transportation:  Transportation concerns (GOAL):: No  Transportation means:: Regular car     Psychosocial:  Mental health DX:: No  Mental health management concern (GOAL):: No  Informal Support system:: Family, Spouse, Children     Financial/Insurance concerns (GOAL):: No  Resources and Interventions:  Current Resources:   Community Resources: None  Supplies used at home:: Diabetic Supplies  Equipment Currently Used at Home: glucometer    Advance Care Plan/Directive  Advanced Care Plans/Directives on file:: No  Advanced Care Plan/Directive Status: Not Applicable    Referrals Placed: None     Goals:   Goals        General    Monitoring (pt-stated)     Notes - Note created  8/30/2018  4:11 PM by Dominga Oliveira RD    Goal Statement: I will check blood sugar 1-2 times daily    Measure of Success: meter memory or log of blood sugar results    Strengths: good understanding of using meter  Ideas to overcome barriers: no barriers noted    Date to Achieve By: 9/12            Patient/Caregiver understanding: Pt verbalizes understanding of follow up instructions and when scheduled appt date and times.     Outreach Frequency: 2 weeks  Future Appointments              In 5 days Raoul Sherman MD [facility] Me    In 6 days [facility] Can[facility], [facility]    In 2 weeks [facility] [facility]    In 2 weeks Jaimie Pedroza APRN [facility]          Plan:   Pt will take medications as prescribed  Pt will call her care team if medication continues to make her nauseated.   Pt will follow up in clinic as schedule.   Pt will continue to check her blood sugars 1-2 times a day.   RN CC will outreach in 1-2 weeks.       Rosie CHIRINOSN, RN, PHN   Care Coordination    [facility]  [address] [place] [address]  Office: [phone]  Fax [phone]   [facility]  [address] [place] [address]  Office: [phone] Fax [phone]  [email or web address]   [email or web address]   Connect with [facility] on social media.

## 2019-03-07 NOTE — LETTER
[facility]  Complex Care Plan  About Me  Patient Name:  Lashawn Reddy    YOB: 1970  Age:     48 year old   Lázaro MRN:   [id number] Telephone Information:  Home Phone [phone]   Mobile [phone]       Address:    [address] [place] [id number] Email address:  [email or web address]      Emergency Contact(s)  Name Relationship Lgl Grd Work Phone Home Phone Mobile Phone   1. BANDAR REDDY Spouse No  [phone]   2. WINIFRED REDDY Daughter No   [phone]           Primary language:  English     needed? No   [employer]:  [phone] op. 1  Other communication barriers: None  Preferred Method of Communication:  Phone  Current living arrangement: I live in a private home with family  Mobility Status/ Medical Equipment: Independent    Health Maintenance  Health Maintenance Reviewed: Due/Overdue     My Access Plan  Medical Emergency 911   Primary Clinic Line [facility] - [phone]   24 Hour Appointment Line [phone] or  [phone]) (toll-free)   24 Hour Nurse Line [phone] (toll-free)   Preferred Urgent Care     Preferred Hospital [facility]  [phone]   Preferred Pharmacy [facility] [place] [address]     Behavioral Health Crisis Line The National Suicide Prevention Lifeline at 1-238.103.7327 or 911     My Care Team Members    Patient Care Team       Relationship Specialty Notifications Start End    Raoul Sherman MD PCP - General Family Practice  9/21/18     Phone: [phone] Fax: [phone]         [address] [place] [address]    Raoul Sherman MD Assigned PCP   7/15/18     Phone: [phone] Fax: [phone]         [address] [place] [address]    Dominga Oliveira RD Diabetes Educator Diabetes Education  8/30/18     Phone: [phone] Fax: [phone]          [address] [address]    Hannah Busch, RN Lead Care Coordinator Primary Care - CC Admissions 3/7/19     Phone: [phone] Fax: [phone]                My Care Plans  Self Management and Treatment Plan  Goals and (Comments)  Goals        General    Monitoring (pt-stated)     Notes - Note created  8/30/2018  4:11 PM by Dominga Oliveira RD    Goal Statement: I will check blood sugar 1-2 times daily    Measure of Success: meter memory or log of blood sugar results    Strengths: good understanding of using meter  Ideas to overcome barriers: no barriers noted    Date to Achieve By: 9/12               Action Plans on File: none    Advance Care Plans/Directives Type:        My Medical and Care Information  Problem List   Patient Active Problem List   Diagnosis     Slow transit constipation     Varicose veins of lower extremities with complications     Family history of malignant neoplasm of gastrointestinal tract     Hyperlipidemia with target LDL less than 100     PMS (premenstrual syndrome)     Menorrhagia with regular cycle     Mixed hyperlipidemia     History of diverticulitis     Bacterial sepsis (H) -early     Lung nodule     Type 2 diabetes mellitus with hyperglycemia, without long-term current use of insulin (H)     Essential hypertension     Morbid obesity (H)     Personal history of DVT (deep vein thrombosis)     S/P hysterectomy     Sigmoid diverticulitis     Anemia     ACS (acute coronary syndrome) (H)      Current Medications and Allergies:  See printed Medication Report.    Care Coordination Start Date: 3/7/2019   Frequency of Care Coordination: 2 weeks   Form Last Updated: 03/07/2019

## 2019-03-12 ENCOUNTER — OFFICE VISIT (OUTPATIENT)
Dept: FAMILY MEDICINE | Facility: CLINIC | Age: 49
End: 2019-03-12
Payer: COMMERCIAL

## 2019-03-12 VITALS
RESPIRATION RATE: 20 BRPM | WEIGHT: 258 LBS | TEMPERATURE: 98.2 F | BODY MASS INDEX: 41.46 KG/M2 | OXYGEN SATURATION: 97 % | HEIGHT: 66 IN | HEART RATE: 74 BPM | DIASTOLIC BLOOD PRESSURE: 74 MMHG | SYSTOLIC BLOOD PRESSURE: 130 MMHG

## 2019-03-12 DIAGNOSIS — I10 BENIGN ESSENTIAL HYPERTENSION: ICD-10-CM

## 2019-03-12 DIAGNOSIS — E11.65 TYPE 2 DIABETES MELLITUS WITH HYPERGLYCEMIA, WITHOUT LONG-TERM CURRENT USE OF INSULIN (H): ICD-10-CM

## 2019-03-12 DIAGNOSIS — E66.01 MORBID OBESITY (H): ICD-10-CM

## 2019-03-12 DIAGNOSIS — I25.5 ISCHEMIC CARDIOMYOPATHY: Primary | ICD-10-CM

## 2019-03-12 PROBLEM — I50.9 CHF (CONGESTIVE HEART FAILURE) (H): Status: ACTIVE | Noted: 2019-03-12

## 2019-03-12 LAB
ANION GAP SERPL CALCULATED.3IONS-SCNC: 6 MMOL/L (ref 3–14)
BUN SERPL-MCNC: 25 MG/DL (ref 7–30)
CALCIUM SERPL-MCNC: 9.2 MG/DL (ref 8.5–10.1)
CHLORIDE SERPL-SCNC: 99 MMOL/L (ref 94–109)
CO2 SERPL-SCNC: 28 MMOL/L (ref 20–32)
CREAT SERPL-MCNC: 0.94 MG/DL (ref 0.52–1.04)
GFR SERPL CREATININE-BSD FRML MDRD: 71 ML/MIN/{1.73_M2}
GLUCOSE SERPL-MCNC: 220 MG/DL (ref 70–99)
POTASSIUM SERPL-SCNC: 4.6 MMOL/L (ref 3.4–5.3)
SODIUM SERPL-SCNC: 133 MMOL/L (ref 133–144)

## 2019-03-12 PROCEDURE — 99215 OFFICE O/P EST HI 40 MIN: CPT | Performed by: OBSTETRICS & GYNECOLOGY

## 2019-03-12 PROCEDURE — 80048 BASIC METABOLIC PNL TOTAL CA: CPT | Performed by: OBSTETRICS & GYNECOLOGY

## 2019-03-12 PROCEDURE — 36415 COLL VENOUS BLD VENIPUNCTURE: CPT | Performed by: OBSTETRICS & GYNECOLOGY

## 2019-03-12 ASSESSMENT — MIFFLIN-ST. JEOR: SCORE: 1812.03

## 2019-03-12 ASSESSMENT — PAIN SCALES - GENERAL: PAINLEVEL: NO PAIN (0)

## 2019-03-12 NOTE — PROGRESS NOTES
Subjective: She was hospitalized at [facility] from March 4 until March 6 because of Takosubo cardiomyopathy.  She came into the emergency room with what was felt to be acute hypoxic respiratory failure and flash pulmonary edema and a hypertensive emergency with her blood pressure at 210/130 initially.  She was admitted to the ICU and started on nitroglycerin and heparin.  Her echocardiogram showed an ejection fraction of 40-45% and she eventually underwent angiogram which was unremarkable.  There was no coronary artery disease and no need for intervention.  Prior to admission she was taking 5 mg daily of lisinopril for blood pressure control but this was increased to prednisone 10/12.5 and also she was started on Coreg 12.5 mg twice daily.  It was noted that her hemoglobin A1c had increased to 8.6.  She had been taking glipizide and metformin for diabetes mellitus.  but in the hospital she was treated with Lantus and insulin sliding scale.    She is here today for a post hospital follow-up.  She is feeling somewhat nauseated and wonders if it is the new medications she is taking.  Also she is still weak.    She was noted to have a leg ulcer on the right lateral lower leg.  When she saw the cardiologist she was told that at her next cardiology appointment to they are going to evaluate this further and arrange for management.    Otherwise she denies any current concerns like the kind that brought her to the emergency room.  She no longer has chest pain or difficulty breathing as she did on that day.  Her leg edema has markedly improved.  Significant edema while in the hospital.  The hospitalist discharge note stated that a pulmonary embolism was ruled out and an ultrasound of the lower extremities was negative for deep venous thrombosis.    She has been checking her blood sugars at home and they range from 80 at a low to 160 or 170 at a high.        The past medical history, social history, past surgical  history and family history as shown below have been reviewed by me today.  Past Medical History:   Diagnosis Date     Depressive disorder, not elsewhere classified     depression in the post partum period after her daughter     Diabetes mellitus, type 2 (H) 6/6/2013     Diffuse cystic mastopathy     fibrocystic breast disease     Tobacco use disorder     quit in 2008     Type 2 diabetes, HbA1c goal < 7% (H) 6/6/2013        Allergies   Allergen Reactions     No Known Drug Allergies      Current Outpatient Medications   Medication Sig Dispense Refill     aspirin (ASA) 81 MG EC tablet Take 1 tablet (81 mg) by mouth daily 30 tablet 0     blood glucose monitoring (ACCU-CHEK FASTCLIX) lancets 1 each 2 times daily 102 each 3     blood glucose monitoring (ACCU-CHEK GUIDE) test strip Use to test blood sugar 2 times daily or as directed. 100 strip 11     carvedilol (COREG) 6.25 MG tablet Take 1 tablet (6.25 mg) by mouth 2 times daily 60 tablet 1     glipiZIDE (GLUCOTROL XL) 10 MG 24 hr tablet Take 10 mg by mouth daily       lisinopril-hydrochlorothiazide (PRINZIDE/ZESTORETIC) 10-12.5 MG tablet Take 1 tablet by mouth daily 30 tablet 1     metFORMIN (GLUCOPHAGE-XR) 500 MG 24 hr tablet TAKE 4 TABLETS (2,000MG) BY MOUTH EVERY DAY WITH DINNER 360 tablet 0     omeprazole (PRILOSEC) 20 MG DR capsule Take 20 mg by mouth daily as needed       rosuvastatin (CRESTOR) 20 MG tablet Take 1 tablet (20 mg) by mouth daily 90 tablet 1     sertraline (ZOLOFT) 50 MG tablet TAKE 1/2 TABLET (25 MG) BY MOUTH EVERY MORNING (Patient taking differently: TAKE 1/2 TABLET (25 MG) BY MOUTH EVERY EVENING) 45 tablet 1     Vitamin D, Cholecalciferol, 1000 units TABS Take 4,000 Units by mouth daily       nystatin-triamcinolone (MYCOLOG II) 429687-6.1 UNIT/GM-% external cream Apply topically daily as needed (rash or itching) 60 g 3     Past Surgical History:   Procedure Laterality Date     C ANESTH,LOWER LEG VEIN SURG,NOS  2002    bilateral     C APPENDECTOMY   04/14/89     COLONOSCOPY  6/21/2013    Procedure: COLONOSCOPY;  colonoscopy;  Surgeon: Vamshi Loomis MD;  Location:  GI     COLONOSCOPY N/A 2/22/2017    Procedure: COLONOSCOPY;  Surgeon: Raoul Sage MD;  Location:  GI     CV HEART CATHETERIZATION WITH POSSIBLE INTERVENTION N/A 3/5/2019    Procedure: Heart Catheterization with possible Intervention;  Surgeon: Kirstin Lynn MD;  Location:  HEART CARDIAC CATH LAB     HC COLONOSCOPY W/WO BRUSH/WASH  06/17/2005     HC DILATION/CURETTAGE DIAG/THER NON OB  1986    after a miscarriage     HC REMOVAL OF TONSILS,<10 Y/O       HYSTERECTOMY TOTAL ABDOMINAL, SALPINGECTOMY Bilateral 9/12/2018    Procedure: HYSTERECTOMY TOTAL ABDOMINAL, SALPINGECTOMY;  Total Abdominal Hysterectomy, left Salpingectomy;  Surgeon: Temi Corado MD;  Location: [facility] OR     TUBAL LIGATION  08/04/2006     Social History     Socioeconomic History     Marital status:      Spouse name: Tim     Number of children: 2     Years of education: 12     Highest education level: None   Occupational History     Occupation: labor   Social Needs     Financial resource strain: None     Food insecurity:     Worry: None     Inability: None     Transportation needs:     Medical: None     Non-medical: None   Tobacco Use     Smoking status: Former Smoker     Years: 16.00     Types: Cigarettes     Last attempt to quit: 9/29/2008     Years since quitting: 10.4     Smokeless tobacco: Never Used   Substance and Sexual Activity     Alcohol use: No     Alcohol/week: 0.0 oz     Comment: couple drinks per year     Drug use: No     Sexual activity: Yes     Partners: Male     Birth control/protection: Surgical     Comment: tubal ligation   Lifestyle     Physical activity:     Days per week: None     Minutes per session: None     Stress: None   Relationships     Social connections:     Talks on phone: None     Gets together: None     Attends [employer] service: None     Active member of club or  organization: None     Attends meetings of clubs or organizations: None     Relationship status: None     Intimate partner violence:     Fear of current or ex partner: None     Emotionally abused: None     Physically abused: None     Forced sexual activity: None   Other Topics Concern      Service No     Blood Transfusions No     Caffeine Concern No     Occupational Exposure No     Hobby Hazards No     Sleep Concern No     Stress Concern Yes     Comment: stress at home at work     Weight Concern Yes     Comment: has continued to gain weight     Special Diet Yes     Comment: trying to eat better     Back Care No     Exercise No     Comment: nothing in the past 3 months     Bike Helmet No     Comment: NA     Seat Belt Yes     Self-Exams Yes     Comment: sometimes     Parent/sibling w/ CABG, MI or angioplasty before 65F 55M? Not Asked   Social History Narrative     None     Family History   Problem Relation Age of Onset     Anesthesia Reaction Mother         Severe nausea     Gastrointestinal Disease Mother         Partial colon removal secondary to a blockage     Gynecology Mother         hysterectomy     Lipids Mother      Lipids Father      Cancer - colorectal Father         dx Oct '03 (dx at age 57)     Cancer Father         skin     Hypertension Father      Cancer Maternal Grandmother         colon at age 68     Cerebrovascular Disease Paternal Grandfather      Cerebrovascular Disease Paternal Grandmother         brain aneurysm     Arthritis Sister         mainly affecting her legs     Gastrointestinal Disease Sister         2 sisters with colon polyps     Cancer Sister         cervical ca     Cancer Maternal Aunt         all over ? breast was the origin     Cancer Maternal Aunt         pancreatic     Heart Disease Maternal Aunt         MI 2010       ROS: A 12 point review of systems was done. Except for what is listed above in the HPI, the systems review is negative .      Objective: Vital signs: Blood  "pressure 130/74, pulse 74, temperature 98.2  F (36.8  C), temperature source Temporal, resp. rate 20, height 1.676 m (5' 6\"), weight 117 kg (258 lb), last menstrual period 09/03/2018, SpO2 97 %, not currently breastfeeding.    She is alert and in no obvious distress.  HEENT:    Sclerae and conjunctiva are normal.   Ear canals and TMs look normal.  Nasal mucosa is pink  - no polyps or masses seen.  Throat is unremarkable . Mucous membranes are moist.   Neck is supple, mobile, no adenopathy or masses palpable. The thyroid feels normal.   Normal range of motion noted.  Chest is clear to auscultation.  No wheezes, rales or rhonchi heard.  Cardiac exam is normal with s1, s2, no murmurs or adventitious sounds.Normal rate and rhythm is heard.   Exam of the extremities reveals that she does have trace bilateral leg edema today but this must be substantially improved over her exam in the hospital.  I do note that she has a grayish blue dark area on the right medial lower leg about 6 cm in diameter which is an area of concern that could develop into a diabetic ulcer.  Or a stasis ulcer.  At this point there is no break in the skin or ulceration but the circulation in that area appears questionable.    She has palpable pulses in both feet and she has normal sensation in her feet and good capillary refill in the toes    Assessment/Plan: A total of 40 minutes were spent face-to-face with this patient during today's consultation, with more than 50% of that time devoted to conversation and counseling about the management decisions.      1.  She had a recent episode of respiratory distress which was attributed to Takosubo cardiomyopathy and she was hospitalized for several days.  She had a hypertensive crisis which was managed with IV medications and eventually she was started on Coreg and Prinzide which she continues to take.  I told her that the constellation of her medical problems suggests to me that she would be best served by " seeing an internal medicine specialist.  I have made that referral for her.  I informed her that I am still happy to see her for her gynecologic needs and usual day-to-day concerns but it would be in her best interest to see Dr. Jaramillo who deals with these types of medications and problems more frequently than I do.  She is in total agreement with this.    2.  With respect to her morbid obesity I had a very long discussion with her today about the need to change her lifestyle.  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  Before she must work full-time.  Nevertheless I strongly encouraged her to walk 5 miles a day and to lose at least 40 pounds.  This will impact not only her hypertension but also her diabetic control.  It was a come to [place] type conversation.  I think she got the message.  She has to invest the time in improving her health.  Can be as simple as walking 5 miles a day.  Also watching her diet.    3.  With respect to the diabetes I do believe she needs tighter control and so I suggested seeing the diabetic educator about starting insulin.  She may be advanced to an insulin pump at some point as well.    4.  We will check basic metabolic panel today because of the addition of the diuretic and she has an appointment to see a cardiologist within the next week.  Their previous plan was to address this questionable area on her right lower leg which I think is at risk for becoming a stasis or diabetic ulcer.    5.  In addition to her upcoming cardiology appointments referral has been made to  and hopefully he can address the above-mentioned concerns including her current concerned that she is still feeling somewhat nauseated and tired.  I am not sure if this is related to her medications or the recent stress of being hospitalized with the cardiomyopathy.  I will be happy to comanage her with respect to her  gynecologic needs and other issues as they arise.Thank you in advance to Dr Jaramillo for assisting in this.    MAGDALENA Sherman MD

## 2019-03-14 ENCOUNTER — OFFICE VISIT (OUTPATIENT)
Dept: FAMILY MEDICINE | Facility: OTHER | Age: 49
End: 2019-03-14
Payer: COMMERCIAL

## 2019-03-14 VITALS
SYSTOLIC BLOOD PRESSURE: 102 MMHG | WEIGHT: 254 LBS | DIASTOLIC BLOOD PRESSURE: 70 MMHG | TEMPERATURE: 97.5 F | RESPIRATION RATE: 18 BRPM | HEART RATE: 114 BPM | OXYGEN SATURATION: 93 % | BODY MASS INDEX: 41 KG/M2

## 2019-03-14 DIAGNOSIS — E11.65 TYPE 2 DIABETES MELLITUS WITH HYPERGLYCEMIA, WITHOUT LONG-TERM CURRENT USE OF INSULIN (H): ICD-10-CM

## 2019-03-14 DIAGNOSIS — I50.9 CONGESTIVE HEART FAILURE, UNSPECIFIED HF CHRONICITY, UNSPECIFIED HEART FAILURE TYPE (H): Primary | ICD-10-CM

## 2019-03-14 LAB
ALBUMIN SERPL-MCNC: 3.8 G/DL (ref 3.4–5)
ALP SERPL-CCNC: 108 U/L (ref 40–150)
ALT SERPL W P-5'-P-CCNC: 16 U/L (ref 0–50)
ANION GAP SERPL CALCULATED.3IONS-SCNC: 9 MMOL/L (ref 3–14)
AST SERPL W P-5'-P-CCNC: 12 U/L (ref 0–45)
BILIRUB SERPL-MCNC: 0.7 MG/DL (ref 0.2–1.3)
BUN SERPL-MCNC: 21 MG/DL (ref 7–30)
CALCIUM SERPL-MCNC: 9.1 MG/DL (ref 8.5–10.1)
CHLORIDE SERPL-SCNC: 97 MMOL/L (ref 94–109)
CO2 SERPL-SCNC: 27 MMOL/L (ref 20–32)
CREAT SERPL-MCNC: 1.08 MG/DL (ref 0.52–1.04)
ERYTHROCYTE [DISTWIDTH] IN BLOOD BY AUTOMATED COUNT: 19.4 % (ref 10–15)
GFR SERPL CREATININE-BSD FRML MDRD: 60 ML/MIN/{1.73_M2}
GLUCOSE SERPL-MCNC: 220 MG/DL (ref 70–99)
HCT VFR BLD AUTO: 54.2 % (ref 35–47)
HGB BLD-MCNC: 17.5 G/DL (ref 11.7–15.7)
MCH RBC QN AUTO: 25.1 PG (ref 26.5–33)
MCHC RBC AUTO-ENTMCNC: 32.3 G/DL (ref 31.5–36.5)
MCV RBC AUTO: 78 FL (ref 78–100)
PLATELET # BLD AUTO: 184 10E9/L (ref 150–450)
POTASSIUM SERPL-SCNC: 4.3 MMOL/L (ref 3.4–5.3)
PROT SERPL-MCNC: 8.6 G/DL (ref 6.8–8.8)
RBC # BLD AUTO: 6.97 10E12/L (ref 3.8–5.2)
SODIUM SERPL-SCNC: 133 MMOL/L (ref 133–144)
WBC # BLD AUTO: 9 10E9/L (ref 4–11)

## 2019-03-14 PROCEDURE — 80053 COMPREHEN METABOLIC PANEL: CPT | Performed by: INTERNAL MEDICINE

## 2019-03-14 PROCEDURE — 85027 COMPLETE CBC AUTOMATED: CPT | Performed by: INTERNAL MEDICINE

## 2019-03-14 PROCEDURE — 99214 OFFICE O/P EST MOD 30 MIN: CPT | Performed by: INTERNAL MEDICINE

## 2019-03-14 PROCEDURE — 36415 COLL VENOUS BLD VENIPUNCTURE: CPT | Performed by: INTERNAL MEDICINE

## 2019-03-14 ASSESSMENT — PAIN SCALES - GENERAL: PAINLEVEL: NO PAIN (0)

## 2019-03-14 NOTE — PROGRESS NOTES
SUBJECTIVE:   Lashawn Reddy is a 48 year old female who presents to clinic today for the following health issues:      Chief Complaint   Patient presents with     Consult                       Chief Complaint         The patient is a pleasant 48-year-old female who was recently diagnosed as having nonischemic cardiomyopathy.  She presents today for initial visit.  It has been recommended that she sets up with internal medicine as she has a concurrent history of diabetes.  Her blood sugars have been somewhat inadequately controlled with an A1c in the mid 8 range.  She has had no symptoms of uncontrolled hyperglycemia.  She notes that currently she feels terrible.  She feels lightheaded and somewhat nauseated.  She does have a systolic blood pressure 102 and that is while sitting down.  She was recently started on Zestoretic.  She also notes that she is been on metformin for some time and recently this does also seem to be causing a little bit of nausea.  She is uncertain as to whether this is a relationship or coincidence.  At this time, she has no chest pain, or shortness of breath.  She is rather sad and weepy and we have had a lengthy discussion regarding her recent ordeal.                         PAST, FAMILY,SOCIAL HISTORY:     Medical  History:   has a past medical history of Depressive disorder, not elsewhere classified, Diabetes mellitus, type 2 (H) (6/6/2013), Diffuse cystic mastopathy, Tobacco use disorder, and Type 2 diabetes, HbA1c goal < 7% (H) (6/6/2013).     Surgical History:   has a past surgical history that includes APPENDECTOMY (04/14/89); DILATION/CURETTAGE DIAG/THER NON OB (1986); REMOVAL OF TONSILS,<13 Y/O; ANESTH,LOWER LEG VEIN SURG,NOS (2002); Colonoscopy w/wo [place] **Performed** (06/17/2005); tubal ligation (08/04/2006); Colonoscopy (6/21/2013); Colonoscopy (N/A, 2/22/2017); Hysterectomy Total Abdominal, Salpingectomy (Bilateral, 9/12/2018); and Heart Catheterization with Possible  Intervention (N/A, 3/5/2019).     Social History:   reports that she quit smoking about 10 years ago. Her smoking use included cigarettes. She quit after 16.00 years of use. she has never used smokeless tobacco. She reports that she does not drink alcohol or use drugs.     Family History:  family history includes Anesthesia Reaction in her mother; Arthritis in her sister; Cancer in her father, maternal aunt, maternal aunt, maternal grandmother, and sister; Cancer - colorectal in her father; Cerebrovascular Disease in her paternal grandfather and paternal grandmother; Gastrointestinal Disease in her mother and sister; Gynecology in her mother; Heart Disease in her maternal aunt; Hypertension in her father; Lipids in her father and mother.            MEDICATIONS  Current Outpatient Medications   Medication Sig Dispense Refill     aspirin (ASA) 81 MG EC tablet Take 1 tablet (81 mg) by mouth daily 30 tablet 0     blood glucose monitoring (ACCU-CHEK FASTCLIX) lancets 1 each 2 times daily 102 each 3     blood glucose monitoring (ACCU-CHEK GUIDE) test strip Use to test blood sugar 2 times daily or as directed. 100 strip 11     carvedilol (COREG) 6.25 MG tablet Take 1 tablet (6.25 mg) by mouth 2 times daily 60 tablet 1     glipiZIDE (GLUCOTROL XL) 10 MG 24 hr tablet Take 10 mg by mouth daily       lisinopril-hydrochlorothiazide (PRINZIDE/ZESTORETIC) 10-12.5 MG tablet Take 1 tablet by mouth daily 30 tablet 1     metFORMIN (GLUCOPHAGE-XR) 500 MG 24 hr tablet TAKE 4 TABLETS (2,000MG) BY MOUTH EVERY DAY WITH DINNER 360 tablet 0     nystatin-triamcinolone (MYCOLOG II) 926746-3.1 UNIT/GM-% external cream Apply topically daily as needed (rash or itching) 60 g 3     omeprazole (PRILOSEC) 20 MG DR capsule Take 20 mg by mouth daily as needed       rosuvastatin (CRESTOR) 20 MG tablet Take 1 tablet (20 mg) by mouth daily 90 tablet 1     sertraline (ZOLOFT) 50 MG tablet TAKE 1/2 TABLET (25 MG) BY MOUTH EVERY MORNING (Patient taking  differently: TAKE 1/2 TABLET (25 MG) BY MOUTH EVERY EVENING) 45 tablet 1     Vitamin D, Cholecalciferol, 1000 units TABS Take 4,000 Units by mouth daily           --------------------------------------------------------------------------------------------------------------------                              Review of Systems       LUNGS: Pt denies: cough, excess sputum, hemoptysis, or shortness of breath.   HEART: Pt denies: chest pain, arrhythmia, syncope, tachy or bradyarrhythmia.   GI: Pt denies: nausea, vomiting, diarrhea, constipation, melena, or hematochezia.   NEURO: Pt denies: seizures, strokes, diplopia, weakness, paraesthesias, or paralysis.   SKIN: Pt denies: itching, rashes, discoloration, or specific lesions of concern. Denies recent hair loss.   PSYCH: The patient denies significant depression, anxiety, mood imbalance. Specifically denies any suicidal ideation.                                     Examination      /70 (BP Location: Right arm, Patient Position: Sitting, Cuff Size: Adult Large)   Pulse 114   Temp 97.5  F (36.4  C) (Temporal)   Resp 18   Wt 115.2 kg (254 lb)   LMP 09/03/2018   SpO2 93%   BMI 41.00 kg/m     Constitutional: The patient appears to be in mild acute distress. The patient appears to be adequately hydrated. No acute respiratory or hemodynamic distress is noted at this time.   LUNGS: clear bilaterally, airflow is brisk, no intercostal retraction or stridor is noted. No coughing is noted during visit.   HEART:  regular without rubs, clicks, gallops, or murmurs. PMI is nondisplaced. Upstrokes are brisk. S1,S2 are heard.   GI: Abdomen is soft, without rebound, guarding or tenderness. Bowel sounds are appropriate. No renal bruits are heard.   NEURO: Pt is alert and appropriate. No neurologic lateralization is noted. Cranial nerves 2-12 are intact. Peripheral sensory and motor function are grossly normal.    SKIN:  warm and dry. No erythema, or rashes are noted. No  specific lesions of concern are noted.    PSYCH: The patient appears grossly appropriate. Maintains good eye contact, does not have any jittery or atypical motion. Displays appropriate affect.                                           Decision Making    1. Congestive heart failure, unspecified HF chronicity, unspecified heart failure type (H)  At this time, recommend that she discontinues his Zestoretic temporarily but continues the low-dose Coreg.  Will check CBC for anemia  - CBC with platelets    2. Type 2 diabetes mellitus with hyperglycemia, without long-term current use of insulin (H)  Discontinue metformin but continue the Glucotrol.  This is a temporary change to determine if the medication is making her sick to her stomach.  I suspect it is not.   - Comprehensive metabolic panel                      FOLLOW UP   I have asked the patient to make an appointment for followup with me early next week        Would like to thank  for allowing me to participate in the patient's care.        Approximately 45 minutes were spent with the patient in direct face-to-face discussion regarding diagnosis and treatment options pertinent to current problems.      I have carefully explained the diagnosis and treatment options to the patient.  The patient has displayed an understanding of the above, and all subsequent questions were answered.      DO BI Jacobo    Portions of this note were produced using [employer]  Although every attempt at real-time proof reading has been made, occasional grammar/syntax errors may have been missed.

## 2019-03-17 NOTE — RESULT ENCOUNTER NOTE
Dear Lashawn, your recent test results are attached.  The gastric panel shows an elevated blood sugar of 220.  Liver function is normal.  The blood cell count shows an elevated hemoglobin at 17 5.  Follow-up is recommended.    Feel free to contact me via the office or My Chart if you have any questions regarding the above.  Sincerely,  DO SHRAVAN JacoboOI

## 2019-03-21 ENCOUNTER — OFFICE VISIT (OUTPATIENT)
Dept: FAMILY MEDICINE | Facility: OTHER | Age: 49
End: 2019-03-21
Payer: COMMERCIAL

## 2019-03-21 VITALS
TEMPERATURE: 96.9 F | RESPIRATION RATE: 16 BRPM | SYSTOLIC BLOOD PRESSURE: 122 MMHG | DIASTOLIC BLOOD PRESSURE: 80 MMHG | OXYGEN SATURATION: 97 % | HEART RATE: 76 BPM | WEIGHT: 259.6 LBS | BODY MASS INDEX: 41.9 KG/M2

## 2019-03-21 DIAGNOSIS — Z90.710 S/P HYSTERECTOMY: ICD-10-CM

## 2019-03-21 DIAGNOSIS — I50.9 CONGESTIVE HEART FAILURE, UNSPECIFIED HF CHRONICITY, UNSPECIFIED HEART FAILURE TYPE (H): ICD-10-CM

## 2019-03-21 DIAGNOSIS — D50.0 IRON DEFICIENCY ANEMIA DUE TO CHRONIC BLOOD LOSS: ICD-10-CM

## 2019-03-21 DIAGNOSIS — E11.65 TYPE 2 DIABETES MELLITUS WITH HYPERGLYCEMIA, WITHOUT LONG-TERM CURRENT USE OF INSULIN (H): Primary | ICD-10-CM

## 2019-03-21 PROCEDURE — 99214 OFFICE O/P EST MOD 30 MIN: CPT | Performed by: INTERNAL MEDICINE

## 2019-03-21 ASSESSMENT — PAIN SCALES - GENERAL: PAINLEVEL: NO PAIN (0)

## 2019-03-21 NOTE — NURSING NOTE
Health Maintenance Due   Topic Date Due     HF ACTION PLAN Q3 YR  1970     EYE EXAM Q1 YEAR  10/21/1970     HIV SCREEN (SYSTEM ASSIGNED)  10/21/1987     OP ANNUAL INR REFERRAL  11/20/2014     DTAP/TDAP/TD IMMUNIZATION (3 - Td) 08/26/2018     INFLUENZA VACCINE (1) 09/01/2018     FOOT EXAM Q1 YEAR  10/18/2018     Darcie MCDONALD LPN

## 2019-03-21 NOTE — PROGRESS NOTES
SUBJECTIVE:   Lashawn Reddy is a 48 year old female who presents to clinic today for the following health issues:      Diabetes Follow-up    Patient is checking blood sugars: twice daily.    Blood sugar testing frequency justification: Patient modifying lifestyle changes (diet, exercise) with blood sugars  Results are as follows:         am - 160         suppertime - 130    Diabetic concerns: None     Symptoms of hypoglycemia (low blood sugar): none     Paresthesias (numbness or burning in feet) or sores: No     Date of last diabetic eye exam: 3-4 months ago    BP Readings from Last 2 Encounters:   03/21/19 122/80   03/14/19 102/70     Hemoglobin A1C (%)   Date Value   03/04/2019 8.6 (H)   11/14/2018 7.3 (H)     LDL Cholesterol Calculated (mg/dL)   Date Value   03/05/2019 40   10/25/2018 69       Diabetes Management Resources  Hypertension Follow-up      Outpatient blood pressures are being checked at home.  Results are random reading.    Low Salt Diet: no added salt      Amount of exercise or physical activity: 6-7 days/week for an average of greater than 60 minutes    Problems taking medications regularly: No    Medication side effects: none    Diet: low salt and diabetic                    Chief Complaint         The patient is a pleasant 48-year-old female who is seen today for recheck.  She was recently diagnosed as having idiopathic cardiomyopathy.  She was taken off of her metformin temporarily as she was having tolerance issues.  She notes that she is feeling much better off the medication.  Her blood sugars have been fairly well controlled and she does keep regular record of them.  Most values are under 160.  Improved control will be necessary but it may be without metformin.  Her blood pressure is much better today and she is on a mild diuretic and she has no edema.  She notes that her activities have increased.  We have had a lengthy discussion regarding her weight and she is fully aware of the need  for weight reduction.  She is working on a caloric restricted diet at this time.  We also reiterated diabetic dieting as well as the various treatment options.  Recent laboratory work was performed which demonstrated an actual reactive polycythemia which I suspect is the result of previous iron supplementation and the resolution of her previous anemia.                       PAST, FAMILY,SOCIAL HISTORY:     Medical  History:   has a past medical history of Depressive disorder, not elsewhere classified, Diabetes mellitus, type 2 (H) (6/6/2013), Diffuse cystic mastopathy, Tobacco use disorder, and Type 2 diabetes, HbA1c goal < 7% (H) (6/6/2013).     Surgical History:   has a past surgical history that includes APPENDECTOMY (04/14/89); DILATION/CURETTAGE DIAG/THER NON OB (1986); REMOVAL OF TONSILS,<11 Y/O; ANESTH,LOWER LEG VEIN SURG,NOS (2002); Colonoscopy w/wo [place] **Performed** (06/17/2005); tubal ligation (08/04/2006); Colonoscopy (6/21/2013); Colonoscopy (N/A, 2/22/2017); Hysterectomy Total Abdominal, Salpingectomy (Bilateral, 9/12/2018); and Heart Catheterization with Possible Intervention (N/A, 3/5/2019).     Social History:   reports that she quit smoking about 10 years ago. Her smoking use included cigarettes. She quit after 16.00 years of use. she has never used smokeless tobacco. She reports that she does not drink alcohol or use drugs.     Family History:  family history includes Anesthesia Reaction in her mother; Arthritis in her sister; Cancer in her father, maternal aunt, maternal aunt, maternal grandmother, and sister; Cancer - colorectal in her father; Cerebrovascular Disease in her paternal grandfather and paternal grandmother; Gastrointestinal Disease in her mother and sister; Gynecology in her mother; Heart Disease in her maternal aunt; Hypertension in her father; Lipids in her father and mother.            MEDICATIONS  Current Outpatient Medications   Medication Sig Dispense Refill     aspirin (ASA)  81 MG EC tablet Take 1 tablet (81 mg) by mouth daily 30 tablet 0     blood glucose monitoring (ACCU-CHEK FASTCLIX) lancets 1 each 2 times daily 102 each 3     blood glucose monitoring (ACCU-CHEK GUIDE) test strip Use to test blood sugar 2 times daily or as directed. 100 strip 11     carvedilol (COREG) 6.25 MG tablet Take 1 tablet (6.25 mg) by mouth 2 times daily 60 tablet 1     glipiZIDE (GLUCOTROL XL) 10 MG 24 hr tablet Take 10 mg by mouth daily       omeprazole (PRILOSEC) 20 MG DR capsule Take 20 mg by mouth daily as needed       rosuvastatin (CRESTOR) 20 MG tablet Take 1 tablet (20 mg) by mouth daily 90 tablet 1     sertraline (ZOLOFT) 50 MG tablet TAKE 1/2 TABLET (25 MG) BY MOUTH EVERY MORNING (Patient taking differently: TAKE 1/2 TABLET (25 MG) BY MOUTH EVERY EVENING) 45 tablet 1     Vitamin D, Cholecalciferol, 1000 units TABS Take 4,000 Units by mouth daily       lisinopril-hydrochlorothiazide (PRINZIDE/ZESTORETIC) 10-12.5 MG tablet Take 1 tablet by mouth daily (Patient not taking: Reported on 3/21/2019) 30 tablet 1     metFORMIN (GLUCOPHAGE-XR) 500 MG 24 hr tablet TAKE 4 TABLETS (2,000MG) BY MOUTH EVERY DAY WITH DINNER (Patient not taking: Reported on 3/21/2019) 360 tablet 0     nystatin-triamcinolone (MYCOLOG II) 315025-4.1 UNIT/GM-% external cream Apply topically daily as needed (rash or itching) (Patient not taking: Reported on 3/21/2019) 60 g 3         --------------------------------------------------------------------------------------------------------------------                              Review of Systems       LUNGS: Pt denies: cough, excess sputum, hemoptysis, or shortness of breath.   HEART: Pt denies: chest pain, arrhythmia, syncope, tachy or bradyarrhythmia.   GI: Pt denies: nausea, vomiting, diarrhea, constipation, melena, or hematochezia.   NEURO: Pt denies: seizures, strokes, diplopia, weakness, paraesthesias, or paralysis.   SKIN: Pt denies: itching, rashes, discoloration, or specific  lesions of concern. Denies recent hair loss.   PSYCH: The patient denies significant depression, anxiety, mood imbalance. Specifically denies any suicidal ideation.                                     Examination      /80 (BP Location: Left arm, Patient Position: Chair, Cuff Size: Adult Large)   Pulse 76   Temp 96.9  F (36.1  C) (Temporal)   Resp 16   Wt 117.8 kg (259 lb 9.6 oz)   LMP 09/03/2018   SpO2 97%   BMI 41.90 kg/m     Constitutional: The patient appears to be in no acute distress. The patient appears to be adequately hydrated. No acute respiratory or hemodynamic distress is noted at this time.     LUNGS: clear bilaterally, airflow is brisk, no intercostal retraction or stridor is noted. No coughing is noted during visit.   HEART:  regular without rubs, clicks, gallops, or murmurs. PMI is nondisplaced. Upstrokes are brisk. S1,S2 are heard.   GI: Abdomen is soft, without rebound, guarding or tenderness. Bowel sounds are appropriate. No renal bruits are heard.   NEURO: Pt is alert and appropriate. No neurologic lateralization is noted. Cranial nerves 2-12 are intact. Peripheral sensory and motor function are grossly normal.    SKIN:  warm and dry. No erythema, or rashes are noted. No specific lesions of concern are noted.    PSYCH: The patient appears grossly appropriate. Maintains good eye contact, does not have any jittery or atypical motion. Displays appropriate affect.                                             Decision Making    1. Type 2 diabetes mellitus with hyperglycemia, without long-term current use of insulin (H)  Recommend continue glipizide.  Continue low carbohydrate diet.  Continue weight reduction  Anticipate restarting metformin in a couple weeks to determine if this truly was the cause of her previous nausea and symptoms.    2. S/P hysterectomy  Resolution of anemia obtained    3. Congestive heart failure, unspecified HF chronicity, unspecified heart failure type (H)  Markedly  improved, continue statin, Coreg, ACE inhibitor, blood sugar management    4. Iron deficiency anemia due to chronic blood loss  Resolved.  Continue iron supplementation for a short period longer and then recheck iron levels.                           FOLLOW UP   I have asked the patient to make an appointment for followup with me 3 weeks        I have carefully explained the diagnosis and treatment options to the patient.  The patient has displayed an understanding of the above, and all subsequent questions were answered.      DO BI Jacobo    Portions of this note were produced using [employer]  Although every attempt at real-time proof reading has been made, occasional grammar/syntax errors may have been missed.

## 2019-03-25 ENCOUNTER — HOSPITAL ENCOUNTER (OUTPATIENT)
Dept: CARDIOLOGY | Facility: CLINIC | Age: 49
Discharge: HOME | End: 2019-03-25
Attending: NURSE PRACTITIONER | Admitting: NURSE PRACTITIONER
Payer: COMMERCIAL

## 2019-03-25 DIAGNOSIS — I51.81 STRESS-INDUCED CARDIOMYOPATHY: ICD-10-CM

## 2019-03-25 PROCEDURE — 93321 DOPPLER ECHO F-UP/LMTD STD: CPT | Mod: 26 | Performed by: INTERNAL MEDICINE

## 2019-03-25 PROCEDURE — 93308 TTE F-UP OR LMTD: CPT

## 2019-03-25 PROCEDURE — 93325 DOPPLER ECHO COLOR FLOW MAPG: CPT | Mod: 26 | Performed by: INTERNAL MEDICINE

## 2019-03-25 PROCEDURE — 93308 TTE F-UP OR LMTD: CPT | Mod: 26 | Performed by: INTERNAL MEDICINE

## 2019-03-27 ENCOUNTER — OFFICE VISIT (OUTPATIENT)
Dept: CARDIOLOGY | Facility: CLINIC | Age: 49
End: 2019-03-27
Payer: COMMERCIAL

## 2019-03-27 VITALS
DIASTOLIC BLOOD PRESSURE: 86 MMHG | HEART RATE: 80 BPM | OXYGEN SATURATION: 97 % | WEIGHT: 262.1 LBS | HEIGHT: 66 IN | SYSTOLIC BLOOD PRESSURE: 130 MMHG | BODY MASS INDEX: 42.12 KG/M2

## 2019-03-27 DIAGNOSIS — I10 ESSENTIAL HYPERTENSION: ICD-10-CM

## 2019-03-27 DIAGNOSIS — I51.81 STRESS-INDUCED CARDIOMYOPATHY: Primary | ICD-10-CM

## 2019-03-27 PROCEDURE — 99214 OFFICE O/P EST MOD 30 MIN: CPT | Performed by: NURSE PRACTITIONER

## 2019-03-27 RX ORDER — LOSARTAN POTASSIUM 25 MG/1
25 TABLET ORAL DAILY
Qty: 30 TABLET | Refills: 3 | Status: SHIPPED | OUTPATIENT
Start: 2019-03-27 | End: 2019-04-23

## 2019-03-27 ASSESSMENT — MIFFLIN-ST. JEOR: SCORE: 1830.63

## 2019-03-27 NOTE — LETTER
3/27/2019    Raoul Sherman MD  [address] [place] [address]    RE: Lashawn Reddy       Dear Colleague,    I had the pleasure of seeing Lashawn Reddy in the [facility].    Cardiology Clinic Progress Note  Lashawn Reddy MRN# [id number]   YOB: 1970 Age: 48 year old          Assessment and Plan:     1. Stress-induced cardiomyopathy    LVEF 40-45% with severe hypokinesia of the basal to mid segments of the LV with hypercontractility of the apical segment    No obstructive coronary artery disease on coronary angiogram, but LV gram showed mild apical anterior hypokinesis with EF 45%    Repeat echocardiogram from 3/25/2019 showed LVEF had normalized at 65-70% without wall motion abnormalities    Return to clinic in 1 month    Establish care with cardiologist in 3 months    2. Hypertension    Goal of systolic blood pressure less than 130    Did not tolerate lisinopril due to recurrent cough    Start losartan 25 mg daily    Return to clinic in 1 month with BMP    3. Venous insufficency with history of nonhealing ulcer on her right lower extremity in 2015    History of DVT after trauma (injury after falling off a ladder).    Venous competency study from 2015 showed incompetent deep and superficial venous symptoms.    Continues to wear compression stockings    We will discuss treatment options with Dr. Roth         History of Presenting Illness:    Lashawn Reddy is a very pleasant 48 year old patient who presents today for a discharge follow up. She has a past medical history notable for DVT after a trauma (completed 6 months of anticoagulation), chronic lymphedema, hypertension, previous tobacco use and type 2 diabetes.    She was admitted on 3/4/2019 to [facility] after presenting to [facility] emergency department for acute onset of shortness of breath on exertion. PE was ruled out and lower extremity venous studies were  negative for DVT.  Initially, she was diagnosed with acute hypoxic respiratory failure with flash pulmonary edema and hypertensive emergency (initial blood pressure 210/130) requiring BiPAP.  IV nitroglycerin and heparin were both initiated, and she was diuresed with IV Lasix with a discharge weight of 118 kg.     A echocardiogram revealed an LVEF of 40-45% with severe hypokinesia of the basal to mid segments of the entire left ventricle with  hypercontractility of the apical segments.  The findings were suggestive of reverse Takotsubo cardiomyopathy.  A coronary angiogram was obtained that showed nonobstructive coronary artery disease.  The LV gram showed mild apical anterior hypokinesis with an LVEF of 45%.    She underwent a limited echocardiogram on 3/25/2019 that showed her LVEF improved to 65-70% without any wall motion abnormalities noted.    Today in clinic she reports that she is starting to feel back to her normal self.  She was able to complete a 25-minute walk on her lunch break without any significant shortness of breath on exertion.  She does however report that her home blood pressure will spike up to 160/90.  Due to a previous injury after a fall off a ladder she has right lower extremity venous insufficiency of both her deep and superficial symptoms as per reported on a competency study from 2015.  She continues to wear compression stockings daily and does have a history of a venous ulcer wound.               Review of Systems:   Review of Systems:  Skin:  Negative     Eyes:  Positive for glasses  ENT:  Negative    Respiratory:  Negative    Cardiovascular:  Negative for;palpitations;chest pain Positive for;edema;lightheadedness  Gastroenterology: Negative    Genitourinary:  Negative    Musculoskeletal:  Negative    Neurologic:  Negative    Psychiatric:  Positive for depression  Heme/Lymph/Imm:  Negative    Endocrine:  Positive for diabetes            Physical Exam:     Vitals: /86 (BP  "Location: Right arm, Patient Position: Fowlers, Cuff Size: Adult Large)   Pulse 80   Ht 1.676 m (5' 6\")   Wt 118.9 kg (262 lb 1.6 oz)   LMP 09/03/2018   SpO2 97%   BMI 42.30 kg/m     Constitutional:  cooperative overweight      Skin:  warm and dry to the touch        Head:  normocephalic, no masses or lesions        Eyes:  pupils equal and round;conjunctivae and lids unremarkable        ENT:  dentition good        Neck:  no stiffness        Chest:  clear to auscultation;normal symmetry        Cardiac: regular rhythm;normal S1 and S2     no presence of murmur            Abdomen:    obese      Extremities and Back:      RLE scar from previous ulcer         Medications:     Current Outpatient Medications   Medication Sig Dispense Refill     aspirin (ASA) 81 MG EC tablet Take 1 tablet (81 mg) by mouth daily 30 tablet 0     carvedilol (COREG) 6.25 MG tablet Take 1 tablet (6.25 mg) by mouth 2 times daily 60 tablet 1     glipiZIDE (GLUCOTROL XL) 10 MG 24 hr tablet Take 10 mg by mouth daily       losartan (COZAAR) 25 MG tablet Take 1 tablet (25 mg) by mouth daily 30 tablet 3     omeprazole (PRILOSEC) 20 MG DR capsule Take 20 mg by mouth daily as needed       rosuvastatin (CRESTOR) 20 MG tablet Take 1 tablet (20 mg) by mouth daily 90 tablet 1     sertraline (ZOLOFT) 50 MG tablet TAKE 1/2 TABLET (25 MG) BY MOUTH EVERY MORNING (Patient taking differently: TAKE 1/2 TABLET (25 MG) BY MOUTH EVERY EVENING) 45 tablet 1     Vitamin D, Cholecalciferol, 1000 units TABS Take 4,000 Units by mouth daily       blood glucose monitoring (ACCU-CHEK FASTCLIX) lancets 1 each 2 times daily 102 each 3     blood glucose monitoring (ACCU-CHEK GUIDE) test strip Use to test blood sugar 2 times daily or as directed. 100 strip 11     lisinopril-hydrochlorothiazide (PRINZIDE/ZESTORETIC) 10-12.5 MG tablet Take 1 tablet by mouth daily (Patient not taking: Reported on 3/21/2019) 30 tablet 1     metFORMIN (GLUCOPHAGE-XR) 500 MG 24 hr tablet TAKE 4 " TABLETS (2,000MG) BY MOUTH EVERY DAY WITH DINNER (Patient not taking: Reported on 3/21/2019) 360 tablet 0     nystatin-triamcinolone (MYCOLOG II) 331506-1.1 UNIT/GM-% external cream Apply topically daily as needed (rash or itching) (Patient not taking: Reported on 3/21/2019) 60 g 3       Family History   Problem Relation Age of Onset     Anesthesia Reaction Mother         Severe nausea     Gastrointestinal Disease Mother         Partial colon removal secondary to a blockage     Gynecology Mother         hysterectomy     Lipids Mother      Lipids Father      Cancer - colorectal Father         dx Oct '03 (dx at age 57)     Cancer Father         skin     Hypertension Father      Cancer Maternal Grandmother         colon at age 68     Cerebrovascular Disease Paternal Grandfather      Cerebrovascular Disease Paternal Grandmother         brain aneurysm     Arthritis Sister         mainly affecting her legs     Gastrointestinal Disease Sister         2 sisters with colon polyps     Cancer Sister         cervical ca     Cancer Maternal Aunt         all over ? breast was the origin     Cancer Maternal Aunt         pancreatic     Heart Disease Maternal Aunt         MI 2010       Social History     Socioeconomic History     Marital status:      Spouse name: Tim     Number of children: 2     Years of education: 12     Highest education level: Not on file   Occupational History     Occupation: labor   Social Needs     Financial resource strain: Not on file     Food insecurity:     Worry: Not on file     Inability: Not on file     Transportation needs:     Medical: Not on file     Non-medical: Not on file   Tobacco Use     Smoking status: Former Smoker     Years: 16.00     Types: Cigarettes     Last attempt to quit: 9/29/2008     Years since quitting: 10.4     Smokeless tobacco: Never Used   Substance and Sexual Activity     Alcohol use: No     Alcohol/week: 0.0 oz     Comment: couple drinks per year     Drug use: No      Sexual activity: Yes     Partners: Male     Birth control/protection: Surgical     Comment: tubal ligation   Lifestyle     Physical activity:     Days per week: Not on file     Minutes per session: Not on file     Stress: Not on file   Relationships     Social connections:     Talks on phone: Not on file     Gets together: Not on file     Attends Latter-day service: Not on file     Active member of club or organization: Not on file     Attends meetings of clubs or organizations: Not on file     Relationship status: Not on file     Intimate partner violence:     Fear of current or ex partner: Not on file     Emotionally abused: Not on file     Physically abused: Not on file     Forced sexual activity: Not on file   Other Topics Concern      Service No     Blood Transfusions No     Caffeine Concern No     Occupational Exposure No     Hobby Hazards No     Sleep Concern No     Stress Concern Yes     Comment: stress at home at work     Weight Concern Yes     Comment: has continued to gain weight     Special Diet Yes     Comment: trying to eat better     Back Care No     Exercise No     Comment: nothing in the past 3 months     Bike Helmet No     Comment: NA     Seat Belt Yes     Self-Exams Yes     Comment: sometimes     Parent/sibling w/ CABG, MI or angioplasty before 65F 55M? Not Asked   Social History Narrative     Not on file            Past Medical History:     Past Medical History:   Diagnosis Date     Depressive disorder, not elsewhere classified     depression in the post partum period after her daughter     Diabetes mellitus, type 2 (H) 6/6/2013     Diffuse cystic mastopathy     fibrocystic breast disease     Tobacco use disorder     quit in 2008     Type 2 diabetes, HbA1c goal < 7% (H) 6/6/2013              Past Surgical History:     Past Surgical History:   Procedure Laterality Date     C ANESTH,LOWER LEG VEIN SURG,NOS  2002    bilateral     C APPENDECTOMY  04/14/89     COLONOSCOPY  6/21/2013     Procedure: COLONOSCOPY;  colonoscopy;  Surgeon: Vamshi Loomis MD;  Location: [facility]     COLONOSCOPY N/A 2/22/2017    Procedure: COLONOSCOPY;  Surgeon: Raoul Sage MD;  Location:  GI     CV HEART CATHETERIZATION WITH POSSIBLE INTERVENTION N/A 3/5/2019    Procedure: Heart Catheterization with possible Intervention;  Surgeon: Kirstin Lynn MD;  Location:  HEART CARDIAC CATH LAB     HC COLONOSCOPY W/WO BRUSH/WASH  06/17/2005     HC DILATION/CURETTAGE DIAG/THER NON OB  1986    after a miscarriage     HC REMOVAL OF TONSILS,<13 Y/O       HYSTERECTOMY TOTAL ABDOMINAL, SALPINGECTOMY Bilateral 9/12/2018    Procedure: HYSTERECTOMY TOTAL ABDOMINAL, SALPINGECTOMY;  Total Abdominal Hysterectomy, left Salpingectomy;  Surgeon: Temi Corado MD;  Location: [facility] OR     TUBAL LIGATION  08/04/2006              Allergies:   No known drug allergies       Data:   All laboratory data reviewed:    Recent Labs   Lab Test 03/05/19  0510 03/04/19  0000 10/25/18  1126 09/21/18  1722 09/21/18  1509  10/16/17  0755   LDL 40  --  69  --   --   --  33   HDL 47*  --  63  --   --   --  67   NHDL 70  --  88  --   --   --  50   CHOL 117  --  151  --   --   --  117   TRIG 151*  --  95  --   --   --  87   TSH  --  3.97  --  1.57 1.96   < >  --    NTBNP  --   --   --   --  963*  --   --     < > = values in this interval not displayed.       Lab Results   Component Value Date    WBC 9.0 03/14/2019    RBC 6.97 (H) 03/14/2019    HGB 17.5 (H) 03/14/2019    HCT 54.2 (H) 03/14/2019    MCV 78 03/14/2019    MCH 25.1 (L) 03/14/2019    MCHC 32.3 03/14/2019    RDW 19.4 (H) 03/14/2019     03/14/2019       Lab Results   Component Value Date     03/14/2019    POTASSIUM 4.3 03/14/2019    CHLORIDE 97 03/14/2019    CO2 27 03/14/2019    ANIONGAP 9 03/14/2019     (H) 03/14/2019    BUN 21 03/14/2019    CR 1.08 (H) 03/14/2019    GFRESTIMATED 60 (L) 03/14/2019    GFRESTBLACK 70 03/14/2019    MICHAELA 9.1 03/14/2019      Lab Results    Component Value Date    AST 12 03/14/2019    ALT 16 03/14/2019       Lab Results   Component Value Date    A1C 8.6 (H) 03/04/2019       Lab Results   Component Value Date    INR 1.12 03/05/2019    INR 1.1 09/21/2018    INR 2.3 (H) 04/11/2014    INR 3.4 (A) 11/25/2013    INR 3.1 (A) 11/22/2013    INR 1.04 10/25/2013         Thank you for allowing me to participate in the care of your patient.    Sincerely,     KRISTI MCLEAN [facility]

## 2019-03-27 NOTE — PROGRESS NOTES
Cardiology Clinic Progress Note  Lashawn Reddy MRN# [id number]   YOB: 1970 Age: 48 year old          Assessment and Plan:     1. Stress-induced cardiomyopathy    LVEF 40-45% with severe hypokinesia of the basal to mid segments of the LV with hypercontractility of the apical segment    No obstructive coronary artery disease on coronary angiogram, but LV gram showed mild apical anterior hypokinesis with EF 45%    Repeat echocardiogram from 3/25/2019 showed LVEF had normalized at 65-70% without wall motion abnormalities    Return to clinic in 1 month    Establish care with cardiologist in 3 months    2. Hypertension    Goal of systolic blood pressure less than 130    Did not tolerate lisinopril due to recurrent cough    Start losartan 25 mg daily    Return to clinic in 1 month with BMP    3. Venous insufficency with history of nonhealing ulcer on her right lower extremity in 2015    History of DVT after trauma (injury after falling off a ladder).    Venous competency study from 2015 showed incompetent deep and superficial venous symptoms.    Continues to wear compression stockings    We will discuss treatment options with Dr. Roth         History of Presenting Illness:    Lashawn Reddy is a very pleasant 48 year old patient who presents today for a discharge follow up. She has a past medical history notable for DVT after a trauma (completed 6 months of anticoagulation), chronic lymphedema, hypertension, previous tobacco use and type 2 diabetes.    She was admitted on 3/4/2019 to [facility] after presenting to [facility] emergency department for acute onset of shortness of breath on exertion. PE was ruled out and lower extremity venous studies were negative for DVT.  Initially, she was diagnosed with acute hypoxic respiratory failure with flash pulmonary edema and hypertensive emergency (initial blood pressure 210/130) requiring BiPAP.  IV nitroglycerin and heparin were both  "initiated, and she was diuresed with IV Lasix with a discharge weight of 118 kg.     A echocardiogram revealed an LVEF of 40-45% with severe hypokinesia of the basal to mid segments of the entire left ventricle with  hypercontractility of the apical segments.  The findings were suggestive of reverse Takotsubo cardiomyopathy.  A coronary angiogram was obtained that showed nonobstructive coronary artery disease.  The LV gram showed mild apical anterior hypokinesis with an LVEF of 45%.    She underwent a limited echocardiogram on 3/25/2019 that showed her LVEF improved to 65-70% without any wall motion abnormalities noted.    Today in clinic she reports that she is starting to feel back to her normal self.  She was able to complete a 25-minute walk on her lunch break without any significant shortness of breath on exertion.  She does however report that her home blood pressure will spike up to 160/90.  Due to a previous injury after a fall off a ladder she has right lower extremity venous insufficiency of both her deep and superficial symptoms as per reported on a competency study from 2015.  She continues to wear compression stockings daily and does have a history of a venous ulcer wound.               Review of Systems:   Review of Systems:  Skin:  Negative     Eyes:  Positive for glasses  ENT:  Negative    Respiratory:  Negative    Cardiovascular:  Negative for;palpitations;chest pain Positive for;edema;lightheadedness  Gastroenterology: Negative    Genitourinary:  Negative    Musculoskeletal:  Negative    Neurologic:  Negative    Psychiatric:  Positive for depression  Heme/Lymph/Imm:  Negative    Endocrine:  Positive for diabetes            Physical Exam:     Vitals: /86 (BP Location: Right arm, Patient Position: Fowlers, Cuff Size: Adult Large)   Pulse 80   Ht 1.676 m (5' 6\")   Wt 118.9 kg (262 lb 1.6 oz)   LMP 09/03/2018   SpO2 97%   BMI 42.30 kg/m    Constitutional:  cooperative overweight      Skin:  " warm and dry to the touch        Head:  normocephalic, no masses or lesions        Eyes:  pupils equal and round;conjunctivae and lids unremarkable        ENT:  dentition good        Neck:  no stiffness        Chest:  clear to auscultation;normal symmetry        Cardiac: regular rhythm;normal S1 and S2     no presence of murmur            Abdomen:    obese      Extremities and Back:      RLE scar from previous ulcer         Medications:     Current Outpatient Medications   Medication Sig Dispense Refill     aspirin (ASA) 81 MG EC tablet Take 1 tablet (81 mg) by mouth daily 30 tablet 0     carvedilol (COREG) 6.25 MG tablet Take 1 tablet (6.25 mg) by mouth 2 times daily 60 tablet 1     glipiZIDE (GLUCOTROL XL) 10 MG 24 hr tablet Take 10 mg by mouth daily       losartan (COZAAR) 25 MG tablet Take 1 tablet (25 mg) by mouth daily 30 tablet 3     omeprazole (PRILOSEC) 20 MG DR capsule Take 20 mg by mouth daily as needed       rosuvastatin (CRESTOR) 20 MG tablet Take 1 tablet (20 mg) by mouth daily 90 tablet 1     sertraline (ZOLOFT) 50 MG tablet TAKE 1/2 TABLET (25 MG) BY MOUTH EVERY MORNING (Patient taking differently: TAKE 1/2 TABLET (25 MG) BY MOUTH EVERY EVENING) 45 tablet 1     Vitamin D, Cholecalciferol, 1000 units TABS Take 4,000 Units by mouth daily       blood glucose monitoring (ACCU-CHEK FASTCLIX) lancets 1 each 2 times daily 102 each 3     blood glucose monitoring (ACCU-CHEK GUIDE) test strip Use to test blood sugar 2 times daily or as directed. 100 strip 11     lisinopril-hydrochlorothiazide (PRINZIDE/ZESTORETIC) 10-12.5 MG tablet Take 1 tablet by mouth daily (Patient not taking: Reported on 3/21/2019) 30 tablet 1     metFORMIN (GLUCOPHAGE-XR) 500 MG 24 hr tablet TAKE 4 TABLETS (2,000MG) BY MOUTH EVERY DAY WITH DINNER (Patient not taking: Reported on 3/21/2019) 360 tablet 0     nystatin-triamcinolone (MYCOLOG II) 996785-6.1 UNIT/GM-% external cream Apply topically daily as needed (rash or itching) (Patient  not taking: Reported on 3/21/2019) 60 g 3       Family History   Problem Relation Age of Onset     Anesthesia Reaction Mother         Severe nausea     Gastrointestinal Disease Mother         Partial colon removal secondary to a blockage     Gynecology Mother         hysterectomy     Lipids Mother      Lipids Father      Cancer - colorectal Father         dx Oct '03 (dx at age 57)     Cancer Father         skin     Hypertension Father      Cancer Maternal Grandmother         colon at age 68     Cerebrovascular Disease Paternal Grandfather      Cerebrovascular Disease Paternal Grandmother         brain aneurysm     Arthritis Sister         mainly affecting her legs     Gastrointestinal Disease Sister         2 sisters with colon polyps     Cancer Sister         cervical ca     Cancer Maternal Aunt         all over ? breast was the origin     Cancer Maternal Aunt         pancreatic     Heart Disease Maternal Aunt         MI 2010       Social History     Socioeconomic History     Marital status:      Spouse name: Tim     Number of children: 2     Years of education: 12     Highest education level: Not on file   Occupational History     Occupation: labor   Social Needs     Financial resource strain: Not on file     Food insecurity:     Worry: Not on file     Inability: Not on file     Transportation needs:     Medical: Not on file     Non-medical: Not on file   Tobacco Use     Smoking status: Former Smoker     Years: 16.00     Types: Cigarettes     Last attempt to quit: 9/29/2008     Years since quitting: 10.4     Smokeless tobacco: Never Used   Substance and Sexual Activity     Alcohol use: No     Alcohol/week: 0.0 oz     Comment: couple drinks per year     Drug use: No     Sexual activity: Yes     Partners: Male     Birth control/protection: Surgical     Comment: tubal ligation   Lifestyle     Physical activity:     Days per week: Not on file     Minutes per session: Not on file     Stress: Not on file    Relationships     Social connections:     Talks on phone: Not on file     Gets together: Not on file     Attends Buddhism service: Not on file     Active member of club or organization: Not on file     Attends meetings of clubs or organizations: Not on file     Relationship status: Not on file     Intimate partner violence:     Fear of current or ex partner: Not on file     Emotionally abused: Not on file     Physically abused: Not on file     Forced sexual activity: Not on file   Other Topics Concern      Service No     Blood Transfusions No     Caffeine Concern No     Occupational Exposure No     Hobby Hazards No     Sleep Concern No     Stress Concern Yes     Comment: stress at home at work     Weight Concern Yes     Comment: has continued to gain weight     Special Diet Yes     Comment: trying to eat better     Back Care No     Exercise No     Comment: nothing in the past 3 months     Bike Helmet No     Comment: NA     Seat Belt Yes     Self-Exams Yes     Comment: sometimes     Parent/sibling w/ CABG, MI or angioplasty before 65F 55M? Not Asked   Social History Narrative     Not on file            Past Medical History:     Past Medical History:   Diagnosis Date     Depressive disorder, not elsewhere classified     depression in the post partum period after her daughter     Diabetes mellitus, type 2 (H) 6/6/2013     Diffuse cystic mastopathy     fibrocystic breast disease     Tobacco use disorder     quit in 2008     Type 2 diabetes, HbA1c goal < 7% (H) 6/6/2013              Past Surgical History:     Past Surgical History:   Procedure Laterality Date     C ANESTH,LOWER LEG VEIN SURG,NOS  2002    bilateral     C APPENDECTOMY  04/14/89     COLONOSCOPY  6/21/2013    Procedure: COLONOSCOPY;  colonoscopy;  Surgeon: Vamshi Loomis MD;  Location:  GI     COLONOSCOPY N/A 2/22/2017    Procedure: COLONOSCOPY;  Surgeon: Raoul Sage MD;  Location:  GI     CV HEART CATHETERIZATION WITH POSSIBLE  INTERVENTION N/A 3/5/2019    Procedure: Heart Catheterization with possible Intervention;  Surgeon: Kirstin Lynn MD;  Location:  HEART CARDIAC CATH LAB     HC COLONOSCOPY W/WO BRUSH/WASH  06/17/2005     HC DILATION/CURETTAGE DIAG/THER NON OB  1986    after a miscarriage     HC REMOVAL OF TONSILS,<11 Y/O       HYSTERECTOMY TOTAL ABDOMINAL, SALPINGECTOMY Bilateral 9/12/2018    Procedure: HYSTERECTOMY TOTAL ABDOMINAL, SALPINGECTOMY;  Total Abdominal Hysterectomy, left Salpingectomy;  Surgeon: Temi Corado MD;  Location:  OR     TUBAL LIGATION  08/04/2006              Allergies:   No known drug allergies       Data:   All laboratory data reviewed:    Recent Labs   Lab Test 03/05/19  0510 03/04/19  0000 10/25/18  1126 09/21/18  1722 09/21/18  1509  10/16/17  0755   LDL 40  --  69  --   --   --  33   HDL 47*  --  63  --   --   --  67   NHDL 70  --  88  --   --   --  50   CHOL 117  --  151  --   --   --  117   TRIG 151*  --  95  --   --   --  87   TSH  --  3.97  --  1.57 1.96   < >  --    NTBNP  --   --   --   --  963*  --   --     < > = values in this interval not displayed.       Lab Results   Component Value Date    WBC 9.0 03/14/2019    RBC 6.97 (H) 03/14/2019    HGB 17.5 (H) 03/14/2019    HCT 54.2 (H) 03/14/2019    MCV 78 03/14/2019    MCH 25.1 (L) 03/14/2019    MCHC 32.3 03/14/2019    RDW 19.4 (H) 03/14/2019     03/14/2019       Lab Results   Component Value Date     03/14/2019    POTASSIUM 4.3 03/14/2019    CHLORIDE 97 03/14/2019    CO2 27 03/14/2019    ANIONGAP 9 03/14/2019     (H) 03/14/2019    BUN 21 03/14/2019    CR 1.08 (H) 03/14/2019    GFRESTIMATED 60 (L) 03/14/2019    GFRESTBLACK 70 03/14/2019    MICHAELA 9.1 03/14/2019      Lab Results   Component Value Date    AST 12 03/14/2019    ALT 16 03/14/2019       Lab Results   Component Value Date    A1C 8.6 (H) 03/04/2019       Lab Results   Component Value Date    INR 1.12 03/05/2019    INR 1.1 09/21/2018    INR 2.3 (H)  04/11/2014    INR 3.4 (A) 11/25/2013    INR 3.1 (A) 11/22/2013    INR 1.04 10/25/2013         KRISTI MCLEAN, CNP  [facility]

## 2019-03-27 NOTE — PATIENT INSTRUCTIONS
TODAY'S RECOMMENDATIONS    1. Follow up in 3 months with cardiologist    2. Continue carvedilol     3. Goal is keep BP <130/80.    4. Start Losartan 25 mg daily    5. Follow up in 1 month and bring BP cuff and home BP log.    If you have questions or concerns please call clinic at [phone].    Please call [phone] for scheduling.      It was a pleasure seeing you today!

## 2019-04-15 DIAGNOSIS — I51.81 STRESS-INDUCED CARDIOMYOPATHY: ICD-10-CM

## 2019-04-23 ENCOUNTER — OFFICE VISIT (OUTPATIENT)
Dept: CARDIOLOGY | Facility: CLINIC | Age: 49
End: 2019-04-23
Payer: COMMERCIAL

## 2019-04-23 VITALS
OXYGEN SATURATION: 96 % | HEIGHT: 66 IN | BODY MASS INDEX: 42.3 KG/M2 | WEIGHT: 263.2 LBS | DIASTOLIC BLOOD PRESSURE: 90 MMHG | HEART RATE: 88 BPM | SYSTOLIC BLOOD PRESSURE: 144 MMHG

## 2019-04-23 DIAGNOSIS — I51.81 STRESS-INDUCED CARDIOMYOPATHY: ICD-10-CM

## 2019-04-23 DIAGNOSIS — E78.2 MIXED HYPERLIPIDEMIA: ICD-10-CM

## 2019-04-23 DIAGNOSIS — E11.65 TYPE 2 DIABETES MELLITUS WITH HYPERGLYCEMIA, WITHOUT LONG-TERM CURRENT USE OF INSULIN (H): ICD-10-CM

## 2019-04-23 DIAGNOSIS — I10 ESSENTIAL HYPERTENSION: ICD-10-CM

## 2019-04-23 LAB
ANION GAP SERPL CALCULATED.3IONS-SCNC: 6 MMOL/L (ref 3–14)
BUN SERPL-MCNC: 11 MG/DL (ref 7–30)
CALCIUM SERPL-MCNC: 8.4 MG/DL (ref 8.5–10.1)
CHLORIDE SERPL-SCNC: 109 MMOL/L (ref 94–109)
CO2 SERPL-SCNC: 25 MMOL/L (ref 20–32)
CREAT SERPL-MCNC: 0.75 MG/DL (ref 0.52–1.04)
GFR SERPL CREATININE-BSD FRML MDRD: >90 ML/MIN/{1.73_M2}
GLUCOSE SERPL-MCNC: 186 MG/DL (ref 70–99)
POTASSIUM SERPL-SCNC: 3.8 MMOL/L (ref 3.4–5.3)
SODIUM SERPL-SCNC: 140 MMOL/L (ref 133–144)

## 2019-04-23 PROCEDURE — 80048 BASIC METABOLIC PNL TOTAL CA: CPT | Performed by: NURSE PRACTITIONER

## 2019-04-23 PROCEDURE — 36415 COLL VENOUS BLD VENIPUNCTURE: CPT | Performed by: NURSE PRACTITIONER

## 2019-04-23 PROCEDURE — 99214 OFFICE O/P EST MOD 30 MIN: CPT | Performed by: NURSE PRACTITIONER

## 2019-04-23 RX ORDER — CARVEDILOL 12.5 MG/1
12.5 TABLET ORAL 2 TIMES DAILY
Qty: 180 TABLET | Refills: 3 | Status: SHIPPED | OUTPATIENT
Start: 2019-04-23 | End: 2019-06-27

## 2019-04-23 RX ORDER — ROSUVASTATIN CALCIUM 20 MG/1
20 TABLET, COATED ORAL DAILY
Qty: 90 TABLET | Refills: 3 | Status: SHIPPED | OUTPATIENT
Start: 2019-04-23 | End: 2020-03-03

## 2019-04-23 RX ORDER — LOSARTAN POTASSIUM 50 MG/1
50 TABLET ORAL DAILY
Qty: 90 TABLET | Refills: 3 | Status: SHIPPED | OUTPATIENT
Start: 2019-04-23 | End: 2020-03-06

## 2019-04-23 ASSESSMENT — MIFFLIN-ST. JEOR: SCORE: 1835.62

## 2019-04-23 NOTE — PROGRESS NOTES
Cardiology Clinic Progress Note  Lashawn Reddy MRN# [id number]   YOB: 1970 Age: 48 year old          Assessment and Plan:     1. Stress-induced cardiomyopathy    LVEF 40-45% with severe hypokinesia of the basal to mid segments of the LV with hypercontractility of the apical segment    No obstructive coronary artery disease on coronary angiogram, but LV gram showed mild apical anterior hypokinesis with EF 45%    Repeat echocardiogram from 3/25/2019 showed LVEF had normalized at 65-70% without wall motion abnormalities    Follow up as scheduled with Dr. Blood in June    2. Hypertension    Goal of systolic blood pressure less than 130    Did not tolerate lisinopril due to recurrent cough    Transitioned to Losartan 25 mg daily, but BP remains elevated    Increase Losartan to 50 mg daily and carvedilol to 12.5 mg BID    3. Venous insufficency with history of nonhealing ulcer on her right lower extremity in 2015    History of DVT after trauma (injury after falling off a ladder).    Venous competency study from 2015 showed incompetent deep and superficial venous symptoms.    Continues to wear compression stockings    Discussed with Dr. Roth and a right GSV ablation plus sclerotherapy may help with swelling, but not completely resolve  Symptoms.    The patient will consider venous ablation and will alert our [facility] pain clinic if he would like to proceed.         History of Presenting Illness:    Lashawn Reddy is a very pleasant 48 year old patient who presents today for a 1 month follow up. She has a past medical history notable for DVT after a trauma (completed 6 months of anticoagulation), chronic lymphedema, hypertension, previous tobacco use, type 2 diabetes and stress cardiomyopathy.    She was admitted on 3/4/2019 to [facility] after presenting to [facility] emergency department for acute onset of shortness of breath on exertion. PE was ruled out and lower extremity  venous studies were negative for DVT.  Initially, she was diagnosed with acute hypoxic respiratory failure with flash pulmonary edema and hypertensive emergency (initial blood pressure 210/130) requiring BiPAP.  IV nitroglycerin and heparin were both initiated, and she was diuresed with IV Lasix with a discharge weight of 118 kg.     A echocardiogram revealed an LVEF of 40-45% with severe hypokinesia of the basal to mid segments of the entire left ventricle with  hypercontractility of the apical segments.  The findings were suggestive of reverse Takotsubo cardiomyopathy.  A coronary angiogram was obtained that showed nonobstructive coronary artery disease.  The LV gram showed mild apical anterior hypokinesis with an LVEF of 45%. She underwent a limited echocardiogram on 3/25/2019 that showed her LVEF improved to 65-70% without any wall motion abnormalities noted.    At our visit last month she noted that she did not tolerate lisinopril due to cough.  Her home blood pressure was also spiking up to 160/90.  She was transitioned to losartan with resolution of the cough but her blood pressure here and at home remains elevated again reaching 160.  Discussed with Dr. Roth her vascular specialist regarding her previous venous competency study from 2015 noting both deep and superficial reflux.  He felt that a radio frequency ablation of her great saphenous vein with sclerotherapy may aid in her swelling, but likely not completely resolve it due to the deep system insufficiency.                Review of Systems:   Review of Systems:  Skin:  Negative     Eyes:  Positive for glasses  ENT:  Negative    Respiratory:  Negative    Cardiovascular:  Negative for;palpitations;chest pain;dizziness;lightheadedness edema;Positive for  Gastroenterology: Negative    Genitourinary:  Negative    Musculoskeletal:  Negative    Neurologic:  Negative    Psychiatric:  Positive for depression  Heme/Lymph/Imm:  Negative    Endocrine:  Positive for  "diabetes            Physical Exam:     Vitals: /90 (BP Location: Right arm, Patient Position: Fowlers, Cuff Size: Adult Large)   Pulse 88   Ht 1.676 m (5' 6\")   Wt 119.4 kg (263 lb 3.2 oz)   LMP 09/03/2018   SpO2 96%   BMI 42.48 kg/m    Constitutional:  cooperative overweight      Skin:  warm and dry to the touch        Head:  normocephalic, no masses or lesions        Eyes:  pupils equal and round;conjunctivae and lids unremarkable        ENT:  dentition good        Neck:  no stiffness        Chest:  clear to auscultation;normal symmetry        Cardiac: regular rhythm;normal S1 and S2     no presence of murmur            Abdomen:    obese      Extremities and Back:      RLE scar from previous ulcer         Medications:     Current Outpatient Medications   Medication Sig Dispense Refill     aspirin (ASA) 81 MG EC tablet Take 1 tablet (81 mg) by mouth daily 90 tablet 3     carvedilol (COREG) 12.5 MG tablet Take 1 tablet (12.5 mg) by mouth 2 times daily 180 tablet 3     glipiZIDE (GLUCOTROL XL) 10 MG 24 hr tablet Take 10 mg by mouth daily       losartan (COZAAR) 50 MG tablet Take 1 tablet (50 mg) by mouth daily 90 tablet 3     omeprazole (PRILOSEC) 20 MG DR capsule Take 20 mg by mouth daily as needed       rosuvastatin (CRESTOR) 20 MG tablet Take 1 tablet (20 mg) by mouth daily 90 tablet 3     sertraline (ZOLOFT) 50 MG tablet TAKE 1/2 TABLET (25 MG) BY MOUTH EVERY MORNING (Patient taking differently: TAKE 1/2 TABLET (25 MG) BY MOUTH EVERY EVENING) 45 tablet 1     Vitamin D, Cholecalciferol, 1000 units TABS Take 4,000 Units by mouth daily       blood glucose monitoring (ACCU-CHEK FASTCLIX) lancets 1 each 2 times daily 102 each 3     blood glucose monitoring (ACCU-CHEK GUIDE) test strip Use to test blood sugar 2 times daily or as directed. 100 strip 11     metFORMIN (GLUCOPHAGE-XR) 500 MG 24 hr tablet TAKE 4 TABLETS (2,000MG) BY MOUTH EVERY DAY WITH DINNER (Patient not taking: Reported on 3/21/2019) 360 " tablet 0     nystatin-triamcinolone (MYCOLOG II) 690245-2.1 UNIT/GM-% external cream Apply topically daily as needed (rash or itching) (Patient not taking: Reported on 3/21/2019) 60 g 3       Family History   Problem Relation Age of Onset     Anesthesia Reaction Mother         Severe nausea     Gastrointestinal Disease Mother         Partial colon removal secondary to a blockage     Gynecology Mother         hysterectomy     Lipids Mother      Lipids Father      Cancer - colorectal Father         dx Oct '03 (dx at age 57)     Cancer Father         skin     Hypertension Father      Cancer Maternal Grandmother         colon at age 68     Cerebrovascular Disease Paternal Grandfather      Cerebrovascular Disease Paternal Grandmother         brain aneurysm     Arthritis Sister         mainly affecting her legs     Gastrointestinal Disease Sister         2 sisters with colon polyps     Cancer Sister         cervical ca     Cancer Maternal Aunt         all over ? breast was the origin     Cancer Maternal Aunt         pancreatic     Heart Disease Maternal Aunt         MI 2010       Social History     Socioeconomic History     Marital status:      Spouse name: Tim     Number of children: 2     Years of education: 12     Highest education level: Not on file   Occupational History     Occupation: labor   Social Needs     Financial resource strain: Not on file     Food insecurity:     Worry: Not on file     Inability: Not on file     Transportation needs:     Medical: Not on file     Non-medical: Not on file   Tobacco Use     Smoking status: Former Smoker     Years: 16.00     Types: Cigarettes     Last attempt to quit: 9/29/2008     Years since quitting: 10.5     Smokeless tobacco: Never Used   Substance and Sexual Activity     Alcohol use: No     Alcohol/week: 0.0 oz     Comment: couple drinks per year     Drug use: No     Sexual activity: Yes     Partners: Male     Birth control/protection: Surgical     Comment:  tubal ligation   Lifestyle     Physical activity:     Days per week: Not on file     Minutes per session: Not on file     Stress: Not on file   Relationships     Social connections:     Talks on phone: Not on file     Gets together: Not on file     Attends Yarsani service: Not on file     Active member of club or organization: Not on file     Attends meetings of clubs or organizations: Not on file     Relationship status: Not on file     Intimate partner violence:     Fear of current or ex partner: Not on file     Emotionally abused: Not on file     Physically abused: Not on file     Forced sexual activity: Not on file   Other Topics Concern      Service No     Blood Transfusions No     Caffeine Concern No     Occupational Exposure No     Hobby Hazards No     Sleep Concern No     Stress Concern Yes     Comment: stress at home at work     Weight Concern Yes     Comment: has continued to gain weight     Special Diet Yes     Comment: trying to eat better     Back Care No     Exercise No     Comment: nothing in the past 3 months     Bike Helmet No     Comment: NA     Seat Belt Yes     Self-Exams Yes     Comment: sometimes     Parent/sibling w/ CABG, MI or angioplasty before 65F 55M? Not Asked   Social History Narrative     Not on file            Past Medical History:     Past Medical History:   Diagnosis Date     Depressive disorder, not elsewhere classified     depression in the post partum period after her daughter     Diabetes mellitus, type 2 (H) 6/6/2013     Diffuse cystic mastopathy     fibrocystic breast disease     Tobacco use disorder     quit in 2008     Type 2 diabetes, HbA1c goal < 7% (H) 6/6/2013              Past Surgical History:     Past Surgical History:   Procedure Laterality Date     C ANESTH,LOWER LEG VEIN SURG,NOS  2002    bilateral     C APPENDECTOMY  04/14/89     COLONOSCOPY  6/21/2013    Procedure: COLONOSCOPY;  colonoscopy;  Surgeon: Vamshi Loomis MD;  Location:  GI     COLONOSCOPY  N/A 2/22/2017    Procedure: COLONOSCOPY;  Surgeon: Raoul Sage MD;  Location:  GI     CV HEART CATHETERIZATION WITH POSSIBLE INTERVENTION N/A 3/5/2019    Procedure: Heart Catheterization with possible Intervention;  Surgeon: Kirstin Lynn MD;  Location:  HEART CARDIAC CATH LAB     HC COLONOSCOPY W/WO BRUSH/WASH  06/17/2005     HC DILATION/CURETTAGE DIAG/THER NON OB  1986    after a miscarriage     HC REMOVAL OF TONSILS,<12 Y/O       HYSTERECTOMY TOTAL ABDOMINAL, SALPINGECTOMY Bilateral 9/12/2018    Procedure: HYSTERECTOMY TOTAL ABDOMINAL, SALPINGECTOMY;  Total Abdominal Hysterectomy, left Salpingectomy;  Surgeon: Temi Corado MD;  Location: [facility] OR     TUBAL LIGATION  08/04/2006              Allergies:   No known drug allergies       Data:   All laboratory data reviewed:    Recent Labs   Lab Test 03/05/19  0510 03/04/19  0000 10/25/18  1126 09/21/18  1722 09/21/18  1509  10/16/17  0755   LDL 40  --  69  --   --   --  33   HDL 47*  --  63  --   --   --  67   NHDL 70  --  88  --   --   --  50   CHOL 117  --  151  --   --   --  117   TRIG 151*  --  95  --   --   --  87   TSH  --  3.97  --  1.57 1.96   < >  --    NTBNP  --   --   --   --  963*  --   --     < > = values in this interval not displayed.       Lab Results   Component Value Date    WBC 9.0 03/14/2019    RBC 6.97 (H) 03/14/2019    HGB 17.5 (H) 03/14/2019    HCT 54.2 (H) 03/14/2019    MCV 78 03/14/2019    MCH 25.1 (L) 03/14/2019    MCHC 32.3 03/14/2019    RDW 19.4 (H) 03/14/2019     03/14/2019       Lab Results   Component Value Date     04/23/2019    POTASSIUM 3.8 04/23/2019    CHLORIDE 109 04/23/2019    CO2 25 04/23/2019    ANIONGAP 6 04/23/2019     (H) 04/23/2019    BUN 11 04/23/2019    CR 0.75 04/23/2019    GFRESTIMATED >90 04/23/2019    GFRESTBLACK >90 04/23/2019    MICHAELA 8.4 (L) 04/23/2019      Lab Results   Component Value Date    AST 12 03/14/2019    ALT 16 03/14/2019       Lab Results   Component Value  Date    A1C 8.6 (H) 03/04/2019       Lab Results   Component Value Date    INR 1.12 03/05/2019    INR 1.1 09/21/2018    INR 2.3 (H) 04/11/2014    INR 3.4 (A) 11/25/2013    INR 3.1 (A) 11/22/2013    INR 1.04 10/25/2013         REY GARRISON, KRISTI, CNP  [facility]

## 2019-04-23 NOTE — PATIENT INSTRUCTIONS
TODAY'S RECOMMENDATIONS    1. Increase losartan to 50 mg daily (can take two 25 mg tablets until gone)    2. Increase carvedilol to 12.5 mg twice a day (can take two 6.25 mg tablets until gone)    3. Watch for symptoms of low blood pressure (lightheaded or dizziness). Monitor BP at home and call if symptoms or BP <110/60    4. Consider right leg vein ablation. If would like to proceed, call [phone]    5. Follow up with Dr. Blood in 2 months to establish care and BP follow up    If you have questions or concerns please call clinic at [phone].    Please call [phone] for scheduling.      It was a pleasure seeing you today!

## 2019-04-23 NOTE — LETTER
4/23/2019    Raoul Sherman MD  [address] [place] [address]    RE: Lashawn Reddy       Dear Colleague,    I had the pleasure of seeing Lashawn Reddy in the [facility].    Cardiology Clinic Progress Note  Lashawn Reddy MRN# [id number]   YOB: 1970 Age: 48 year old          Assessment and Plan:     1. Stress-induced cardiomyopathy    LVEF 40-45% with severe hypokinesia of the basal to mid segments of the LV with hypercontractility of the apical segment    No obstructive coronary artery disease on coronary angiogram, but LV gram showed mild apical anterior hypokinesis with EF 45%    Repeat echocardiogram from 3/25/2019 showed LVEF had normalized at 65-70% without wall motion abnormalities    Follow up as scheduled with Dr. Blood in June    2. Hypertension    Goal of systolic blood pressure less than 130    Did not tolerate lisinopril due to recurrent cough    Transitioned to Losartan 25 mg daily, but BP remains elevated    Increase Losartan to 50 mg daily and carvedilol to 12.5 mg BID    3. Venous insufficency with history of nonhealing ulcer on her right lower extremity in 2015    History of DVT after trauma (injury after falling off a ladder).    Venous competency study from 2015 showed incompetent deep and superficial venous symptoms.    Continues to wear compression stockings    Discussed with Dr. Roth and a right GSV ablation plus sclerotherapy may help with swelling, but not completely resolve  Symptoms.    The patient will consider venous ablation and will alert our [facility] pain clinic if he would like to proceed.         History of Presenting Illness:    Lashawn Reddy is a very pleasant 48 year old patient who presents today for a 1 month follow up. She has a past medical history notable for DVT after a trauma (completed 6 months of anticoagulation), chronic lymphedema, hypertension, previous tobacco use, type 2 diabetes and stress  cardiomyopathy.    She was admitted on 3/4/2019 to [facility] after presenting to [facility] emergency department for acute onset of shortness of breath on exertion. PE was ruled out and lower extremity venous studies were negative for DVT.  Initially, she was diagnosed with acute hypoxic respiratory failure with flash pulmonary edema and hypertensive emergency (initial blood pressure 210/130) requiring BiPAP.  IV nitroglycerin and heparin were both initiated, and she was diuresed with IV Lasix with a discharge weight of 118 kg.     A echocardiogram revealed an LVEF of 40-45% with severe hypokinesia of the basal to mid segments of the entire left ventricle with  hypercontractility of the apical segments.  The findings were suggestive of reverse Takotsubo cardiomyopathy.  A coronary angiogram was obtained that showed nonobstructive coronary artery disease.  The LV gram showed mild apical anterior hypokinesis with an LVEF of 45%. She underwent a limited echocardiogram on 3/25/2019 that showed her LVEF improved to 65-70% without any wall motion abnormalities noted.    At our visit last month she noted that she did not tolerate lisinopril due to cough.  Her home blood pressure was also spiking up to 160/90.  She was transitioned to losartan with resolution of the cough but her blood pressure here and at home remains elevated again reaching 160.  Discussed with Dr. Roth her vascular specialist regarding her previous venous competency study from 2015 noting both deep and superficial reflux.  He felt that a radio frequency ablation of her great saphenous vein with sclerotherapy may aid in her swelling, but likely not completely resolve it due to the deep system insufficiency.                Review of Systems:   Review of Systems:  Skin:  Negative     Eyes:  Positive for glasses  ENT:  Negative    Respiratory:  Negative    Cardiovascular:  Negative for;palpitations;chest  "pain;dizziness;lightheadedness edema;Positive for  Gastroenterology: Negative    Genitourinary:  Negative    Musculoskeletal:  Negative    Neurologic:  Negative    Psychiatric:  Positive for depression  Heme/Lymph/Imm:  Negative    Endocrine:  Positive for diabetes            Physical Exam:     Vitals: /90 (BP Location: Right arm, Patient Position: Fowlers, Cuff Size: Adult Large)   Pulse 88   Ht 1.676 m (5' 6\")   Wt 119.4 kg (263 lb 3.2 oz)   LMP 09/03/2018   SpO2 96%   BMI 42.48 kg/m     Constitutional:  cooperative overweight      Skin:  warm and dry to the touch        Head:  normocephalic, no masses or lesions        Eyes:  pupils equal and round;conjunctivae and lids unremarkable        ENT:  dentition good        Neck:  no stiffness        Chest:  clear to auscultation;normal symmetry        Cardiac: regular rhythm;normal S1 and S2     no presence of murmur            Abdomen:    obese      Extremities and Back:      RLE scar from previous ulcer         Medications:     Current Outpatient Medications   Medication Sig Dispense Refill     aspirin (ASA) 81 MG EC tablet Take 1 tablet (81 mg) by mouth daily 90 tablet 3     carvedilol (COREG) 12.5 MG tablet Take 1 tablet (12.5 mg) by mouth 2 times daily 180 tablet 3     glipiZIDE (GLUCOTROL XL) 10 MG 24 hr tablet Take 10 mg by mouth daily       losartan (COZAAR) 50 MG tablet Take 1 tablet (50 mg) by mouth daily 90 tablet 3     omeprazole (PRILOSEC) 20 MG DR capsule Take 20 mg by mouth daily as needed       rosuvastatin (CRESTOR) 20 MG tablet Take 1 tablet (20 mg) by mouth daily 90 tablet 3     sertraline (ZOLOFT) 50 MG tablet TAKE 1/2 TABLET (25 MG) BY MOUTH EVERY MORNING (Patient taking differently: TAKE 1/2 TABLET (25 MG) BY MOUTH EVERY EVENING) 45 tablet 1     Vitamin D, Cholecalciferol, 1000 units TABS Take 4,000 Units by mouth daily       blood glucose monitoring (ACCU-CHEK FASTCLIX) lancets 1 each 2 times daily 102 each 3     blood glucose " monitoring (ACCU-CHEK GUIDE) test strip Use to test blood sugar 2 times daily or as directed. 100 strip 11     metFORMIN (GLUCOPHAGE-XR) 500 MG 24 hr tablet TAKE 4 TABLETS (2,000MG) BY MOUTH EVERY DAY WITH DINNER (Patient not taking: Reported on 3/21/2019) 360 tablet 0     nystatin-triamcinolone (MYCOLOG II) 285820-5.1 UNIT/GM-% external cream Apply topically daily as needed (rash or itching) (Patient not taking: Reported on 3/21/2019) 60 g 3       Family History   Problem Relation Age of Onset     Anesthesia Reaction Mother         Severe nausea     Gastrointestinal Disease Mother         Partial colon removal secondary to a blockage     Gynecology Mother         hysterectomy     Lipids Mother      Lipids Father      Cancer - colorectal Father         dx Oct '03 (dx at age 57)     Cancer Father         skin     Hypertension Father      Cancer Maternal Grandmother         colon at age 68     Cerebrovascular Disease Paternal Grandfather      Cerebrovascular Disease Paternal Grandmother         brain aneurysm     Arthritis Sister         mainly affecting her legs     Gastrointestinal Disease Sister         2 sisters with colon polyps     Cancer Sister         cervical ca     Cancer Maternal Aunt         all over ? breast was the origin     Cancer Maternal Aunt         pancreatic     Heart Disease Maternal Aunt         MI 2010       Social History     Socioeconomic History     Marital status:      Spouse name: Tim     Number of children: 2     Years of education: 12     Highest education level: Not on file   Occupational History     Occupation: labor   Social Needs     Financial resource strain: Not on file     Food insecurity:     Worry: Not on file     Inability: Not on file     Transportation needs:     Medical: Not on file     Non-medical: Not on file   Tobacco Use     Smoking status: Former Smoker     Years: 16.00     Types: Cigarettes     Last attempt to quit: 9/29/2008     Years since quitting: 10.5      Smokeless tobacco: Never Used   Substance and Sexual Activity     Alcohol use: No     Alcohol/week: 0.0 oz     Comment: couple drinks per year     Drug use: No     Sexual activity: Yes     Partners: Male     Birth control/protection: Surgical     Comment: tubal ligation   Lifestyle     Physical activity:     Days per week: Not on file     Minutes per session: Not on file     Stress: Not on file   Relationships     Social connections:     Talks on phone: Not on file     Gets together: Not on file     Attends Faith service: Not on file     Active member of club or organization: Not on file     Attends meetings of clubs or organizations: Not on file     Relationship status: Not on file     Intimate partner violence:     Fear of current or ex partner: Not on file     Emotionally abused: Not on file     Physically abused: Not on file     Forced sexual activity: Not on file   Other Topics Concern      Service No     Blood Transfusions No     Caffeine Concern No     Occupational Exposure No     Hobby Hazards No     Sleep Concern No     Stress Concern Yes     Comment: stress at home at work     Weight Concern Yes     Comment: has continued to gain weight     Special Diet Yes     Comment: trying to eat better     Back Care No     Exercise No     Comment: nothing in the past 3 months     Bike Helmet No     Comment: NA     Seat Belt Yes     Self-Exams Yes     Comment: sometimes     Parent/sibling w/ CABG, MI or angioplasty before 65F 55M? Not Asked   Social History Narrative     Not on file            Past Medical History:     Past Medical History:   Diagnosis Date     Depressive disorder, not elsewhere classified     depression in the post partum period after her daughter     Diabetes mellitus, type 2 (H) 6/6/2013     Diffuse cystic mastopathy     fibrocystic breast disease     Tobacco use disorder     quit in 2008     Type 2 diabetes, HbA1c goal < 7% (H) 6/6/2013              Past Surgical History:     Past  Surgical History:   Procedure Laterality Date     C ANESTH,LOWER LEG VEIN SURG,NOS  2002    bilateral     C APPENDECTOMY  04/14/89     COLONOSCOPY  6/21/2013    Procedure: COLONOSCOPY;  colonoscopy;  Surgeon: Vamshi Loomis MD;  Location:  GI     COLONOSCOPY N/A 2/22/2017    Procedure: COLONOSCOPY;  Surgeon: Raoul Sage MD;  Location:  GI     CV HEART CATHETERIZATION WITH POSSIBLE INTERVENTION N/A 3/5/2019    Procedure: Heart Catheterization with possible Intervention;  Surgeon: Kirstin Lynn MD;  Location:  HEART CARDIAC CATH LAB     HC COLONOSCOPY W/WO BRUSH/WASH  06/17/2005     HC DILATION/CURETTAGE DIAG/THER NON OB  1986    after a miscarriage     HC REMOVAL OF TONSILS,<11 Y/O       HYSTERECTOMY TOTAL ABDOMINAL, SALPINGECTOMY Bilateral 9/12/2018    Procedure: HYSTERECTOMY TOTAL ABDOMINAL, SALPINGECTOMY;  Total Abdominal Hysterectomy, left Salpingectomy;  Surgeon: Temi Corado MD;  Location: [facility] OR     TUBAL LIGATION  08/04/2006              Allergies:   No known drug allergies       Data:   All laboratory data reviewed:    Recent Labs   Lab Test 03/05/19  0510 03/04/19  0000 10/25/18  1126 09/21/18  1722 09/21/18  1509  10/16/17  0755   LDL 40  --  69  --   --   --  33   HDL 47*  --  63  --   --   --  67   NHDL 70  --  88  --   --   --  50   CHOL 117  --  151  --   --   --  117   TRIG 151*  --  95  --   --   --  87   TSH  --  3.97  --  1.57 1.96   < >  --    NTBNP  --   --   --   --  963*  --   --     < > = values in this interval not displayed.       Lab Results   Component Value Date    WBC 9.0 03/14/2019    RBC 6.97 (H) 03/14/2019    HGB 17.5 (H) 03/14/2019    HCT 54.2 (H) 03/14/2019    MCV 78 03/14/2019    MCH 25.1 (L) 03/14/2019    MCHC 32.3 03/14/2019    RDW 19.4 (H) 03/14/2019     03/14/2019       Lab Results   Component Value Date     04/23/2019    POTASSIUM 3.8 04/23/2019    CHLORIDE 109 04/23/2019    CO2 25 04/23/2019    ANIONGAP 6 04/23/2019     (H)  04/23/2019    BUN 11 04/23/2019    CR 0.75 04/23/2019    GFRESTIMATED >90 04/23/2019    GFRESTBLACK >90 04/23/2019    MICHAELA 8.4 (L) 04/23/2019      Lab Results   Component Value Date    AST 12 03/14/2019    ALT 16 03/14/2019       Lab Results   Component Value Date    A1C 8.6 (H) 03/04/2019       Lab Results   Component Value Date    INR 1.12 03/05/2019    INR 1.1 09/21/2018    INR 2.3 (H) 04/11/2014    INR 3.4 (A) 11/25/2013    INR 3.1 (A) 11/22/2013    INR 1.04 10/25/2013         KRISTI MCLEAN, CNP  [facility]    Thank you for allowing me to participate in the care of your patient.      Sincerely,     KRISTI MCLEAN CNP     [facility]

## 2019-04-25 ENCOUNTER — PATIENT OUTREACH (OUTPATIENT)
Dept: CARE COORDINATION | Facility: CLINIC | Age: 49
End: 2019-04-25

## 2019-04-25 NOTE — PROGRESS NOTES
Clinic Care Coordination Contact    Clinic Care Coordination Contact  OUTREACH    Referral Information:  Referral Source: IP Handoff    Primary Diagnosis: Cardiovascular - other    Chief Complaint   Patient presents with     Clinic Care Coordination - Follow-up     RN follow up      Universal Utilization:  Clinic Utilization  Difficulty keeping appointments:: No  Compliance Concerns: No  No-Show Concerns: No  No PCP office visit in Past Year: No  Utilization    Last refreshed: 5/7/2019  2:45 PM:  Hospital Admissions 3           Last refreshed: 5/7/2019  2:45 PM:  ED Visits 3           Last refreshed: 5/7/2019  2:45 PM:  No Show Count (past year) 1              Current as of: 5/7/2019  2:45 PM            Clinical Concerns:  Current Medical Concerns:  Called and spoke with pt, States she is doing pretty good since we talked last. States she did have some medications's adjusted by cardiology and they seem to be making her very tired.  She does have a follow up appt with Dr. Jaramillo to review her medications. Pt also states the cardiologist said she did not need to go to Cardiac Rehab as initially suggested. Pt also states she has been monitoring her blood sugars and doing some adjustments to get them down.  At this time pt states she does not need any additional resources.    Current Behavioral Concerns: No current concerns    Education Provided to patient: call with any questions or concerns   Pain  Pain (GOAL):: No  Health Maintenance Reviewed:    Clinical Pathway: None    Medication Management:  Self managment     Functional Status:  Dependent ADLs:: Independent  Dependent IADLs:: Independent  Bed or wheelchair confined:: No  Mobility Status: Independent    Living Situation:  Current living arrangement:: I live in a private home with family  Type of residence:: Private home - stairs    Diet/Exercise/Sleep:  Diet:: Diabetic diet  Inadequate nutrition (GOAL):: No  Food Insecurity: No  Tube Feeding: No  Exercise::  Currently not exercising  Inadequate activity/exercise (GOAL):: No  Significant changes in sleep pattern (GOAL): No    Transportation:  Transportation concerns (GOAL):: No  Transportation means:: Regular car     Psychosocial:  Mental health DX:: No  Mental health management concern (GOAL):: No  Informal Support system:: Family, Spouse, Children     Financial/Insurance concerns (GOAL):: No     Resources and Interventions:  Current Resources:   Community Resources: None  Supplies used at home:: Diabetic Supplies  Equipment Currently Used at Home: glucometer    Advance Care Plan/Directive  Advanced Care Plans/Directives on file:: No  Advanced Care Plan/Directive Status: Not Applicable    Referrals Placed: None     Goals:   Goals        General    Monitoring (pt-stated)     Notes - Note created  8/30/2018  4:11 PM by Dominga Oliveira RD    Goal Statement: I will check blood sugar 1-2 times daily    Measure of Success: meter memory or log of blood sugar results    Strengths: good understanding of using meter  Ideas to overcome barriers: no barriers noted    Date to Achieve By: 9/12            Patient/Caregiver understanding: Pt verbalizes understanding of follow up instructions and when scheduled appt date and times.          Future Appointments              Tomorrow Venkata Jaramillo DO [facility]    In 1 month Nathanael Blood MD [facility]          Plan: Pt will follow up with providers as recommended.   No further outreach by RN CC

## 2019-04-26 ENCOUNTER — TELEPHONE (OUTPATIENT)
Dept: CARDIOLOGY | Facility: CLINIC | Age: 49
End: 2019-04-26

## 2019-04-26 DIAGNOSIS — I10 HTN (HYPERTENSION): Primary | ICD-10-CM

## 2019-04-26 RX ORDER — HYDROCHLOROTHIAZIDE 25 MG/1
25 TABLET ORAL DAILY
Qty: 30 TABLET | Refills: 3 | Status: SHIPPED | OUTPATIENT
Start: 2019-04-26 | End: 2019-08-20

## 2019-04-26 NOTE — TELEPHONE ENCOUNTER
Reviewed patients BP and concerns with Jaimie NP. Jaimie would like patient to start hydrochlorothiazide 25 mg daily with a BMP in 1 week.     Spoke to patient and she will start hydrochlorothiazide 25 mg daily. Pt will get BMP done at  clinic by her house in 1 week. BMP ordered.     Reminder set to make sure patient had BMP in 1 week.

## 2019-04-26 NOTE — LETTER
June 17, 2019       TO: Lashawn Vegakin   [address] [facility] [address]       Dear Ms. Reddy,    The results of your recent labs are below.    Results for orders placed or performed in visit on 04/23/19   Basic metabolic panel   Result Value Ref Range    Sodium 140 133 - 144 mmol/L    Potassium 3.8 3.4 - 5.3 mmol/L    Chloride 109 94 - 109 mmol/L    Carbon Dioxide 25 20 - 32 mmol/L    Anion Gap 6 3 - 14 mmol/L    Glucose 186 (H) 70 - 99 mg/dL    Urea Nitrogen 11 7 - 30 mg/dL    Creatinine 0.75 0.52 - 1.04 mg/dL    GFR Estimate >90 >60 mL/min/[1.73_m2]    GFR Estimate If Black >90 >60 mL/min/[1.73_m2]    Calcium 8.4 (L) 8.5 - 10.1 mg/dL     Jaimie took a look at your labs that you had done on 5/9/19 and everything is looking good. She would like you to continue your current medications. Jaimie would also like to know how your blood pressure has been doing at home. We have been unable to reach you by phone, so please give us a call at [phone] to provide an update on your blood pressure trends.    Sincerely,    [employer]  [facility]

## 2019-04-26 NOTE — TELEPHONE ENCOUNTER
Pt called stating that her BP seems to be still high even after increasing the Losartan and Carvedilol  Pt has checked her BP at home and the [facility] by her house and both readings have been about 160/90s after meds.   Pt currently taking Losartan 25 mg at bedtime and Carvedilol 12.5 mg BID.   Pt also worried about fluid retention. Pt stated that she has been drinking a lot of water but does not seem to be urinating at much as she feels she should. Pt does not have any swelling or signs of fluid retention but is worried as last time she did not show swelling.      Will route to [facility] to review.     4/23/19 OV   1. Stress-induced cardiomyopathy    LVEF 40-45% with severe hypokinesia of the basal to mid segments of the LV with hypercontractility of the apical segment    No obstructive coronary artery disease on coronary angiogram, but LV gram showed mild apical anterior hypokinesis with EF 45%    Repeat echocardiogram from 3/25/2019 showed LVEF had normalized at 65-70% without wall motion abnormalities    Follow up as scheduled with Dr. Blood in June 2. Hypertension    Goal of systolic blood pressure less than 130    Did not tolerate lisinopril due to recurrent cough    Transitioned to Losartan 25 mg daily, but BP remains elevated    Increase Losartan to 50 mg daily and carvedilol to 12.5 mg BID

## 2019-05-07 DIAGNOSIS — E11.65 TYPE 2 DIABETES MELLITUS WITH HYPERGLYCEMIA (H): ICD-10-CM

## 2019-05-07 RX ORDER — METFORMIN HCL 500 MG
TABLET, EXTENDED RELEASE 24 HR ORAL
Qty: 360 TABLET | Refills: 0 | Status: SHIPPED | OUTPATIENT
Start: 2019-05-07 | End: 2019-06-27

## 2019-05-07 NOTE — TELEPHONE ENCOUNTER
Metformin  Last Written Prescription Date:  02/13/2019  Last Fill Quantity: 360,  # refills: 0   Last office visit: 3/21/2019 with prescribing provider:  otilio   Future Office Visit:   Next 5 appointments (look out 90 days)    May 09, 2019  7:20 AM CDT  [facility] [address] [facility] [id number] [phone]   Jun 27, 2019 11:00 AM CDT  Return Visit with Nathanael Blood MD  [facility] ([facility]) [address] [facility] [id number] [phone]      Prescription approved per G Refill Protocol.    Requested Prescriptions   Pending Prescriptions Disp Refills     metFORMIN (GLUCOPHAGE-XR) 500 MG 24 hr tablet [Pharmacy Med Name: METFORMIN 500MG ER TABLET] 360 tablet 0     Sig: TAKE 4 TABLETS (2,000MG) BY MOUTH EVERY DAY WITH DINNER       Biguanide Agents Failed - 5/7/2019  1:04 AM        Failed - Blood pressure less than 140/90 in past 6 months     BP Readings from Last 3 Encounters:   04/23/19 144/90   03/27/19 130/86   03/21/19 122/80           Passed - Patient has documented LDL within the past 12 mos.     Recent Labs   Lab Test 03/05/19  0510   LDL 40           Passed - Patient has had a Microalbumin in the past 15 mos.     Recent Labs   Lab Test 10/25/18  1143   MICROL 21   UMALCR 8.97           Passed - Patient is age 10 or older        Passed - Patient has documented A1c within the specified period of time.     If HgbA1C is 8 or greater, it needs to be on file within the past 3 months.  If less than 8, must be on file within the past 6 months.     Recent Labs   Lab Test 03/04/19  0529   A1C 8.6*           Passed - Patient's CR is NOT>1.4 OR Patient's EGFR is NOT<45 within past 12 mos.     Recent Labs   Lab Test 04/23/19  0944   GFRESTIMATED >90   GFRESTBLACK >90     Recent Labs   Lab Test 04/23/19  0944   CR 0.75           Passed - Patient  "does NOT have a diagnosis of CHF.        Passed - Medication is active on med list        Passed - Patient is not pregnant        Passed - Patient has not had a positive pregnancy test within the past 12 mos.         Passed - Recent (6 mo) or future (30 days) visit within the authorizing provider's specialty     Patient had office visit in the last 6 months or has a visit in the next 30 days with authorizing provider or within the authorizing provider's specialty.  See \"Patient Info\" tab in inbasket, or \"Choose Columns\" in Meds & Orders section of the refill encounter.        Una Maria RN   "

## 2019-05-08 ASSESSMENT — ACTIVITIES OF DAILY LIVING (ADL): DEPENDENT_IADLS:: INDEPENDENT

## 2019-05-09 DIAGNOSIS — I10 HTN (HYPERTENSION): ICD-10-CM

## 2019-05-09 LAB
ANION GAP SERPL CALCULATED.3IONS-SCNC: 6 MMOL/L (ref 3–14)
BUN SERPL-MCNC: 18 MG/DL (ref 7–30)
CALCIUM SERPL-MCNC: 9.4 MG/DL (ref 8.5–10.1)
CHLORIDE SERPL-SCNC: 98 MMOL/L (ref 94–109)
CO2 SERPL-SCNC: 30 MMOL/L (ref 20–32)
CREAT SERPL-MCNC: 0.99 MG/DL (ref 0.52–1.04)
GFR SERPL CREATININE-BSD FRML MDRD: 67 ML/MIN/{1.73_M2}
GLUCOSE SERPL-MCNC: 256 MG/DL (ref 70–99)
POTASSIUM SERPL-SCNC: 3.9 MMOL/L (ref 3.4–5.3)
SODIUM SERPL-SCNC: 134 MMOL/L (ref 133–144)

## 2019-05-09 PROCEDURE — 80048 BASIC METABOLIC PNL TOTAL CA: CPT | Performed by: NURSE PRACTITIONER

## 2019-05-09 PROCEDURE — 36415 COLL VENOUS BLD VENIPUNCTURE: CPT | Performed by: NURSE PRACTITIONER

## 2019-05-10 NOTE — TELEPHONE ENCOUNTER
Notes recorded by Jaimie Pedroza APRN CNP on 5/10/2019 at 11:19 AM CDT  BMP stable. Continue present medications. Please assess her home BP trends when calling with results.     KRISTI Valenzuela, CNP    Contacted patient to review. Patient did not answer. Left message for patient to call back to review home BP readings.

## 2019-05-16 DIAGNOSIS — E11.65 TYPE 2 DIABETES MELLITUS WITH HYPERGLYCEMIA, WITHOUT LONG-TERM CURRENT USE OF INSULIN (H): ICD-10-CM

## 2019-05-16 DIAGNOSIS — N94.3 PMS (PREMENSTRUAL SYNDROME): ICD-10-CM

## 2019-05-17 NOTE — TELEPHONE ENCOUNTER
Zoloft  Last Written Prescription Date:  11/14/2018  Last Fill Quantity: 45,  # refills: 1   Last office visit: 3/21/2019 with prescribing provider:  otilio Cai Office Visit:   Next 5 appointments (look out 90 days)    Jun 27, 2019 11:00 AM CDT  Return Visit with Nathanael Blood MD  [facility] ([facility]) [address] [place] [id number]  [phone]      Prescription approved per [facility] Refill Protocol.    Glipizide  Last office visit: 3/21/2019 with prescribing provider:  otilio Cai Office Visit:   Next 5 appointments (look out 90 days)    Jun 27, 2019 11:00 AM CDT  Return Visit with Nathanael Blood MD  [facility] ([address] [address]  [phone]      Routing refill request to provider for review/approval because:  Medication is reported/historical    Requested Prescriptions   Pending Prescriptions Disp Refills     glipiZIDE (GLUCOTROL XL) 10 MG 24 hr tablet [Pharmacy Med Name: glipiZIDE (GLIPIZIDE XL) 10 MG 24 hr tablet] 90 tablet 3     Sig: TAKE 1 TABLET BY MOUTH EVERY DAY       Sulfonylurea Agents Failed - 5/16/2019  8:47 AM        Failed - Blood pressure less than 140/90 in past 6 months     BP Readings from Last 3 Encounters:   04/23/19 144/90   03/27/19 130/86   03/21/19 122/80           Passed - Patient has documented LDL within the past 12 mos.     Recent Labs   Lab Test 03/05/19  0510   LDL 40           Passed - Patient has had a Microalbumin in the past 15 mos.     Recent Labs   Lab Test 10/25/18  1143   MICROL 21   UMALCR 8.97           Passed - Patient has documented A1c within the specified period of time.     If HgbA1C is 8 or greater, it needs to be on file within the past 3 months.  If less than 8, must be on file within the past 6 months.   Recent Labs   Lab Test 03/04/19  0529   A1C 8.6*           Passed -  "Medication is active on med list        Passed - Patient is age 18 or older        Passed - No active pregnancy on record        Passed - Patient has a recent creatinine (normal) within the past 12 mos.     Recent Labs   Lab Test 05/09/19  1008   CR 0.99           Passed - Patient has not had a positive pregnancy test within the past 12 mos.        Passed - Recent (6 mo) or future (30 days) visit within the authorizing provider's specialty     Patient had office visit in the last 6 months or has a visit in the next 30 days with authorizing provider or within the authorizing provider's specialty.  See \"Patient Info\" tab in inbasket, or \"Choose Columns\" in Meds & Orders section of the refill encounter.          sertraline (ZOLOFT) 50 MG tablet [Pharmacy Med Name: SERTRALINE 50MG TABLET] 45 tablet 1     Sig: TAKE 1/2 TABLET (25 MG) BY MOUTH EVERY MORNING       SSRIs Protocol Passed - 5/16/2019  8:47 AM        Passed - Recent (12 mo) or future (30 days) visit within the authorizing provider's specialty     Patient had office visit in the last 12 months or has a visit in the next 30 days with authorizing provider or within the authorizing provider's specialty.  See \"Patient Info\" tab in inbasket, or \"Choose Columns\" in Meds & Orders section of the refill encounter.          Passed - Medication is active on med list        Passed - Patient is age 18 or older        Passed - No active pregnancy on record        Passed - No positive pregnancy test in last 12 months      Una Maria RN   "

## 2019-05-29 RX ORDER — GLIPIZIDE 10 MG/1
10 TABLET, FILM COATED, EXTENDED RELEASE ORAL DAILY
Qty: 90 TABLET | Refills: 3 | Status: CANCELLED | OUTPATIENT
Start: 2019-05-29

## 2019-05-29 RX ORDER — GLIPIZIDE 10 MG/1
TABLET, FILM COATED, EXTENDED RELEASE ORAL
Qty: 90 TABLET | Refills: 3 | Status: SHIPPED | OUTPATIENT
Start: 2019-05-29 | End: 2019-08-26

## 2019-05-30 NOTE — TELEPHONE ENCOUNTER
Requested Prescriptions   Pending Prescriptions Disp Refills     glipiZIDE (GLUCOTROL XL) 10 MG 24 hr tablet 90 tablet 3     Sig: Take 1 tablet (10 mg) by mouth daily   Last Written Prescription Date:  5/29/19  Last Fill Quantity: 90,  # refills: 3   Last office visit: 3/21/2019 with prescribing provider:     Future Office Visit:   Next 5 appointments (look out 90 days)    Jun 27, 2019 11:00 AM CDT  Return Visit with Nathanael Blood MD  [facility] ([address] [id number] [phone]             Sulfonylurea Agents Failed - 5/29/2019  8:42 AM        Failed - Blood pressure less than 140/90 in past 6 months     BP Readings from Last 3 Encounters:   04/23/19 144/90   03/27/19 130/86   03/21/19 122/80                 Passed - Patient has documented LDL within the past 12 mos.     Recent Labs   Lab Test 03/05/19  0510   LDL 40             Passed - Patient has had a Microalbumin in the past 15 mos.     Recent Labs   Lab Test 10/25/18  1143   MICROL 21   UMALCR 8.97             Passed - Patient has documented A1c within the specified period of time.     If HgbA1C is 8 or greater, it needs to be on file within the past 3 months.  If less than 8, must be on file within the past 6 months.     Recent Labs   Lab Test 03/04/19  0529   A1C 8.6*             Passed - Medication is active on med list        Passed - Patient is age 18 or older        Passed - No active pregnancy on record        Passed - Patient has a recent creatinine (normal) within the past 12 mos.     Recent Labs   Lab Test 05/09/19  1008   CR 0.99             Passed - Patient has not had a positive pregnancy test within the past 12 mos.        Passed - Recent (6 mo) or future (30 days) visit within the authorizing provider's specialty     Patient had office visit in the last 6 months or has a visit in the next 30 days with authorizing provider or within the  "authorizing provider's specialty.  See \"Patient Info\" tab in inbasket, or \"Choose Columns\" in Meds & Orders section of the refill encounter.            This was filled yesterday.    Closing this encounter.  TARAN BarryN, RN      "

## 2019-06-06 ENCOUNTER — TELEPHONE (OUTPATIENT)
Dept: FAMILY MEDICINE | Facility: CLINIC | Age: 49
End: 2019-06-06

## 2019-06-06 NOTE — TELEPHONE ENCOUNTER
Reason for Call:  Medication or medication refill:    Do you use a [facility]?  Name of the pharmacy and phone number for the current request:  [facility] - [phone]    Name of the medication requested: diverticulitis med     Other request: Pt states she was told to call when she had a flare up and a med will be called in.     Can we leave a detailed message on this number? YES    Phone number patient can be reached at: Home number on file [phone] (home)    Best Time: any     Call taken on 6/6/2019 at 7:52 AM by Mari Tan

## 2019-06-06 NOTE — TELEPHONE ENCOUNTER
Attempted to contact patient again to review BMP results and Jaimie's recommendations. Patient did not answer. Left message for patient to call back.

## 2019-06-06 NOTE — TELEPHONE ENCOUNTER
Unfortunately, per Dr. Sherman patient will need to be seen in clinic to have her re-evaluated for diverticulitis. He states he does not usually prescribe antibiotic without seeing the patient.   LM for patient to call [phone] to schedule an appointment.   Yari Dee CMA

## 2019-06-06 NOTE — TELEPHONE ENCOUNTER
Patient calling and states she thought Dr Sherman would just order a antibiotic for her per their last conversation but she states she is feeling a little better today so will call back if she needs an appt.

## 2019-06-10 ENCOUNTER — TELEPHONE (OUTPATIENT)
Dept: FAMILY MEDICINE | Facility: CLINIC | Age: 49
End: 2019-06-10

## 2019-06-10 NOTE — TELEPHONE ENCOUNTER
Reason for Call:  Other call back    Detailed comments: Patient states her symptoms have subsided from her diverticulitis. She finally got approval for a stand up desk but needs request from PCP.     Phone Number Patient can be reached at: Home number on file [phone] (home)    Best Time: any     Can we leave a detailed message on this number? YES    Call taken on 6/10/2019 at 1:59 PM by Ale Hall

## 2019-06-11 NOTE — TELEPHONE ENCOUNTER
Calling patient to get more information. There are no notes in Epic regarding why patient needs a stand up desk.  LM for patient to call [phone]  Yari Dee CMA

## 2019-06-11 NOTE — TELEPHONE ENCOUNTER
Patient calling back, states that she talked to Dr. Sherman about this. Patient states that she also has vascular disease, and stomach issues. Patient feels it would be better to also be able to stand and not sit all day at her desk, please call to advise

## 2019-06-12 NOTE — TELEPHONE ENCOUNTER
Per Dr. Sherman this should come from Dr. Jaramillo who is managing her vascular disease, diabetes and stomach issues. Dr. Sherman does not recall speaking about a standing desk for patient at their last visit in 3/2019. Please advise.  Yari Dee CMA

## 2019-06-18 NOTE — TELEPHONE ENCOUNTER
"Patient seen in March and at that time we did not discuss a stand-up desk.  If she would like to have \"ADA\" compliant office equipment, we will need an office visit to document the need.  She should set up an appointment at her convenience and I will be happy to go over this with her.    Clint"

## 2019-06-27 ENCOUNTER — OFFICE VISIT (OUTPATIENT)
Dept: CARDIOLOGY | Facility: CLINIC | Age: 49
End: 2019-06-27
Payer: COMMERCIAL

## 2019-06-27 ENCOUNTER — OFFICE VISIT (OUTPATIENT)
Dept: FAMILY MEDICINE | Facility: OTHER | Age: 49
End: 2019-06-27
Payer: COMMERCIAL

## 2019-06-27 VITALS
OXYGEN SATURATION: 97 % | HEART RATE: 84 BPM | TEMPERATURE: 97.5 F | WEIGHT: 254.5 LBS | DIASTOLIC BLOOD PRESSURE: 84 MMHG | SYSTOLIC BLOOD PRESSURE: 138 MMHG | RESPIRATION RATE: 18 BRPM | BODY MASS INDEX: 41.08 KG/M2

## 2019-06-27 VITALS
HEART RATE: 72 BPM | DIASTOLIC BLOOD PRESSURE: 76 MMHG | HEIGHT: 66 IN | WEIGHT: 256.5 LBS | BODY MASS INDEX: 41.22 KG/M2 | SYSTOLIC BLOOD PRESSURE: 110 MMHG | OXYGEN SATURATION: 97 %

## 2019-06-27 DIAGNOSIS — E11.65 TYPE 2 DIABETES MELLITUS WITH HYPERGLYCEMIA, WITHOUT LONG-TERM CURRENT USE OF INSULIN (H): ICD-10-CM

## 2019-06-27 DIAGNOSIS — E11.65 TYPE 2 DIABETES MELLITUS WITH HYPERGLYCEMIA, WITHOUT LONG-TERM CURRENT USE OF INSULIN (H): Primary | ICD-10-CM

## 2019-06-27 DIAGNOSIS — E78.2 MIXED HYPERLIPIDEMIA: ICD-10-CM

## 2019-06-27 DIAGNOSIS — I10 ESSENTIAL HYPERTENSION: Primary | ICD-10-CM

## 2019-06-27 DIAGNOSIS — I51.81 STRESS-INDUCED CARDIOMYOPATHY: ICD-10-CM

## 2019-06-27 DIAGNOSIS — I87.2 VENOUS (PERIPHERAL) INSUFFICIENCY: ICD-10-CM

## 2019-06-27 LAB
ANION GAP SERPL CALCULATED.3IONS-SCNC: 4 MMOL/L (ref 3–14)
BUN SERPL-MCNC: 18 MG/DL (ref 7–30)
CALCIUM SERPL-MCNC: 8.9 MG/DL (ref 8.5–10.1)
CHLORIDE SERPL-SCNC: 102 MMOL/L (ref 94–109)
CO2 SERPL-SCNC: 30 MMOL/L (ref 20–32)
CREAT SERPL-MCNC: 0.86 MG/DL (ref 0.52–1.04)
GFR SERPL CREATININE-BSD FRML MDRD: 79 ML/MIN/{1.73_M2}
GLUCOSE SERPL-MCNC: 230 MG/DL (ref 70–99)
HBA1C MFR BLD: 8.6 % (ref 0–5.6)
POTASSIUM SERPL-SCNC: 4 MMOL/L (ref 3.4–5.3)
SODIUM SERPL-SCNC: 136 MMOL/L (ref 133–144)

## 2019-06-27 PROCEDURE — 99207 C FOOT EXAM  NO CHARGE: CPT | Performed by: INTERNAL MEDICINE

## 2019-06-27 PROCEDURE — 83036 HEMOGLOBIN GLYCOSYLATED A1C: CPT | Performed by: INTERNAL MEDICINE

## 2019-06-27 PROCEDURE — 80048 BASIC METABOLIC PNL TOTAL CA: CPT | Performed by: INTERNAL MEDICINE

## 2019-06-27 PROCEDURE — 99214 OFFICE O/P EST MOD 30 MIN: CPT | Performed by: INTERNAL MEDICINE

## 2019-06-27 PROCEDURE — 36415 COLL VENOUS BLD VENIPUNCTURE: CPT | Performed by: INTERNAL MEDICINE

## 2019-06-27 RX ORDER — CARVEDILOL 12.5 MG/1
6.25 TABLET ORAL 2 TIMES DAILY
Qty: 180 TABLET | Refills: 3 | Status: SHIPPED | OUTPATIENT
Start: 2019-06-27 | End: 2020-04-13

## 2019-06-27 ASSESSMENT — MIFFLIN-ST. JEOR: SCORE: 1805.23

## 2019-06-27 ASSESSMENT — PAIN SCALES - GENERAL: PAINLEVEL: NO PAIN (0)

## 2019-06-27 NOTE — PROGRESS NOTES
Service Date: 06/27/2019      HISTORY OF PRESENT ILLNESS:  I had the opportunity to see Ms. Lashawn Reddy in Cardiology Clinic today at the [facility] in [place] for re-evaluation of stress-induced cardiomyopathy.  She was admitted in early March with acute on subacute shortness of breath with evidence of pulmonary edema.  She was transferred down to [facility] and found to have evidence of cardiomyopathy with an ejection fraction of 40%-45% in a pattern suggestive of stress cardiomyopathy.  Her coronary angiogram showed normal coronary arteries and she was treated with diuretic therapy and improved.  Her blood pressure was quite high when she was initially evaluated, but improved with medical therapy.      She has other issues including type 2 diabetes, hypertension and chronic right lower extremity lymphedema after a traumatic venous injury and DVT in the past.      Followup of her cardiomyopathy included a repeat echocardiogram on 03/25/2019 which demonstrated normalization of left ventricular function with an ejection fraction of 65%-70%.  Her blood pressure has been well controlled on her current medication program as well.  Even her lymphedema has been somewhat better after initiation of hydrochlorothiazide.      She has been concerned about her right lower extremity lymphedema and had a venous competency study several years ago in 2015 which showed some incompetent deep and superficial veins.  This situation was discussed with Dr. Roth in Vascular Clinic and he suggested that a right GSV ablation plus collateral therapy might help with her swelling, but may not completely resolve her issues.  For now, she is continuing her compression stockings and diuretic.  The diuretic has helped.  However, she continues to have some tenderness and discomfort at the site as well as some discoloration.      She has been doing well now with no recurrence of shortness of breath  problems.  She has no chest pain, palpitations, lightheadedness or other cardiac symptoms.      PHYSICAL EXAMINATION:  Her blood pressure is 110/76, heart rate 72 and weight 256 pounds.  Her lungs are clear.  Heart rhythm is regular.  She has no cardiac murmurs.      IMPRESSIONS:  Ms. Flory Reddy is a 48-year-old woman with an episode of acute pulmonary edema thought to be due to acute stress cardiomyopathy with an ejection fraction of about 40%.  She has normal coronary arteries.  Her left ventricular function normalized after a couple of weeks, although she was treated with medications for her cardiomyopathy and blood pressure.  Her blood pressure recently has been excellent and perhaps a bit low.  I suggested she could cut her carvedilol in half from 12.5 b.i.d. to 6.25 b.i.d.  I will have her continue her losartan long-term for hypertension and renal protection as well as her hydrochlorothiazide for treatment of her edema in combination with her compression stocking.      She is interested in meeting with a vascular specialist to consider venous ablation therapy.  I will set that up.  I do not think she will need further followup in general Cardiology Clinic and I would suggest that she continue to wean down her carvedilol if her blood pressure remains low and potentially even stop that medication.  It should not be necessary in the future unless her blood pressure remains elevated.      cc:   Raoul Sherman MD   [address]  [address]         DAVID WALKER MD, [facility]             D: 2019   T: 2019   MT: ARIA      Name:     FLORY REDDY   MRN:      [id number]        Account:      [id number]   :      1970           Service Date: 2019      Document: [id number]

## 2019-06-27 NOTE — PROGRESS NOTES
HPI and Plan:   See dictation    Orders Placed This Encounter   Procedures     Follow-Up with Vascular Cardiologist       Orders Placed This Encounter   Medications     carvedilol (COREG) 12.5 MG tablet     Sig: Take 0.5 tablets (6.25 mg) by mouth 2 times daily     Dispense:  180 tablet     Refill:  3       Medications Discontinued During This Encounter   Medication Reason     carvedilol (COREG) 12.5 MG tablet          Encounter Diagnoses   Name Primary?     Stress-induced cardiomyopathy      Essential hypertension Yes     Mixed hyperlipidemia      Type 2 diabetes mellitus with hyperglycemia, without long-term current use of insulin (H)      Venous (peripheral) insufficiency        CURRENT MEDICATIONS:  Current Outpatient Medications   Medication Sig Dispense Refill     aspirin (ASA) 81 MG EC tablet Take 1 tablet (81 mg) by mouth daily 90 tablet 3     carvedilol (COREG) 12.5 MG tablet Take 0.5 tablets (6.25 mg) by mouth 2 times daily 180 tablet 3     glipiZIDE (GLUCOTROL XL) 10 MG 24 hr tablet TAKE 1 TABLET BY MOUTH EVERY DAY 90 tablet 3     hydrochlorothiazide (HYDRODIURIL) 25 MG tablet Take 1 tablet (25 mg) by mouth daily 30 tablet 3     losartan (COZAAR) 50 MG tablet Take 1 tablet (50 mg) by mouth daily 90 tablet 3     nystatin-triamcinolone (MYCOLOG II) 976657-7.1 UNIT/GM-% external cream Apply topically daily as needed (rash or itching) 60 g 3     omeprazole (PRILOSEC) 20 MG DR capsule Take 20 mg by mouth daily as needed       rosuvastatin (CRESTOR) 20 MG tablet Take 1 tablet (20 mg) by mouth daily 90 tablet 3     sertraline (ZOLOFT) 50 MG tablet TAKE 1/2 TABLET (25 MG) BY MOUTH EVERY MORNING 45 tablet 1     Vitamin D, Cholecalciferol, 1000 units TABS Take 4,000 Units by mouth daily       blood glucose monitoring (ACCU-CHEK FASTCLIX) lancets 1 each 2 times daily 102 each 3     blood glucose monitoring (ACCU-CHEK GUIDE) test strip Use to test blood sugar 2 times daily or as directed. 100 strip 11        ALLERGIES     Allergies   Allergen Reactions     No Known Drug Allergies        PAST MEDICAL HISTORY:  Past Medical History:   Diagnosis Date     Depressive disorder, not elsewhere classified     depression in the post partum period after her daughter     Diabetes mellitus, type 2 (H) 6/6/2013     Diffuse cystic mastopathy     fibrocystic breast disease     Tobacco use disorder     quit in 2008     Type 2 diabetes, HbA1c goal < 7% (H) 6/6/2013       PAST SURGICAL HISTORY:  Past Surgical History:   Procedure Laterality Date     C ANESTH,LOWER LEG VEIN SURG,NOS  2002    bilateral     C APPENDECTOMY  04/14/89     COLONOSCOPY  6/21/2013    Procedure: COLONOSCOPY;  colonoscopy;  Surgeon: Vamshi Loomis MD;  Location:  GI     COLONOSCOPY N/A 2/22/2017    Procedure: COLONOSCOPY;  Surgeon: Raoul Sage MD;  Location:  GI     CV HEART CATHETERIZATION WITH POSSIBLE INTERVENTION N/A 3/5/2019    Procedure: Heart Catheterization with possible Intervention;  Surgeon: Kirstin Lynn MD;  Location:  HEART CARDIAC CATH LAB     HC COLONOSCOPY W/WO BRUSH/WASH  06/17/2005     HC DILATION/CURETTAGE DIAG/THER NON OB  1986    after a miscarriage     HC REMOVAL OF TONSILS,<10 Y/O       HYSTERECTOMY TOTAL ABDOMINAL, SALPINGECTOMY Bilateral 9/12/2018    Procedure: HYSTERECTOMY TOTAL ABDOMINAL, SALPINGECTOMY;  Total Abdominal Hysterectomy, left Salpingectomy;  Surgeon: Temi Corado MD;  Location: [facility] OR     TUBAL LIGATION  08/04/2006       FAMILY HISTORY:  Family History   Problem Relation Age of Onset     Anesthesia Reaction Mother         Severe nausea     Gastrointestinal Disease Mother         Partial colon removal secondary to a blockage     Gynecology Mother         hysterectomy     Lipids Mother      Lipids Father      Cancer - colorectal Father         dx Oct '03 (dx at age 57)     Cancer Father         skin     Hypertension Father      Cancer Maternal Grandmother         colon at age 68      Cerebrovascular Disease Paternal Grandfather      Cerebrovascular Disease Paternal Grandmother         brain aneurysm     Arthritis Sister         mainly affecting her legs     Gastrointestinal Disease Sister         2 sisters with colon polyps     Cancer Sister         cervical ca     Cancer Maternal Aunt         all over ? breast was the origin     Cancer Maternal Aunt         pancreatic     Heart Disease Maternal Aunt         MI 2010       SOCIAL HISTORY:  Social History     Socioeconomic History     Marital status:      Spouse name: Tim     Number of children: 2     Years of education: 12     Highest education level: Not on file   Occupational History     Occupation: labor   Social Needs     Financial resource strain: Not on file     Food insecurity:     Worry: Not on file     Inability: Not on file     Transportation needs:     Medical: Not on file     Non-medical: Not on file   Tobacco Use     Smoking status: Former Smoker     Years: 16.00     Types: Cigarettes     Last attempt to quit: 9/29/2008     Years since quitting: 10.7     Smokeless tobacco: Never Used   Substance and Sexual Activity     Alcohol use: No     Alcohol/week: 0.0 oz     Comment: couple drinks per year     Drug use: No     Sexual activity: Yes     Partners: Male     Birth control/protection: Surgical     Comment: tubal ligation   Lifestyle     Physical activity:     Days per week: Not on file     Minutes per session: Not on file     Stress: Not on file   Relationships     Social connections:     Talks on phone: Not on file     Gets together: Not on file     Attends Sabianist service: Not on file     Active member of club or organization: Not on file     Attends meetings of clubs or organizations: Not on file     Relationship status: Not on file     Intimate partner violence:     Fear of current or ex partner: Not on file     Emotionally abused: Not on file     Physically abused: Not on file     Forced sexual activity: Not on file  "  Other Topics Concern      Service No     Blood Transfusions No     Caffeine Concern No     Occupational Exposure No     Hobby Hazards No     Sleep Concern No     Stress Concern Yes     Comment: stress at home at work     Weight Concern Yes     Comment: has continued to gain weight     Special Diet Yes     Comment: trying to eat better     Back Care No     Exercise No     Comment: nothing in the past 3 months     Bike Helmet No     Comment: NA     Seat Belt Yes     Self-Exams Yes     Comment: sometimes     Parent/sibling w/ CABG, MI or angioplasty before 65F 55M? Not Asked   Social History Narrative     Not on file       Review of Systems:  Skin:  Negative       Eyes:  Positive for glasses    ENT:  Negative      Respiratory:  Negative       Cardiovascular:  Negative for;palpitations;chest pain;edema;lightheadedness;dizziness   still wears support stockings but since started HCTZ swelling is better   Gastroenterology: Negative      Genitourinary:  Negative      Musculoskeletal:  Negative      Neurologic:  Negative      Psychiatric:  Positive for      Heme/Lymph/Imm:  Negative      Endocrine:  Positive for diabetes      Physical Exam:  Vitals: /76 (BP Location: Left arm, Patient Position: Fowlers, Cuff Size: Adult Large)   Pulse 72   Ht 1.676 m (5' 6\")   Wt 116.3 kg (256 lb 8 oz)   LMP 09/03/2018   SpO2 97%   BMI 41.40 kg/m      Constitutional:  cooperative overweight      Skin:  warm and dry to the touch          Head:  normocephalic, no masses or lesions        Eyes:  pupils equal and round;conjunctivae and lids unremarkable        Lymph:      ENT:  dentition good        Neck:  no stiffness        Respiratory:  clear to auscultation;normal symmetry         Cardiac: regular rhythm;normal S1 and S2     no presence of murmur                                                   GI:    obese      Extremities and Muscular Skeletal:        RLE edema;telangiectasia;varicose vein   RLE scar from previous " ulcer    Neurological:           Psych:  Alert and Oriented x 3        CC  Jaimie Pedroza, APRN CNP  [facility]  [address]  [place] [id number]

## 2019-06-27 NOTE — LETTER
6/27/2019      Raoul Sherman MD  [address] [place] [address]      RE: Lashawn Vegakin       Dear Colleague,    I had the pleasure of seeing Lashawn Reddy in the [facility].    Service Date: 06/27/2019      HISTORY OF PRESENT ILLNESS:  I had the opportunity to see Ms. Lashawn Reddy in Cardiology Clinic today at the [facility] in [place] for re-evaluation of stress-induced cardiomyopathy.  She was admitted in early March with acute on subacute shortness of breath with evidence of pulmonary edema.  She was transferred down to [facility] and found to have evidence of cardiomyopathy with an ejection fraction of 40%-45% in a pattern suggestive of stress cardiomyopathy.  Her coronary angiogram showed normal coronary arteries and she was treated with diuretic therapy and improved.  Her blood pressure was quite high when she was initially evaluated, but improved with medical therapy.      She has other issues including type 2 diabetes, hypertension and chronic right lower extremity lymphedema after a traumatic venous injury and DVT in the past.      Followup of her cardiomyopathy included a repeat echocardiogram on 03/25/2019 which demonstrated normalization of left ventricular function with an ejection fraction of 65%-70%.  Her blood pressure has been well controlled on her current medication program as well.  Even her lymphedema has been somewhat better after initiation of hydrochlorothiazide.      She has been concerned about her right lower extremity lymphedema and had a venous competency study several years ago in 2015 which showed some incompetent deep and superficial veins.  This situation was discussed with Dr. Roth in Vascular Clinic and he suggested that a right GSV ablation plus collateral therapy might help with her swelling, but may not completely resolve her issues.  For now, she is continuing her compression  stockings and diuretic.  The diuretic has helped.  However, she continues to have some tenderness and discomfort at the site as well as some discoloration.      She has been doing well now with no recurrence of shortness of breath problems.  She has no chest pain, palpitations, lightheadedness or other cardiac symptoms.      PHYSICAL EXAMINATION:  Her blood pressure is 110/76, heart rate 72 and weight 256 pounds.  Her lungs are clear.  Heart rhythm is regular.  She has no cardiac murmurs.      IMPRESSIONS:  Ms. Flory Reddy is a 48-year-old woman with an episode of acute pulmonary edema thought to be due to acute stress cardiomyopathy with an ejection fraction of about 40%.  She has normal coronary arteries.  Her left ventricular function normalized after a couple of weeks, although she was treated with medications for her cardiomyopathy and blood pressure.  Her blood pressure recently has been excellent and perhaps a bit low.  I suggested she could cut her carvedilol in half from 12.5 b.i.d. to 6.25 b.i.d.  I will have her continue her losartan long-term for hypertension and renal protection as well as her hydrochlorothiazide for treatment of her edema in combination with her compression stocking.      She is interested in meeting with a vascular specialist to consider venous ablation therapy.  I will set that up.  I do not think she will need further followup in general Cardiology Clinic and I would suggest that she continue to wean down her carvedilol if her blood pressure remains low and potentially even stop that medication.  It should not be necessary in the future unless her blood pressure remains elevated.      cc:   Raoul Sherman MD   [address]  [address]         DAVID WAKLER MD, [facility]             D: 2019   T: 2019   MT: ARIA      Name:     FLORY REDDY   MRN:      [id number]        Account:      [id number]   :       1970           Service Date: 06/27/2019      Document: [id number]         Outpatient Encounter Medications as of 6/27/2019   Medication Sig Dispense Refill     aspirin (ASA) 81 MG EC tablet Take 1 tablet (81 mg) by mouth daily 90 tablet 3     carvedilol (COREG) 12.5 MG tablet Take 0.5 tablets (6.25 mg) by mouth 2 times daily 180 tablet 3     glipiZIDE (GLUCOTROL XL) 10 MG 24 hr tablet TAKE 1 TABLET BY MOUTH EVERY DAY 90 tablet 3     hydrochlorothiazide (HYDRODIURIL) 25 MG tablet Take 1 tablet (25 mg) by mouth daily 30 tablet 3     losartan (COZAAR) 50 MG tablet Take 1 tablet (50 mg) by mouth daily 90 tablet 3     nystatin-triamcinolone (MYCOLOG II) 460859-2.1 UNIT/GM-% external cream Apply topically daily as needed (rash or itching) 60 g 3     omeprazole (PRILOSEC) 20 MG DR capsule Take 20 mg by mouth daily as needed       rosuvastatin (CRESTOR) 20 MG tablet Take 1 tablet (20 mg) by mouth daily 90 tablet 3     sertraline (ZOLOFT) 50 MG tablet TAKE 1/2 TABLET (25 MG) BY MOUTH EVERY MORNING 45 tablet 1     Vitamin D, Cholecalciferol, 1000 units TABS Take 4,000 Units by mouth daily       blood glucose monitoring (ACCU-CHEK FASTCLIX) lancets 1 each 2 times daily 102 each 3     blood glucose monitoring (ACCU-CHEK GUIDE) test strip Use to test blood sugar 2 times daily or as directed. 100 strip 11     [DISCONTINUED] carvedilol (COREG) 12.5 MG tablet Take 1 tablet (12.5 mg) by mouth 2 times daily 180 tablet 3     No facility-administered encounter medications on file as of 6/27/2019.        Again, thank you for allowing me to participate in the care of your patient.      Sincerely,    DAVID WALKER MD     [facility]

## 2019-06-27 NOTE — PROGRESS NOTES
Subjective     Lashawn Reddy is a 48 year old female who presents to clinic today for the following health issues:    HPI   Chief Complaint   Patient presents with     Letter Request     need for stand up desk      Follow Up     medications                         Chief Complaint         The patient is a pleasant 48-year-old female who has a history of stress-induced cardiomyopathy with congestive heart failure.  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  She is requesting a stand-up desk at this time.  She notes that when she sits for an extended period of time she is increased edema in her legs and it is painful.  She is on a diuretic but this edema is specifically associated with her sitting and actively.  She notes that if she stands or is in a position where she can walk a little bit, the edema improved significantly.  This is somewhat contrary to what one would expect but I anticipate the additional muscle tone and activity helps with venous return.  She is wishing to have a face-to-face evaluation and a letter to her employer.  With respect to her diabetes, she notes that her blood sugars have been moderately controlled.  Her most recent glycosylated hemoglobin was in March and was 8.6.  She has followed up with her cardiologist regularly.  She continues to follow a low carbohydrate diet and is on the medications as listed.  She does not check her blood sugars every single day but tries to get it several times a week.                         PAST, FAMILY,SOCIAL HISTORY:     Medical  History:   has a past medical history of Depressive disorder, not elsewhere classified, Diabetes mellitus, type 2 (H) (6/6/2013), Diffuse cystic mastopathy, Tobacco use disorder, and Type 2 diabetes, HbA1c goal < 7% (H) (6/6/2013).     Surgical History:   has a past surgical history that includes APPENDECTOMY (04/14/89); DILATION/CURETTAGE DIAG/THER NON OB (1986); REMOVAL OF  TONSILS,<13 Y/O; ANESTH,LOWER LEG VEIN SURG,NOS (2002); Colonoscopy w/wo [place] **Performed** (06/17/2005); tubal ligation (08/04/2006); Colonoscopy (6/21/2013); Colonoscopy (N/A, 2/22/2017); Hysterectomy Total Abdominal, Salpingectomy (Bilateral, 9/12/2018); and Heart Catheterization with Possible Intervention (N/A, 3/5/2019).     Social History:   reports that she quit smoking about 10 years ago. Her smoking use included cigarettes. She quit after 16.00 years of use. She has never used smokeless tobacco. She reports that she does not drink alcohol or use drugs.     Family History:  family history includes Anesthesia Reaction in her mother; Arthritis in her sister; Cancer in her father, maternal aunt, maternal aunt, maternal grandmother, and sister; Cancer - colorectal in her father; Cerebrovascular Disease in her paternal grandfather and paternal grandmother; Gastrointestinal Disease in her mother and sister; Gynecology in her mother; Heart Disease in her maternal aunt; Hypertension in her father; Lipids in her father and mother.            MEDICATIONS  Current Outpatient Medications   Medication Sig Dispense Refill     aspirin (ASA) 81 MG EC tablet Take 1 tablet (81 mg) by mouth daily 90 tablet 3     blood glucose monitoring (ACCU-CHEK FASTCLIX) lancets 1 each 2 times daily 102 each 3     blood glucose monitoring (ACCU-CHEK GUIDE) test strip Use to test blood sugar 2 times daily or as directed. 100 strip 11     carvedilol (COREG) 12.5 MG tablet Take 1 tablet (12.5 mg) by mouth 2 times daily 180 tablet 3     glipiZIDE (GLUCOTROL XL) 10 MG 24 hr tablet TAKE 1 TABLET BY MOUTH EVERY DAY 90 tablet 3     hydrochlorothiazide (HYDRODIURIL) 25 MG tablet Take 1 tablet (25 mg) by mouth daily 30 tablet 3     losartan (COZAAR) 50 MG tablet Take 1 tablet (50 mg) by mouth daily 90 tablet 3     nystatin-triamcinolone (MYCOLOG II) 246130-7.1 UNIT/GM-% external cream Apply topically daily as needed (rash or itching) 60 g 3      omeprazole (PRILOSEC) 20 MG DR capsule Take 20 mg by mouth daily as needed       rosuvastatin (CRESTOR) 20 MG tablet Take 1 tablet (20 mg) by mouth daily 90 tablet 3     sertraline (ZOLOFT) 50 MG tablet TAKE 1/2 TABLET (25 MG) BY MOUTH EVERY MORNING 45 tablet 1     Vitamin D, Cholecalciferol, 1000 units TABS Take 4,000 Units by mouth daily           --------------------------------------------------------------------------------------------------------------------                              Review of Systems       LUNGS: Pt denies: cough, excess sputum, hemoptysis, or shortness of breath.   HEART: Pt denies: chest pain, arrhythmia, syncope, tachy or bradyarrhythmia.   GI: Pt denies: nausea, vomiting, diarrhea, constipation, melena, or hematochezia.   NEURO: Pt denies: seizures, strokes, diplopia, weakness, paraesthesias, or paralysis.   SKIN: Pt denies: itching, rashes, discoloration, or specific lesions of concern. Denies recent hair loss.   PSYCH: The patient denies significant depression, anxiety, mood imbalance. Specifically denies any suicidal ideation.                                     Examination    /84 (BP Location: Right arm, Patient Position: Sitting, Cuff Size: Adult Large)   Pulse 84   Temp 97.5  F (36.4  C) (Temporal)   Resp 18   Wt 115.4 kg (254 lb 8 oz)   LMP 09/03/2018   SpO2 97%   BMI 41.08 kg/m       Constitutional: The patient appears to be in no acute distress. The patient appears to be adequately hydrated. No acute respiratory or hemodynamic distress is noted at this time.   LUNGS: clear bilaterally, airflow is brisk, no intercostal retraction or stridor is noted. No coughing is noted during visit.   HEART:  regular without rubs, clicks, gallops, or murmurs. PMI is nondisplaced. Upstrokes are brisk. S1,S2 are heard.   GI: Abdomen is soft, without rebound, guarding or tenderness. Bowel sounds are appropriate. No renal bruits are heard.   NEURO: Pt is alert and appropriate. No  neurologic lateralization is noted. Cranial nerves 2-12 are intact. Peripheral sensory and motor function are grossly normal.    SKIN:  warm and dry. No erythema, or rashes are noted. No specific lesions of concern are noted.    PSYCH: The patient appears grossly appropriate. Maintains good eye contact, does not have any jittery or atypical motion. Displays appropriate affect.   Feet: no evidence of skin breakdown or ulceration is noted.  Sensation is intact to monofilament and vibration.  Pulses are strong, capillary refill is brisk.                                        Decision Making    1. Type 2 diabetes mellitus with hyperglycemia, without long-term current use of insulin (H)  Check A1c and renal function  - Hemoglobin A1c  - Basic metabolic panel  - FOOT EXAM    2. Stress-induced cardiomyopathy  Continue current medications including hydrochlorothiazide, carvedilol, losartan.                           FOLLOW UP   I have asked the patient to make an appointment for followup with me in 3 months or sooner predicated on lab results        I have carefully explained the diagnosis and treatment options to the patient.  The patient has displayed an understanding of the above, and all subsequent questions were answered.      DO BI Jacobo    Portions of this note were produced using myseekit  Although every attempt at real-time proof reading has been made, occasional grammar/syntax errors may have been missed.

## 2019-06-27 NOTE — LETTER
6/27/2019    Raoul Sherman MD  [address] [place] [address]    RE: Lashawn Reddy       Dear Colleague,    I had the pleasure of seeing Lashawn Reddy in the [facility].    HPI and Plan:   See dictation    Orders Placed This Encounter   Procedures     Follow-Up with Vascular Cardiologist       Orders Placed This Encounter   Medications     carvedilol (COREG) 12.5 MG tablet     Sig: Take 0.5 tablets (6.25 mg) by mouth 2 times daily     Dispense:  180 tablet     Refill:  3       Medications Discontinued During This Encounter   Medication Reason     carvedilol (COREG) 12.5 MG tablet          Encounter Diagnoses   Name Primary?     Stress-induced cardiomyopathy      Essential hypertension Yes     Mixed hyperlipidemia      Type 2 diabetes mellitus with hyperglycemia, without long-term current use of insulin (H)      Venous (peripheral) insufficiency        CURRENT MEDICATIONS:  Current Outpatient Medications   Medication Sig Dispense Refill     aspirin (ASA) 81 MG EC tablet Take 1 tablet (81 mg) by mouth daily 90 tablet 3     carvedilol (COREG) 12.5 MG tablet Take 0.5 tablets (6.25 mg) by mouth 2 times daily 180 tablet 3     glipiZIDE (GLUCOTROL XL) 10 MG 24 hr tablet TAKE 1 TABLET BY MOUTH EVERY DAY 90 tablet 3     hydrochlorothiazide (HYDRODIURIL) 25 MG tablet Take 1 tablet (25 mg) by mouth daily 30 tablet 3     losartan (COZAAR) 50 MG tablet Take 1 tablet (50 mg) by mouth daily 90 tablet 3     nystatin-triamcinolone (MYCOLOG II) 457201-5.1 UNIT/GM-% external cream Apply topically daily as needed (rash or itching) 60 g 3     omeprazole (PRILOSEC) 20 MG DR capsule Take 20 mg by mouth daily as needed       rosuvastatin (CRESTOR) 20 MG tablet Take 1 tablet (20 mg) by mouth daily 90 tablet 3     sertraline (ZOLOFT) 50 MG tablet TAKE 1/2 TABLET (25 MG) BY MOUTH EVERY MORNING 45 tablet 1     Vitamin D, Cholecalciferol, 1000 units TABS Take 4,000 Units by mouth daily        blood glucose monitoring (ACCU-CHEK FASTCLIX) lancets 1 each 2 times daily 102 each 3     blood glucose monitoring (ACCU-CHEK GUIDE) test strip Use to test blood sugar 2 times daily or as directed. 100 strip 11       ALLERGIES     Allergies   Allergen Reactions     No Known Drug Allergies        PAST MEDICAL HISTORY:  Past Medical History:   Diagnosis Date     Depressive disorder, not elsewhere classified     depression in the post partum period after her daughter     Diabetes mellitus, type 2 (H) 6/6/2013     Diffuse cystic mastopathy     fibrocystic breast disease     Tobacco use disorder     quit in 2008     Type 2 diabetes, HbA1c goal < 7% (H) 6/6/2013       PAST SURGICAL HISTORY:  Past Surgical History:   Procedure Laterality Date     C ANESTH,LOWER LEG VEIN SURG,NOS  2002    bilateral     C APPENDECTOMY  04/14/89     COLONOSCOPY  6/21/2013    Procedure: COLONOSCOPY;  colonoscopy;  Surgeon: Vamshi Loomis MD;  Location:  GI     COLONOSCOPY N/A 2/22/2017    Procedure: COLONOSCOPY;  Surgeon: Raoul Sage MD;  Location:  GI     CV HEART CATHETERIZATION WITH POSSIBLE INTERVENTION N/A 3/5/2019    Procedure: Heart Catheterization with possible Intervention;  Surgeon: Kirstin Lynn MD;  Location:  HEART CARDIAC CATH LAB     HC COLONOSCOPY W/WO BRUSH/WASH  06/17/2005     HC DILATION/CURETTAGE DIAG/THER NON OB  1986    after a miscarriage     HC REMOVAL OF TONSILS,<13 Y/O       HYSTERECTOMY TOTAL ABDOMINAL, SALPINGECTOMY Bilateral 9/12/2018    Procedure: HYSTERECTOMY TOTAL ABDOMINAL, SALPINGECTOMY;  Total Abdominal Hysterectomy, left Salpingectomy;  Surgeon: Temi Corado MD;  Location: [facility] OR     TUBAL LIGATION  08/04/2006       FAMILY HISTORY:  Family History   Problem Relation Age of Onset     Anesthesia Reaction Mother         Severe nausea     Gastrointestinal Disease Mother         Partial colon removal secondary to a blockage     Gynecology Mother         hysterectomy     Lipids  Mother      Lipids Father      Cancer - colorectal Father         dx Oct '03 (dx at age 57)     Cancer Father         skin     Hypertension Father      Cancer Maternal Grandmother         colon at age 68     Cerebrovascular Disease Paternal Grandfather      Cerebrovascular Disease Paternal Grandmother         brain aneurysm     Arthritis Sister         mainly affecting her legs     Gastrointestinal Disease Sister         2 sisters with colon polyps     Cancer Sister         cervical ca     Cancer Maternal Aunt         all over ? breast was the origin     Cancer Maternal Aunt         pancreatic     Heart Disease Maternal Aunt         MI 2010       SOCIAL HISTORY:  Social History     Socioeconomic History     Marital status:      Spouse name: Tim     Number of children: 2     Years of education: 12     Highest education level: Not on file   Occupational History     Occupation: labor   Social Needs     Financial resource strain: Not on file     Food insecurity:     Worry: Not on file     Inability: Not on file     Transportation needs:     Medical: Not on file     Non-medical: Not on file   Tobacco Use     Smoking status: Former Smoker     Years: 16.00     Types: Cigarettes     Last attempt to quit: 9/29/2008     Years since quitting: 10.7     Smokeless tobacco: Never Used   Substance and Sexual Activity     Alcohol use: No     Alcohol/week: 0.0 oz     Comment: couple drinks per year     Drug use: No     Sexual activity: Yes     Partners: Male     Birth control/protection: Surgical     Comment: tubal ligation   Lifestyle     Physical activity:     Days per week: Not on file     Minutes per session: Not on file     Stress: Not on file   Relationships     Social connections:     Talks on phone: Not on file     Gets together: Not on file     Attends Qu[employer] service: Not on file     Active member of club or organization: Not on file     Attends meetings of clubs or organizations: Not on file     Relationship  "status: Not on file     Intimate partner violence:     Fear of current or ex partner: Not on file     Emotionally abused: Not on file     Physically abused: Not on file     Forced sexual activity: Not on file   Other Topics Concern      Service No     Blood Transfusions No     Caffeine Concern No     Occupational Exposure No     Hobby Hazards No     Sleep Concern No     Stress Concern Yes     Comment: stress at home at work     Weight Concern Yes     Comment: has continued to gain weight     Special Diet Yes     Comment: trying to eat better     Back Care No     Exercise No     Comment: nothing in the past 3 months     Bike Helmet No     Comment: NA     Seat Belt Yes     Self-Exams Yes     Comment: sometimes     Parent/sibling w/ CABG, MI or angioplasty before 65F 55M? Not Asked   Social History Narrative     Not on file       Review of Systems:  Skin:  Negative       Eyes:  Positive for glasses    ENT:  Negative      Respiratory:  Negative       Cardiovascular:  Negative for;palpitations;chest pain;edema;lightheadedness;dizziness   still wears support stockings but since started HCTZ swelling is better   Gastroenterology: Negative      Genitourinary:  Negative      Musculoskeletal:  Negative      Neurologic:  Negative      Psychiatric:  Positive for      Heme/Lymph/Imm:  Negative      Endocrine:  Positive for diabetes      Physical Exam:  Vitals: /76 (BP Location: Left arm, Patient Position: Fowlers, Cuff Size: Adult Large)   Pulse 72   Ht 1.676 m (5' 6\")   Wt 116.3 kg (256 lb 8 oz)   LMP 09/03/2018   SpO2 97%   BMI 41.40 kg/m       Constitutional:  cooperative overweight      Skin:  warm and dry to the touch          Head:  normocephalic, no masses or lesions        Eyes:  pupils equal and round;conjunctivae and lids unremarkable        Lymph:      ENT:  dentition good        Neck:  no stiffness        Respiratory:  clear to auscultation;normal symmetry         Cardiac: regular rhythm;normal S1 " and S2     no presence of murmur                                                   GI:    obese      Extremities and Muscular Skeletal:        RLE edema;telangiectasia;varicose vein   RLE scar from previous ulcer    Neurological:           Psych:  Alert and Oriented x 3        CC  KRISTI Gray CNP  [facility]  [address]  [place] [address]                Thank you for allowing me to participate in the care of your patient.      Sincerely,     DAVID WALKER MD     [facility]    cc:   KRISTI Gray CNP  [facility]  [address]  [place] [address]

## 2019-06-27 NOTE — LETTER
[facility]  [address] [facility] [address]  Phone: [phone]    June 27, 2019        Lashawn Reddy  [address] [facility] [address]          To whom it may concern:    RE: Lashawn NITA Barry    The above patient has a medical condition which would improve with the use of a stand-up desk.  Please allow her to have one for her office use as soon as one can become available.  Thank you for your consideration in time.    Please contact me for questions or concerns.      Sincerely,        Venkata Jaramillo, DO

## 2019-07-02 NOTE — RESULT ENCOUNTER NOTE
Dear Lashawn, your recent test results are attached.  Blood sugar is elevated at 230.  The kidney tests are normal.  The hemoglobin A1c is stable at 8.6 which is still somewhat elevated.    Feel free to contact me via the office or My Chart if you have any questions regarding the above.  Sincerely,  Venkata Jaramillo,  FACOI

## 2019-07-25 ENCOUNTER — ALLIED HEALTH/NURSE VISIT (OUTPATIENT)
Dept: EDUCATION SERVICES | Facility: CLINIC | Age: 49
End: 2019-07-25
Payer: COMMERCIAL

## 2019-07-25 DIAGNOSIS — E11.65 TYPE 2 DIABETES MELLITUS WITH HYPERGLYCEMIA, WITHOUT LONG-TERM CURRENT USE OF INSULIN (H): Primary | ICD-10-CM

## 2019-07-25 PROCEDURE — G0108 DIAB MANAGE TRN  PER INDIV: HCPCS

## 2019-07-25 NOTE — PROGRESS NOTES
"Diabetes Self-Management Education & Support    Diabetes Education Self Management & Training    SUBJECTIVE/OBJECTIVE:  Diabetes education in the past 24mo: Yes  Diabetes type: Type 2  Disease course: Getting harder to manage  Diabetes management related comments/concerns: no  Cultural Influences/Ethnic Background:  American      Diabetes Symptoms & Complications  Blurred vision: Yes  Fatigue: Yes  Foot ulcerations: No  Polydipsia: Yes  Polyphagia: No  Polyuria: Yes  Visual change: Yes  Weakness: No  Weight loss: No  Slow healing wounds: No  Weight trend: Stable  Autonomic neuropathy: No  CVA: No  Heart disease: No  Nephropathy: No  Peripheral neuropathy: No  Peripheral Vascular Disease: Yes  Retinopathy: No  Sexual dysfunction: No    Patient Problem List and Family Medical History reviewed for relevant medical history, current medical status, and diabetes risk factors.    Vitals:  LMP 09/03/2018   Estimated body mass index is 41.4 kg/m  as calculated from the following:    Height as of 6/27/19: 1.676 m (5' 6\").    Weight as of 6/27/19: 116.3 kg (256 lb 8 oz).   Last 3 BP:   BP Readings from Last 3 Encounters:   06/27/19 110/76   06/27/19 138/84   04/23/19 144/90       History   Smoking Status     Former Smoker     Years: 16.00     Types: Cigarettes     Quit date: 9/29/2008   Smokeless Tobacco     Never Used       Labs:  Lab Results   Component Value Date    A1C 8.6 06/27/2019     Lab Results   Component Value Date     06/27/2019     Lab Results   Component Value Date    LDL 40 03/05/2019     HDL Cholesterol   Date Value Ref Range Status   03/05/2019 47 (L) >49 mg/dL Final   ]  GFR Estimate   Date Value Ref Range Status   06/27/2019 79 >60 mL/min/[1.73_m2] Final     Comment:     Non  GFR Calc  Starting 12/18/2018, serum creatinine based estimated GFR (eGFR) will be   calculated using the Chronic Kidney Disease Epidemiology Collaboration   (CKD-EPI) equation.       GFR Estimate If Black   Date " Value Ref Range Status   2019 >90 >60 mL/min/[1.73_m2] Final     Comment:      GFR Calc  Starting 2018, serum creatinine based estimated GFR (eGFR) will be   calculated using the Chronic Kidney Disease Epidemiology Collaboration   (CKD-EPI) equation.       Lab Results   Component Value Date    CR 0.86 2019     No results found for: MICROALBUMIN    Healthy Eating  Cultural/Restoration diet restrictions?: No  Meal planning: Avoiding sweets, Heart healthy, Low salt, Smaller portions  Meals include: Breakfast, Lunch, Dinner, Snacks  B: Activia drink, and maybe Cheerios with 1%,  Or maybe hard boiled egg  L: 1:30 pm - often salad at work; yesterday just chicken breast  Snack celery with peanut butter  D: gets late and varies, usually a meat, not always starch; not much bread; usually salad  Sometimes popcorn or peanuts for snack.    Beverages: Water, Diet soda 1/day   Has patient met with a dietitian in the past?: No    Being Active  How intense was your typical exercise? : Moderate (like brisk walking)  Barrier to exercise: Time    Monitoring  Blood Glucose Meter: Accu-check  Home Glucose (Sugar) Monitorin-2 times per day  Blood glucose trend: Fluctuating minimally  Low Glucose Range (mg/dL): 180-200  High Glucose Range (mg/dL): >200  Overall Range (mg/dL): 180-200      Taking Medications  Diabetes Medication(s)     Sulfonylureas       glipiZIDE (GLUCOTROL XL) 10 MG 24 hr tablet    TAKE 1 TABLET BY MOUTH EVERY DAY          Current Treatments: Diet, Oral Agent (monotherapy)    Problem Solving  Hypoglycemia Frequency: Rarely  Medical alert: No  Severe weather/disaster plan for diabetes management?: No  DKA prevention plan?: No  Sick day plan for diabetes management?: (P) No    Hypoglycemia symptoms  Confusion: No  Dizziness or Light-Headedness: No  Headaches: No  Hunger: No  Mood changes: No  Nervousness/Anxiety: No  Sleepiness: No  Speech difficulty: No  Sweats: No  Tremors:  No    Hypoglycemia Complications  Blackouts: No  Hospitalization: No  Nocturnal hypoglycemia: No  Required assistance: No  Required glucagon injection: No  Seizures: No    Reducing Risks  CAD Risks: Family history, Obesity  Has dilated eye exam at least once a year?: Yes  Sees dentist every 6 months?: No  Sees podiatrist (foot doctor)?: No    Healthy Coping  Informal Support system:: Children, Family, Friends, Parent, Partner, Spouse  Difficulty affording diabetes management supplies?: No  Patient Activation Measure Survey Score:  MARIO ALBERTO Score (Last Two) 2/1/2011   MARIO ALBERTO Raw Score 39   Activation Score 56.4   MARIO ALBERTO Level 3       ASSESSMENT:  Off metformin - made her sick. Blood sugars higher in morning. Works a lot, from 8-4:30pm and hours before and after. BG averaging mid-200s. Feels she needs a different medication and I would agree that an SGLT2i or GLP-1 would be beneficial.       Patient's most recent   Lab Results   Component Value Date    A1C 8.6 06/27/2019    is not meeting goal of <7.0    INTERVENTION:   Diabetes knowledge and skills assessment:     Patient is knowledgeable in diabetes management concepts related to: Healthy Eating, Being Active, Monitoring, Reducing Risks and Healthy Coping    Patient needs further education on the following diabetes management concepts: Taking Medication and Problem Solving    Based on learning assessment above, most appropriate setting for further diabetes education would be: Individual setting.    Education provided today on:  AADE Self-Care Behaviors:  Taking Medication: action of prescribed medication, side effects of prescribed medications and when to take medications  Reducing Risks: appropriate dental care, annual eye exam, A1C - goals, relating to blood glucose levels, how often to check and lipids levels and goals    Opportunities for ongoing education and support in diabetes-self management were discussed.    Pt verbalized understanding of concepts discussed and  recommendations provided today.       Education Materials Provided:  [facility] Taking Charge of Your Diabetes Book    PLAN:  Will check with Dr Jaramillo on Jardiance or Ozempic and instruct patient further.     Dominga Oliveira RDN, NARCISO, CDE    Time Spent: 60 minutes  Encounter Type: Individual    Any diabetes medication dose changes were made via the CDE Protocol and Collaborative Practice Agreement with the patient's primary care provider. A copy of this encounter was shared with the provider.

## 2019-08-06 ENCOUNTER — TELEPHONE (OUTPATIENT)
Dept: FAMILY MEDICINE | Facility: CLINIC | Age: 49
End: 2019-08-06

## 2019-08-06 NOTE — TELEPHONE ENCOUNTER
Reason for Call:  Other patient calls to follow up on diabetic education appointment.     Detailed comments: Lashawn was expecting to hear back from someone regarding her medication plan for diabetes.  Notes from 7/25/19 visit: PLAN:  Will check with Dr Jaramillo on Jardiance or Ozempic and instruct patient further.      Dominga Oliveira RDN, LD, CDE     Time Spent: 60 minutes  Encounter Type: Individual     Any diabetes medication dose changes were made via the CDE Protocol and Collaborative Practice Agreement with the patient's primary care provider. A copy of this encounter was shared with the provider.    Phone Number Patient can be reached at: Work number on file:  [phone] (work) ext [phone], ok for message.    Best Time: before 3:30    Can we leave a detailed message on this number? YES    Call taken on 8/6/2019 at 12:25 PM by Sara Baires

## 2019-08-07 ENCOUNTER — PATIENT OUTREACH (OUTPATIENT)
Dept: EDUCATION SERVICES | Facility: CLINIC | Age: 49
End: 2019-08-07

## 2019-08-07 DIAGNOSIS — E11.65 TYPE 2 DIABETES MELLITUS WITH HYPERGLYCEMIA, WITHOUT LONG-TERM CURRENT USE OF INSULIN (H): Primary | ICD-10-CM

## 2019-08-07 NOTE — TELEPHONE ENCOUNTER
The medication has been called to the patient's pharmacy.  I would like to see her in the upcoming couple weeks to evaluate her progress.    Clint

## 2019-08-07 NOTE — PROGRESS NOTES
Spoke with patient to review instructions regarding Jardiance, including drinking plenty of water and need to check potassium and creatinine about 2 weeks after starting.     Dominga Oliveira RDN, LD, CDE

## 2019-08-20 DIAGNOSIS — I10 HTN (HYPERTENSION): ICD-10-CM

## 2019-08-20 RX ORDER — HYDROCHLOROTHIAZIDE 25 MG/1
25 TABLET ORAL DAILY
Qty: 90 TABLET | Refills: 3 | Status: SHIPPED | OUTPATIENT
Start: 2019-08-20 | End: 2020-06-15

## 2019-08-22 DIAGNOSIS — E11.65 TYPE 2 DIABETES MELLITUS WITH HYPERGLYCEMIA, WITHOUT LONG-TERM CURRENT USE OF INSULIN (H): ICD-10-CM

## 2019-08-22 RX ORDER — EMPAGLIFLOZIN 10 MG/1
TABLET, FILM COATED ORAL
Qty: 30 TABLET | Refills: 3 | Status: SHIPPED | OUTPATIENT
Start: 2019-08-22 | End: 2019-12-18

## 2019-08-22 NOTE — TELEPHONE ENCOUNTER
Prescription approved per [facility] [facility].    TARAN RodasN, RN  [facility]

## 2019-08-22 NOTE — TELEPHONE ENCOUNTER
Requested Prescriptions   Pending Prescriptions Disp Refills     JARDIANCE 10 MG TABS tablet [Pharmacy Med Name: empagliflozin (JARDIANCE) 10 MG TABS tablet] 30 tablet 0     Sig: TAKE 1 TABLET BY MOUTH EVERY DAY   Last Written Prescription Date:  8/7/19  Last Fill Quantity: 30,  # refills: 0   Last office visit: 7/25/2019 with prescribing provider:  6/27/19   Future Office Visit:   Next 5 appointments (look out 90 days)    Aug 26, 2019  7:00 AM CDT  [facility] [address] [facility] [address] [phone]           Sodium Glucose Co-Transport Inhibitor Agents Passed - 8/22/2019  1:05 AM        Passed - Blood pressure less than 140/90 in past 6 months     BP Readings from Last 3 Encounters:   06/27/19 110/76   06/27/19 138/84   04/23/19 144/90                 Passed - Patient has documented LDL within the past 12 mos.     Recent Labs   Lab Test 03/05/19  0510   LDL 40             Passed - Patient has had a Microalbumin in the past 15 mos.     Recent Labs   Lab Test 10/25/18  1143   MICROL 21   UMALCR 8.97             Passed - Patient has documented A1c within the specified period of time.     If HgbA1C is 8 or greater, it needs to be on file within the past 3 months.  If less than 8, must be on file within the past 6 months.     Recent Labs   Lab Test 06/27/19  0749   A1C 8.6*             Passed - No creatinine >1.4 or GFR <45 within the past 12 mos     Recent Labs   Lab Test 06/27/19  0749   GFRESTIMATED 79   GFRESTBLACK >90       Recent Labs   Lab Test 06/27/19  0749   CR 0.86             Passed - Medication is active on med list        Passed - Patient is age 18 or older        Passed - Patient is not pregnant        Passed - Patient has documented normal Potassium within the last 12 mos.     Recent Labs   Lab Test 06/27/19  0749   POTASSIUM 4.0             Passed - Patient has no positive pregnancy test within the  "past 12 mos.        Passed - Recent (6 mo) or future (30 days) visit within the authorizing provider's specialty     Patient had office visit in the last 6 months or has a visit in the next 30 days with authorizing provider or within the authorizing provider's specialty.  See \"Patient Info\" tab in inbasket, or \"Choose Columns\" in Meds & Orders section of the refill encounter.            "

## 2019-08-26 ENCOUNTER — OFFICE VISIT (OUTPATIENT)
Dept: FAMILY MEDICINE | Facility: OTHER | Age: 49
End: 2019-08-26
Payer: COMMERCIAL

## 2019-08-26 VITALS
SYSTOLIC BLOOD PRESSURE: 126 MMHG | TEMPERATURE: 96.9 F | RESPIRATION RATE: 16 BRPM | WEIGHT: 250 LBS | OXYGEN SATURATION: 98 % | HEART RATE: 78 BPM | BODY MASS INDEX: 40.35 KG/M2 | DIASTOLIC BLOOD PRESSURE: 74 MMHG

## 2019-08-26 DIAGNOSIS — E78.2 MIXED HYPERLIPIDEMIA: ICD-10-CM

## 2019-08-26 DIAGNOSIS — E11.65 TYPE 2 DIABETES MELLITUS WITH HYPERGLYCEMIA, WITHOUT LONG-TERM CURRENT USE OF INSULIN (H): Primary | ICD-10-CM

## 2019-08-26 DIAGNOSIS — I10 ESSENTIAL HYPERTENSION: ICD-10-CM

## 2019-08-26 DIAGNOSIS — Z90.710 S/P HYSTERECTOMY: ICD-10-CM

## 2019-08-26 DIAGNOSIS — Z12.31 ENCOUNTER FOR SCREENING MAMMOGRAM FOR BREAST CANCER: ICD-10-CM

## 2019-08-26 LAB
ALBUMIN SERPL-MCNC: 3.6 G/DL (ref 3.4–5)
ALP SERPL-CCNC: 108 U/L (ref 40–150)
ALT SERPL W P-5'-P-CCNC: 25 U/L (ref 0–50)
ANION GAP SERPL CALCULATED.3IONS-SCNC: 8 MMOL/L (ref 3–14)
AST SERPL W P-5'-P-CCNC: 13 U/L (ref 0–45)
BILIRUB SERPL-MCNC: 0.4 MG/DL (ref 0.2–1.3)
BUN SERPL-MCNC: 25 MG/DL (ref 7–30)
CALCIUM SERPL-MCNC: 8.7 MG/DL (ref 8.5–10.1)
CHLORIDE SERPL-SCNC: 100 MMOL/L (ref 94–109)
CO2 SERPL-SCNC: 28 MMOL/L (ref 20–32)
CREAT SERPL-MCNC: 0.88 MG/DL (ref 0.52–1.04)
GFR SERPL CREATININE-BSD FRML MDRD: 77 ML/MIN/{1.73_M2}
GLUCOSE SERPL-MCNC: 278 MG/DL (ref 70–99)
HBA1C MFR BLD: 9.1 % (ref 0–5.6)
POTASSIUM SERPL-SCNC: 3.4 MMOL/L (ref 3.4–5.3)
PROT SERPL-MCNC: 7.4 G/DL (ref 6.8–8.8)
SODIUM SERPL-SCNC: 136 MMOL/L (ref 133–144)

## 2019-08-26 PROCEDURE — 83036 HEMOGLOBIN GLYCOSYLATED A1C: CPT | Performed by: INTERNAL MEDICINE

## 2019-08-26 PROCEDURE — 80053 COMPREHEN METABOLIC PANEL: CPT | Performed by: INTERNAL MEDICINE

## 2019-08-26 PROCEDURE — 99214 OFFICE O/P EST MOD 30 MIN: CPT | Performed by: INTERNAL MEDICINE

## 2019-08-26 PROCEDURE — 36415 COLL VENOUS BLD VENIPUNCTURE: CPT | Performed by: INTERNAL MEDICINE

## 2019-08-26 ASSESSMENT — PAIN SCALES - GENERAL: PAINLEVEL: NO PAIN (0)

## 2019-08-26 NOTE — PROGRESS NOTES
Subjective     Lashawn Reddy is a 48 year old female who presents to clinic today for the following health issues:    HPI   Diabetes Follow-up      How often are you checking your blood sugar? One time daily    What time of day are you checking your blood sugars (select all that apply)?  Before and after meals    Have you had any blood sugars above 200?  Yes     Have you had any blood sugars below 70?  No    What symptoms do you notice when your blood sugar is low?  None    What concerns do you have today about your diabetes? Other: high sugars      Do you have any of these symptoms? (Select all that apply)  Numbness in feet     Have you had a diabetic eye exam in the last 12 months? Yes- Date of last eye exam: 10/19    BP Readings from Last 2 Encounters:   08/26/19 126/74   06/27/19 110/76     Hemoglobin A1C (%)   Date Value   06/27/2019 8.6 (H)   03/04/2019 8.6 (H)     LDL Cholesterol Calculated (mg/dL)   Date Value   03/05/2019 40   10/25/2018 69       Diabetes Management Resources                      Chief Complaint         The patient is a pleasant 48-year-old female who presents today for follow-up of her diabetes.  She notes that she is checking her blood sugars possibly once daily and most values are near 200.  She has [employer] diabetic education and is now currently on Jardiance.  She is no longer taking the sulfonylurea and she is intolerant to metformin.  She denies polyuria or polydipsia.  She notes that she is much more physically active, she is lost approximately 17 pounds this summer and is watching her diet much more aggressively.  She is appropriately on a statin, aspirin, ARB.  Her depression is doing fairly well with the Zoloft and she is tolerating without side effects.  She notes that following her hysterectomy, she is doing much better overall.  Blood pressure today is controlled.  She denies any headaches, chest pain or other cardiac symptoms at this time.  Does have a history of some  congestive heart failure which is now controlled with the use of hydrochlorothiazide and Coreg.                         PAST, FAMILY,SOCIAL HISTORY:     Medical  History:   has a past medical history of Depressive disorder, not elsewhere classified, Diabetes mellitus, type 2 (H) (6/6/2013), Diffuse cystic mastopathy, Tobacco use disorder, and Type 2 diabetes, HbA1c goal < 7% (H) (6/6/2013).     Surgical History:   has a past surgical history that includes APPENDECTOMY (04/14/89); DILATION/CURETTAGE DIAG/THER NON OB (1986); REMOVAL OF TONSILS,<13 Y/O; ANESTH,LOWER LEG VEIN SURG,NOS (2002); Colonoscopy w/wo Bradley **Performed** (06/17/2005); tubal ligation (08/04/2006); Colonoscopy (6/21/2013); Colonoscopy (N/A, 2/22/2017); Hysterectomy Total Abdominal, Salpingectomy (Bilateral, 9/12/2018); and Heart Catheterization with Possible Intervention (N/A, 3/5/2019).     Social History:   reports that she quit smoking about 10 years ago. Her smoking use included cigarettes. She quit after 16.00 years of use. She has never used smokeless tobacco. She reports that she does not drink alcohol or use drugs.     Family History:  family history includes Anesthesia Reaction in her mother; Arthritis in her sister; Cancer in her father, maternal aunt, maternal aunt, maternal grandmother, and sister; Cancer - colorectal in her father; Cerebrovascular Disease in her paternal grandfather and paternal grandmother; Gastrointestinal Disease in her mother and sister; Gynecology in her mother; Heart Disease in her maternal aunt; Hypertension in her father; Lipids in her father and mother.            MEDICATIONS  Current Outpatient Medications   Medication Sig Dispense Refill     aspirin (ASA) 81 MG EC tablet Take 1 tablet (81 mg) by mouth daily 90 tablet 3     blood glucose monitoring (ACCU-CHEK FASTCLIX) lancets 1 each 2 times daily 102 each 3     blood glucose monitoring (ACCU-CHEK GUIDE) test strip Use to test blood sugar 2 times daily or as  directed. 100 strip 11     carvedilol (COREG) 12.5 MG tablet Take 0.5 tablets (6.25 mg) by mouth 2 times daily 180 tablet 3     hydrochlorothiazide (HYDRODIURIL) 25 MG tablet Take 1 tablet (25 mg) by mouth daily 90 tablet 3     JARDIANCE 10 MG TABS tablet TAKE 1 TABLET BY MOUTH EVERY DAY 30 tablet 3     losartan (COZAAR) 50 MG tablet Take 1 tablet (50 mg) by mouth daily 90 tablet 3     nystatin-triamcinolone (MYCOLOG II) 527055-5.1 UNIT/GM-% external cream Apply topically daily as needed (rash or itching) 60 g 3     omeprazole (PRILOSEC) 20 MG DR capsule Take 20 mg by mouth daily as needed       rosuvastatin (CRESTOR) 20 MG tablet Take 1 tablet (20 mg) by mouth daily 90 tablet 3     sertraline (ZOLOFT) 50 MG tablet TAKE 1/2 TABLET (25 MG) BY MOUTH EVERY MORNING 45 tablet 1     Vitamin D, Cholecalciferol, 1000 units TABS Take 4,000 Units by mouth daily           --------------------------------------------------------------------------------------------------------------------                              Review of Systems       LUNGS: Pt denies: cough, excess sputum, hemoptysis, or shortness of breath.   HEART: Pt denies: chest pain, arrhythmia, syncope, tachy or bradyarrhythmia.   GI: Pt denies: nausea, vomiting, diarrhea, constipation, melena, or hematochezia.   NEURO: Pt denies: seizures, strokes, diplopia, weakness, paraesthesias, or paralysis.   SKIN: Pt denies: itching, rashes, discoloration, or specific lesions of concern. Denies recent hair loss.   PSYCH: The patient denies significant depression, anxiety, mood imbalance. Specifically denies any suicidal ideation.                                     Examination    /74 (BP Location: Left arm, Patient Position: Sitting, Cuff Size: Adult Large)   Pulse 78   Temp 96.9  F (36.1  C) (Temporal)   Resp 16   Wt 113.4 kg (250 lb)   LMP 09/03/2018   SpO2 98%   BMI 40.35 kg/m       Constitutional: The patient appears to be in no acute distress. The patient  appears to be adequately hydrated. No acute respiratory or hemodynamic distress is noted at this time.   LUNGS: clear bilaterally, airflow is brisk, no intercostal retraction or stridor is noted. No coughing is noted during visit.   HEART:  regular without rubs, clicks, gallops, or murmurs. PMI is nondisplaced. Upstrokes are brisk. S1,S2 are heard.   GI: Abdomen is soft, without rebound, guarding or tenderness. Bowel sounds are appropriate. No renal bruits are heard.   NEURO: Pt is alert and appropriate. No neurologic lateralization is noted. Cranial nerves 2-12 are intact. Peripheral sensory and motor function are grossly normal.    SKIN:  warm and dry. No erythema, or rashes are noted. No specific lesions of concern are noted.    PSYCH: The patient appears grossly appropriate. Maintains good eye contact, does not have any jittery or atypical motion. Displays appropriate affect.                                           Decision Making  1. Type 2 diabetes mellitus with hyperglycemia, without long-term current use of insulin (H)  Check A1c and elect lites as well as renal function  - Hemoglobin A1c  - Comprehensive metabolic panel    2. Encounter for screening mammogram for breast cancer  Patient to schedule mammogram this fall  - *MA Screening Digital Bilateral; Future    3. S/P hysterectomy  Doing well postoperatively    4. Essential hypertension  Controlled    5. Mixed hyperlipidemia  Continue statin                             FOLLOW UP   I have asked the patient to make an appointment for followup with me in 4 months or sooner as predicated by lab results        I have carefully explained the diagnosis and treatment options to the patient.  The patient has displayed an understanding of the above, and all subsequent questions were answered.      DO BI Jacobo    Portions of this note were produced using [employer]  Although every attempt at real-time proof reading has been made, occasional  grammar/syntax errors may have been missed.

## 2019-08-27 ENCOUNTER — TELEPHONE (OUTPATIENT)
Dept: FAMILY MEDICINE | Facility: CLINIC | Age: 49
End: 2019-08-27

## 2019-08-27 ENCOUNTER — DOCUMENTATION ONLY (OUTPATIENT)
Dept: FAMILY MEDICINE | Facility: OTHER | Age: 49
End: 2019-08-27

## 2019-08-27 NOTE — RESULT ENCOUNTER NOTE
Dear Lashawn, your recent test results are attached.  The chemistry panel demonstrates elevated glucose of 278.  The kidney function is normal.  Liver function is good as well.  Hemoglobin A1c has gone up from 8.6 up to 9.1 over the last 2 months.  This is a significant worsening.  Please set up an appointment to follow-up regarding improved diabetic management.    Feel free to contact me via the office or My Chart if you have any questions regarding the above.  Sincerely,  Venkata Jaramillo DO [facility]

## 2019-09-06 ENCOUNTER — OFFICE VISIT (OUTPATIENT)
Dept: CARDIOLOGY | Facility: CLINIC | Age: 49
End: 2019-09-06
Payer: COMMERCIAL

## 2019-09-06 VITALS
HEART RATE: 78 BPM | WEIGHT: 251 LBS | OXYGEN SATURATION: 97 % | DIASTOLIC BLOOD PRESSURE: 88 MMHG | HEIGHT: 66 IN | SYSTOLIC BLOOD PRESSURE: 122 MMHG | BODY MASS INDEX: 40.34 KG/M2

## 2019-09-06 DIAGNOSIS — I83.013 VENOUS ULCER OF ANKLE, RIGHT (H): Primary | ICD-10-CM

## 2019-09-06 DIAGNOSIS — L97.319 VENOUS ULCER OF ANKLE, RIGHT (H): Primary | ICD-10-CM

## 2019-09-06 DIAGNOSIS — I87.2 VENOUS (PERIPHERAL) INSUFFICIENCY: ICD-10-CM

## 2019-09-06 PROCEDURE — 99215 OFFICE O/P EST HI 40 MIN: CPT | Performed by: INTERNAL MEDICINE

## 2019-09-06 ASSESSMENT — MIFFLIN-ST. JEOR: SCORE: 1780.28

## 2019-09-06 NOTE — PROGRESS NOTES
Vascular Cardiology Consultation      Lashawn Reddy MRN# [id number]   YOB: 1970 Age: 48 year old   Date of Visit 09/06/2019     Reason for consult:  Venous ulcer           Assessment and Plan:     1. Right lower extremity venous ulcer in the setting of deep vein and superficial vein incompetence, worsening telangiectasias and edema    Patient has been wearing compression stockings but still evidence of progressive disease.    Elevated risk of future ulcers and bleeding.    Repeat venous competency testing and plan for RF ablation of right greater saphenous vein.  She understands that there will still be residual deep vein reflux so the symptoms may improve but not completely resolve.  However, with the worsening lower extremity signs and symptoms it is reasonable to try to reduce the total amount of reflux by addressing the superficial vein system.      2. History of previous right lower extremity DVT after trauma    Appropriately completed anticoagulation.      This note was transcribed using electronic voice recognition software and may contain typographical errors.             Chief Complaint:   New Patient (Nonobstructive CAD, stress cardiomyopathy, pulmonary edema, HTN)           History of Present Illness:   This patient is a very pleasant 48 year old female with history of previous right lower extremity DVT after trauma in 4092-1396, appropriately anticoagulated for 6 months with resolution of thrombus.    Venous competency testing 2015 showed significant greater saphenous vein reflux as well as deep vein reflux in the order of 2 to 3 seconds per segment.  The patient has noticed worsening telangiectasias and leg discomfort as well as edema.  The edema improved somewhat with the addition of hydrochlorothiazide.  Is still present.  She continues to wear compression stockings.    She had right lower extremity venous ulcer and bleeding varicosity previously.  Currently healed but in jeopardy of  "future bleeding due to the venous pressure and reflux.    History of ulcer: Yes  History of edema: Yes  History of compression stockings: Yes  History of leg discomfort requiring analgesics: Yes leg discomfort  History of itching, skin change or restless legs: Yes skin change         Physical Exam:       Vitals: /88 (BP Location: Right arm, Patient Position: Sitting, Cuff Size: Adult Regular)   Pulse 78   Ht 1.676 m (5' 6\")   Wt 113.9 kg (251 lb)   LMP 09/03/2018   SpO2 97%   BMI 40.51 kg/m    Constitutional:  cooperative, alert and oriented, well developed, well nourished, in no acute distress overweight      Skin:  warm and dry to the touch        Head:  normocephalic, no masses or lesions        Eyes:  pupils equal and round;conjunctivae and lids unremarkable        ENT:  dentition good        Neck:  JVP normal        Chest:  clear to auscultation;normal symmetry        Cardiac: regular rhythm;normal S1 and S2     no presence of murmur            Abdomen:    obese      Extremities and Back:      RLE scar from previous ulcer, extensive telangiectasias right lower extremity with venous varicosities.    Neurological:       Nonfocal                  Past Medical History:   I have reviewed this patient's past medical history  Past Medical History:   Diagnosis Date     Anemia 11/15/2018     CAD (coronary artery disease)     Nonobstructive per angiogram 3/2019     Depressive disorder, not elsewhere classified     depression in the post partum period after her daughter     Diabetes mellitus, type 2 (H) 6/6/2013     Diffuse cystic mastopathy     fibrocystic breast disease     Former smoker      Lymphedema of right lower extremity      Peripheral venous insufficiency      Personal history of DVT (deep vein thrombosis) 8/30/2018     S/P hysterectomy 9/12/2018     Sigmoid diverticulitis 11/15/2018     Tobacco use disorder     quit in 2008     Type 2 diabetes, HbA1c goal < 7% (H) 6/6/2013     Varicose veins of " lower extremities with complications 5/13/2005     Problem list name updated by automated process. Provider to review             Past Surgical History:   I have reviewed this patient's past surgical history  Past Surgical History:   Procedure Laterality Date     C ANESTH,LOWER LEG VEIN SURG,NOS  2002    bilateral     C APPENDECTOMY  04/14/89     COLONOSCOPY  6/21/2013    Procedure: COLONOSCOPY;  colonoscopy;  Surgeon: Vamshi Loomis MD;  Location: [facility]     COLONOSCOPY N/A 2/22/2017    Procedure: COLONOSCOPY;  Surgeon: Raoul Sage MD;  Location: [facility]     CV HEART CATHETERIZATION WITH POSSIBLE INTERVENTION N/A 3/5/2019    Procedure: Heart Catheterization with possible Intervention;  Surgeon: Kirstin Lynn MD;  Location:  [facility]ATH [facility]     HC COLONOSCOPY W/WO BRUSH/WASH  06/17/2005     HC DILATION/CURETTAGE DIAG/THER NON OB  1986    after a miscarriage     HC REMOVAL OF TONSILS,<11 Y/O       HYSTERECTOMY TOTAL ABDOMINAL, SALPINGECTOMY Bilateral 9/12/2018    Procedure: HYSTERECTOMY TOTAL ABDOMINAL, SALPINGECTOMY;  Total Abdominal Hysterectomy, left Salpingectomy;  Surgeon: Temi Corado MD;  Location: [facility]     TUBAL LIGATION  08/04/2006               Social History:   I have reviewed this patient's social history  Social History     Tobacco Use     Smoking status: Former Smoker     Years: 16.00     Types: Cigarettes     Last attempt to quit: 9/29/2008     Years since quitting: 10.9     Smokeless tobacco: Never Used   Substance Use Topics     Alcohol use: Yes     Alcohol/week: 0.0 oz     Comment: couple drinks per year             Family History:   I have reviewed this patient's family history  Family History   Problem Relation Age of Onset     Anesthesia Reaction Mother         Severe nausea     Gastrointestinal Disease Mother         Partial colon removal secondary to a blockage     Gynecology Mother         hysterectomy     Lipids Mother      Lipids Father      Cancer -  colorectal Father         dx Oct '03 (dx at age 57)     Cancer Father         skin     Hypertension Father      Cancer Maternal Grandmother         colon at age 68     Cerebrovascular Disease Paternal Grandfather      Cerebrovascular Disease Paternal Grandmother         brain aneurysm     Arthritis Sister         mainly affecting her legs     Gastrointestinal Disease Sister         2 sisters with colon polyps     Cancer Sister         cervical ca     Cancer Maternal Aunt         all over ? breast was the origin     Cancer Maternal Aunt         pancreatic     Heart Disease Maternal Aunt         MI 2010             Allergies:     Allergies   Allergen Reactions     No Known Drug Allergies              Medications:   I have reviewed this patient's current medications  Current Outpatient Medications   Medication Sig Dispense Refill     aspirin (ASA) 81 MG EC tablet Take 1 tablet (81 mg) by mouth daily 90 tablet 3     blood glucose monitoring (ACCU-CHEK FASTCLIX) lancets 1 each 2 times daily 102 each 3     blood glucose monitoring (ACCU-CHEK GUIDE) test strip Use to test blood sugar 2 times daily or as directed. 100 strip 11     carvedilol (COREG) 12.5 MG tablet Take 0.5 tablets (6.25 mg) by mouth 2 times daily 180 tablet 3     hydrochlorothiazide (HYDRODIURIL) 25 MG tablet Take 1 tablet (25 mg) by mouth daily 90 tablet 3     JARDIANCE 10 MG TABS tablet TAKE 1 TABLET BY MOUTH EVERY DAY 30 tablet 3     losartan (COZAAR) 50 MG tablet Take 1 tablet (50 mg) by mouth daily 90 tablet 3     nystatin-triamcinolone (MYCOLOG II) 898756-0.1 UNIT/GM-% external cream Apply topically daily as needed (rash or itching) 60 g 3     omeprazole (PRILOSEC) 20 MG DR capsule Take 20 mg by mouth daily as needed       rosuvastatin (CRESTOR) 20 MG tablet Take 1 tablet (20 mg) by mouth daily 90 tablet 3     sertraline (ZOLOFT) 50 MG tablet TAKE 1/2 TABLET (25 MG) BY MOUTH EVERY MORNING 45 tablet 1     Vitamin D, Cholecalciferol, 1000 units TABS  Take 4,000 Units by mouth daily                 Review of Systems:   Review of Systems:  11 point review of systems performed and negative except as for HPI and PMH              Data:   All laboratory data reviewed  Lab Results   Component Value Date    CHOL 117 03/05/2019     Lab Results   Component Value Date    HDL 47 03/05/2019     Lab Results   Component Value Date    LDL 40 03/05/2019     Lab Results   Component Value Date    TRIG 151 03/05/2019     Lab Results   Component Value Date    CHOLHDLRATIO 3.8 06/29/2015     TSH   Date Value Ref Range Status   03/04/2019 3.97 0.40 - 4.00 mU/L Final     Last Basic Metabolic Panel:  Lab Results   Component Value Date     08/26/2019      Lab Results   Component Value Date    POTASSIUM 3.4 08/26/2019     Lab Results   Component Value Date    CHLORIDE 100 08/26/2019     Lab Results   Component Value Date    MICHAELA 8.7 08/26/2019     Lab Results   Component Value Date    CO2 28 08/26/2019     Lab Results   Component Value Date    BUN 25 08/26/2019     Lab Results   Component Value Date    CR 0.88 08/26/2019     Lab Results   Component Value Date     08/26/2019     Lab Results   Component Value Date    WBC 9.0 03/14/2019     Lab Results   Component Value Date    RBC 6.97 03/14/2019     Lab Results   Component Value Date    HGB 17.5 03/14/2019     Lab Results   Component Value Date    HCT 54.2 03/14/2019     Lab Results   Component Value Date    MCV 78 03/14/2019     Lab Results   Component Value Date    MCH 25.1 03/14/2019     Lab Results   Component Value Date    MCHC 32.3 03/14/2019     Lab Results   Component Value Date    RDW 19.4 03/14/2019     Lab Results   Component Value Date     03/14/2019

## 2019-09-06 NOTE — LETTER
9/6/2019    Raoul Sherman MD  [address] [place] [address]    RE: Lashawn MOYA Barry       Dear Colleague,    I had the pleasure of seeing Lashawn Reddy in the [facility].    Vascular Cardiology Consultation      Lashawn Reddy MRN# [id number]   YOB: 1970 Age: 48 year old   Date of Visit 09/06/2019     Reason for consult:  Venous ulcer           Assessment and Plan:     1. Right lower extremity venous ulcer in the setting of deep vein and superficial vein incompetence, worsening telangiectasias and edema    Patient has been wearing compression stockings but still evidence of progressive disease.    Elevated risk of future ulcers and bleeding.    Repeat venous competency testing and plan for RF ablation of right greater saphenous vein.  She understands that there will still be residual deep vein reflux so the symptoms may improve but not completely resolve.  However, with the worsening lower extremity signs and symptoms it is reasonable to try to reduce the total amount of reflux by addressing the superficial vein system.      2. History of previous right lower extremity DVT after trauma    Appropriately completed anticoagulation.      This note was transcribed using electronic voice recognition software and may contain typographical errors.             Chief Complaint:   New Patient (Nonobstructive CAD, stress cardiomyopathy, pulmonary edema, HTN)           History of Present Illness:   This patient is a very pleasant 48 year old female with history of previous right lower extremity DVT after trauma in 6027-6259, appropriately anticoagulated for 6 months with resolution of thrombus.    Venous competency testing 2015 showed significant greater saphenous vein reflux as well as deep vein reflux in the order of 2 to 3 seconds per segment.  The patient has noticed worsening telangiectasias and leg discomfort as well as edema.  The edema improved  "somewhat with the addition of hydrochlorothiazide.  Is still present.  She continues to wear compression stockings.    She had right lower extremity venous ulcer and bleeding varicosity previously.  Currently healed but in jeopardy of future bleeding due to the venous pressure and reflux.    History of ulcer: Yes  History of edema: Yes  History of compression stockings: Yes  History of leg discomfort requiring analgesics: Yes leg discomfort  History of itching, skin change or restless legs: Yes skin change         Physical Exam:       Vitals: /88 (BP Location: Right arm, Patient Position: Sitting, Cuff Size: Adult Regular)   Pulse 78   Ht 1.676 m (5' 6\")   Wt 113.9 kg (251 lb)   LMP 09/03/2018   SpO2 97%   BMI 40.51 kg/m     Constitutional:  cooperative, alert and oriented, well developed, well nourished, in no acute distress overweight      Skin:  warm and dry to the touch        Head:  normocephalic, no masses or lesions        Eyes:  pupils equal and round;conjunctivae and lids unremarkable        ENT:  dentition good        Neck:  JVP normal        Chest:  clear to auscultation;normal symmetry        Cardiac: regular rhythm;normal S1 and S2     no presence of murmur            Abdomen:    obese      Extremities and Back:      RLE scar from previous ulcer, extensive telangiectasias right lower extremity with venous varicosities.    Neurological:       Nonfocal                  Past Medical History:   I have reviewed this patient's past medical history  Past Medical History:   Diagnosis Date     Anemia 11/15/2018     CAD (coronary artery disease)     Nonobstructive per angiogram 3/2019     Depressive disorder, not elsewhere classified     depression in the post partum period after her daughter     Diabetes mellitus, type 2 (H) 6/6/2013     Diffuse cystic mastopathy     fibrocystic breast disease     Former smoker      Lymphedema of right lower extremity      Peripheral venous insufficiency      " Personal history of DVT (deep vein thrombosis) 8/30/2018     S/P hysterectomy 9/12/2018     Sigmoid diverticulitis 11/15/2018     Tobacco use disorder     quit in 2008     Type 2 diabetes, HbA1c goal < 7% (H) 6/6/2013     Varicose veins of lower extremities with complications 5/13/2005     Problem list name updated by automated process. Provider to review             Past Surgical History:   I have reviewed this patient's past surgical history  Past Surgical History:   Procedure Laterality Date     C ANESTH,LOWER LEG VEIN SURG,NOS  2002    bilateral     C APPENDECTOMY  04/14/89     COLONOSCOPY  6/21/2013    Procedure: COLONOSCOPY;  colonoscopy;  Surgeon: Vamshi Loomis MD;  Location:  GI     COLONOSCOPY N/A 2/22/2017    Procedure: COLONOSCOPY;  Surgeon: Raoul Sage MD;  Location:  GI     CV HEART CATHETERIZATION WITH POSSIBLE INTERVENTION N/A 3/5/2019    Procedure: Heart Catheterization with possible Intervention;  Surgeon: Kirstin Lynn MD;  Location:  HEART CARDIAC CATH LAB     HC COLONOSCOPY W/WO BRUSH/WASH  06/17/2005     HC DILATION/CURETTAGE DIAG/THER NON OB  1986    after a miscarriage     HC REMOVAL OF TONSILS,<13 Y/O       HYSTERECTOMY TOTAL ABDOMINAL, SALPINGECTOMY Bilateral 9/12/2018    Procedure: HYSTERECTOMY TOTAL ABDOMINAL, SALPINGECTOMY;  Total Abdominal Hysterectomy, left Salpingectomy;  Surgeon: Temi Corado MD;  Location[facility]     TUBAL LIGATION  08/04/2006               Social History:   I have reviewed this patient's social history  Social History     Tobacco Use     Smoking status: Former Smoker     Years: 16.00     Types: Cigarettes     Last attempt to quit: 9/29/2008     Years since quitting: 10.9     Smokeless tobacco: Never Used   Substance Use Topics     Alcohol use: Yes     Alcohol/week: 0.0 oz     Comment: couple drinks per year             Family History:   I have reviewed this patient's family history  Family History   Problem Relation Age of Onset      Anesthesia Reaction Mother         Severe nausea     Gastrointestinal Disease Mother         Partial colon removal secondary to a blockage     Gynecology Mother         hysterectomy     Lipids Mother      Lipids Father      Cancer - colorectal Father         dx Oct '03 (dx at age 57)     Cancer Father         skin     Hypertension Father      Cancer Maternal Grandmother         colon at age 68     Cerebrovascular Disease Paternal Grandfather      Cerebrovascular Disease Paternal Grandmother         brain aneurysm     Arthritis Sister         mainly affecting her legs     Gastrointestinal Disease Sister         2 sisters with colon polyps     Cancer Sister         cervical ca     Cancer Maternal Aunt         all over ? breast was the origin     Cancer Maternal Aunt         pancreatic     Heart Disease Maternal Aunt         MI 2010             Allergies:     Allergies   Allergen Reactions     No Known Drug Allergies              Medications:   I have reviewed this patient's current medications  Current Outpatient Medications   Medication Sig Dispense Refill     aspirin (ASA) 81 MG EC tablet Take 1 tablet (81 mg) by mouth daily 90 tablet 3     blood glucose monitoring (ACCU-CHEK FASTCLIX) lancets 1 each 2 times daily 102 each 3     blood glucose monitoring (ACCU-CHEK GUIDE) test strip Use to test blood sugar 2 times daily or as directed. 100 strip 11     carvedilol (COREG) 12.5 MG tablet Take 0.5 tablets (6.25 mg) by mouth 2 times daily 180 tablet 3     hydrochlorothiazide (HYDRODIURIL) 25 MG tablet Take 1 tablet (25 mg) by mouth daily 90 tablet 3     JARDIANCE 10 MG TABS tablet TAKE 1 TABLET BY MOUTH EVERY DAY 30 tablet 3     losartan (COZAAR) 50 MG tablet Take 1 tablet (50 mg) by mouth daily 90 tablet 3     nystatin-triamcinolone (MYCOLOG II) 306731-9.1 UNIT/GM-% external cream Apply topically daily as needed (rash or itching) 60 g 3     omeprazole (PRILOSEC) 20 MG DR capsule Take 20 mg by mouth daily as needed        rosuvastatin (CRESTOR) 20 MG tablet Take 1 tablet (20 mg) by mouth daily 90 tablet 3     sertraline (ZOLOFT) 50 MG tablet TAKE 1/2 TABLET (25 MG) BY MOUTH EVERY MORNING 45 tablet 1     Vitamin D, Cholecalciferol, 1000 units TABS Take 4,000 Units by mouth daily                 Review of Systems:   Review of Systems:  11 point review of systems performed and negative except as for HPI and PMH              Data:   All laboratory data reviewed  Lab Results   Component Value Date    CHOL 117 03/05/2019     Lab Results   Component Value Date    HDL 47 03/05/2019     Lab Results   Component Value Date    LDL 40 03/05/2019     Lab Results   Component Value Date    TRIG 151 03/05/2019     Lab Results   Component Value Date    CHOLHDLRATIO 3.8 06/29/2015     TSH   Date Value Ref Range Status   03/04/2019 3.97 0.40 - 4.00 mU/L Final     Last Basic Metabolic Panel:  Lab Results   Component Value Date     08/26/2019      Lab Results   Component Value Date    POTASSIUM 3.4 08/26/2019     Lab Results   Component Value Date    CHLORIDE 100 08/26/2019     Lab Results   Component Value Date    MICHAELA 8.7 08/26/2019     Lab Results   Component Value Date    CO2 28 08/26/2019     Lab Results   Component Value Date    BUN 25 08/26/2019     Lab Results   Component Value Date    CR 0.88 08/26/2019     Lab Results   Component Value Date     08/26/2019     Lab Results   Component Value Date    WBC 9.0 03/14/2019     Lab Results   Component Value Date    RBC 6.97 03/14/2019     Lab Results   Component Value Date    HGB 17.5 03/14/2019     Lab Results   Component Value Date    HCT 54.2 03/14/2019     Lab Results   Component Value Date    MCV 78 03/14/2019     Lab Results   Component Value Date    MCH 25.1 03/14/2019     Lab Results   Component Value Date    MCHC 32.3 03/14/2019     Lab Results   Component Value Date    RDW 19.4 03/14/2019     Lab Results   Component Value Date     03/14/2019       Thank you for allowing me to  participate in the care of your patient.    Sincerely,     Nick Roth MD     [facility]

## 2019-09-11 DIAGNOSIS — E11.65 TYPE 2 DIABETES MELLITUS WITH HYPERGLYCEMIA, WITHOUT LONG-TERM CURRENT USE OF INSULIN (H): ICD-10-CM

## 2019-09-11 RX ORDER — BLOOD SUGAR DIAGNOSTIC
STRIP MISCELLANEOUS
Qty: 100 EACH | Refills: 5 | Status: SHIPPED | OUTPATIENT
Start: 2019-09-11 | End: 2020-12-23

## 2019-09-11 NOTE — TELEPHONE ENCOUNTER
"Test strips  Last Written Prescription Date:  8/30/2018  Last Fill Quantity: 100,  # refills: 11   Last office visit: 8/26/2019 with prescribing provider:  Kavon   Future Office Visit:   Next 5 appointments (look out 90 days)    Sep 12, 2019  7:00 AM CDT  Office Visit with Venkata Jaramillo DO  [facility] [address] [facility] [address] [phone]           Requested Prescriptions   Pending Prescriptions Disp Refills     ACCU-CHEK GUIDE test strip [Pharmacy Med Name: ACCU-CHEK GUIDE TEST STRIP] 100 each      Sig: USE TO TEST BLOOD SUGAR 2 TIMES DAILY OR AS DIRECTED.       Diabetic Supplies Protocol Passed - 9/11/2019  9:14 AM        Passed - Medication is active on med list        Passed - Patient is 18 years of age or older        Passed - Recent (6 mo) or future (30 days) visit within the authorizing provider's specialty     Patient had office visit in the last 6 months or has a visit in the next 30 days with authorizing provider.  See \"Patient Info\" tab in inbasket, or \"Choose Columns\" in Meds & Orders section of the refill encounter.            Prescription approved per RN refill protocol.    Katherine Kaiser RN on 9/11/2019 at 12:30 PM    "

## 2019-09-12 ENCOUNTER — OFFICE VISIT (OUTPATIENT)
Dept: FAMILY MEDICINE | Facility: OTHER | Age: 49
End: 2019-09-12
Payer: COMMERCIAL

## 2019-09-12 VITALS
HEART RATE: 82 BPM | OXYGEN SATURATION: 96 % | BODY MASS INDEX: 40.51 KG/M2 | SYSTOLIC BLOOD PRESSURE: 134 MMHG | WEIGHT: 251 LBS | DIASTOLIC BLOOD PRESSURE: 78 MMHG | TEMPERATURE: 97.5 F | RESPIRATION RATE: 16 BRPM

## 2019-09-12 DIAGNOSIS — Z86.718 PERSONAL HISTORY OF DVT (DEEP VEIN THROMBOSIS): ICD-10-CM

## 2019-09-12 DIAGNOSIS — Z12.31 ENCOUNTER FOR SCREENING MAMMOGRAM FOR BREAST CANCER: ICD-10-CM

## 2019-09-12 DIAGNOSIS — L30.4 INTERTRIGO: ICD-10-CM

## 2019-09-12 DIAGNOSIS — E11.65 TYPE 2 DIABETES MELLITUS WITH HYPERGLYCEMIA, WITHOUT LONG-TERM CURRENT USE OF INSULIN (H): Primary | ICD-10-CM

## 2019-09-12 DIAGNOSIS — I50.9 CONGESTIVE HEART FAILURE, UNSPECIFIED HF CHRONICITY, UNSPECIFIED HEART FAILURE TYPE (H): ICD-10-CM

## 2019-09-12 PROCEDURE — 99214 OFFICE O/P EST MOD 30 MIN: CPT | Performed by: INTERNAL MEDICINE

## 2019-09-12 RX ORDER — NYSTATIN AND TRIAMCINOLONE ACETONIDE 100000; 1 [USP'U]/G; MG/G
CREAM TOPICAL DAILY PRN
Qty: 60 G | Refills: 3 | Status: SHIPPED | OUTPATIENT
Start: 2019-09-12 | End: 2020-10-05

## 2019-09-12 RX ORDER — PIOGLITAZONEHYDROCHLORIDE 30 MG/1
30 TABLET ORAL DAILY
Qty: 14 TABLET | Refills: 0 | Status: SHIPPED | OUTPATIENT
Start: 2019-09-12 | End: 2019-09-27

## 2019-09-12 ASSESSMENT — PAIN SCALES - GENERAL: PAINLEVEL: NO PAIN (0)

## 2019-09-12 NOTE — PROGRESS NOTES
Subjective     Lashawn Reddy is a pleasant 48 year old female who presents to clinic today for follow-up of her diabetes. She notes that she is checking her blood sugars 2-3 times each day, and that since starting Jardiance, her readings have been around 200. She voices her frustration with her blood sugar results, as she has been watching her diet carefully in an attempt to control them. However, she reports that they have been more consistent and she is not experiencing highs and lows like she used to before taking Jardiance. She reports polyuria, polydipsia, and a recent yeast infection that has not been fully treated, but attributes those to the Jardiance. She is still taking her statin, aspirin, and ARB for her CHF without any reported side effects. She denies chest pain, headaches, or other cardiac symptoms.      HPI   Diabetes Follow-up      How often are you checking your blood sugar? Three times daily    What time of day are you checking your blood sugars (select all that apply)?  Before and after meals    Have you had any blood sugars above 200?  Yes     Have you had any blood sugars below 70?  No    What symptoms do you notice when your blood sugar is low?  None    What concerns do you have today about your diabetes? Blood sugar is often over 200     Do you have any of these symptoms? (Select all that apply)  Numbness in feet     Have you had a diabetic eye exam in the last 12 months? Yes- Date of last eye exam: 10/2018                          PAST, FAMILY,SOCIAL HISTORY:     Medical  History:   has a past medical history of Anemia (11/15/2018), CAD (coronary artery disease), Depressive disorder, not elsewhere classified, Diabetes mellitus, type 2 (H) (6/6/2013), Diffuse cystic mastopathy, Former smoker, Lymphedema of right lower extremity, Peripheral venous insufficiency, Personal history of DVT (deep vein thrombosis) (8/30/2018), S/P hysterectomy (9/12/2018), Sigmoid diverticulitis (11/15/2018),  Tobacco use disorder, Type 2 diabetes, HbA1c goal < 7% (H) (6/6/2013), and Varicose veins of lower extremities with complications (5/13/2005).     Surgical History:   has a past surgical history that includes APPENDECTOMY (04/14/89); DILATION/CURETTAGE DIAG/THER NON OB (1986); REMOVAL OF TONSILS,<11 Y/O; ANESTH,LOWER LEG VEIN SURG,NOS (2002); Colonoscopy w/wo [place] **Performed** (06/17/2005); tubal ligation (08/04/2006); Colonoscopy (6/21/2013); Colonoscopy (N/A, 2/22/2017); Hysterectomy Total Abdominal, Salpingectomy (Bilateral, 9/12/2018); and Heart Catheterization with Possible Intervention (N/A, 3/5/2019).     Social History:   reports that she quit smoking about 10 years ago. Her smoking use included cigarettes. She quit after 16.00 years of use. She has never used smokeless tobacco. She reports that she drinks alcohol. She reports that she does not use drugs.     Family History:  family history includes Anesthesia Reaction in her mother; Arthritis in her sister; Cancer in her father, maternal aunt, maternal aunt, maternal grandmother, and sister; Cancer - colorectal in her father; Cerebrovascular Disease in her paternal grandfather and paternal grandmother; Gastrointestinal Disease in her mother and sister; Gynecology in her mother; Heart Disease in her maternal aunt; Hypertension in her father; Lipids in her father and mother.            MEDICATIONS  Current Outpatient Medications   Medication Sig Dispense Refill     ACCU-CHEK GUIDE test strip USE TO TEST BLOOD SUGAR 2 TIMES DAILY OR AS DIRECTED. 100 each 5     aspirin (ASA) 81 MG EC tablet Take 1 tablet (81 mg) by mouth daily 90 tablet 3     blood glucose monitoring (ACCU-CHEK FASTCLIX) lancets 1 each 2 times daily 102 each 3     carvedilol (COREG) 12.5 MG tablet Take 0.5 tablets (6.25 mg) by mouth 2 times daily 180 tablet 3     hydrochlorothiazide (HYDRODIURIL) 25 MG tablet Take 1 tablet (25 mg) by mouth daily 90 tablet 3     JARDIANCE 10 MG TABS tablet  TAKE 1 TABLET BY MOUTH EVERY DAY 30 tablet 3     losartan (COZAAR) 50 MG tablet Take 1 tablet (50 mg) by mouth daily 90 tablet 3     nystatin-triamcinolone (MYCOLOG II) 056991-0.1 UNIT/GM-% external cream Apply topically daily as needed (rash or itching) 60 g 3     omeprazole (PRILOSEC) 20 MG DR capsule Take 20 mg by mouth daily as needed       pioglitazone (ACTOS) 30 MG tablet Take 1 tablet (30 mg) by mouth daily 14 tablet 0     rosuvastatin (CRESTOR) 20 MG tablet Take 1 tablet (20 mg) by mouth daily 90 tablet 3     sertraline (ZOLOFT) 50 MG tablet TAKE 1/2 TABLET (25 MG) BY MOUTH EVERY MORNING 45 tablet 1     Vitamin D, Cholecalciferol, 1000 units TABS Take 4,000 Units by mouth daily           --------------------------------------------------------------------------------------------------------------------        Allergies   Allergen Reactions     No Known Drug Allergies      Recent Labs   Lab Test 08/26/19  0734 06/27/19  0749  03/14/19  0807  03/05/19  0510  03/04/19  0529 03/04/19  0000  10/25/18  1126  09/21/18  1722  10/16/17  0755   A1C 9.1* 8.6*  --   --   --   --   --  8.6*  --    < > 6.8*  --   --    < > 8.5*   LDL  --   --   --   --   --  40  --   --   --   --  69  --   --   --  33   HDL  --   --   --   --   --  47*  --   --   --   --  63  --   --   --  67   TRIG  --   --   --   --   --  151*  --   --   --   --  95  --   --   --  87   ALT 25  --   --  16  --   --   --   --  28  --  18  --  18   < >  --    CR 0.88 0.86   < > 1.08*   < > 0.82  --   --  0.71   < >  --    < > 0.51*   < >  --    GFRESTIMATED 77 79   < > 60*   < > 83  --   --  >90   < >  --    < > >90   < >  --    GFRESTBLACK 89 >90   < > 70   < > >90  --   --  >90   < >  --    < > >90   < >  --    POTASSIUM 3.4 4.0   < > 4.3   < > 3.7   < >  --  3.9   < >  --    < > 3.7   < >  --    TSH  --   --   --   --   --   --   --   --  3.97  --   --   --  1.57   < >  --     < > = values in this interval not displayed.         -------------------------------------  Reviewed and updated as needed this visit by Provider         Review of Systems   ROS COMP: CONSTITUTIONAL: NEGATIVE for fever, chills, change in weight  INTEGUMENTARY/SKIN: NEGATIVE for worrisome rashes, moles or lesions  EYES: NEGATIVE for vision changes or irritation  ENT/MOUTH: NEGATIVE for ear, mouth and throat problems  RESP: NEGATIVE for significant cough or SOB  CV: NEGATIVE for chest pain, palpitations or peripheral edema  GI: NEGATIVE for nausea, abdominal pain, heartburn, or change in bowel habits   female: reports polyuria, denies dysuria or hematuria  MUSCULOSKELETAL: NEGATIVE for joint pain or muscle aches  NEURO: NEGATIVE for weakness, dizziness or paresthesias  PSYCHIATRIC: NEGATIVE for changes in mood or affect      Objective    /78 (BP Location: Left arm, Patient Position: Sitting, Cuff Size: Adult Large)   Pulse 82   Temp 97.5  F (36.4  C) (Temporal)   Resp 16   Wt 113.9 kg (251 lb)   LMP 09/03/2018   SpO2 96%   BMI 40.51 kg/m    Body mass index is 40.51 kg/m .     Physical Exam   GENERAL: healthy, alert and no distress  EYES: Eyes grossly normal to inspection, PERRL and conjunctivae and sclerae normal  RESP: lungs clear to auscultation - no rales, rhonchi or wheezes  CV: regular rate and rhythm, normal S1 S2, no S3 or S4, no murmur, click or rub, no peripheral edema and peripheral pulses strong  MS: no gross musculoskeletal defects noted, no edema  SKIN: no suspicious lesions or rashes  NEURO: Normal strength and tone, mentation intact and speech normal  PSYCH: mentation appears normal, affect normal/bright    Diagnostic Test Results:  Labs reviewed in Epic        Assessment & Plan     1. Type 2 diabetes mellitus with hyperglycemia, without long-term current use of insulin (H)  Since she is intolerant to metformin and sulfonylureas and her blood sugars are not yet at her goal of 150-160 on Jardiance alone, we are adding pioglitazone to her  diabetes treatment. We discussed the use of insulin as a treatment option, but are holding off on that until we see how the pioglitazone affects her blood sugars.   - pioglitazone (ACTOS) 30 MG tablet; Take 1 tablet (30 mg) by mouth daily  Dispense: 14 tablet; Refill: 0    2. Intertrigo  Will use nystatin cream to treat concurrent yeast infection  - nystatin-triamcinolone (MYCOLOG II) 916061-5.1 UNIT/GM-% external cream; Apply topically daily as needed (rash or itching)  Dispense: 60 g; Refill: 3    3. Congestive heart failure, unspecified HF chronicity, unspecified heart failure type (H)  Controlled     4. Personal history of DVT (deep vein thrombosis)  Controlled    5. Encounter for screening mammogram for breast cancer  Patient to schedule mammogram this fall   - *MA Screening Digital Bilateral; Future       Follow up in 2 weeks to check for side effects of the pioglitazone and her average blood sugar levels. If they are still uncontrolled after the trial of pioglitazone, we will discuss insulin as the next recommended course of treatment.       This patient has been interviewed, examined, diagnosed, and informed of the above by me personally.  Medical records and available pertinent information has been reviewed by me personally.  All decisions and discussion have been between myself and the patient/family.  This was done in the presence of Mu Chong  , who acted as a medical scribe and recorded the events above.  No diagnosis or decision making was made by the above-mentioned scribe.  The patient, and or his/her ensurors will not be billed for the presence or actions of this scribe.  The information recorded by the scribe has been reviewed by me and found to be accurate.    Venkata Jaramillo, [facility]

## 2019-09-16 ENCOUNTER — HOSPITAL ENCOUNTER (OUTPATIENT)
Dept: MAMMOGRAPHY | Facility: CLINIC | Age: 49
Discharge: HOME | End: 2019-09-16
Attending: INTERNAL MEDICINE | Admitting: INTERNAL MEDICINE
Payer: COMMERCIAL

## 2019-09-16 DIAGNOSIS — Z12.31 VISIT FOR SCREENING MAMMOGRAM: ICD-10-CM

## 2019-09-16 PROCEDURE — 77063 BREAST TOMOSYNTHESIS BI: CPT

## 2019-09-19 DIAGNOSIS — N94.3 PMS (PREMENSTRUAL SYNDROME): ICD-10-CM

## 2019-09-19 NOTE — TELEPHONE ENCOUNTER
"Zoloft  Last Written Prescription Date:  05/17/2019  Last Fill Quantity: 45,  # refills: 1   Last office visit: 09/12/2019 with prescribing provider:  otilio   Future Office Visit:   Next 5 appointments (look out 90 days)    Sep 27, 2019  7:00 AM CDT  [facility]illo DO  [facility] [address] [facility] [id number] [phone]      Prescription approved per [facility] Refill Protocol.    Requested Prescriptions   Pending Prescriptions Disp Refills     sertraline (ZOLOFT) 50 MG tablet [Pharmacy Med Name: SERTRALINE 50MG TABLET] 45 tablet 1     Sig: TAKE 1/2 TABLET (25 MG) BY MOUTH EVERY MORNING       SSRIs Protocol Passed - 9/19/2019 10:46 AM        Passed - Recent (12 mo) or future (30 days) visit within the authorizing provider's specialty     Patient had office visit in the last 12 months or has a visit in the next 30 days with authorizing provider or within the authorizing provider's specialty.  See \"Patient Info\" tab in inbasket, or \"Choose Columns\" in Meds & Orders section of the refill encounter.          Passed - Medication is active on med list        Passed - Patient is age 18 or older        Passed - No active pregnancy on record        Passed - No positive pregnancy test in last 12 months      Una Maria RN   "

## 2019-09-26 ENCOUNTER — DOCUMENTATION ONLY (OUTPATIENT)
Dept: CARDIOLOGY | Facility: CLINIC | Age: 49
End: 2019-09-26

## 2019-09-26 NOTE — PROGRESS NOTES
9/26 I sent conf & letter, regarding appt of 10/18 in [place], with my number if she has questions & wants my help to re-schedule.  I also gave her [place]'s radiology direct number, [phone],to call if she prefers to schedule herself.  9/25@ 3pm I lm/dana with date & to call me to conf. [facility]  9/25 I r/s this appt from 11/4 to 10/18 w/Bernice at the [facility] due to needing more time for insurance approval. 14-21 business days prior to ablation. nk  9/25@9:57 lm/vm again [facility]   9/24 Lm/ asking pt to call/me/b to r/s 11/4 Venous Comp appt. It has to be sooner due to ins nk

## 2019-09-27 ENCOUNTER — OFFICE VISIT (OUTPATIENT)
Dept: FAMILY MEDICINE | Facility: OTHER | Age: 49
End: 2019-09-27
Payer: COMMERCIAL

## 2019-09-27 VITALS
WEIGHT: 253 LBS | TEMPERATURE: 97.3 F | BODY MASS INDEX: 40.84 KG/M2 | HEART RATE: 72 BPM | OXYGEN SATURATION: 96 % | RESPIRATION RATE: 16 BRPM | DIASTOLIC BLOOD PRESSURE: 72 MMHG | SYSTOLIC BLOOD PRESSURE: 128 MMHG

## 2019-09-27 DIAGNOSIS — E78.2 MIXED HYPERLIPIDEMIA: Primary | ICD-10-CM

## 2019-09-27 DIAGNOSIS — E11.65 TYPE 2 DIABETES MELLITUS WITH HYPERGLYCEMIA, WITHOUT LONG-TERM CURRENT USE OF INSULIN (H): ICD-10-CM

## 2019-09-27 DIAGNOSIS — I50.9 CONGESTIVE HEART FAILURE, UNSPECIFIED HF CHRONICITY, UNSPECIFIED HEART FAILURE TYPE (H): ICD-10-CM

## 2019-09-27 PROCEDURE — 99213 OFFICE O/P EST LOW 20 MIN: CPT | Performed by: INTERNAL MEDICINE

## 2019-09-27 RX ORDER — METFORMIN HCL 500 MG
500 TABLET, EXTENDED RELEASE 24 HR ORAL DAILY
COMMUNITY
Start: 2019-09-27 | End: 2020-07-16

## 2019-09-27 RX ORDER — PIOGLITAZONEHYDROCHLORIDE 30 MG/1
30 TABLET ORAL DAILY
Qty: 14 TABLET | Refills: 0 | Status: SHIPPED | OUTPATIENT
Start: 2019-09-27 | End: 2019-10-09 | Stop reason: ALTCHOICE

## 2019-09-27 ASSESSMENT — PAIN SCALES - GENERAL: PAINLEVEL: NO PAIN (0)

## 2019-09-27 NOTE — PROGRESS NOTES
Subjective     Lashawn Reddy is a 48 year old female who presents to clinic today for the following health issues:    HPI   Chief Complaint   Patient presents with     Diabetes     follow up lab results                    Chief Complaint           The patient is a pleasant 48-year-old female who has type 2 diabetes.  She is noting that her blood sugar control has been quite poor with values generally in the mid to upper 200 range.  She has in the past had some questionable intolerance to both metformin and sulfonylureas.  She has been on the Jardiance and has occasional yeast vaginitis and we discussed the use of diaper wipes post void.  Also discussed the impending need to convert to insulin.  She would like to avoid this if the oral medications at all to be more effective.  After discussion, she would like to retry the metformin as she questions may be the medication was not causing her side effects but all of her other comorbidities at the time.                         PAST, FAMILY,SOCIAL HISTORY:     Medical  History:   has a past medical history of Anemia (11/15/2018), CAD (coronary artery disease), Depressive disorder, not elsewhere classified, Diabetes mellitus, type 2 (H) (6/6/2013), Diffuse cystic mastopathy, Former smoker, Lymphedema of right lower extremity, Peripheral venous insufficiency, Personal history of DVT (deep vein thrombosis) (8/30/2018), S/P hysterectomy (9/12/2018), Sigmoid diverticulitis (11/15/2018), Tobacco use disorder, Type 2 diabetes, HbA1c goal < 7% (H) (6/6/2013), and Varicose veins of lower extremities with complications (5/13/2005).     Surgical History:   has a past surgical history that includes APPENDECTOMY (04/14/89); DILATION/CURETTAGE DIAG/THER NON OB (1986); REMOVAL OF TONSILS,<13 Y/O; ANESTH,LOWER LEG VEIN SURG,NOS (2002); Colonoscopy w/wo [place] **Performed** (06/17/2005); tubal ligation (08/04/2006); Colonoscopy (6/21/2013); Colonoscopy (N/A, 2/22/2017); Hysterectomy Total  Abdominal, Salpingectomy (Bilateral, 9/12/2018); and Heart Catheterization with Possible Intervention (N/A, 3/5/2019).     Social History:   reports that she quit smoking about 11 years ago. Her smoking use included cigarettes. She quit after 16.00 years of use. She has never used smokeless tobacco. She reports current alcohol use. She reports that she does not use drugs.     Family History:  family history includes Anesthesia Reaction in her mother; Arthritis in her sister; Cancer in her father, maternal aunt, maternal aunt, maternal grandmother, and sister; Cancer - colorectal in her father; Cerebrovascular Disease in her paternal grandfather and paternal grandmother; Gastrointestinal Disease in her mother and sister; Gynecology in her mother; Heart Disease in her maternal aunt; Hypertension in her father; Lipids in her father and mother.            MEDICATIONS  Current Outpatient Medications   Medication Sig Dispense Refill     ACCU-CHEK GUIDE test strip USE TO TEST BLOOD SUGAR 2 TIMES DAILY OR AS DIRECTED. 100 each 5     aspirin (ASA) 81 MG EC tablet Take 1 tablet (81 mg) by mouth daily 90 tablet 3     blood glucose monitoring (ACCU-CHEK FASTCLIX) lancets 1 each 2 times daily 102 each 3     carvedilol (COREG) 12.5 MG tablet Take 0.5 tablets (6.25 mg) by mouth 2 times daily 180 tablet 3     hydrochlorothiazide (HYDRODIURIL) 25 MG tablet Take 1 tablet (25 mg) by mouth daily 90 tablet 3     JARDIANCE 10 MG TABS tablet TAKE 1 TABLET BY MOUTH EVERY DAY 30 tablet 3     losartan (COZAAR) 50 MG tablet Take 1 tablet (50 mg) by mouth daily 90 tablet 3     metFORMIN (GLUCOPHAGE-XR) 500 MG 24 hr tablet Take 1 tablet (500 mg) by mouth daily       nystatin-triamcinolone (MYCOLOG II) 150145-6.1 UNIT/GM-% external cream Apply topically daily as needed (rash or itching) 60 g 3     omeprazole (PRILOSEC) 20 MG DR capsule Take 20 mg by mouth daily as needed       pioglitazone (ACTOS) 30 MG tablet Take 1 tablet (30 mg) by mouth  daily 14 tablet 0     rosuvastatin (CRESTOR) 20 MG tablet Take 1 tablet (20 mg) by mouth daily 90 tablet 3     sertraline (ZOLOFT) 50 MG tablet TAKE 1/2 TABLET (25 MG) BY MOUTH EVERY MORNING 45 tablet 1     Vitamin D, Cholecalciferol, 1000 units TABS Take 4,000 Units by mouth daily           --------------------------------------------------------------------------------------------------------------------                              Review of Systems       LUNGS: Pt denies: cough, excess sputum, hemoptysis, or shortness of breath.   HEART: Pt denies: chest pain, arrhythmia, syncope, tachy or bradyarrhythmia.   GI: Pt denies: nausea, vomiting, diarrhea, constipation, melena, or hematochezia.   NEURO: Pt denies: seizures, strokes, diplopia, weakness, paraesthesias, or paralysis.   SKIN: Pt denies: itching, rashes, discoloration, or specific lesions of concern. Denies recent hair loss.   PSYCH: The patient denies significant depression, anxiety, mood imbalance. Specifically denies any suicidal ideation.                                     Examination    /72 (BP Location: Left arm, Patient Position: Sitting, Cuff Size: Adult Large)   Pulse 72   Temp 97.3  F (36.3  C) (Temporal)   Resp 16   Wt 114.8 kg (253 lb)   LMP 09/03/2018   SpO2 96%   BMI 40.84 kg/m       Constitutional: The patient appears to be in no acute distress. The patient appears to be adequately hydrated. No acute respiratory or hemodynamic distress is noted at this time.   LUNGS: clear bilaterally, airflow is brisk, no intercostal retraction or stridor is noted. No coughing is noted during visit.   HEART:  regular without rubs, clicks, gallops, or murmurs. PMI is nondisplaced. Upstrokes are brisk. S1,S2 are heard.   GI: Abdomen is soft, without rebound, guarding or tenderness. Bowel sounds are appropriate. No renal bruits are heard.   NEURO: Pt is alert and appropriate. No neurologic lateralization is noted. Cranial nerves 2-12 are intact.  Peripheral sensory and motor function are grossly normal.    SKIN:  warm and dry. No erythema, or rashes are noted. No specific lesions of concern are noted.    PSYCH: The patient appears grossly appropriate. Maintains good eye contact, does not have any jittery or atypical motion. Displays appropriate affect.                                           Decision Making    1. Type 2 diabetes mellitus with hyperglycemia, without long-term current use of insulin (H)  Restart the metformin at 500 mg daily on a trial basis.  Continue Jardiance with post void diaper wipes  Restart the Actos as she was initially under the impression that 14 days was all she needed (this was simply a trial prescription)  - pioglitazone (ACTOS) 30 MG tablet; Take 1 tablet (30 mg) by mouth daily  Dispense: 14 tablet; Refill: 0  - metFORMIN (GLUCOPHAGE-XR) 500 MG 24 hr tablet; Take 1 tablet (500 mg) by mouth daily    2. Mixed hyperlipidemia  Continue statin therapy    3. Congestive heart failure, unspecified HF chronicity, unspecified heart failure type (H)  Controlled at this time with diuretic, ARB, hydrochlorothiazide.                             FOLLOW UP   I have asked the patient to make an appointment for followup with me in 2 weeks with blood sugar log.  We will try to coordinate with MTM evaluation of possible.            I have carefully explained the diagnosis and treatment options to the patient.  The patient has displayed an understanding of the above, and all subsequent questions were answered.      DO BI Jacobo    Portions of this note were produced using [employer]  Although every attempt at real-time proof reading has been made, occasional grammar/syntax errors may have been missed.

## 2019-10-01 ENCOUNTER — TELEPHONE (OUTPATIENT)
Dept: FAMILY MEDICINE | Facility: CLINIC | Age: 49
End: 2019-10-01

## 2019-10-01 NOTE — LETTER
October 2, 2019      Lashawn Reddy  [address] [place] [id number]        Dr. Jaramillo has recommended you schedule a Medication Therapy Management (MTM) appointment. MTM is designed to help you get the most of out of your medicines.     During an MTM appointment a specially trained pharmacist will review all of your medicines, both prescription and over-the-counter. They will make sure your medicines are the best choice for you and are safe and convenient for you.  MTM pharmacists work together with you and your doctor to help you understand your medicines, solve any problems related to your medicines and help you get the best results from taking your medicines.     At [facility] (formerly [facility]), we strongly believe in a team approach to health care. We want to help you understand your medicines and health conditions. To learn more about how you might benefit from MTM services, watch the patient video at [email or web address].     To make an appointment, please call the clinic at [phone] or the MTM scheduling line at [phone] (toll-free at [phone]).    We look forward to hearing from you!        Smiley Brizuela, PharmD  Medication Therapy Management Pharmacist  Pager: [phone]

## 2019-10-01 NOTE — TELEPHONE ENCOUNTER
MTM referral from: [facility] visit (referral by provider)    MTM referral outreach attempt #2 on October 1, 2019 at 1:21 PM      Outcome: Patient not reachable after several attempts, will route to MTM Pharmacist/Provider as an FYI. Thank you for the referral.    See Ac MTM Coordinator

## 2019-10-02 NOTE — TELEPHONE ENCOUNTER
Sent referral letter.    Smiley Brizuela, PharmD  Medication Therapy Management Pharmacist  Pager: [phone]

## 2019-10-06 NOTE — PROGRESS NOTES
"SUBJECTIVE/OBJECTIVE:                           Lashawn Reddy is a 48 year old female coming in for an initial visit for Medication Therapy Management.  She was referred to me from Dr. Jaramillo.    Chief Complaint: Uncontrolled diabetes.    Allergies/ADRs: Reviewed in Epic  Tobacco: History of tobacco dependence - quit 10 years ago.  Alcohol: rarely  Caffeine: 2-3 diet sodas/day  Activity: \"pretty low lately\", works in customer service, walks   PMH: Reviewed in Epic    Medication Adherence/Access:  Patient takes medications directly from bottles, would like to start using a pill box.  Patient takes medications 2 time(s) per day.   Per patient, misses medication 0 times per week.   Medication barriers: Jardiance is expensive ($50)  The patient fills medications at [facility]: NO, fills medications at [facility].    Diabetes:  Pt currently taking metformin XR 500mg daily (restarted ~1 week ago), Jardiance 10mg daily (started ~3 months ago), and pioglitazone 30mg daily (started ~1 month ago). Higher doses of metformin caused nausea in the past, is tolerating 500mg daily well so far. Jardiance causes vaginal itching issues, uses nystatin-triamcinolone cream daily which is helpful, also has gotten a couple yeast infections. Tried glipizide in the past, caused hunger spikes and low blood sugars.  SMBG: two times daily.   Ranges (patient reported): 208, 276, 223, 254, 362, 261, 252, 272, 273, 247.   Patient is not experiencing hypoglycemia  Recent symptoms of high blood sugar? polydipsia  Eye exam: up to date  Foot exam: up to date  ACEi/ARB: Taking losartan 50mg daily.  Urine Albumin:   Lab Results   Component Value Date    UMALCR 8.97 10/25/2018   Aspirin: Taking 81mg daily and denies side effects  Diet/Exercise: Eats a lot of fruits and vegetables.     Lab Results   Component Value Date    A1C 9.1 08/26/2019    A1C 8.6 06/27/2019    A1C 8.6 03/04/2019    A1C 7.3 11/14/2018    A1C 6.8 10/25/2018     Hyperlipidemia: " Current therapy includes rosuvastatin 20mg once daily.  Pt reports no significant myalgias or other side effects.    Lab Results   Component Value Date    CHOL 117 03/05/2019     Lab Results   Component Value Date    HDL 47 03/05/2019     Lab Results   Component Value Date    LDL 40 03/05/2019     Lab Results   Component Value Date    TRIG 151 03/05/2019     Lab Results   Component Value Date    CHOLHDLRALVERTOO 3.8 06/29/2015     Hypertension/Edema/Hx Cardiomyopathy: Current medications include carvedilol 6.25mg twice daily, losartan 50mg daily, and hydrochlorothiazide 25mg daily.  Patient does self-monitor BP. Home BP monitoring in range of 120's systolic over 70-80's diastolic.  Patient reports no current medication side effects. Swelling is maybe a little worse lately, didn't really notice a big difference since starting Actos, wearing compression socks helps.     BP Readings from Last 3 Encounters:   09/27/19 128/72   09/12/19 134/78   09/06/19 122/88     GERD: Current medications include: Prilosec (omeprazole) 20mg PRN. Uses about once monthly, mostly when eating something that may cause heartburn. Patient feels that current regimen is effective.    Mental Health/Premenstrual Syndrome:  Current medications include: Sertraline 25mg once daily. Pt was started on this before her hysterectomy, used to have issues around the time of her period. Pt feels current therapy is effective, feels it helps keep her calm. No side effects reported.     PHQ-9 SCORE 10/25/2018   PHQ-9 Total Score 4     MICK-7 SCORE 10/25/2018   Total Score 2     Supplements: Pt is currently taking vitamin D 4000 units daily. No concerns today.    Today's Vitals: Pulse 77   Wt 255 lb (115.7 kg)   BMI 41.16 kg/m       Wt Readings from Last 4 Encounters:   09/27/19 253 lb (114.8 kg)   09/12/19 251 lb (113.9 kg)   09/06/19 251 lb (113.9 kg)   08/26/19 250 lb (113.4 kg)     Last Comprehensive Metabolic Panel:  Sodium   Date Value Ref Range Status    08/26/2019 136 133 - 144 mmol/L Final     Potassium   Date Value Ref Range Status   08/26/2019 3.4 3.4 - 5.3 mmol/L Final     Chloride   Date Value Ref Range Status   08/26/2019 100 94 - 109 mmol/L Final     Carbon Dioxide   Date Value Ref Range Status   08/26/2019 28 20 - 32 mmol/L Final     Anion Gap   Date Value Ref Range Status   08/26/2019 8 3 - 14 mmol/L Final     Glucose   Date Value Ref Range Status   08/26/2019 278 (H) 70 - 99 mg/dL Final     Urea Nitrogen   Date Value Ref Range Status   08/26/2019 25 7 - 30 mg/dL Final     Creatinine   Date Value Ref Range Status   08/26/2019 0.88 0.52 - 1.04 mg/dL Final     GFR Estimate   Date Value Ref Range Status   08/26/2019 77 >60 mL/min/[1.73_m2] Final     Comment:     Non  GFR Calc  Starting 12/18/2018, serum creatinine based estimated GFR (eGFR) will be   calculated using the Chronic Kidney Disease Epidemiology Collaboration   (CKD-EPI) equation.       Calcium   Date Value Ref Range Status   08/26/2019 8.7 8.5 - 10.1 mg/dL Final     Estimated Creatinine Clearance: 99.5 mL/min (based on SCr of 0.88 mg/dL).     ASSESSMENT:                             Current medications were reviewed today as discussed above.     Medication Adherence: good, no issues identified. I gave patient some pill boxes today. I helped patient sign-up for the Jardiance savings card, reduced cost to $0.    Diabetes: Needs Improvement. Patient is not meeting A1c goal of < 7%. Self monitoring of blood glucose is not at goal of fasting  mg/dL and post prandial < 180 mg/dL. Pt may benefit from slowly titrating up metformin as tolerated. Pt may benefit from starting a GLP-1 agonist, demonstrated once weekly options for convenience, pt would prefer Trulicity device as long as it's not too expensive. In the future may consider stopping Jardiance due to vaginitis side effects and/or Actos due to edema concerns. Pt may benefit from continuous glucose monitoring, she is interested  in trying Freestyle Uche depending on cost.    Hyperlipidemia: Stable.     Hypertension/Edema/Hx Cardiomyopathy: Stable. Patient is meeting BP goal of < 140/90mmHg. Problem list and last PCP note indicate a diagnosis of CHF, reviewed cardiology notes which indicate diagnosis of stress-induced cardiomyopathy, I will discuss with PCP to clarify the diagnosis because Actos should not be used in heart failure.    GERD: Stable.     Mental Health/Premenstrual Syndrome:  Stable per pt. Offered to increase sertraline dose to 50mg daily due to PHQ-9 score greater than 5 today, pt doesn't think this is necessary.     Supplements: Stable.     PLAN:                            1. Pt to increase metformin ER to 1000mg daily. Try taking in divided doses with food to help with GI upset.   2. Sent an order for Trulicity 0.75mg weekly to check cost.  3. Sent order for Freestyle Uche reader and sensors to check cost.  4. PharmD to discuss with Dr. Jaramillo to clarify cardiomyopathy vs. CHF diagnosis in regards to continuing Actos therapy.    **Update 10/8/19: Cost of Trulicity is $25 using a savings card. Cost of Freestyle Uche is $40 for reader and $75 for 2 sensors. Called pt to discuss plan of care. She will increase metformin ER to 1000mg daily for 1 week, then start Trulicity 0.75mg weekly. I prefer not to make both medication changes at one time to prevent possible GI side effects. Pt declines Freestyle Uche.    **Update 10/9/19: Discussed diagnosis of cardiomyopathy vs. CHF with Dr. Jaramillo. He advises to stop pioglitazone. I called and informed the patient.    I spent 60 minutes with this patient today. All changes were made via collaborative practice agreement with Dr. Jaramillo. A copy of the visit note was provided to the patient's primary care provider.    Will follow up in 1 month - scheduled appt on 11/11.    The patient was given a summary of these recommendations as an after visit summary.     Smiley Brizuela,  Asif  Medication Therapy Management Pharmacist  Pager: [phone]

## 2019-10-07 ENCOUNTER — OFFICE VISIT (OUTPATIENT)
Dept: PHARMACY | Facility: OTHER | Age: 49
End: 2019-10-07
Payer: COMMERCIAL

## 2019-10-07 VITALS — HEART RATE: 77 BPM | BODY MASS INDEX: 41.16 KG/M2 | WEIGHT: 255 LBS

## 2019-10-07 DIAGNOSIS — F32.A DEPRESSION, UNSPECIFIED DEPRESSION TYPE: ICD-10-CM

## 2019-10-07 DIAGNOSIS — Z86.79 HISTORY OF CARDIOMYOPATHY: ICD-10-CM

## 2019-10-07 DIAGNOSIS — I10 ESSENTIAL HYPERTENSION: ICD-10-CM

## 2019-10-07 DIAGNOSIS — N94.3 PMS (PREMENSTRUAL SYNDROME): ICD-10-CM

## 2019-10-07 DIAGNOSIS — E78.2 MIXED HYPERLIPIDEMIA: ICD-10-CM

## 2019-10-07 DIAGNOSIS — Z78.9 TAKES DIETARY SUPPLEMENTS: ICD-10-CM

## 2019-10-07 DIAGNOSIS — K21.9 GASTROESOPHAGEAL REFLUX DISEASE WITHOUT ESOPHAGITIS: ICD-10-CM

## 2019-10-07 DIAGNOSIS — E11.65 TYPE 2 DIABETES MELLITUS WITH HYPERGLYCEMIA, WITHOUT LONG-TERM CURRENT USE OF INSULIN (H): Primary | ICD-10-CM

## 2019-10-07 DIAGNOSIS — R60.9 EDEMA, UNSPECIFIED TYPE: ICD-10-CM

## 2019-10-07 PROCEDURE — 99607 MTMS BY PHARM ADDL 15 MIN: CPT | Performed by: PHARMACIST

## 2019-10-07 PROCEDURE — 99605 MTMS BY PHARM NP 15 MIN: CPT | Performed by: PHARMACIST

## 2019-10-07 RX ORDER — FLASH GLUCOSE SCANNING READER
1 EACH MISCELLANEOUS ONCE
Qty: 1 DEVICE | Refills: 0 | Status: SHIPPED | OUTPATIENT
Start: 2019-10-07 | End: 2019-11-12

## 2019-10-07 RX ORDER — FLASH GLUCOSE SENSOR
1 KIT MISCELLANEOUS
Qty: 2 EACH | Refills: 1 | Status: SHIPPED | OUTPATIENT
Start: 2019-10-07 | End: 2019-11-12

## 2019-10-07 ASSESSMENT — ANXIETY QUESTIONNAIRES
6. BECOMING EASILY ANNOYED OR IRRITABLE: SEVERAL DAYS
3. WORRYING TOO MUCH ABOUT DIFFERENT THINGS: SEVERAL DAYS
5. BEING SO RESTLESS THAT IT IS HARD TO SIT STILL: SEVERAL DAYS
2. NOT BEING ABLE TO STOP OR CONTROL WORRYING: NOT AT ALL
GAD7 TOTAL SCORE: 4
7. FEELING AFRAID AS IF SOMETHING AWFUL MIGHT HAPPEN: NOT AT ALL
1. FEELING NERVOUS, ANXIOUS, OR ON EDGE: NOT AT ALL
IF YOU CHECKED OFF ANY PROBLEMS ON THIS QUESTIONNAIRE, HOW DIFFICULT HAVE THESE PROBLEMS MADE IT FOR YOU TO DO YOUR WORK, TAKE CARE OF THINGS AT HOME, OR GET ALONG WITH OTHER PEOPLE: NOT DIFFICULT AT ALL

## 2019-10-07 ASSESSMENT — PATIENT HEALTH QUESTIONNAIRE - PHQ9
5. POOR APPETITE OR OVEREATING: SEVERAL DAYS
SUM OF ALL RESPONSES TO PHQ QUESTIONS 1-9: 6

## 2019-10-07 NOTE — PATIENT INSTRUCTIONS
Recommendations from today's MTM visit:                                                    MTM (medication therapy management) is a service provided by a clinical pharmacist designed to help you get the most of out of your medicines.   Today we reviewed what your medicines are for, how to know if they are working, that your medicines are safe and how to make your medicine regimen as easy as possible.     1. Increase metformin ER to 1000mg daily. Splitting into 1 tablet twice daily and taking with food will help with stomach upset.    2. Talk with Dr. Jaramillo about refilling pioglitazone.     3. Sent a prescription for Freestyle Uche to check on cost.     4. We will check on the cost of Trulicity 0.75mg weekly injection.    It was great to speak with you today.  I value your experience and would be very thankful for your time with providing feedback on our clinic survey. You may receive a survey via email or text message in the next few days.     Next MTM visit: 1 month    To schedule another MTM appointment, please call the clinic directly or you may call the MTM scheduling line at [phone] or toll-free at [phone].     My Clinical Pharmacist's contact information:                                                      It was a pleasure talking with you today!  Please feel free to contact me with any questions or concerns you have.      Smiley Brizuela, PharmD  Medication Therapy Management Pharmacist  Pager: [phone]

## 2019-10-08 ASSESSMENT — ANXIETY QUESTIONNAIRES: GAD7 TOTAL SCORE: 4

## 2019-10-18 ENCOUNTER — HOSPITAL ENCOUNTER (OUTPATIENT)
Dept: ULTRASOUND IMAGING | Facility: CLINIC | Age: 49
Discharge: HOME | End: 2019-10-18
Attending: INTERNAL MEDICINE | Admitting: INTERNAL MEDICINE
Payer: COMMERCIAL

## 2019-10-18 DIAGNOSIS — I83.013 VENOUS ULCER OF ANKLE, RIGHT (H): ICD-10-CM

## 2019-10-18 DIAGNOSIS — I87.2 VENOUS (PERIPHERAL) INSUFFICIENCY: ICD-10-CM

## 2019-10-18 DIAGNOSIS — L97.319 VENOUS ULCER OF ANKLE, RIGHT (H): ICD-10-CM

## 2019-10-18 PROCEDURE — 93970 EXTREMITY STUDY: CPT

## 2019-11-04 ENCOUNTER — TELEPHONE (OUTPATIENT)
Dept: CARDIOLOGY | Facility: CLINIC | Age: 49
End: 2019-11-04

## 2019-11-04 DIAGNOSIS — L97.319 VENOUS ULCER OF ANKLE, RIGHT (H): Primary | ICD-10-CM

## 2019-11-04 DIAGNOSIS — I87.2 VENOUS (PERIPHERAL) INSUFFICIENCY: ICD-10-CM

## 2019-11-04 DIAGNOSIS — I83.013 VENOUS ULCER OF ANKLE, RIGHT (H): Primary | ICD-10-CM

## 2019-11-04 RX ORDER — DIAZEPAM 5 MG
TABLET ORAL
Qty: 2 TABLET | Refills: 0 | Status: SHIPPED | OUTPATIENT
Start: 2019-11-04 | End: 2020-03-16

## 2019-11-05 ENCOUNTER — TELEPHONE (OUTPATIENT)
Dept: PHARMACY | Facility: OTHER | Age: 49
End: 2019-11-05

## 2019-11-05 NOTE — TELEPHONE ENCOUNTER
Patient called to let me know that Trulicity is causing nausea. I called her back to discuss further and left a voicemail.    Smiley Brizuela, PharmD  Medication Therapy Management Pharmacist  Pager: [phone]

## 2019-11-06 ENCOUNTER — TELEPHONE (OUTPATIENT)
Dept: CARDIOLOGY | Facility: CLINIC | Age: 49
End: 2019-11-06

## 2019-11-06 NOTE — TELEPHONE ENCOUNTER
Called patient and left VM advising patient to call clinic to review Pre- Ablation orders:    Patient will increase fluid the day before the procedure.  Patient will hold diuretic until after the ablation.  Patient will not wear compression stockings the day before or the day of the ablation.  Valium was explained to patient and ordered to pharmacy of choice.  Patient aware to bring Valium to clinic.  Patient will have a  to bring them home.  Follow-up ultrasound for Wednesday scheduled.  Follow-up OV for 1 month scheduled.     Patient has no questions.

## 2019-11-06 NOTE — TELEPHONE ENCOUNTER
I spoke with patient today. She did her first Trulicity injection last Saturday, felt fine for the first couple days but yesterday was really nauseous and vomited. Pt is feeling better today. I advised that nausea will get better with time and pt opts to continue Trulicity.     Smiley Brizuela, PharmD  Medication Therapy Management Pharmacist  Pager: [phone]

## 2019-11-07 NOTE — TELEPHONE ENCOUNTER
Spoke with patient about vein ablation instructions below. Patient verbalized understanding and agreed with plan of care. Rx for Valium faxed to patient's preferred pharmacy.

## 2019-11-07 NOTE — TELEPHONE ENCOUNTER
VM from patient, returning Aly's call from yesterday. Attempted to call patient back, patient did not answer. Left message for patient to call back.

## 2019-11-09 NOTE — PROGRESS NOTES
SUBJECTIVE/OBJECTIVE:                Lashawn Reddy is a 49 year old female coming in for a follow-up visit for Medication Therapy Management.  She was referred to me from Dr. Jaramillo.     Chief Complaint: Follow up from MT visit on 10/7/19.  Recheck blood sugars.    Tobacco: History of tobacco dependence - quit 10 years ago.  Alcohol: rarely    Medication Adherence/Access:  no issues reported    Diabetes:  Pt currently taking metformin XR 500mg twice daily, Jardiance 10mg once daily, and Trulicity 0.75mg once weekly (on , has done 2 doses so far). Pt endorses mild nausea, although it's improved from last week and no more vomiting. Metformin dose of 2000 mg/day caused nausea in the past. Jardiance causes vaginal itching issues, uses nystatin-triamcinolone cream daily which is helpful, would like to stop Jardiance in the future if possible. Tried glipizide in the past, caused hunger spikes and low blood sugars.  SMB-2 times daily.   Ranges (based on glucometer readings): 236, 201, 239, 175, 222, 262, 235, 245, 308, 248, 210, 198, 279, 357, 284, 211,   Patient is not experiencing hypoglycemia  Recent symptoms of high blood sugar? fatigue  Eye exam: up to date  Foot exam: up to date  ACEi/ARB: Taking losartan 50mg daily.  Urine Albumin:   Lab Results   Component Value Date    UMALCR 8.97 10/25/2018   Aspirin: Taking 81mg daily and denies side effects  Diet/Exercise: No changes since last [facility] visit.    Lab Results   Component Value Date    A1C 9.1 2019    A1C 8.6 2019    A1C 8.6 2019    A1C 7.3 2018    A1C 6.8 10/25/2018     Hyperlipidemia: Current therapy includes rosuvastatin 20mg once daily.  Pt reports no significant myalgias or other side effects. History of LDL >190 mg/dL.     Lab Results   Component Value Date    CHOL 117 2019     Lab Results   Component Value Date    HDL 47 2019     Lab Results   Component Value Date    LDL 40 2019     Lab Results    Component Value Date    TRIG 151 03/05/2019     Lab Results   Component Value Date    CHOLHDLRATIO 3.8 06/29/2015     Hypertension/Edema/Hx Cardiomyopathy: Current medications include carvedilol 6.25mg twice daily, losartan 50mg daily, and hydrochlorothiazide 25mg daily.  Patient does self-monitor BP. Home BP monitoring in range of 120's systolic over 70-80's diastolic.  Patient reports no current medication side effects. Swelling is the same since stopping Actos, wearing compression socks helps. Is scheduled for a vein ablation tomorrow.     BP Readings from Last 3 Encounters:   09/27/19 128/72   09/12/19 134/78   09/06/19 122/88     Today's Vitals: /70   Pulse 80   Wt 246 lb 8 oz (111.8 kg)   BMI 39.79 kg/m      Last Comprehensive Metabolic Panel:  Sodium   Date Value Ref Range Status   08/26/2019 136 133 - 144 mmol/L Final     Potassium   Date Value Ref Range Status   08/26/2019 3.4 3.4 - 5.3 mmol/L Final     Chloride   Date Value Ref Range Status   08/26/2019 100 94 - 109 mmol/L Final     Carbon Dioxide   Date Value Ref Range Status   08/26/2019 28 20 - 32 mmol/L Final     Anion Gap   Date Value Ref Range Status   08/26/2019 8 3 - 14 mmol/L Final     Glucose   Date Value Ref Range Status   08/26/2019 278 (H) 70 - 99 mg/dL Final     Urea Nitrogen   Date Value Ref Range Status   08/26/2019 25 7 - 30 mg/dL Final     Creatinine   Date Value Ref Range Status   08/26/2019 0.88 0.52 - 1.04 mg/dL Final     GFR Estimate   Date Value Ref Range Status   08/26/2019 77 >60 mL/min/[1.73_m2] Final     Comment:     Non  GFR Calc  Starting 12/18/2018, serum creatinine based estimated GFR (eGFR) will be   calculated using the Chronic Kidney Disease Epidemiology Collaboration   (CKD-EPI) equation.       Calcium   Date Value Ref Range Status   08/26/2019 8.7 8.5 - 10.1 mg/dL Final     ASSESSMENT:                Medication Adherence: good, no issues identified    Diabetes: Needs Improvement. Patient is not  meeting A1c goal of < 7%. Self monitoring of blood glucose is not at goal of fasting  mg/dL and post prandial < 180 mg/dL. I expect nausea will continue to improve with time, will consider increasing Trulicity and/or metformin as tolerated in the future.     Hyperlipidemia: Stable. Pt is on high intensity statin which is indicated based on 2019 ACC/AHA guidelines for lipid management.      Hypertension/Edema/Hx Cardiomyopathy: Stable. Patient is meeting BP goal of < 140/90mmHg. Follow-up plan in place for vein ablation.      PLAN:                  1. Pt to continue current medications.  2. Future considerations: Increase Trulicity and/or metformin as tolerated?    I spent 15 minutes with this patient today. All changes were made via collaborative practice agreement with Dr. Jaramillo. A copy of the visit note was provided to the patient's primary care provider.     Will follow up on 11/20 via [employer].    The patient was given a summary of these recommendations as an after visit summary.    Smiley Brizuela, PharmD  Medication Therapy Management Pharmacist  Pager: [phone]

## 2019-11-11 ENCOUNTER — OFFICE VISIT (OUTPATIENT)
Dept: PHARMACY | Facility: OTHER | Age: 49
End: 2019-11-11
Payer: COMMERCIAL

## 2019-11-11 VITALS
SYSTOLIC BLOOD PRESSURE: 105 MMHG | HEART RATE: 80 BPM | WEIGHT: 246.5 LBS | BODY MASS INDEX: 39.79 KG/M2 | DIASTOLIC BLOOD PRESSURE: 70 MMHG

## 2019-11-11 DIAGNOSIS — Z86.79 HISTORY OF CARDIOMYOPATHY: ICD-10-CM

## 2019-11-11 DIAGNOSIS — I10 ESSENTIAL HYPERTENSION: ICD-10-CM

## 2019-11-11 DIAGNOSIS — R60.9 EDEMA, UNSPECIFIED TYPE: ICD-10-CM

## 2019-11-11 DIAGNOSIS — E11.65 TYPE 2 DIABETES MELLITUS WITH HYPERGLYCEMIA, WITHOUT LONG-TERM CURRENT USE OF INSULIN (H): Primary | ICD-10-CM

## 2019-11-11 DIAGNOSIS — E78.2 MIXED HYPERLIPIDEMIA: ICD-10-CM

## 2019-11-11 PROCEDURE — 99606 MTMS BY PHARM EST 15 MIN: CPT | Performed by: PHARMACIST

## 2019-11-11 NOTE — PATIENT INSTRUCTIONS
Recommendations from today's MTM visit:                                                    MTM (medication therapy management) is a service provided by a clinical pharmacist designed to help you get the most of out of your medicines.   Today we reviewed what your medicines are for, how to know if they are working, that your medicines are safe and how to make your medicine regimen as easy as possible.     1. We will consider increasing Trulicity in the future.    It was great to speak with you today.  I value your experience and would be very thankful for your time with providing feedback on our clinic survey. You may receive a survey via email or text message in the next few days.     Next MTM visit: 11/20 via [employer]    To schedule another MTM appointment, please call the clinic directly or you may call the MTM scheduling line at [phone] or toll-free at [phone].     My Clinical Pharmacist's contact information:                                                      It was a pleasure talking with you today!  Please feel free to contact me with any questions or concerns you have.      Smiley Brizuela, PharmD  Medication Therapy Management Pharmacist  Pager: [phone]

## 2019-11-12 ENCOUNTER — ANCILLARY PROCEDURE (OUTPATIENT)
Dept: VASCULAR ULTRASOUND | Facility: CLINIC | Age: 49
End: 2019-11-12
Attending: INTERNAL MEDICINE
Payer: COMMERCIAL

## 2019-11-12 VITALS
DIASTOLIC BLOOD PRESSURE: 79 MMHG | OXYGEN SATURATION: 96 % | RESPIRATION RATE: 16 BRPM | HEART RATE: 64 BPM | SYSTOLIC BLOOD PRESSURE: 113 MMHG

## 2019-11-12 DIAGNOSIS — I83.013 VENOUS ULCER OF ANKLE, RIGHT (H): Primary | ICD-10-CM

## 2019-11-12 DIAGNOSIS — L97.319 VENOUS ULCER OF ANKLE, RIGHT (H): Primary | ICD-10-CM

## 2019-11-12 DIAGNOSIS — I87.2 VENOUS (PERIPHERAL) INSUFFICIENCY: ICD-10-CM

## 2019-11-12 PROCEDURE — 36475 ENDOVENOUS RF 1ST VEIN: CPT | Mod: RT | Performed by: INTERNAL MEDICINE

## 2019-11-12 PROCEDURE — 36470 NJX SCLRSNT 1 INCMPTNT VEIN: CPT | Mod: RT | Performed by: INTERNAL MEDICINE

## 2019-11-12 NOTE — PROGRESS NOTES
Successful RF of R) GSV and sclerotherapy completed. 10 mg valium given per Dr. Roth. Patient tolerated procedure well.

## 2019-11-14 ENCOUNTER — TELEPHONE (OUTPATIENT)
Dept: CARDIOLOGY | Facility: CLINIC | Age: 49
End: 2019-11-14

## 2019-11-14 ENCOUNTER — HOSPITAL ENCOUNTER (OUTPATIENT)
Dept: ULTRASOUND IMAGING | Facility: CLINIC | Age: 49
Discharge: HOME | End: 2019-11-14
Attending: INTERNAL MEDICINE | Admitting: INTERNAL MEDICINE
Payer: COMMERCIAL

## 2019-11-14 DIAGNOSIS — L97.319 VENOUS ULCER OF ANKLE, RIGHT (H): ICD-10-CM

## 2019-11-14 DIAGNOSIS — I82.409 DVT (DEEP VENOUS THROMBOSIS) (H): Primary | ICD-10-CM

## 2019-11-14 DIAGNOSIS — I87.2 VENOUS (PERIPHERAL) INSUFFICIENCY: ICD-10-CM

## 2019-11-14 DIAGNOSIS — I83.013 VENOUS ULCER OF ANKLE, RIGHT (H): ICD-10-CM

## 2019-11-14 PROCEDURE — 93971 EXTREMITY STUDY: CPT | Mod: RT

## 2019-11-14 NOTE — TELEPHONE ENCOUNTER
RN called patient and left VM at her work phone number advising her to call clinic to discuss Dr. Roth's recommendations below.

## 2019-11-14 NOTE — TELEPHONE ENCOUNTER
Received call from [facility].  Patient had follow-up ultrasound after radiofrequency ablation of her right greater saphenous vein.  She has a small thrombus at the origin of the greater saphenous vein into the saphenofemoral junction that peeks into the common femoral vein.    I discussed with the radiologist that at the recent national meeting for vein treatment, the studies suggested that even with these thrombi that are technically deep vein clots, since they are secondary to heat injury they normally resolve without any specific treatment and that upcoming guidelines might actually recommend against routine screening post radiofrequency ablation if patient asymptomatic.    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  Will have our nursing team call patient to  5 days of DOAC samples, either Eliquis 5 mg twice per day or Xarelto 20 mg once per day and recheck ultrasound in 1 week.  Hold aspirin while on anticoagulation.

## 2019-11-19 ENCOUNTER — TELEPHONE (OUTPATIENT)
Dept: PHARMACY | Facility: PHYSICIAN GROUP | Age: 49
End: 2019-11-19

## 2019-11-19 NOTE — TELEPHONE ENCOUNTER
Pt called me today. She continues feeling nauseated, slight headache, and really tired. This is also how she previously felt on higher doses of metformin. I advised pt to continue Trulicity 0.75mg weekly and decrease metformin ER to 500mg daily. Will follow-up next week to see if pt is feeling better.    Smiley Brizuela, PharmD  Medication Therapy Management Pharmacist  Pager: [phone]

## 2019-11-22 ENCOUNTER — HOSPITAL ENCOUNTER (OUTPATIENT)
Dept: ULTRASOUND IMAGING | Facility: CLINIC | Age: 49
Discharge: HOME | End: 2019-11-22
Attending: INTERNAL MEDICINE | Admitting: INTERNAL MEDICINE
Payer: COMMERCIAL

## 2019-11-22 ENCOUNTER — TELEPHONE (OUTPATIENT)
Dept: CARDIOLOGY | Facility: CLINIC | Age: 49
End: 2019-11-22

## 2019-11-22 DIAGNOSIS — I82.409 DVT (DEEP VENOUS THROMBOSIS) (H): Primary | ICD-10-CM

## 2019-11-22 DIAGNOSIS — I82.409 DVT (DEEP VENOUS THROMBOSIS) (H): ICD-10-CM

## 2019-11-22 PROCEDURE — 93971 EXTREMITY STUDY: CPT | Mod: RT

## 2019-11-22 NOTE — TELEPHONE ENCOUNTER
Dr. Roth reviewed with this RN that patient's venous ultrasound showed increase in venous clot. Dr. Roth recommends patient start Eliquis 5mg BID (for one month) and restart ASA 81mg (indefinitely). Dr. Roth advised if Eliquis too costly for patient we can try xarelto DVT starter pack (15mg tab BID) for one month. Patient to have repeat ultrasound in one month. RN placed orders.    RN called patient to review the above recommendations. Patient verbalized understanding and is in agreement with plan to start eliquis 5mg BID for one month. Patient informed RN she already restarted her ASA 81mg after completing the 5 day dose of xarelto. Patient aware to take Eliquis with food. RN placed order for repeat US in one month. Patient will call [facility] to arrange. Patient will call our office with any further questions/concerns.

## 2019-11-27 ENCOUNTER — TELEPHONE (OUTPATIENT)
Dept: PHARMACY | Facility: OTHER | Age: 49
End: 2019-11-27

## 2019-11-27 DIAGNOSIS — E11.65 TYPE 2 DIABETES MELLITUS WITH HYPERGLYCEMIA, WITHOUT LONG-TERM CURRENT USE OF INSULIN (H): Primary | ICD-10-CM

## 2019-11-27 NOTE — TELEPHONE ENCOUNTER
Called pt to follow-up on nausea with metformin and Trulicity. Left message to call me back.    Smiley Brizuela, PharmD  Medication Therapy Management Pharmacist  Pager: [phone]

## 2019-12-02 NOTE — TELEPHONE ENCOUNTER
Spoke to patient today. Nausea has improved with metformin dose reduction. Will continue metformin ER 500mg once daily, it seems this is the highest dose she is able to tolerate. Pt has 3 more doses of the Trulicity 0.75mg left, then will increase to 1.5mg weekly, sent prescription to pharmacy. Additionally pt is due for A1c recheck, placed a future order and pt will get lab done next week.    Smiley Brizuela, PharmD  Medication Therapy Management Pharmacist  Pager: [phone]

## 2019-12-08 ENCOUNTER — HEALTH MAINTENANCE LETTER (OUTPATIENT)
Age: 49
End: 2019-12-08

## 2019-12-11 ENCOUNTER — TELEPHONE (OUTPATIENT)
Dept: SURGERY | Facility: OTHER | Age: 49
End: 2019-12-11

## 2019-12-11 ENCOUNTER — ALLIED HEALTH/NURSE VISIT (OUTPATIENT)
Dept: PHARMACY | Facility: OTHER | Age: 49
End: 2019-12-11
Payer: COMMERCIAL

## 2019-12-11 ENCOUNTER — OFFICE VISIT (OUTPATIENT)
Dept: FAMILY MEDICINE | Facility: CLINIC | Age: 49
End: 2019-12-11
Payer: COMMERCIAL

## 2019-12-11 ENCOUNTER — OFFICE VISIT (OUTPATIENT)
Dept: CARDIOLOGY | Facility: CLINIC | Age: 49
End: 2019-12-11
Payer: COMMERCIAL

## 2019-12-11 VITALS
BODY MASS INDEX: 40.51 KG/M2 | SYSTOLIC BLOOD PRESSURE: 112 MMHG | DIASTOLIC BLOOD PRESSURE: 74 MMHG | RESPIRATION RATE: 14 BRPM | WEIGHT: 251 LBS | OXYGEN SATURATION: 98 % | HEART RATE: 66 BPM | TEMPERATURE: 97.8 F

## 2019-12-11 VITALS
SYSTOLIC BLOOD PRESSURE: 110 MMHG | OXYGEN SATURATION: 99 % | BODY MASS INDEX: 40.22 KG/M2 | DIASTOLIC BLOOD PRESSURE: 80 MMHG | HEIGHT: 66 IN | WEIGHT: 250.3 LBS | HEART RATE: 70 BPM

## 2019-12-11 DIAGNOSIS — I87.2 VENOUS (PERIPHERAL) INSUFFICIENCY: ICD-10-CM

## 2019-12-11 DIAGNOSIS — E11.65 TYPE 2 DIABETES MELLITUS WITH HYPERGLYCEMIA, WITHOUT LONG-TERM CURRENT USE OF INSULIN (H): Primary | ICD-10-CM

## 2019-12-11 DIAGNOSIS — L97.319 VENOUS ULCER OF ANKLE, RIGHT (H): Primary | ICD-10-CM

## 2019-12-11 DIAGNOSIS — I83.013 VENOUS ULCER OF ANKLE, RIGHT (H): Primary | ICD-10-CM

## 2019-12-11 DIAGNOSIS — L91.8 SKIN TAG: Primary | ICD-10-CM

## 2019-12-11 DIAGNOSIS — E11.65 TYPE 2 DIABETES MELLITUS WITH HYPERGLYCEMIA, WITHOUT LONG-TERM CURRENT USE OF INSULIN (H): ICD-10-CM

## 2019-12-11 DIAGNOSIS — I10 ESSENTIAL HYPERTENSION: ICD-10-CM

## 2019-12-11 LAB — HBA1C MFR BLD: 10.4 % (ref 0–5.6)

## 2019-12-11 PROCEDURE — 99213 OFFICE O/P EST LOW 20 MIN: CPT | Performed by: OBSTETRICS & GYNECOLOGY

## 2019-12-11 PROCEDURE — 99606 MTMS BY PHARM EST 15 MIN: CPT | Performed by: PHARMACIST

## 2019-12-11 PROCEDURE — 36415 COLL VENOUS BLD VENIPUNCTURE: CPT | Performed by: INTERNAL MEDICINE

## 2019-12-11 PROCEDURE — 83036 HEMOGLOBIN GLYCOSYLATED A1C: CPT | Mod: QW | Performed by: INTERNAL MEDICINE

## 2019-12-11 PROCEDURE — 99214 OFFICE O/P EST MOD 30 MIN: CPT | Performed by: NURSE PRACTITIONER

## 2019-12-11 ASSESSMENT — PAIN SCALES - GENERAL: PAINLEVEL: NO PAIN (0)

## 2019-12-11 ASSESSMENT — MIFFLIN-ST. JEOR: SCORE: 1772.1

## 2019-12-11 NOTE — TELEPHONE ENCOUNTER
Reason for Call:  Other call back    Detailed comments: Pt states that she is on blood thinners till the 22nd. Will this be a problem? She stated that someone said that Chu will remove it on 12/13 at her apt. Please call her and let her know.     Phone Number Patient can be reached at: Home number on file [phone] (home)    Best Time: NA     Can we leave a detailed message on this number? YES    Call taken on 12/11/2019 at 11:26 AM by Jennifer Roblero

## 2019-12-11 NOTE — TELEPHONE ENCOUNTER
Nursing Note    D:  Saw ari today for tag on the back of her knee. He took a picture. During the conversation, it was noted that she was on blood thinners (eliquis). Patient scheduled on Friday with Chu to remove this. Patient needs to know if she needs to be off eliquis prior to removal. If so, how long? Patient willing to reschedule her appt if needed.    A:  Will check with MD and call patient back. Patient will call clinic if she hasn't heard from anyone by 4pm.    R:  Patient verbalizes understanding and will call back if needed.    Veronika Rai RN  [facility]  [phone]  12/11/2019 12:16 PM

## 2019-12-11 NOTE — PATIENT INSTRUCTIONS
TODAY'S RECOMMENDATIONS    1. Call to schedule repeat right leg ultrasound  2. Continue to wean carvedilol (can stop if BP remains stable)  3. Follow with Dr. Blood as needed  4. Follow up with [facility] per ultrasound results    If you have questions or concerns please call clinic at [phone].    Please call [phone] for scheduling.      It was a pleasure seeing you today!

## 2019-12-11 NOTE — PROGRESS NOTES
Vein Clinic Progress Note  Lashawn Reddy MRN# [id number]   YOB: 1970 Age: 49 year old     Primary cardiologist: Dr. Blood    Reason for visit: Vein clinic follow up    History of presenting illness:    Lashawn Reddy, a pleasant 49 year old patient who presents today post a right GSV venous ablation. Her cardiac history is signficant for stress-induced cardiomyopathy. She presented to [facility] with shortness of breath and evidence of pulmonary edema, and then she was transferred down to [facility].  Her LVEF was found to be 40 to 45% was suggestive of stress cardiomyopathy.  A coronary angiogram revealed normal coronary arteries.  Her most recent echocardiogram in March 2019 showed that her LVEF had normalized.  At her last visit with Dr. Blood in June 2019 he noted that she could wean off of carvedilol, but continue long-term losartan for hypertension treatment.    She was referred to Dr. Roth in vein treatment clinic and subsequently underwent a right GSV ablation ans insheath sclerotherapy to the distal right GSV and varicosities and venous plexus underneath health ulcer on 11/12/19.  Unfortunately, her post procedure ultrasound showed that the GSV was closed, but there was a small thrombus at the origin of the SFJ that slightly extends into the common femoral vein. She was started on 5 days of Xarelto with ASA hold. Unfortunately, a repeat ultrasound showed an increased in the venous clot and Eliquis was recommend for 1 month with a repeat ultrasound.     Past medical history :    1. Venous insufficiency  2. Right lower extremity DVT  3. Non obstructive CAD  4. Stress cardiomyopathy  5. Hypertension  6. Type 2 diabetes         Assessment and Plan:     ASSESSMENT:    1. Venous insufficiency with history of ulcer    Right GSV ablation ans insheath sclerotherapy to the distal right GSV and varicosities and venous plexus underneath health ulcer on 11/12/19.    Complicated by clot  "in the common femoral vein    2. Right lower extremity DVT in 2015 secondary to trauma    Residual deep vein incompetency    Completed anticoagulation     3. Stress-induced cardiomyopathy    LVEF 40-45% with severe hypokinesia of the basal to mid segments of the LV with hypercontractility of the apical segment    No obstructive coronary artery disease on coronary angiogram, but LV gram showed mild apical anterior hypokinesis with EF 45%    Repeat echocardiogram from 3/25/2019 showed LVEF had normalized at 65-70% without wall motion abnormalities     4. Hypertension    Well controlled     PLAN:     1. Remain on Eliquis 5 mg BID until repeat venous ultrasound to assess thrombus.  2. Taper off carvedilol, but remain on Losartan  3. Follow up with general cardiology as needed            Review of Systems:     Review of Systems:  Skin:  Negative     Eyes:  Positive for glasses  ENT:  Negative    Respiratory:  Negative    Cardiovascular:  Negative for;palpitations;chest pain;edema;lightheadedness;dizziness    Gastroenterology: Negative    Genitourinary:  Negative    Musculoskeletal:  Negative    Neurologic:  Negative    Psychiatric:  Negative    Heme/Lymph/Imm:  Negative    Endocrine:  Positive for diabetes            Physical Exam:     Vitals: /80 (BP Location: Right arm, Patient Position: Fowlers, Cuff Size: Adult Regular)   Pulse 70   Ht 1.676 m (5' 6\")   Wt 113.5 kg (250 lb 4.8 oz)   LMP 09/03/2018 (Exact Date)   SpO2 99%   BMI 40.40 kg/m    Constitutional:  cooperative, alert and oriented, well developed, well nourished, in no acute distress overweight      Skin:  warm and dry to the touch        Head:  normocephalic, no masses or lesions        Eyes:  pupils equal and round;conjunctivae and lids unremarkable        ENT:  dentition good        Neck:  JVP normal        Chest:  clear to auscultation;normal symmetry        Cardiac: regular rhythm;normal S1 and S2     no presence of murmur            Abdomen: "    obese      Extremities and Back:      RLE scar from previous ulcer.          Medications:     Current Outpatient Medications   Medication Sig Dispense Refill     ACCU-CHEK GUIDE test strip USE TO TEST BLOOD SUGAR 2 TIMES DAILY OR AS DIRECTED. 100 each 5     apixaban ANTICOAGULANT (ELIQUIS ANTICOAGULANT) 5 MG tablet Take 1 tablet (5 mg) by mouth 2 times daily 60 tablet 1     aspirin (ASA) 81 MG EC tablet Take 1 tablet (81 mg) by mouth daily 90 tablet 3     blood glucose monitoring (ACCU-CHEK FASTCLIX) lancets 1 each 2 times daily 102 each 3     carvedilol (COREG) 12.5 MG tablet Take 0.5 tablets (6.25 mg) by mouth 2 times daily 180 tablet 3     diazepam (VALIUM) 5 MG tablet Please bring to prescription to the procedure and take 5-10 mg as needed prior to procedure. 2 tablet 0     dulaglutide (TRULICITY) 1.5 MG/0.5ML pen Inject 1.5 mg Subcutaneous every 7 days 2 mL 1     hydrochlorothiazide (HYDRODIURIL) 25 MG tablet Take 1 tablet (25 mg) by mouth daily 90 tablet 3     JARDIANCE 10 MG TABS tablet TAKE 1 TABLET BY MOUTH EVERY DAY 30 tablet 3     losartan (COZAAR) 50 MG tablet Take 1 tablet (50 mg) by mouth daily 90 tablet 3     metFORMIN (GLUCOPHAGE-XR) 500 MG 24 hr tablet Take 1 tablet (500 mg) by mouth daily       nystatin-triamcinolone (MYCOLOG II) 709574-0.1 UNIT/GM-% external cream Apply topically daily as needed (rash or itching) 60 g 3     omeprazole (PRILOSEC) 20 MG DR capsule Take 20 mg by mouth daily as needed       rosuvastatin (CRESTOR) 20 MG tablet Take 1 tablet (20 mg) by mouth daily 90 tablet 3     sertraline (ZOLOFT) 50 MG tablet TAKE 1/2 TABLET (25 MG) BY MOUTH EVERY MORNING 45 tablet 1     Vitamin D, Cholecalciferol, 1000 units TABS Take 4,000 Units by mouth daily         Family History   Problem Relation Age of Onset     Anesthesia Reaction Mother         Severe nausea     Gastrointestinal Disease Mother         Partial colon removal secondary to a blockage     Gynecology Mother          hysterectomy     Lipids Mother      Lipids Father      Cancer - colorectal Father         dx Oct '03 (dx at age 57)     Cancer Father         skin     Hypertension Father      Cancer Maternal Grandmother         colon at age 68     Cerebrovascular Disease Paternal Grandfather      Cerebrovascular Disease Paternal Grandmother         brain aneurysm     Arthritis Sister         mainly affecting her legs     Gastrointestinal Disease Sister         2 sisters with colon polyps     Cancer Sister         cervical ca     Cancer Maternal Aunt         all over ? breast was the origin     Cancer Maternal Aunt         pancreatic     Heart Disease Maternal Aunt         MI        Social History     Socioeconomic History     Marital status:      Spouse name: Tim     Number of children: 2     Years of education: 12     Highest education level: Not on file   Occupational History     Occupation: labor   Social Needs     Financial resource strain: Not on file     Food insecurity:     Worry: Not on file     Inability: Not on file     Transportation needs:     Medical: Not on file     Non-medical: Not on file   Tobacco Use     Smoking status: Former Smoker     Years: 16.00     Types: Cigarettes     Last attempt to quit: 2008     Years since quittin.2     Smokeless tobacco: Never Used   Substance and Sexual Activity     Alcohol use: Yes     Alcohol/week: 0.0 standard drinks     Comment: couple drinks per year     Drug use: No     Sexual activity: Yes     Partners: Male     Birth control/protection: Surgical     Comment: tubal ligation   Lifestyle     Physical activity:     Days per week: Not on file     Minutes per session: Not on file     Stress: Not on file   Relationships     Social connections:     Talks on phone: Not on file     Gets together: Not on file     Attends Christian service: Not on file     Active member of club or organization: Not on file     Attends meetings of clubs or organizations: Not on file      Relationship status: Not on file     Intimate partner violence:     Fear of current or ex partner: Not on file     Emotionally abused: Not on file     Physically abused: Not on file     Forced sexual activity: Not on file   Other Topics Concern      Service No     Blood Transfusions No     Caffeine Concern No     Occupational Exposure No     Hobby Hazards No     Sleep Concern No     Stress Concern Yes     Comment: stress at home at work     Weight Concern Yes     Comment: has continued to gain weight     Special Diet Yes     Comment: trying to eat better     Back Care No     Exercise No     Comment: nothing in the past 3 months     Bike Helmet No     Comment: NA     Seat Belt Yes     Self-Exams Yes     Comment: sometimes     Parent/sibling w/ CABG, MI or angioplasty before 65F 55M? Not Asked   Social History Narrative     Not on file            Past Medical History:     Past Medical History:   Diagnosis Date     Anemia 11/15/2018     CAD (coronary artery disease)     Nonobstructive per angiogram 3/2019     Depressive disorder, not elsewhere classified     depression in the post partum period after her daughter     Diabetes mellitus, type 2 (H) 6/6/2013     Diffuse cystic mastopathy     fibrocystic breast disease     Former smoker      Lymphedema of right lower extremity      Peripheral venous insufficiency      Personal history of DVT (deep vein thrombosis) 8/30/2018     S/P hysterectomy 9/12/2018     Sigmoid diverticulitis 11/15/2018     Tobacco use disorder     quit in 2008     Type 2 diabetes, HbA1c goal < 7% (H) 6/6/2013     Varicose veins of lower extremities with complications 5/13/2005     Problem list name updated by automated process. Provider to review              Past Surgical History:     Past Surgical History:   Procedure Laterality Date     C ANESTH,LOWER LEG VEIN SURG,NOS  2002    bilateral     C APPENDECTOMY  04/14/89     COLONOSCOPY  6/21/2013    Procedure: COLONOSCOPY;  colonoscopy;   Surgeon: Vamshi Loomis MD;  Location:  GI     COLONOSCOPY N/A 2/22/2017    Procedure: COLONOSCOPY;  Surgeon: Raoul Sage MD;  Location:  GI     CV HEART CATHETERIZATION WITH POSSIBLE INTERVENTION N/A 3/5/2019    Procedure: Heart Catheterization with possible Intervention;  Surgeon: Kirstin Lynn MD;  Location:  HEART CARDIAC CATH LAB     HC COLONOSCOPY W/WO BRUSH/WASH  06/17/2005     HC DILATION/CURETTAGE DIAG/THER NON OB  1986    after a miscarriage     HC REMOVAL OF TONSILS,<12 Y/O       HYSTERECTOMY TOTAL ABDOMINAL, SALPINGECTOMY Bilateral 9/12/2018    Procedure: HYSTERECTOMY TOTAL ABDOMINAL, SALPINGECTOMY;  Total Abdominal Hysterectomy, left Salpingectomy;  Surgeon: Temi Corado MD;  Location: [facility] OR     TUBAL LIGATION  08/04/2006              Allergies:   No known drug allergies       Data:   All laboratory data reviewed:    Recent Labs   Lab Test 03/05/19  0510 03/04/19  0000 10/25/18  1126 09/21/18  1722 09/21/18  1509  10/16/17  0755   LDL 40  --  69  --   --   --  33   HDL 47*  --  63  --   --   --  67   NHDL 70  --  88  --   --   --  50   CHOL 117  --  151  --   --   --  117   TRIG 151*  --  95  --   --   --  87   TSH  --  3.97  --  1.57 1.96   < >  --    NTBNP  --   --   --   --  963*  --   --     < > = values in this interval not displayed.       Lab Results   Component Value Date    WBC 9.0 03/14/2019    RBC 6.97 (H) 03/14/2019    HGB 17.5 (H) 03/14/2019    HCT 54.2 (H) 03/14/2019    MCV 78 03/14/2019    MCH 25.1 (L) 03/14/2019    MCHC 32.3 03/14/2019    RDW 19.4 (H) 03/14/2019     03/14/2019       Lab Results   Component Value Date     08/26/2019    POTASSIUM 3.4 08/26/2019    CHLORIDE 100 08/26/2019    CO2 28 08/26/2019    ANIONGAP 8 08/26/2019     (H) 08/26/2019    BUN 25 08/26/2019    CR 0.88 08/26/2019    GFRESTIMATED 77 08/26/2019    GFRESTBLACK 89 08/26/2019    MICHAELA 8.7 08/26/2019      Lab Results   Component Value Date    AST 13 08/26/2019    ALT  25 08/26/2019       Lab Results   Component Value Date    A1C 10.4 (H) 12/11/2019       Lab Results   Component Value Date    INR 1.12 03/05/2019    INR 1.1 09/21/2018    INR 2.3 (H) 04/11/2014    INR 3.4 (A) 11/25/2013    INR 3.1 (A) 11/22/2013    INR 1.04 10/25/2013         KRISTI MCLEAN [facility]  Pager: [phone]  RN phone: [phone]

## 2019-12-11 NOTE — LETTER
12/11/2019    Venkata Bob Clint, DO  [address] [facility] [address]    RE: Lashawn Reddy       Dear Colleague,    I had the pleasure of seeing Lashawn Reddy in the [facility].    Vein Clinic Progress Note  Lashawn Reddy MRN# [id number]   YOB: 1970 Age: 49 year old     Primary cardiologist: Dr. Blood    Reason for visit: Vein clinic follow up    History of presenting illness:    Lashawn Reddy, a pleasant 49 year old patient who presents today post a right GSV venous ablation. Her cardiac history is signficant for stress-induced cardiomyopathy. She presented to [facility] with shortness of breath and evidence of pulmonary edema, and then she was transferred down to [facility].  Her LVEF was found to be 40 to 45% was suggestive of stress cardiomyopathy.  A coronary angiogram revealed normal coronary arteries.  Her most recent echocardiogram in March 2019 showed that her LVEF had normalized.  At her last visit with Dr. Blood in June 2019 he noted that she could wean off of carvedilol, but continue long-term losartan for hypertension treatment.    She was referred to Dr. Roth in vein treatment clinic and subsequently underwent a right GSV ablation ans insheath sclerotherapy to the distal right GSV and varicosities and venous plexus underneath health ulcer on 11/12/19.  Unfortunately, her post procedure ultrasound showed that the GSV was closed, but there was a small thrombus at the origin of the SFJ that slightly extends into the common femoral vein. She was started on 5 days of Xarelto with ASA hold. Unfortunately, a repeat ultrasound showed an increased in the venous clot and Eliquis was recommend for 1 month with a repeat ultrasound.     Past medical history :    1. Venous insufficiency  2. Right lower extremity DVT  3. Non obstructive CAD  4. Stress cardiomyopathy  5. Hypertension  6. Type 2 diabetes         Assessment and Plan:  "    ASSESSMENT:    1. Venous insufficiency with history of ulcer    Right GSV ablation ans insheath sclerotherapy to the distal right GSV and varicosities and venous plexus underneath health ulcer on 11/12/19.    Complicated by clot in the common femoral vein    2. Right lower extremity DVT in 2015 secondary to trauma    Residual deep vein incompetency    Completed anticoagulation     3. Stress-induced cardiomyopathy    LVEF 40-45% with severe hypokinesia of the basal to mid segments of the LV with hypercontractility of the apical segment    No obstructive coronary artery disease on coronary angiogram, but LV gram showed mild apical anterior hypokinesis with EF 45%    Repeat echocardiogram from 3/25/2019 showed LVEF had normalized at 65-70% without wall motion abnormalities     4. Hypertension    Well controlled     PLAN:     1. Remain on Eliquis 5 mg BID until repeat venous ultrasound to assess thrombus.  2. Taper off carvedilol, but remain on Losartan  3. Follow up with general cardiology as needed            Review of Systems:     Review of Systems:  Skin:  Negative     Eyes:  Positive for glasses  ENT:  Negative    Respiratory:  Negative    Cardiovascular:  Negative for;palpitations;chest pain;edema;lightheadedness;dizziness    Gastroenterology: Negative    Genitourinary:  Negative    Musculoskeletal:  Negative    Neurologic:  Negative    Psychiatric:  Negative    Heme/Lymph/Imm:  Negative    Endocrine:  Positive for diabetes            Physical Exam:     Vitals: /80 (BP Location: Right arm, Patient Position: Fowlers, Cuff Size: Adult Regular)   Pulse 70   Ht 1.676 m (5' 6\")   Wt 113.5 kg (250 lb 4.8 oz)   LMP 09/03/2018 (Exact Date)   SpO2 99%   BMI 40.40 kg/m     Constitutional:  cooperative, alert and oriented, well developed, well nourished, in no acute distress overweight      Skin:  warm and dry to the touch        Head:  normocephalic, no masses or lesions        Eyes:  pupils equal and " round;conjunctivae and lids unremarkable        ENT:  dentition good        Neck:  JVP normal        Chest:  clear to auscultation;normal symmetry        Cardiac: regular rhythm;normal S1 and S2     no presence of murmur            Abdomen:    obese      Extremities and Back:      RLE scar from previous ulcer.          Medications:     Current Outpatient Medications   Medication Sig Dispense Refill     ACCU-CHEK GUIDE test strip USE TO TEST BLOOD SUGAR 2 TIMES DAILY OR AS DIRECTED. 100 each 5     apixaban ANTICOAGULANT (ELIQUIS ANTICOAGULANT) 5 MG tablet Take 1 tablet (5 mg) by mouth 2 times daily 60 tablet 1     aspirin (ASA) 81 MG EC tablet Take 1 tablet (81 mg) by mouth daily 90 tablet 3     blood glucose monitoring (ACCU-CHEK FASTCLIX) lancets 1 each 2 times daily 102 each 3     carvedilol (COREG) 12.5 MG tablet Take 0.5 tablets (6.25 mg) by mouth 2 times daily 180 tablet 3     diazepam (VALIUM) 5 MG tablet Please bring to prescription to the procedure and take 5-10 mg as needed prior to procedure. 2 tablet 0     dulaglutide (TRULICITY) 1.5 MG/0.5ML pen Inject 1.5 mg Subcutaneous every 7 days 2 mL 1     hydrochlorothiazide (HYDRODIURIL) 25 MG tablet Take 1 tablet (25 mg) by mouth daily 90 tablet 3     JARDIANCE 10 MG TABS tablet TAKE 1 TABLET BY MOUTH EVERY DAY 30 tablet 3     losartan (COZAAR) 50 MG tablet Take 1 tablet (50 mg) by mouth daily 90 tablet 3     metFORMIN (GLUCOPHAGE-XR) 500 MG 24 hr tablet Take 1 tablet (500 mg) by mouth daily       nystatin-triamcinolone (MYCOLOG II) 468564-1.1 UNIT/GM-% external cream Apply topically daily as needed (rash or itching) 60 g 3     omeprazole (PRILOSEC) 20 MG DR capsule Take 20 mg by mouth daily as needed       rosuvastatin (CRESTOR) 20 MG tablet Take 1 tablet (20 mg) by mouth daily 90 tablet 3     sertraline (ZOLOFT) 50 MG tablet TAKE 1/2 TABLET (25 MG) BY MOUTH EVERY MORNING 45 tablet 1     Vitamin D, Cholecalciferol, 1000 units TABS Take 4,000 Units by mouth  daily         Family History   Problem Relation Age of Onset     Anesthesia Reaction Mother         Severe nausea     Gastrointestinal Disease Mother         Partial colon removal secondary to a blockage     Gynecology Mother         hysterectomy     Lipids Mother      Lipids Father      Cancer - colorectal Father         dx Oct '03 (dx at age 57)     Cancer Father         skin     Hypertension Father      Cancer Maternal Grandmother         colon at age 68     Cerebrovascular Disease Paternal Grandfather      Cerebrovascular Disease Paternal Grandmother         brain aneurysm     Arthritis Sister         mainly affecting her legs     Gastrointestinal Disease Sister         2 sisters with colon polyps     Cancer Sister         cervical ca     Cancer Maternal Aunt         all over ? breast was the origin     Cancer Maternal Aunt         pancreatic     Heart Disease Maternal Aunt         MI        Social History     Socioeconomic History     Marital status:      Spouse name: Tim     Number of children: 2     Years of education: 12     Highest education level: Not on file   Occupational History     Occupation: labor   Social Needs     Financial resource strain: Not on file     Food insecurity:     Worry: Not on file     Inability: Not on file     Transportation needs:     Medical: Not on file     Non-medical: Not on file   Tobacco Use     Smoking status: Former Smoker     Years: 16.00     Types: Cigarettes     Last attempt to quit: 2008     Years since quittin.2     Smokeless tobacco: Never Used   Substance and Sexual Activity     Alcohol use: Yes     Alcohol/week: 0.0 standard drinks     Comment: couple drinks per year     Drug use: No     Sexual activity: Yes     Partners: Male     Birth control/protection: Surgical     Comment: tubal ligation   Lifestyle     Physical activity:     Days per week: Not on file     Minutes per session: Not on file     Stress: Not on file   Relationships     Social  connections:     Talks on phone: Not on file     Gets together: Not on file     Attends Restorationism service: Not on file     Active member of club or organization: Not on file     Attends meetings of clubs or organizations: Not on file     Relationship status: Not on file     Intimate partner violence:     Fear of current or ex partner: Not on file     Emotionally abused: Not on file     Physically abused: Not on file     Forced sexual activity: Not on file   Other Topics Concern      Service No     Blood Transfusions No     Caffeine Concern No     Occupational Exposure No     Hobby Hazards No     Sleep Concern No     Stress Concern Yes     Comment: stress at home at work     Weight Concern Yes     Comment: has continued to gain weight     Special Diet Yes     Comment: trying to eat better     Back Care No     Exercise No     Comment: nothing in the past 3 months     Bike Helmet No     Comment: NA     Seat Belt Yes     Self-Exams Yes     Comment: sometimes     Parent/sibling w/ CABG, MI or angioplasty before 65F 55M? Not Asked   Social History Narrative     Not on file            Past Medical History:     Past Medical History:   Diagnosis Date     Anemia 11/15/2018     CAD (coronary artery disease)     Nonobstructive per angiogram 3/2019     Depressive disorder, not elsewhere classified     depression in the post partum period after her daughter     Diabetes mellitus, type 2 (H) 6/6/2013     Diffuse cystic mastopathy     fibrocystic breast disease     Former smoker      Lymphedema of right lower extremity      Peripheral venous insufficiency      Personal history of DVT (deep vein thrombosis) 8/30/2018     S/P hysterectomy 9/12/2018     Sigmoid diverticulitis 11/15/2018     Tobacco use disorder     quit in 2008     Type 2 diabetes, HbA1c goal < 7% (H) 6/6/2013     Varicose veins of lower extremities with complications 5/13/2005     Problem list name updated by automated process. Provider to review               Past Surgical History:     Past Surgical History:   Procedure Laterality Date     C ANESTH,LOWER LEG VEIN SURG,NOS  2002    bilateral     C APPENDECTOMY  04/14/89     COLONOSCOPY  6/21/2013    Procedure: COLONOSCOPY;  colonoscopy;  Surgeon: Vamshi Loomis MD;  Location:  GI     COLONOSCOPY N/A 2/22/2017    Procedure: COLONOSCOPY;  Surgeon: Raoul Sage MD;  Location: [facility]     CV HEART CATHETERIZATION WITH POSSIBLE INTERVENTION N/A 3/5/2019    Procedure: Heart Catheterization with possible Intervention;  Surgeon: Kirstin Lynn MD;  Location:  HEART CARDIAC CATH LAB     HC COLONOSCOPY W/WO BRUSH/WASH  06/17/2005     HC DILATION/CURETTAGE DIAG/THER NON OB  1986    after a miscarriage     HC REMOVAL OF TONSILS,<13 Y/O       HYSTERECTOMY TOTAL ABDOMINAL, SALPINGECTOMY Bilateral 9/12/2018    Procedure: HYSTERECTOMY TOTAL ABDOMINAL, SALPINGECTOMY;  Total Abdominal Hysterectomy, left Salpingectomy;  Surgeon: Temi Corado MD;  Location: [facility]     TUBAL LIGATION  08/04/2006              Allergies:   No known drug allergies       Data:   All laboratory data reviewed:    Recent Labs   Lab Test 03/05/19  0510 03/04/19  0000 10/25/18  1126 09/21/18  1722 09/21/18  1509  10/16/17  0755   LDL 40  --  69  --   --   --  33   HDL 47*  --  63  --   --   --  67   NHDL 70  --  88  --   --   --  50   CHOL 117  --  151  --   --   --  117   TRIG 151*  --  95  --   --   --  87   TSH  --  3.97  --  1.57 1.96   < >  --    NTBNP  --   --   --   --  963*  --   --     < > = values in this interval not displayed.       Lab Results   Component Value Date    WBC 9.0 03/14/2019    RBC 6.97 (H) 03/14/2019    HGB 17.5 (H) 03/14/2019    HCT 54.2 (H) 03/14/2019    MCV 78 03/14/2019    MCH 25.1 (L) 03/14/2019    MCHC 32.3 03/14/2019    RDW 19.4 (H) 03/14/2019     03/14/2019       Lab Results   Component Value Date     08/26/2019    POTASSIUM 3.4 08/26/2019    CHLORIDE 100 08/26/2019    CO2 28 08/26/2019     ANIONGAP 8 08/26/2019     (H) 08/26/2019    BUN 25 08/26/2019    CR 0.88 08/26/2019    GFRESTIMATED 77 08/26/2019    GFRESTBLACK 89 08/26/2019    MICHAELA 8.7 08/26/2019      Lab Results   Component Value Date    AST 13 08/26/2019    ALT 25 08/26/2019       Lab Results   Component Value Date    A1C 10.4 (H) 12/11/2019       Lab Results   Component Value Date    INR 1.12 03/05/2019    INR 1.1 09/21/2018    INR 2.3 (H) 04/11/2014    INR 3.4 (A) 11/25/2013    INR 3.1 (A) 11/22/2013    INR 1.04 10/25/2013         KRISTI MCLEAN CNP  [facility]  Pager: [phone]  RN phone: [phone]    Thank you for allowing me to participate in the care of your patient.    Sincerely,     KRISTI MCLEAN CNP     [facility]

## 2019-12-11 NOTE — TELEPHONE ENCOUNTER
I spoke with Chu who said patient doesn't need to be off Eliquis prior to the skin tag removal.    Writer called patient back and informed her of this. Patient wanted to reschedule appt to 12/23/19 -done.  Veronika Rai RN on 12/11/2019 at 4:30 PM

## 2019-12-11 NOTE — PROGRESS NOTES
Subjective: She is followed by Dr. Jaramillo for most of her healthcare needs but she sees me for gynecologic issues.  She comes in today because of an issue with a large mole or skin tag behind her left knee.  It has been there for quite a long time.  She is currently on Eliquis for a recent DVT.  She is due to come off of it within the next week to 10 days.      The past medical history, social history, past surgical history and family history as shown below have been reviewed by me today.    Past Medical History:   Diagnosis Date     Anemia 11/15/2018     CAD (coronary artery disease)     Nonobstructive per angiogram 3/2019     Depressive disorder, not elsewhere classified     depression in the post partum period after her daughter     Diabetes mellitus, type 2 (H) 6/6/2013     Diffuse cystic mastopathy     fibrocystic breast disease     Former smoker      Lymphedema of right lower extremity      Peripheral venous insufficiency      Personal history of DVT (deep vein thrombosis) 8/30/2018     S/P hysterectomy 9/12/2018     Sigmoid diverticulitis 11/15/2018     Tobacco use disorder     quit in 2008     Type 2 diabetes, HbA1c goal < 7% (H) 6/6/2013     Varicose veins of lower extremities with complications 5/13/2005     Problem list name updated by automated process. Provider to review        Allergies   Allergen Reactions     No Known Drug Allergies      Current Outpatient Medications   Medication Sig Dispense Refill     ACCU-CHEK GUIDE test strip USE TO TEST BLOOD SUGAR 2 TIMES DAILY OR AS DIRECTED. 100 each 5     apixaban ANTICOAGULANT (ELIQUIS ANTICOAGULANT) 5 MG tablet Take 1 tablet (5 mg) by mouth 2 times daily 60 tablet 1     aspirin (ASA) 81 MG EC tablet Take 1 tablet (81 mg) by mouth daily 90 tablet 3     blood glucose monitoring (ACCU-CHEK FASTCLIX) lancets 1 each 2 times daily 102 each 3     carvedilol (COREG) 12.5 MG tablet Take 0.5 tablets (6.25 mg) by mouth 2 times daily 180 tablet 3     diazepam  (VALIUM) 5 MG tablet Please bring to prescription to the procedure and take 5-10 mg as needed prior to procedure. 2 tablet 0     dulaglutide (TRULICITY) 1.5 MG/0.5ML pen Inject 1.5 mg Subcutaneous every 7 days 2 mL 1     hydrochlorothiazide (HYDRODIURIL) 25 MG tablet Take 1 tablet (25 mg) by mouth daily 90 tablet 3     JARDIANCE 10 MG TABS tablet TAKE 1 TABLET BY MOUTH EVERY DAY 30 tablet 3     losartan (COZAAR) 50 MG tablet Take 1 tablet (50 mg) by mouth daily 90 tablet 3     metFORMIN (GLUCOPHAGE-XR) 500 MG 24 hr tablet Take 1 tablet (500 mg) by mouth daily       nystatin-triamcinolone (MYCOLOG II) 779792-9.1 UNIT/GM-% external cream Apply topically daily as needed (rash or itching) 60 g 3     omeprazole (PRILOSEC) 20 MG DR capsule Take 20 mg by mouth daily as needed       rosuvastatin (CRESTOR) 20 MG tablet Take 1 tablet (20 mg) by mouth daily 90 tablet 3     sertraline (ZOLOFT) 50 MG tablet TAKE 1/2 TABLET (25 MG) BY MOUTH EVERY MORNING 45 tablet 1     Vitamin D, Cholecalciferol, 1000 units TABS Take 4,000 Units by mouth daily       Past Surgical History:   Procedure Laterality Date     C ANESTH,LOWER LEG VEIN SURG,NOS  2002    bilateral     C APPENDECTOMY  04/14/89     COLONOSCOPY  6/21/2013    Procedure: COLONOSCOPY;  colonoscopy;  Surgeon: Vamshi Loomis MD;  Location:  GI     COLONOSCOPY N/A 2/22/2017    Procedure: COLONOSCOPY;  Surgeon: Raoul Sage MD;  Location:  GI     CV HEART CATHETERIZATION WITH POSSIBLE INTERVENTION N/A 3/5/2019    Procedure: Heart Catheterization with possible Intervention;  Surgeon: Kirstin Lynn MD;  Location:  HEART CARDIAC CATH LAB     HC COLONOSCOPY W/WO BRUSH/WASH  06/17/2005     HC DILATION/CURETTAGE DIAG/THER NON OB  1986    after a miscarriage     HC REMOVAL OF TONSILS,<12 Y/O       HYSTERECTOMY TOTAL ABDOMINAL, SALPINGECTOMY Bilateral 9/12/2018    Procedure: HYSTERECTOMY TOTAL ABDOMINAL, SALPINGECTOMY;  Total Abdominal Hysterectomy, left Salpingectomy;   Surgeon: Temi Corado MD;  Location: [facility]     TUBAL LIGATION  2006     Social History     Socioeconomic History     Marital status:      Spouse name: Tim     Number of children: 2     Years of education: 12     Highest education level: None   Occupational History     Occupation: labor   Social Needs     Financial resource strain: None     Food insecurity:     Worry: None     Inability: None     Transportation needs:     Medical: None     Non-medical: None   Tobacco Use     Smoking status: Former Smoker     Years: 16.00     Types: Cigarettes     Last attempt to quit: 2008     Years since quittin.2     Smokeless tobacco: Never Used   Substance and Sexual Activity     Alcohol use: Yes     Alcohol/week: 0.0 standard drinks     Comment: couple drinks per year     Drug use: No     Sexual activity: Yes     Partners: Male     Birth control/protection: Surgical     Comment: tubal ligation   Lifestyle     Physical activity:     Days per week: None     Minutes per session: None     Stress: None   Relationships     Social connections:     Talks on phone: None     Gets together: None     Attends Sabianist service: None     Active member of club or organization: None     Attends meetings of clubs or organizations: None     Relationship status: None     Intimate partner violence:     Fear of current or ex partner: None     Emotionally abused: None     Physically abused: None     Forced sexual activity: None   Other Topics Concern      Service No     Blood Transfusions No     Caffeine Concern No     Occupational Exposure No     Hobby Hazards No     Sleep Concern No     Stress Concern Yes     Comment: stress at home at work     Weight Concern Yes     Comment: has continued to gain weight     Special Diet Yes     Comment: trying to eat better     Back Care No     Exercise No     Comment: nothing in the past 3 months     Bike Helmet No     Comment: NA     Seat Belt Yes     Self-Exams Yes      Comment: sometimes     Parent/sibling w/ CABG, MI or angioplasty before 65F 55M? Not Asked   Social History Narrative     None     Family History   Problem Relation Age of Onset     Anesthesia Reaction Mother         Severe nausea     Gastrointestinal Disease Mother         Partial colon removal secondary to a blockage     Gynecology Mother         hysterectomy     Lipids Mother      Lipids Father      Cancer - colorectal Father         dx Oct '03 (dx at age 57)     Cancer Father         skin     Hypertension Father      Cancer Maternal Grandmother         colon at age 68     Cerebrovascular Disease Paternal Grandfather      Cerebrovascular Disease Paternal Grandmother         brain aneurysm     Arthritis Sister         mainly affecting her legs     Gastrointestinal Disease Sister         2 sisters with colon polyps     Cancer Sister         cervical ca     Cancer Maternal Aunt         all over ? breast was the origin     Cancer Maternal Aunt         pancreatic     Heart Disease Maternal Aunt         MI 2010       ROS: A 12 point review of systems was done. Except for what is listed above in the HPI, the systems review is negative .      Objective: Vital signs: Blood pressure 112/74, pulse 66, temperature 97.8  F (36.6  C), temperature source Temporal, resp. rate 14, weight 113.9 kg (251 lb), last menstrual period 09/03/2018, SpO2 98 %, not currently breastfeeding.    Exam of the popliteal area of the left knee reveals a large skin tag with a broad base.  I do think there is a potential for bleeding when this is removed and therefore it is unwise to remove it today here in the clinic with her limited capacity for control of hemostasis especially since she is on Eliquis.      Assessment/Plan:  A total of 15 minutes were spent face-to-face with this patient during today's consultation, with more than 50% of that time devoted to conversation and counseling about the management decisions.      1.  Large skin tag behind  left knee and popliteal area.  She wants this removed because she wears compression stockings for leg edema.  I think this is a very valid clinical indication.  I am going to refer her to Dr. Pj Sage MD to consider having this removed but not until after she has been off the Eliquis for several weeks.             The above information was dictating using Dragon voice software and as a result there may be some irregularities that were not detected in my review of this note.    MAGDALENA Sherman MD

## 2019-12-12 NOTE — PROGRESS NOTES
SUBJECTIVE/OBJECTIVE:                Lashawn Reddy is a 49 year old female coming in for a follow-up visit for Medication Therapy Management.  She was referred to me from Dr. Jaramillo.     Chief Complaint: Follow up from MTM visit on 19.  Pt is surprised that A1c returned higher because home blood sugars are improving.    Tobacco: History of tobacco dependence - quit 10 years ago.  Alcohol: rarely    Medication Adherence/Access:  no issues reported    Diabetes:  Pt currently taking metformin XR 500mg twice daily, Jardiance 10mg once daily, and Trulicity 0.75mg once weekly (has 2 more pens then will increase to 1.5mg). Pt denies any nausea issues since decreasing metformin. Jardiance causes vaginal itching issues, uses nystatin-triamcinolone cream daily which is helpful, would like to stop Jardiance in the future if possible. Tried glipizide in the past, caused hunger spikes and low blood sugars.  SMB-2 times daily.   Ranges (patient reported): 160 this morning.  Patient is not experiencing hypoglycemia  Recent symptoms of high blood sugar? None.  Eye exam: up to date  Foot exam: up to date  ACEi/ARB: Taking losartan 50mg daily.  Urine Albumin:   Lab Results   Component Value Date    UMALCR 8.97 10/25/2018   Aspirin: Taking 81mg daily and denies side effects  Diet/Exercise: No changes since last MTM visit.    Lab Results   Component Value Date    A1C 10.4 2019    A1C 9.1 2019    A1C 8.6 2019    A1C 8.6 2019    A1C 7.3 2018     Today's Vitals: None taken (telemed)    ASSESSMENT:                Medication Adherence: good, no issues identified    Diabetes: Needs Improvement. Patient is not meeting A1c goal of < 7%. Self monitoring of blood glucose is not at goal of fasting  mg/dL and post prandial < 180 mg/dL. Discussed that A1c is a measure of blood sugars over the last 3 months. Pt would benefit from increasing Trulicity dose now.     PLAN:                  1. Pt to  increase Trulicity to 1.5mg weekly. She will inject 2 of the 0.75mg pens on Saturday, then increase to the 1.5mg pen.    I spent 15 minutes with this patient today. All changes were made via collaborative practice agreement with Dr. Jaramillo. A copy of the visit note was provided to the patient's primary care provider.     Will follow up in 3 weeks.    The patient declined a summary of these recommendations as an after visit summary.    Smiley Brizuela, PharmD  Medication Therapy Management Pharmacist  Pager: [phone]

## 2019-12-18 DIAGNOSIS — E11.65 TYPE 2 DIABETES MELLITUS WITH HYPERGLYCEMIA, WITHOUT LONG-TERM CURRENT USE OF INSULIN (H): ICD-10-CM

## 2019-12-18 RX ORDER — EMPAGLIFLOZIN 10 MG/1
TABLET, FILM COATED ORAL
Qty: 30 TABLET | Refills: 3 | Status: SHIPPED | OUTPATIENT
Start: 2019-12-18 | End: 2020-04-24

## 2019-12-18 NOTE — TELEPHONE ENCOUNTER
Prescription approved per [facility] Refill Protocol.    TARAN RodasN, RN  [facility]

## 2019-12-18 NOTE — TELEPHONE ENCOUNTER
Requested Prescriptions   Pending Prescriptions Disp Refills     JARDIANCE 10 MG TABS tablet [Pharmacy Med Name: JARDIANCE 10MG TABLET] 30 tablet 3     Sig: TAKE 1 TABLET BY MOUTH EVERY DAY   Last Written Prescription Date:  8/22/19  Last Fill Quantity: 30,  # refills: 3   Last office visit: 7/25/2019 with prescribing provider:  9/27/19   Future Office Visit:        Sodium Glucose Co-Transport Inhibitor Agents Passed - 12/18/2019  1:05 AM        Passed - Blood pressure less than 140/90 in past 6 months     BP Readings from Last 3 Encounters:   12/11/19 112/74   12/11/19 110/80   11/12/19 113/79                 Passed - Patient has documented LDL within the past 12 mos.     Recent Labs   Lab Test 03/05/19  0510   LDL 40             Passed - Patient has had a Microalbumin in the past 15 mos.     Recent Labs   Lab Test 10/25/18  1143   MICROL 21   UMALCR 8.97             Passed - Patient has documented A1c within the specified period of time.     If HgbA1C is 8 or greater, it needs to be on file within the past 3 months.  If less than 8, must be on file within the past 6 months.     Recent Labs   Lab Test 12/11/19  1044   A1C 10.4*             Passed - No creatinine >1.4 or GFR <45 within the past 12 mos     Recent Labs   Lab Test 08/26/19  0734   GFRESTIMATED 77   GFRESTBLACK 89       Recent Labs   Lab Test 08/26/19  0734   CR 0.88             Passed - Medication is active on med list        Passed - Patient is age 18 or older        Passed - Patient is not pregnant        Passed - Patient has documented normal Potassium within the last 12 mos.     Recent Labs   Lab Test 08/26/19  0734   POTASSIUM 3.4             Passed - Patient has no positive pregnancy test within the past 12 mos.        Passed - Recent (6 mo) or future (30 days) visit within the authorizing provider's specialty     Patient had office visit in the last 6 months or has a visit in the next 30 days with authorizing provider or within the authorizing  "provider's specialty.  See \"Patient Info\" tab in inbasket, or \"Choose Columns\" in Meds & Orders section of the refill encounter.            "

## 2019-12-23 ENCOUNTER — HOSPITAL ENCOUNTER (OUTPATIENT)
Dept: ULTRASOUND IMAGING | Facility: CLINIC | Age: 49
Discharge: HOME | End: 2019-12-23
Attending: INTERNAL MEDICINE | Admitting: INTERNAL MEDICINE
Payer: COMMERCIAL

## 2019-12-23 ENCOUNTER — OFFICE VISIT (OUTPATIENT)
Dept: SURGERY | Facility: CLINIC | Age: 49
End: 2019-12-23
Payer: COMMERCIAL

## 2019-12-23 ENCOUNTER — TELEPHONE (OUTPATIENT)
Dept: SURGERY | Facility: OTHER | Age: 49
End: 2019-12-23

## 2019-12-23 VITALS — SYSTOLIC BLOOD PRESSURE: 124 MMHG | DIASTOLIC BLOOD PRESSURE: 62 MMHG | BODY MASS INDEX: 38.93 KG/M2 | WEIGHT: 241.2 LBS

## 2019-12-23 DIAGNOSIS — I82.409 DVT (DEEP VENOUS THROMBOSIS) (H): ICD-10-CM

## 2019-12-23 DIAGNOSIS — R22.42 KNEE MASS, LEFT: Primary | ICD-10-CM

## 2019-12-23 PROCEDURE — 93971 EXTREMITY STUDY: CPT | Mod: RT

## 2019-12-23 PROCEDURE — 99213 OFFICE O/P EST LOW 20 MIN: CPT | Performed by: SPECIALIST

## 2019-12-23 NOTE — LETTER
12/23/2019         RE: Lashawn Reddy  [address] [facility] [address]        Dear Colleague,    Thank you for referring your patient, Lashawn Reddy, to the [facility]. Please see a copy of my visit note below.    F/U for left posterior knee mass      Subjective:  Patient is a 49-year-old white female well-known to me for diverticulitis and venous disease.  She recently had a right GSV RF ablation with a resultant DVT.  She did stop her anticoagulants on Saturday.  She has had a large pedunculated skin mass on her right posterior knee for many years.  Is been slowly enlarging.  She is now thinking she like to finally have it excised.    Objective:  B/P: 124/62, T: Data Unavailable, P: Data Unavailable, R: Data Unavailable  LLE - Post knee - pedunculated mass 2x3x2 cm, soft, with long stalk.    Assessment/plan:  This is a 49-year-old lady with a pedunculated skin mass of the left posterior knee.  After discussion with the patient the plan at this time is for an excision under local anesthesia.  Procedure room is not available today as it is been used by ENT so the plan is to do it in the OR on Thursday after [place].  The procedure, risks, benefits alternatives were discussed and she agrees to proceed.  She will be scheduled for Thursday.    Raoul Sage MD, FACS    Again, thank you for allowing me to participate in the care of your patient.        Sincerely,        Raoul Sage MD

## 2019-12-23 NOTE — PROGRESS NOTES
F/U for left posterior knee mass      Subjective:  Patient is a 49-year-old white female well-known to me for diverticulitis and venous disease.  She recently had a right GSV RF ablation with a resultant DVT.  She did stop her anticoagulants on Saturday.  She has had a large pedunculated skin mass on her right posterior knee for many years.  Is been slowly enlarging.  She is now thinking she like to finally have it excised.    Objective:  B/P: 124/62, T: Data Unavailable, P: Data Unavailable, R: Data Unavailable  LLE - Post knee - pedunculated mass 2x3x2 cm, soft, with long stalk.    Assessment/plan:  This is a 49-year-old lady with a pedunculated skin mass of the left posterior knee.  After discussion with the patient the plan at this time is for an excision under local anesthesia.  Procedure room is not available today as it is been used by ENT so the plan is to do it in the OR on Thursday after [place].  The procedure, risks, benefits alternatives were discussed and she agrees to proceed.  She will be scheduled for Thursday.    Raoul Sage MD, FACS

## 2019-12-23 NOTE — TELEPHONE ENCOUNTER
Type of surgery: EXCISION, MASS, LOWER EXTREMITY  CONSENT TO READ - Excision Left Posterior Knee Mass (Left)   Location of surgery: [facility] OR  Date and time of surgery: 12/26  Surgeon: Chu  Pre-Op Appt Date: NA  Post-Op Appt Date: 1/6   Packet sent out: Yes  Pre-cert/Authorization completed:  Not Applicable  Date: na

## 2019-12-26 ENCOUNTER — HOSPITAL ENCOUNTER (OUTPATIENT)
Facility: CLINIC | Age: 49
Discharge: HOME | End: 2019-12-26
Attending: SPECIALIST | Admitting: SPECIALIST
Payer: COMMERCIAL

## 2019-12-26 VITALS
DIASTOLIC BLOOD PRESSURE: 75 MMHG | OXYGEN SATURATION: 95 % | TEMPERATURE: 97.6 F | SYSTOLIC BLOOD PRESSURE: 113 MMHG | RESPIRATION RATE: 16 BRPM

## 2019-12-26 DIAGNOSIS — R22.42 KNEE MASS, LEFT: ICD-10-CM

## 2019-12-26 DIAGNOSIS — G89.18 POST-OP PAIN: Primary | ICD-10-CM

## 2019-12-26 PROCEDURE — 25000125 ZZHC RX 250: Performed by: SPECIALIST

## 2019-12-26 PROCEDURE — 27327 EXC THIGH/KNEE LES SC < 3 CM: CPT | Mod: LT | Performed by: SPECIALIST

## 2019-12-26 PROCEDURE — 36000050 ZZH SURGERY LEVEL 2 1ST 30 MIN: Performed by: SPECIALIST

## 2019-12-26 PROCEDURE — 88305 TISSUE EXAM BY PATHOLOGIST: CPT | Mod: 26 | Performed by: SPECIALIST

## 2019-12-26 PROCEDURE — 40000306 ZZH STATISTIC PRE PROC ASSESS II: Performed by: SPECIALIST

## 2019-12-26 PROCEDURE — 88305 TISSUE EXAM BY PATHOLOGIST: CPT | Performed by: SPECIALIST

## 2019-12-26 PROCEDURE — 71000027 ZZH RECOVERY PHASE 2 EACH 15 MINS: Performed by: SPECIALIST

## 2019-12-26 PROCEDURE — 27210794 ZZH OR GENERAL SUPPLY STERILE: Performed by: SPECIALIST

## 2019-12-26 RX ORDER — LIDOCAINE HYDROCHLORIDE AND EPINEPHRINE 10; 10 MG/ML; UG/ML
INJECTION, SOLUTION INFILTRATION; PERINEURAL PRN
Status: DISCONTINUED | OUTPATIENT
Start: 2019-12-26 | End: 2019-12-26 | Stop reason: HOSPADM

## 2019-12-26 RX ORDER — BUPIVACAINE HYDROCHLORIDE AND EPINEPHRINE 2.5; 5 MG/ML; UG/ML
INJECTION, SOLUTION INFILTRATION; PERINEURAL PRN
Status: DISCONTINUED | OUTPATIENT
Start: 2019-12-26 | End: 2019-12-26 | Stop reason: HOSPADM

## 2019-12-26 RX ORDER — HYDROCODONE BITARTRATE AND ACETAMINOPHEN 5; 325 MG/1; MG/1
1 TABLET ORAL
Status: CANCELLED | OUTPATIENT
Start: 2019-12-26

## 2019-12-26 RX ORDER — HYDROCODONE BITARTRATE AND ACETAMINOPHEN 5; 325 MG/1; MG/1
1-2 TABLET ORAL EVERY 6 HOURS PRN
Qty: 10 TABLET | Refills: 0 | Status: SHIPPED | OUTPATIENT
Start: 2019-12-26 | End: 2020-04-13

## 2019-12-26 NOTE — OP NOTE
Procedure Date: 2019      PREOPERATIVE DIAGNOSIS:  Posterior left knee mass.      POSTOPERATIVE DIAGNOSIS:  Posterior left knee mass.      PROCEDURE:  Excision of left posterior knee mass measuring 2 x 1.5 x 1 cm in size with a 1.5 cm ellipse.      SURGEON:  Raoul Bailey MD, FACS      ANESTHESIA:  Local.      INDICATIONS FOR PROCEDURE:  This is a 49-year-old lady with a longstanding pedunculated skin lesion/mass in her left posterior knee.  It was causing her some discomfort.  She opted to have it excised.  The procedure room was not available in the clinic so we elected to do it in the OR.       OPERATIVE FINDINGS:  Included a posterior knee mass with measurements as above.      DETAILS OF PROCEDURE:  With cooperation of the patient in the preoperative holding room, the mass was marked.  She was then taken to the operating room and the left posterior knee was prepped and draped in sterile fashion.  The area was then infiltrated with local anesthetic.  After adequate analgesia was achieved, an elliptical incision of 1.5 cm was made around the base and was dissected free from the subcutaneous tissue using cautery.  It was then submitted as specimen.  Hemostasis was achieved using cautery.  Subcutaneous tissue was reapproximated using 3-0 Vicryl.  The skin was closed with a running 4-0 Monocryl subcuticular stitch and Exofin glue was then used to seal the incision.  Sterile dressing was applied and the patient was taken from the operating room to the recovery room in stable condition to be sent home.         RAOUL BAILEY MD, FACS             D: 2019   T: 2019   MT: CHRISTELLE      Name:     FLORY LÓPEZ   MRN:      [id number]        Account:        [id number]   :      1970           Procedure Date: 2019      Document: [id number]

## 2019-12-26 NOTE — BRIEF OP NOTE
[facility] Brief Operative Note    Pre-operative diagnosis: Knee mass, left [R22.42]   Post-operative diagnosis Same   Procedure: Procedure(s):  Excision Left Posterior Knee Mass - 2x1.5x1 cm with 1.5 cm ellipse   Surgeon(s): Surgeon(s) and Role:     * Raoul Sage MD - Primary   Estimated blood loss: 1cc   Specimens: ID Type Source Tests Collected by Time Destination   A : Posterior Left knee tissue  Tissue Knee, Left SURGICAL PATHOLOGY EXAM Raoul Sage MD 12/26/2019 12:23 PM       Findings: Post knee mass as above       Raoul Sage MD, FACS    #[id number]

## 2019-12-26 NOTE — DISCHARGE INSTRUCTIONS
[facility]Day [facility]   Adult Discharge Orders & Instructions     For 24 hours after surgery    1. Get plenty of rest.  A responsible adult must stay with you for at least 24 hours after you leave the hospital.   2. Do not drive or use heavy equipment.  If you have weakness or tingling, don't drive or use heavy equipment until this feeling goes away.  3. Do not drink alcohol.  4. Avoid strenuous or risky activities.  Ask for help when climbing stairs.   5. You may feel lightheaded.  If so, sit for a few minutes before standing.  Have someone help you get up.   6. You may have a slight fever. Call the doctor if your fever is over 100 F (37.7 C) (taken under the tongue) or lasts longer than 24 hours.  7. You may have a dry mouth, a sore throat, muscle aches or trouble sleeping.  These should go away after 24 hours.  8. Do not make important or legal decisions.  We don t expect you to have any problems from the surgery or treatment you had today. Just in case, here s what to do if you have pain, upset stomach (nausea), bleeding or infection:  Pain:  Take medicines your doctor has prescribed or over-the-counter medicine they have suggested. Resting and using ice packs can help, too. For surgery on an arm or leg, raise it on a pillow to ease swelling. Call your doctor if these methods don t work.  Copyright Marco [employer], Licensed under [employer]  Upset stomach (nausea):  Take anti-nausea medicine approved by your doctor. Drink clear liquids like apple juice, ginger ale, broth or 7-Up. Be sure to drink enough fluids. Rest can help, too. Move to normal foods when you re ready. Bleeding:  In the first 24 hours, you may see a little blood on your dressing, about the size of a quarter. You don t need to worry about this much blood, but if the blood spot keeps getting bigger:    Put pressure on the wound if you can, AND    Call your doctor.  Copyright [employer], Licensed under CC[employer]  International  Fever/Infection: Please call your doctor if you have any of these signs:    Redness    Swelling    Wound feels warm    Pain gets worse    Bad-smelling fluid leaks from wound    Fever or chills  Call your doctor for any of the followin.  It has been over 8 to 10 hours since surgery and you are still not able to urinate (pass water).    2.  Headache for over 24 hours.       [facility] Nurse advice line: [phone] (24 hour line)

## 2019-12-27 NOTE — PROGRESS NOTES
"[facility] Same Day Surgery  Discharge Call Back  Lashawn Reddy  1970  MRN: [id number]  Home: [phone] (home) [phone] (work)  PCP: Venkata Jaramillo    We are calling to see how you're doing since your surgery/procedure with us?   Comments: \"Oh doing very well\"  Clinical Questions  1. Have you had time to look at your discharge instructions? Do you have any questions in regards to the instructions?   Comment: yes, no  2. Do you feel your pain is being controlled with the regimen the surgeon sent you home on? (ie: prescription medications, over the counter pain medications, ice packs)   Comments: yes  3. Have you noticed any drainage on your dressing? Do you know what to do if you have bleeding as a result of your procedure?   Comments: no  4. Have you had any nausea/vomiting? Do you know how to treat this?   Comment: no  5. Have you had any signs/symptoms of infection? (ie: fever, swelling, heat, drainage or redness) Do you know what to do if you have?   Comment: no  6. Do you have a follow up appointment made with your surgeon? Do you have a number to contact them at if you need it?   Comment: yes, yes  Retained Foreign Object (SANCHO, Hemovac, Penrose, Wound Packing, Vaginal Packing, Nasal Splints, Urethral Stents, Mckeon Catheter)  1. Do you still have na in place?   2. If the item is still in place, can you review the plan for removal with me? na      You may be randomly selected to fill out a [facility] survey. We would appreciate you taking the time to fill this out. It is important to us if you would answer all of the questions on the survey.              "

## 2020-01-01 LAB — COPATH REPORT: NORMAL

## 2020-01-06 ENCOUNTER — TELEPHONE (OUTPATIENT)
Dept: SURGERY | Facility: CLINIC | Age: 50
End: 2020-01-06

## 2020-01-06 NOTE — TELEPHONE ENCOUNTER
Dr. Sage to contact patient with results. Routed to Dr. Sage.........................      ..................Marcie Anthony CMA  ([facility])

## 2020-01-22 ENCOUNTER — TELEPHONE (OUTPATIENT)
Dept: PHARMACY | Facility: OTHER | Age: 50
End: 2020-01-22

## 2020-01-22 DIAGNOSIS — E11.65 TYPE 2 DIABETES MELLITUS WITH HYPERGLYCEMIA, WITHOUT LONG-TERM CURRENT USE OF INSULIN (H): Primary | ICD-10-CM

## 2020-01-22 NOTE — TELEPHONE ENCOUNTER
I called pt to follow-up after 12/11 MTM visit. Pt feels Trulicity is working well. Blood sugars are running 130-170s fasting in the morning. Pt is currently taking metformin ER 500mg daily, Jardiance 10mg daily, and Trulicity 1.5mg weekly. Unable to tolerate higher doses of metformin in the past. Pt will continue current medications, work on diet/exercise modifications, and recheck A1c in March. Placed future A1c order and scheduled MTM/lab appt on 3/16.    Smiley Brizuela, PharmD  Medication Therapy Management Pharmacist  Pager: [phone]

## 2020-02-12 DIAGNOSIS — E11.65 TYPE 2 DIABETES MELLITUS WITH HYPERGLYCEMIA, WITHOUT LONG-TERM CURRENT USE OF INSULIN (H): ICD-10-CM

## 2020-02-13 RX ORDER — DULAGLUTIDE 1.5 MG/.5ML
INJECTION, SOLUTION SUBCUTANEOUS
Qty: 2 ML | Refills: 1 | Status: SHIPPED | OUTPATIENT
Start: 2020-02-13 | End: 2020-04-13

## 2020-02-13 NOTE — TELEPHONE ENCOUNTER
Routing refill request to provider for review/approval because:  Labs not current:  Microalbumin    TARAN RodasN, RN  [facility]

## 2020-02-13 NOTE — TELEPHONE ENCOUNTER
"Requested Prescriptions   Pending Prescriptions Disp Refills     TRULICITY 1.5 MG/0.5ML pen [Pharmacy Med Name: TRULICITY 1.5MG/0.5ML SOLN INJ] 2 mL 1     Sig: INJECT 1.5 MG SUBCUTANEOUS EVERY 7 DAYS   Last Written Prescription Date:  12/2/19  Last Fill Quantity: 2 mL,  # refills: 1   Last office visit: 12/11/2019 with prescribing provider:  9/27/19   Future Office Visit:   Next 5 appointments (look out 90 days)    Mar 16, 2020  8:30 AM CDT  SHORT with Smiley Brizuela [facility] ([facility] [address] [facility] [address] [phone]           GLP-1 Agonists Protocol Failed - 2/12/2020  5:48 PM        Failed - Microalbumin on file in past 12 months     Recent Labs   Lab Test 10/25/18  1143   MICROL 21   UMALCR 8.97             Passed - Blood pressure less than 140/90 in past 6 months     BP Readings from Last 3 Encounters:   12/26/19 113/75   12/23/19 124/62   12/11/19 112/74                 Passed - LDL on file in past 12 months     Recent Labs   Lab Test 03/05/19  0510   LDL 40             Passed - HgbA1C in past 3 or 6 months     If HgbA1C is 8 or greater, it needs to be on file within the past 3 months.  If less than 8, must be on file within the past 6 months.     Recent Labs   Lab Test 12/11/19  1044   A1C 10.4*             Passed - Medication is active on med list        Passed - Patient is age 18 or older        Passed - No active pregnancy on record        Passed - Normal serum creatinine on file in past 12 months     Recent Labs   Lab Test 08/26/19  0734   CR 0.88             Passed - No positive pregnancy test in past 12 months        Passed - Recent (6 mo) or future (30 days) visit within the authorizing provider's specialty     Patient had office visit in the last 6 months or has a visit in the next 30 days with authorizing provider.  See \"Patient Info\" tab in inbasket, or \"Choose Columns\" in Meds & Orders section of the refill encounter.        "

## 2020-02-27 DIAGNOSIS — L30.4 INTERTRIGO: ICD-10-CM

## 2020-02-28 RX ORDER — FLUCONAZOLE 150 MG/1
150 TABLET ORAL
Qty: 10 TABLET | Refills: 0 | Status: SHIPPED | OUTPATIENT
Start: 2020-02-28 | End: 2020-07-06

## 2020-02-28 NOTE — TELEPHONE ENCOUNTER
"Diflucan  Last Written Prescription Date:  12/31/2018  Last Fill Quantity: 10,  # refills: 0   Last office visit: 12/11/2019 with prescribing provider:    Future Office Visit:   Next 5 appointments (look out 90 days)    Mar 16, 2020  8:30 AM CDT  SHORT with Smiley Brizuela [facility] ([facility]) [address] [facility] [address] [phone]         Requested Prescriptions   Pending Prescriptions Disp Refills     fluconazole (DIFLUCAN) 150 MG tablet [Pharmacy Med Name: FLUCONAZOLE 150MG TABLET] 10 tablet 0     Sig: TAKE 1 TABLET (150 MG) BY MOUTH EVERY 3 DAYS       Antifungal Agents Failed - 2/27/2020  4:24 PM        Failed - Not Fluconazole or Terconazole      If oral Fluconazole or Terconazole, may refill if indicated in progress notes.           Failed - Medication is active on med list        Passed - Recent (12 mo) or future (30 days) visit within the authorizing provider's specialty     Patient has had an office visit with the authorizing provider or a provider within the authorizing providers department within the previous 12 mos or has a future within next 30 days. See \"Patient Info\" tab in inbasket, or \"Choose Columns\" in Meds & Orders section of the refill encounter.                "

## 2020-02-28 NOTE — TELEPHONE ENCOUNTER
Routing refill request to provider for review/approval because:  Drug not active on patient's medication list  A break in medication    Alondra Culver RN BSN

## 2020-03-03 DIAGNOSIS — E11.65 TYPE 2 DIABETES MELLITUS WITH HYPERGLYCEMIA, WITHOUT LONG-TERM CURRENT USE OF INSULIN (H): ICD-10-CM

## 2020-03-03 DIAGNOSIS — I51.81 STRESS-INDUCED CARDIOMYOPATHY: ICD-10-CM

## 2020-03-03 DIAGNOSIS — E78.2 MIXED HYPERLIPIDEMIA: ICD-10-CM

## 2020-03-03 RX ORDER — ROSUVASTATIN CALCIUM 20 MG/1
20 TABLET, COATED ORAL DAILY
Qty: 90 TABLET | Refills: 2 | Status: SHIPPED | OUTPATIENT
Start: 2020-03-03 | End: 2020-10-05

## 2020-03-06 DIAGNOSIS — N94.3 PMS (PREMENSTRUAL SYNDROME): ICD-10-CM

## 2020-03-06 DIAGNOSIS — I51.81 STRESS-INDUCED CARDIOMYOPATHY: ICD-10-CM

## 2020-03-06 DIAGNOSIS — I10 ESSENTIAL HYPERTENSION: ICD-10-CM

## 2020-03-06 RX ORDER — LOSARTAN POTASSIUM 50 MG/1
50 TABLET ORAL DAILY
Qty: 90 TABLET | Refills: 2 | Status: SHIPPED | OUTPATIENT
Start: 2020-03-06 | End: 2020-10-05

## 2020-03-06 NOTE — TELEPHONE ENCOUNTER
"Sertraline  Last Written Prescription Date:  09/19/2019  Last Fill Quantity: 45,  # refills: 1   Last office visit: 12/11/2019 with prescribing provider:   Future Office Visit:   Next 5 appointments (look out 90 days)    Mar 16, 2020  8:30 AM CDT  SHORT with Smiley Brizuela [facility] ([facility]) [address] [facility] [address] [phone]         Requested Prescriptions   Pending Prescriptions Disp Refills     sertraline (ZOLOFT) 50 MG tablet [Pharmacy Med Name: SERTRALINE 50MG TABLET] 45 tablet 1     Sig: TAKE 1/2 TABLET (25MG) BY MOUTH EVERY MORNING       SSRIs Protocol Passed - 3/6/2020  1:02 AM        Passed - Recent (12 mo) or future (30 days) visit within the authorizing provider's specialty     Patient has had an office visit with the authorizing provider or a provider within the authorizing providers department within the previous 12 mos or has a future within next 30 days. See \"Patient Info\" tab in inbasket, or \"Choose Columns\" in Meds & Orders section of the refill encounter.              Passed - Medication is active on med list        Passed - Patient is age 18 or older        Passed - No active pregnancy on record        Passed - No positive pregnancy test in last 12 months        Alondra Culver RN BSN    "

## 2020-03-15 NOTE — PROGRESS NOTES
MTM ENCOUNTER  SUBJECTIVE/OBJECTIVE:                           Lashawn Reddy is a 49 year old female called for a follow-up visit. She was referred to me from Dr. Jaramillo.  Today's visit is a follow-up MTM visit from 12/11/19.     Chief Complaint: Pt got A1c rechecked this morning.    Allergies/ADRs: Reviewed in Epic  Tobacco:  reports that she quit smoking about 11 years ago. Her smoking use included cigarettes. She quit after 16.00 years of use. She has never used smokeless tobacco.  Alcohol: not currently using  PMH: Reviewed in Epic    Medication Adherence/Access: no issues reported    Diabetes:  Pt currently taking metformin ER 500mg daily, Jardiance 10mg daily, and Trulicity 1.5mg weekly. Was unable to tolerate higher doses of metformin in the past due to nausea. Jardiance causes vaginal itching issues, feels it's tolerable and uses nystatin-triamcinolone cream daily. Was on glipizide in the past, which she stopped when Jardiance was started per chart review.  SMBG: occasionally, random times although usually fasting morning.  Ranges (patient reported): 177 mg/dL 7-day avg, 199 mg/dL 14-day avg.  Patient is not experiencing hypoglycemia  Recent symptoms of high blood sugar? None.  Eye exam: up to date  Foot exam: up to date  ACEi/ARB: Taking losartan 50mg daily.  Urine Albumin:   Lab Results   Component Value Date    UMALCR 8.97 10/25/2018   Aspirin: Taking 81mg daily and denies side effects  Diet/Exercise: Pt has been losing weight and feeling good.    Lab Results   Component Value Date    A1C 8.7 03/16/2020    A1C 10.4 12/11/2019    A1C 9.1 08/26/2019    A1C 8.6 06/27/2019    A1C 8.6 03/04/2019     Today's Vitals: None taken (telemed)    BP Readings from Last 3 Encounters:   12/26/19 113/75   12/23/19 124/62   12/11/19 112/74     Wt Readings from Last 4 Encounters:   12/23/19 241 lb 3.2 oz (109.4 kg)   12/11/19 251 lb (113.9 kg)   12/11/19 250 lb 4.8 oz (113.5 kg)   11/11/19 246 lb 8 oz (111.8 kg)     Last  Comprehensive Metabolic Panel:  Sodium   Date Value Ref Range Status   08/26/2019 136 133 - 144 mmol/L Final     Potassium   Date Value Ref Range Status   08/26/2019 3.4 3.4 - 5.3 mmol/L Final     Chloride   Date Value Ref Range Status   08/26/2019 100 94 - 109 mmol/L Final     Carbon Dioxide   Date Value Ref Range Status   08/26/2019 28 20 - 32 mmol/L Final     Anion Gap   Date Value Ref Range Status   08/26/2019 8 3 - 14 mmol/L Final     Glucose   Date Value Ref Range Status   08/26/2019 278 (H) 70 - 99 mg/dL Final     Urea Nitrogen   Date Value Ref Range Status   08/26/2019 25 7 - 30 mg/dL Final     Creatinine   Date Value Ref Range Status   08/26/2019 0.88 0.52 - 1.04 mg/dL Final     GFR Estimate   Date Value Ref Range Status   08/26/2019 77 >60 mL/min/[1.73_m2] Final     Comment:     Non  GFR Calc  Starting 12/18/2018, serum creatinine based estimated GFR (eGFR) will be   calculated using the Chronic Kidney Disease Epidemiology Collaboration   (CKD-EPI) equation.       Calcium   Date Value Ref Range Status   08/26/2019 8.7 8.5 - 10.1 mg/dL Final     ASSESSMENT:                            Medication Adherence: good, no issues identified    Diabetes: Improved. Patient is not meeting A1c goal of < 7%. Self monitoring of blood glucose is not at goal of fasting  mg/dL and post prandial < 180 mg/dL. Discussed risks vs benefits of medication options. Unable to increase metformin or Jardiance further due to side effect concerns. Actos may exacerbate venous insufficiency. Switching Trulicity to Ozempic may be associated with greater nausea. Pt opts to start sulfonylurea therapy today.    PLAN:                            1. Pt to start glipizide ER 5mg daily. Sent order to pharmacy.    I spent 30 minutes with this patient today. All changes were made via collaborative practice agreement with Dr. Jaramillo. A copy of the visit note was provided to the patient's primary care provider.    Will follow up  in 1 month - scheduled appt on 4/13.    The patient declined a summary of these recommendations.     Smiley Brizuela, PharmD  Medication Therapy Management Pharmacist  Pager: [phone]

## 2020-03-16 ENCOUNTER — ALLIED HEALTH/NURSE VISIT (OUTPATIENT)
Dept: PHARMACY | Facility: OTHER | Age: 50
End: 2020-03-16
Payer: COMMERCIAL

## 2020-03-16 DIAGNOSIS — E11.65 TYPE 2 DIABETES MELLITUS WITH HYPERGLYCEMIA, WITHOUT LONG-TERM CURRENT USE OF INSULIN (H): ICD-10-CM

## 2020-03-16 DIAGNOSIS — E11.65 TYPE 2 DIABETES MELLITUS WITH HYPERGLYCEMIA, WITHOUT LONG-TERM CURRENT USE OF INSULIN (H): Primary | ICD-10-CM

## 2020-03-16 LAB — HBA1C MFR BLD: 8.7 % (ref 0–5.6)

## 2020-03-16 PROCEDURE — 99607 MTMS BY PHARM ADDL 15 MIN: CPT | Performed by: PHARMACIST

## 2020-03-16 PROCEDURE — 99606 MTMS BY PHARM EST 15 MIN: CPT | Performed by: PHARMACIST

## 2020-03-16 PROCEDURE — 83036 HEMOGLOBIN GLYCOSYLATED A1C: CPT | Performed by: INTERNAL MEDICINE

## 2020-03-16 PROCEDURE — 36415 COLL VENOUS BLD VENIPUNCTURE: CPT | Performed by: INTERNAL MEDICINE

## 2020-03-16 RX ORDER — GLIPIZIDE 5 MG/1
5 TABLET, FILM COATED, EXTENDED RELEASE ORAL DAILY
Qty: 30 TABLET | Refills: 1 | Status: SHIPPED | OUTPATIENT
Start: 2020-03-16 | End: 2020-04-28

## 2020-04-05 DIAGNOSIS — E11.65 TYPE 2 DIABETES MELLITUS WITH HYPERGLYCEMIA, WITHOUT LONG-TERM CURRENT USE OF INSULIN (H): ICD-10-CM

## 2020-04-07 RX ORDER — GLIPIZIDE 5 MG/1
TABLET, FILM COATED, EXTENDED RELEASE ORAL
Qty: 30 TABLET | Refills: 1 | OUTPATIENT
Start: 2020-04-07

## 2020-04-07 NOTE — TELEPHONE ENCOUNTER
"Requested Prescriptions   Pending Prescriptions Disp Refills     glipiZIDE (GLUCOTROL XL) 5 MG 24 hr tablet [Pharmacy Med Name: glipiZIDE (GLUCOTROL XL) 5 MG 24 hrtablet] 30 tablet 1     Sig: TAKE 1 TABLET BY MOUTH EVERY DAY   Last Written Prescription Date:  3/16/2020  Last Fill Quantity: 30,  # refills: 1   Last office visit: 3/16/2020 with prescribing provider:  9/27/19   Future Office Visit:        Sulfonylurea Agents Passed - 4/5/2020  5:05 AM        Passed - Patient has documented A1c within the specified period of time.     If HgbA1C is 8 or greater, it needs to be on file within the past 3 months.  If less than 8, must be on file within the past 6 months.     Recent Labs   Lab Test 03/16/20  0748   A1C 8.7*             Passed - Medication is active on med list        Passed - Patient is age 18 or older        Passed - No active pregnancy on record        Passed - Patient has a recent creatinine (normal) within the past 12 mos.     Recent Labs   Lab Test 08/26/19  0734   CR 0.88       Ok to refill medication if creatinine is low          Passed - Patient has not had a positive pregnancy test within the past 12 mos.        Passed - Recent (6 mo) or future (30 days) visit within the authorizing provider's specialty     Patient had office visit in the last 6 months or has a visit in the next 30 days with authorizing provider or within the authorizing provider's specialty.  See \"Patient Info\" tab in inbasket, or \"Choose Columns\" in Meds & Orders section of the refill encounter.               "

## 2020-04-07 NOTE — TELEPHONE ENCOUNTER
Prescription was sent 3/16/2020 for #30 with 1 refill.  Pharmacy notified via E-Prescribe refusal.     TARAN RodasN, RN  [facility]

## 2020-04-12 NOTE — PROGRESS NOTES
MTM ENCOUNTER  SUBJECTIVE/OBJECTIVE:                           Lashawn Reddy is a 49 year old female called for a follow-up visit. She was referred to me from Dr. Jaramillo.  Today's visit is a follow-up MTM visit from 3/16/20     Patient consented to a telehealth visit: yes    Chief Complaint: Pt feels blood sugars are a lot better since starting glipizide at last MTM visit.    Allergies/ADRs: Reviewed in Epic  Tobacco:  reports that she quit smoking about 11 years ago. Her smoking use included cigarettes. She quit after 16.00 years of use. She has never used smokeless tobacco.  Alcohol: not currently using  PMH: Reviewed in Epic    Medication Adherence/Access: no issues reported    Type 2 Diabetes:  Pt currently taking metformin ER 500mg daily, Jardiance 10mg daily, Trulicity 1.5mg weekly, and glipizide ER 5mg daily. Was unable to tolerate higher doses of metformin in the past due to nausea. Jardiance causes mild vaginal itching, feels it's tolerable and uses nystatin-triamcinolone cream daily.  SMBG:once daily. Ranges (based on glucometer): 153 mg/dL 7-day avg. Pt is only able to navigate meter to tell me the averages but not the individual readings.  Patient is not experiencing hypoglycemia  Recent symptoms of high blood sugar? None.  Eye exam: up to date  Foot exam: up to date  Aspirin: Taking 81mg daily and denies side effects  Statin: Taking rosuvastatin 20mg daily.  ACEi/ARB: Taking losartan 50mg daily.   Urine Albumin:   Lab Results   Component Value Date    UMALCR 8.97 10/25/2018   Diet/Exercise: Did not discuss today.    Lab Results   Component Value Date    A1C 8.7 03/16/2020    A1C 10.4 12/11/2019    A1C 9.1 08/26/2019    A1C 8.6 06/27/2019    A1C 8.6 03/04/2019     Today's Vitals: None taken (telemed)    Last Comprehensive Metabolic Panel:  Sodium   Date Value Ref Range Status   08/26/2019 136 133 - 144 mmol/L Final     Potassium   Date Value Ref Range Status   08/26/2019 3.4 3.4 - 5.3 mmol/L Final      Chloride   Date Value Ref Range Status   08/26/2019 100 94 - 109 mmol/L Final     Carbon Dioxide   Date Value Ref Range Status   08/26/2019 28 20 - 32 mmol/L Final     Anion Gap   Date Value Ref Range Status   08/26/2019 8 3 - 14 mmol/L Final     Glucose   Date Value Ref Range Status   08/26/2019 278 (H) 70 - 99 mg/dL Final     Urea Nitrogen   Date Value Ref Range Status   08/26/2019 25 7 - 30 mg/dL Final     Creatinine   Date Value Ref Range Status   08/26/2019 0.88 0.52 - 1.04 mg/dL Final     GFR Estimate   Date Value Ref Range Status   08/26/2019 77 >60 mL/min/[1.73_m2] Final     Comment:     Non  GFR Calc  Starting 12/18/2018, serum creatinine based estimated GFR (eGFR) will be   calculated using the Chronic Kidney Disease Epidemiology Collaboration   (CKD-EPI) equation.       Calcium   Date Value Ref Range Status   08/26/2019 8.7 8.5 - 10.1 mg/dL Final     ASSESSMENT:                            Medication Adherence: good, no issues identified    Type 2 Diabetes: Needs Improvement. Patient is not meeting A1c goal of < 7%. Unknown if self monitoring of blood glucose is at goal of fasting  mg/dL and post prandial < 180 mg/dL. Pt would benefit from keeping a glucose log.    PLAN:                            1. Pt to check blood sugars every day fasting in the morning and keep a glucose log.  2. Refilled Trulicity per pt request.    I spent 20 minutes with this patient today. All changes were made via collaborative practice agreement with Dr. Jaramillo. A copy of the visit note was provided to the patient's primary care provider.    Will follow up in 2 weeks - scheduled appt on 4/27.    The patient declined a summary of these recommendations.     Smiley Brizuela, PharmD  Medication Therapy Management Pharmacist  Pager: [phone]

## 2020-04-13 ENCOUNTER — ALLIED HEALTH/NURSE VISIT (OUTPATIENT)
Dept: PHARMACY | Facility: OTHER | Age: 50
End: 2020-04-13
Payer: COMMERCIAL

## 2020-04-13 DIAGNOSIS — E11.65 TYPE 2 DIABETES MELLITUS WITH HYPERGLYCEMIA, WITHOUT LONG-TERM CURRENT USE OF INSULIN (H): ICD-10-CM

## 2020-04-13 PROCEDURE — 99607 MTMS BY PHARM ADDL 15 MIN: CPT | Performed by: PHARMACIST

## 2020-04-13 PROCEDURE — 99606 MTMS BY PHARM EST 15 MIN: CPT | Performed by: PHARMACIST

## 2020-04-13 RX ORDER — DULAGLUTIDE 1.5 MG/.5ML
1.5 INJECTION, SOLUTION SUBCUTANEOUS
Qty: 6 ML | Refills: 1 | Status: SHIPPED | OUTPATIENT
Start: 2020-04-13 | End: 2020-05-30 | Stop reason: ALTCHOICE

## 2020-04-21 ENCOUNTER — TELEPHONE (OUTPATIENT)
Dept: EDUCATION SERVICES | Facility: CLINIC | Age: 50
End: 2020-04-21

## 2020-04-21 DIAGNOSIS — E11.65 TYPE 2 DIABETES MELLITUS WITH HYPERGLYCEMIA, WITHOUT LONG-TERM CURRENT USE OF INSULIN (H): ICD-10-CM

## 2020-04-23 NOTE — TELEPHONE ENCOUNTER
Routing to team to set up virtual appointment for patient. Due for diabetic follow up  Please route to PCP when appointment has been scheduled for completion of refill.    Katelynn Weaver, TARANN, RN  [facility]

## 2020-04-24 ENCOUNTER — VIRTUAL VISIT (OUTPATIENT)
Dept: FAMILY MEDICINE | Facility: CLINIC | Age: 50
End: 2020-04-24
Payer: COMMERCIAL

## 2020-04-24 DIAGNOSIS — E66.01 MORBID OBESITY (H): ICD-10-CM

## 2020-04-24 DIAGNOSIS — I50.9 CONGESTIVE HEART FAILURE, UNSPECIFIED HF CHRONICITY, UNSPECIFIED HEART FAILURE TYPE (H): ICD-10-CM

## 2020-04-24 DIAGNOSIS — E78.2 MIXED HYPERLIPIDEMIA: ICD-10-CM

## 2020-04-24 DIAGNOSIS — K29.30 CHRONIC SUPERFICIAL GASTRITIS WITHOUT BLEEDING: ICD-10-CM

## 2020-04-24 DIAGNOSIS — I10 ESSENTIAL HYPERTENSION: Primary | ICD-10-CM

## 2020-04-24 DIAGNOSIS — E11.65 TYPE 2 DIABETES MELLITUS WITH HYPERGLYCEMIA, WITHOUT LONG-TERM CURRENT USE OF INSULIN (H): ICD-10-CM

## 2020-04-24 PROCEDURE — 99214 OFFICE O/P EST MOD 30 MIN: CPT | Mod: TEL | Performed by: INTERNAL MEDICINE

## 2020-04-24 RX ORDER — EMPAGLIFLOZIN 10 MG/1
TABLET, FILM COATED ORAL
Qty: 30 TABLET | Refills: 0 | OUTPATIENT
Start: 2020-04-24

## 2020-04-24 ASSESSMENT — PATIENT HEALTH QUESTIONNAIRE - PHQ9: SUM OF ALL RESPONSES TO PHQ QUESTIONS 1-9: 3

## 2020-04-24 ASSESSMENT — PAIN SCALES - GENERAL: PAINLEVEL: NO PAIN (0)

## 2020-04-24 NOTE — TELEPHONE ENCOUNTER
Prescription was sent 4/24/2020 for #90 with 1 refill.  Pharmacy notified via E-Prescribe refusal.     TARAN RodasN, RN  [facility]

## 2020-04-24 NOTE — PROGRESS NOTES
"Lashawn Reddy is a 49 year old female who is being evaluated via a billable telephone visit.      The patient has been notified of following:     \"This telephone visit will be conducted via a call between you and your physician/provider. We have found that certain health care needs can be provided without the need for a physical exam.  This service lets us provide the care you need with a short phone conversation.  If a prescription is necessary we can send it directly to your pharmacy.  If lab work is needed we can place an order for that and you can then stop by our lab to have the test done at a later time.    Telephone visits are billed at different rates depending on your insurance coverage. During this emergency period, for some insurers they may be billed the same as an in-person visit.  Please reach out to your insurance provider with any questions.    If during the course of the call the physician/provider feels a telephone visit is not appropriate, you will not be charged for this service.\"    Patient has given verbal consent for Telephone visit?  Yes    How would you like to obtain your AVS? MyChart    Subjective     Lashawn Reddy is a 49 year old female who presents to clinic today for the following health issues:    HPI  Diabetes Follow-up    How often are you checking your blood sugar? Two times daily  Blood sugar testing frequency justification:  Adjustment of medication(s)  What time of day are you checking your blood sugars (select all that apply)?  Before meals  Have you had any blood sugars above 200?  No  Have you had any blood sugars below 70?  No    What symptoms do you notice when your blood sugar is low?  Shaky    What concerns do you have today about your diabetes? None     Do you have any of these symptoms? (Select all that apply)  No numbness or tingling in feet.  No redness, sores or blisters on feet.  No complaints of excessive thirst.  No reports of blurry vision.  No significant " changes to weight.              Hyperlipidemia Follow-Up      Are you regularly taking any medication or supplement to lower your cholesterol?   Yes- Crestor     Are you having muscle aches or other side effects that you think could be caused by your cholesterol lowering medication?  No    Hypertension Follow-up      Do you check your blood pressure regularly outside of the clinic? No     Are you following a low salt diet? No    Are your blood pressures ever more than 140 on the top number (systolic) OR more   than 90 on the bottom number (diastolic), for example 140/90? Does not check   BP Readings from Last 2 Encounters:   12/26/19 113/75   12/23/19 124/62     Hemoglobin A1C (%)   Date Value   03/16/2020 8.7 (H)   12/11/2019 10.4 (H)     LDL Cholesterol Calculated (mg/dL)   Date Value   03/05/2019 40   10/25/2018 69         How many servings of fruits and vegetables do you eat daily?  0-1    On average, how many sweetened beverages do you drink each day (Examples: soda, juice, sweet tea, etc.  Do NOT count diet or artificially sweetened beverages)?   0    How many days per week do you exercise enough to make your heart beat faster? 3 or less    How many minutes a day do you exercise enough to make your heart beat faster? 9 or less    How many days per week do you miss taking your medication? 0                      Chief Complaint         The patient is a pleasant 49-year-old female who has a history of hypertension, hyperlipidemia and type 2 diabetes.  Her blood sugars have been markedly improved over previous.  The addition of the Jardiance as well as the Trulicity has made a significant change.  She continues her metformin and glipizide without difficulty.  She denies any diarrhea or vaginitis.  She does note that she is had some increased urination and drinks more fluid but states that this is actually not a problem for her.  She is working every day in a cabinet shop and notes that she is following strict  isolation protocol.  She is avoiding public and trying to limit her coronavirus exposure.  She notes that she is had no upper respiratory symptoms, cough or the like.  With respect to her hypertension, denies any headache lightheadedness, dizziness or side effects from the medication.  She continues the ARB and diuretic without difficulty.  Muscle pain is not noted.                         PAST, FAMILY,SOCIAL HISTORY:     Medical  History:   has a past medical history of Anemia (11/15/2018), CAD (coronary artery disease), Depressive disorder, not elsewhere classified, Diabetes mellitus, type 2 (H) (6/6/2013), Diffuse cystic mastopathy, Former smoker, Lymphedema of right lower extremity, Peripheral venous insufficiency, Personal history of DVT (deep vein thrombosis) (8/30/2018), S/P hysterectomy (9/12/2018), Sigmoid diverticulitis (11/15/2018), Tobacco use disorder, Type 2 diabetes, HbA1c goal < 7% (H) (6/6/2013), and Varicose veins of lower extremities with complications (5/13/2005).     Surgical History:   has a past surgical history that includes APPENDECTOMY (04/14/89); DILATION/CURETTAGE DIAG/THER NON OB (1986); REMOVAL OF TONSILS,<11 Y/O; ANESTH,LOWER LEG VEIN SURG,NOS (2002); Colonoscopy w/wo [place] **Performed** (06/17/2005); tubal ligation (08/04/2006); Colonoscopy (6/21/2013); Colonoscopy (N/A, 2/22/2017); Hysterectomy Total Abdominal, Salpingectomy (Bilateral, 9/12/2018); Heart Catheterization with Possible Intervention (N/A, 3/5/2019); and Excise mass lower extremity (Left, 12/26/2019).     Social History:   reports that she quit smoking about 11 years ago. Her smoking use included cigarettes. She quit after 16.00 years of use. She has never used smokeless tobacco. She reports current alcohol use. She reports that she does not use drugs.     Family History:  family history includes Anesthesia Reaction in her mother; Arthritis in her sister; Cancer in her father, maternal aunt, maternal aunt, maternal  grandmother, and sister; Cancer - colorectal in her father; Cerebrovascular Disease in her paternal grandfather and paternal grandmother; Gastrointestinal Disease in her mother and sister; Gynecology in her mother; Heart Disease in her maternal aunt; Hypertension in her father; Lipids in her father and mother.            MEDICATIONS  Current Outpatient Medications   Medication Sig Dispense Refill     ACCU-CHEK GUIDE test strip USE TO TEST BLOOD SUGAR 2 TIMES DAILY OR AS DIRECTED. 100 each 5     aspirin (ASA) 81 MG EC tablet Take 1 tablet (81 mg) by mouth daily 90 tablet 2     blood glucose monitoring (ACCU-CHEK FASTCLIX) lancets 1 each 2 times daily 102 each 3     dulaglutide (TRULICITY) 1.5 MG/0.5ML pen Inject 1.5 mg Subcutaneous every 7 days 6 mL 1     empagliflozin (JARDIANCE) 10 MG TABS tablet Take 1 tablet (10 mg) by mouth daily 90 tablet 1     fluconazole (DIFLUCAN) 150 MG tablet TAKE 1 TABLET (150 MG) BY MOUTH EVERY 3 DAYS 10 tablet 0     glipiZIDE (GLUCOTROL XL) 5 MG 24 hr tablet Take 1 tablet (5 mg) by mouth daily 30 tablet 1     hydrochlorothiazide (HYDRODIURIL) 25 MG tablet Take 1 tablet (25 mg) by mouth daily 90 tablet 3     losartan (COZAAR) 50 MG tablet Take 1 tablet (50 mg) by mouth daily 90 tablet 2     metFORMIN (GLUCOPHAGE-XR) 500 MG 24 hr tablet Take 1 tablet (500 mg) by mouth daily       nystatin-triamcinolone (MYCOLOG II) 252972-5.1 UNIT/GM-% external cream Apply topically daily as needed (rash or itching) 60 g 3     omeprazole (PRILOSEC) 20 MG DR capsule Take 1 capsule (20 mg) by mouth daily as needed (acid reflux) 90 capsule 3     rosuvastatin (CRESTOR) 20 MG tablet Take 1 tablet (20 mg) by mouth daily 90 tablet 2     sertraline (ZOLOFT) 50 MG tablet TAKE 1/2 TABLET (25MG) BY MOUTH EVERY MORNING 45 tablet 1     Vitamin D, Cholecalciferol, 1000 units TABS Take 2,000 Units by mouth daily             --------------------------------------------------------------------------------------------------------------------                              Review of Systems       LUNGS: Pt denies: cough, excess sputum, hemoptysis, or shortness of breath.   HEART: Pt denies: chest pain, arrhythmia, syncope, tachy or bradyarrhythmia.  Patient denies symptoms of congestive heart failure including edema or orthopnea.   GI: Pt denies: nausea, vomiting, diarrhea, constipation, melena, or hematochezia.   NEURO: Pt denies: seizures, strokes, diplopia, weakness, paraesthesias, or paralysis.   SKIN: Pt denies: itching, rashes, discoloration, or specific lesions of concern. Denies recent hair loss.  No further diabetic ulceration is reported.   PSYCH: The patient denies significant depression, anxiety, mood imbalance. Specifically denies any suicidal ideation.                 MS: Pt denies: significant joint pain, swelling, or acute loss of function. Able to ambulate with little or no assistance.   ENT: Pt denies: sore throat, significant nasal congestion, epistaxis, difficulty swallowing, or changes in base-line hearing.                   No physical examination is performed as this is a virtual visit.  The patient's voice is strong, there is no evidence of labored breathing or wheezing.  The patient is alert and appropriate and demonstrates no obvious behavioral irregularities.         Decision Making       1. Type 2 diabetes mellitus with hyperglycemia, without long-term current use of insulin (H)  Well-controlled at this time.  Will place orders for standing lab work.  Continue current diet restriction and exercise as possible  - Albumin Random Urine Quantitative with Creat Ratio  - BASIC METABOLIC PANEL  - empagliflozin (JARDIANCE) 10 MG TABS tablet; Take 1 tablet (10 mg) by mouth daily  Dispense: 90 tablet; Refill: 1    2. Essential hypertension  Controlled on current medication, will check lab work    3. Chronic  superficial gastritis without bleeding  Improved with use of PPI, continue.  Recommend patient trying H2 blocker if possible.  - CBC with Platelets    - omeprazole (PRILOSEC) 20 MG DR capsule; Take 1 capsule (20 mg) by mouth daily as needed (acid reflux)  Dispense: 90 capsule; Refill: 3    4. Mixed hyperlipidemia  Continue dietary restrictions and statin therapy.  - Lipid panel reflex to direct LDL Fasting    5. Congestive heart failure, unspecified HF chronicity, unspecified heart failure type (H)  Currently controlled with medication.  The Jardiance has actually helped this.    6. Morbid obesity (H)  Working aggressively with weight loss.  Continue restriction of caloric intake and exercise as tolerated.                             FOLLOW UP   I have asked the patient to make an appointment for followup with me in August or sooner as needed            I have carefully explained the diagnosis and treatment options to the patient.  The patient has displayed an understanding of the above, and all subsequent questions were answered.      DO BI Jacobo    Portions of this note were produced using [employer]  Although every attempt at real-time proof reading has been made, occasional grammar/syntax errors may have been missed.        Telephone time: 22 minutes

## 2020-04-24 NOTE — NURSING NOTE
Health Maintenance Due   Topic Date Due     HF ACTION PLAN  1970     DEPRESSION ACTION PLAN  1970     HIV SCREENING  10/21/1984     OP ANNUAL INR REFERRAL  11/20/2014     DTAP/TDAP/TD IMMUNIZATION (3 - Td) 08/26/2018     INFLUENZA VACCINE (1) 09/01/2019     PREVENTIVE CARE VISIT  10/25/2019     MICROALBUMIN  10/25/2019     BMP  02/26/2020     LIPID  03/05/2020     CBC  03/14/2020     PHQ-9  04/07/2020     ZOSTER IMMUNIZATION (1 of 2) 10/21/2019      Guerda Razo LPN........4/24/2020 8:48 AM

## 2020-04-27 DIAGNOSIS — E11.65 TYPE 2 DIABETES MELLITUS WITH HYPERGLYCEMIA, WITHOUT LONG-TERM CURRENT USE OF INSULIN (H): ICD-10-CM

## 2020-04-28 RX ORDER — GLIPIZIDE 5 MG/1
5 TABLET, FILM COATED, EXTENDED RELEASE ORAL DAILY
Qty: 30 TABLET | Refills: 3 | Status: SHIPPED | OUTPATIENT
Start: 2020-04-28 | End: 2020-05-30 | Stop reason: SINTOL

## 2020-05-27 ENCOUNTER — ALLIED HEALTH/NURSE VISIT (OUTPATIENT)
Dept: PHARMACY | Facility: OTHER | Age: 50
End: 2020-05-27
Payer: COMMERCIAL

## 2020-05-27 ENCOUNTER — TELEPHONE (OUTPATIENT)
Dept: FAMILY MEDICINE | Facility: OTHER | Age: 50
End: 2020-05-27

## 2020-05-27 DIAGNOSIS — E11.65 TYPE 2 DIABETES MELLITUS WITH HYPERGLYCEMIA, WITHOUT LONG-TERM CURRENT USE OF INSULIN (H): Primary | ICD-10-CM

## 2020-05-27 PROCEDURE — 99606 MTMS BY PHARM EST 15 MIN: CPT | Performed by: PHARMACIST

## 2020-05-27 PROCEDURE — 99607 MTMS BY PHARM ADDL 15 MIN: CPT | Performed by: PHARMACIST

## 2020-05-27 NOTE — TELEPHONE ENCOUNTER
Patient informed.  She states she has already been tested and still waiting on the results.  She just felt it was important for her to take the leave due to her diabetes and some heart issues.  Yaneli Mcgowan, CMA

## 2020-05-27 NOTE — TELEPHONE ENCOUNTER
If the patient's coworker tested positive for COVID, I assume the coworker was contagious and ill prior to the testing.  Therefore, leaving work was probably unnecessary as the patient was already exposed.  If the patient is asymptomatic, she should continue to practice distancing and masking.  If she would like to be tested for the virus, we can do that.      Ultimately, her decision to abstain from work was entirely a personal decision and not medically based.    Clint

## 2020-05-27 NOTE — TELEPHONE ENCOUNTER
Reason for Call:  Other      Detailed comments: Lashawn took a leave of absence from work due to coworkers testing positive for Covid. Lashawn is diabetic with some heart issues and she wanted to get your opinion if she did the right thing or what you recommend.     Phone Number Patient can be reached at: Home number on file [phone] (home)    Best Time: any     Can we leave a detailed message on this number? YES    Call taken on 5/27/2020 at 10:12 AM by Rosey Anna

## 2020-05-27 NOTE — PROGRESS NOTES
MTM ENCOUNTER  SUBJECTIVE/OBJECTIVE:                           Lashawn Reddy is a 49 year old female called for a follow-up visit. She was referred to me from Dr. Jaramillo. Today's visit is a follow-up MTM visit from 4/13/20.     Patient consented to a telehealth visit: yes  Telemedicine Visit Details  Type of service:  Telephone visit  Start Time: 1437  End Time: 1500  Originating Location (pt. Location): Home  Distant Location (provider location):  [facility] MTM  Mode of Communication:  Telephone    Chief Complaint: Recheck blood sugars.     Allergies/ADRs: Reviewed in Epic  Tobacco:  reports that she quit smoking about 11 years ago. Her smoking use included cigarettes. She quit after 16.00 years of use. She has never used smokeless tobacco.  Alcohol: not currently using  PMH: Reviewed in Epic    Medication Adherence/Access: no issues reported    Type 2 Diabetes:  Pt currently taking metformin ER 500mg daily, Jardiance 10mg daily, and Trulicity 1.5mg weekly. Pt stopped glipizide because she was having hunger spikes and low blood sugars in 60s. Was unable to tolerate higher doses of metformin in the past due to nausea. Jardiance causes mild vaginal itching, feels it's tolerable and uses nystatin-triamcinolone cream daily.  SMBG:once daily. Ranges (based on glucometer): 7-day avg 222 mg/dL.  Symptoms of low blood sugar? none  Symptoms of high blood sugar? None  Aspirin: Taking 81mg daily and denies side effects  Statin: Taking rosuvastatin 20mg daily.  ACEi/ARB: Taking losartan 50mg daily.   Urine Albumin:   Lab Results   Component Value Date    UMALCR 8.97 10/25/2018   Diet/Exercise: Did not discuss today.    Lab Results   Component Value Date    A1C 8.7 03/16/2020    A1C 10.4 12/11/2019    A1C 9.1 08/26/2019    A1C 8.6 06/27/2019    A1C 8.6 03/04/2019     Today's Vitals: None taken (telemed)    Last Comprehensive Metabolic Panel:  Sodium   Date Value Ref Range Status   08/26/2019 136 133 - 144 mmol/L  Final     Potassium   Date Value Ref Range Status   08/26/2019 3.4 3.4 - 5.3 mmol/L Final     Chloride   Date Value Ref Range Status   08/26/2019 100 94 - 109 mmol/L Final     Carbon Dioxide   Date Value Ref Range Status   08/26/2019 28 20 - 32 mmol/L Final     Anion Gap   Date Value Ref Range Status   08/26/2019 8 3 - 14 mmol/L Final     Glucose   Date Value Ref Range Status   08/26/2019 278 (H) 70 - 99 mg/dL Final     Urea Nitrogen   Date Value Ref Range Status   08/26/2019 25 7 - 30 mg/dL Final     Creatinine   Date Value Ref Range Status   08/26/2019 0.88 0.52 - 1.04 mg/dL Final     GFR Estimate   Date Value Ref Range Status   08/26/2019 77 >60 mL/min/[1.73_m2] Final     Comment:     Non  GFR Calc  Starting 12/18/2018, serum creatinine based estimated GFR (eGFR) will be   calculated using the Chronic Kidney Disease Epidemiology Collaboration   (CKD-EPI) equation.       Calcium   Date Value Ref Range Status   08/26/2019 8.7 8.5 - 10.1 mg/dL Final     ASSESSMENT:                            Medication Adherence: fair, no issues identified    Type 2 Diabetes: Needs Improvement. Patient is not meeting A1c goal of < 7%. Self monitoring of blood glucose is not at goal of fasting  mg/dL and post prandial < 180 mg/dL. Discussed medication options to improve glucose control. Pt prefers not to increase metformin or Jardiance doses due side effect issues. Pt is willing to try switching Trulicity to Ozempic. Pt prefers not to start insulin.    PLAN:                            1. Pt to switch Trulicity to Ozempic. Dosing is 0.25mg weekly for 4 doses, then increase to 0.5mg weekly. Sent order to pharmacy.    I spent 20 minutes with this patient today. All changes were made via collaborative practice agreement with Dr. Jaramillo. A copy of the visit note was provided to the patient's primary care provider.    Will follow up in 1 month - scheduled appt on 6/22.    The patient declined a summary of these  recommendations.     Smiley Brizuela, PharmD  Medication Therapy Management Pharmacist  Pager: [phone]

## 2020-05-30 RX ORDER — SEMAGLUTIDE 1.34 MG/ML
INJECTION, SOLUTION SUBCUTANEOUS
Qty: 1.5 ML | Refills: 1 | Status: SHIPPED | OUTPATIENT
Start: 2020-05-30 | End: 2020-07-16

## 2020-06-15 DIAGNOSIS — I10 HTN (HYPERTENSION): ICD-10-CM

## 2020-06-15 DIAGNOSIS — E11.65 TYPE 2 DIABETES MELLITUS WITH HYPERGLYCEMIA, WITHOUT LONG-TERM CURRENT USE OF INSULIN (H): ICD-10-CM

## 2020-06-15 RX ORDER — HYDROCHLOROTHIAZIDE 25 MG/1
25 TABLET ORAL DAILY
Qty: 90 TABLET | Refills: 0 | Status: SHIPPED | OUTPATIENT
Start: 2020-06-15 | End: 2020-10-05

## 2020-06-16 RX ORDER — GLIPIZIDE 5 MG/1
TABLET, EXTENDED RELEASE ORAL
Qty: 120 TABLET | OUTPATIENT
Start: 2020-06-16

## 2020-06-16 NOTE — TELEPHONE ENCOUNTER
Medication was discontinued by MTM on 5/30/2020 due to side effects.   Pharmacy notified via E-Prescribe refusal.     TARAN RodasN, RN  [facility]

## 2020-06-25 ENCOUNTER — TRANSFERRED RECORDS (OUTPATIENT)
Dept: HEALTH INFORMATION MANAGEMENT | Facility: CLINIC | Age: 50
End: 2020-06-25

## 2020-06-25 LAB — RETINOPATHY: NEGATIVE

## 2020-07-04 DIAGNOSIS — L30.4 INTERTRIGO: ICD-10-CM

## 2020-07-06 ENCOUNTER — ALLIED HEALTH/NURSE VISIT (OUTPATIENT)
Dept: PHARMACY | Facility: OTHER | Age: 50
End: 2020-07-06
Payer: COMMERCIAL

## 2020-07-06 DIAGNOSIS — E11.65 TYPE 2 DIABETES MELLITUS WITH HYPERGLYCEMIA, WITHOUT LONG-TERM CURRENT USE OF INSULIN (H): Primary | ICD-10-CM

## 2020-07-06 PROCEDURE — 99606 MTMS BY PHARM EST 15 MIN: CPT | Performed by: PHARMACIST

## 2020-07-06 RX ORDER — FLUCONAZOLE 150 MG/1
150 TABLET ORAL
Qty: 10 TABLET | Refills: 0 | Status: SHIPPED | OUTPATIENT
Start: 2020-07-06 | End: 2022-07-20

## 2020-07-06 NOTE — TELEPHONE ENCOUNTER
Routing refill request to provider for review/approval because:  Drug not on the [facility] refill protocol     KATIE Rodas, RN  [facility]

## 2020-07-15 DIAGNOSIS — E11.65 TYPE 2 DIABETES MELLITUS WITH HYPERGLYCEMIA, WITHOUT LONG-TERM CURRENT USE OF INSULIN (H): ICD-10-CM

## 2020-07-16 RX ORDER — SEMAGLUTIDE 1.34 MG/ML
INJECTION, SOLUTION SUBCUTANEOUS
Qty: 1.5 ML | Refills: 1 | Status: SHIPPED | OUTPATIENT
Start: 2020-07-16 | End: 2020-08-05 | Stop reason: ALTCHOICE

## 2020-07-16 RX ORDER — METFORMIN HCL 500 MG
TABLET, EXTENDED RELEASE 24 HR ORAL
Qty: 172 TABLET | Refills: 3 | Status: SHIPPED | OUTPATIENT
Start: 2020-07-16 | End: 2020-10-26

## 2020-07-16 NOTE — TELEPHONE ENCOUNTER
ozempic  Last Written Prescription Date:  None   Last Fill Quantity: 0,   # refills: 0  Last Office Visit: 12/11/2019  Future Office visit:       Routing refill request to provider for review/approval because:  Drug not active on patient's medication list

## 2020-08-04 ENCOUNTER — VIRTUAL VISIT (OUTPATIENT)
Dept: FAMILY MEDICINE | Facility: OTHER | Age: 50
End: 2020-08-04
Payer: COMMERCIAL

## 2020-08-04 DIAGNOSIS — Z20.822 SUSPECTED COVID-19 VIRUS INFECTION: Primary | ICD-10-CM

## 2020-08-04 PROCEDURE — 99421 OL DIG E/M SVC 5-10 MIN: CPT | Performed by: PHYSICIAN ASSISTANT

## 2020-08-04 NOTE — PROGRESS NOTES
"Date: 2020 09:40:51  Clinician: Adonay Severino  Clinician NPI: [id number]  Patient: Lashawn Reddy  Patient : 1970  Patient Address: [address] [place] [address]  Patient Phone: [phone]  Visit Protocol: URI  Patient Summary:  Lashawn is a 49 year old ( : 1970 ) female who initiated a Visit for COVID-19 (Coronavirus) evaluation and screening. When asked the question \"Please sign me up to receive news, health information and promotions from [employer].\", Lashawn responded \"No\".    Lashawn states her symptoms started 1-2 days ago.   Her symptoms consist of nausea, myalgia, a sore throat, facial pain or pressure, malaise, and a headache.   Symptom details     Sore throat: Lashawn reports having moderate throat pain (4-6 on a 10 point pain scale), does not have exudate on her tonsils, and can swallow liquids. The lymph nodes in her neck are not enlarged. A rash has not appeared on the skin since the sore throat started.     Facial pain or pressure: The facial pain or pressure feels worse when bending over or leaning forward.     Headache: She states the headache is moderate (4-6 on a 10 point pain scale).      Lashawn denies having wheezing, teeth pain, ageusia, diarrhea, anosmia, fever, cough, nasal congestion, vomiting, rhinitis, ear pain, chills, and enlarged lymph nodes. She also denies having recent facial or sinus surgery in the past 60 days and taking antibiotic medication in the past month. She is not experiencing dyspnea.   Precipitating events  Within the past week, Lashawn has not been exposed to someone with strep throat. She has not recently been exposed to someone with influenza. Lashawn has been in close contact with the following high risk individuals: adults 65 or older and children under the age of 5.   Pertinent COVID-19 (Coronavirus) information  In the past 14 days, Lashawn has not worked in a congregate living setting.   She does not work or volunteer as healthcare worker or a first " responder and does not work or volunteer in a healthcare facility.   Lashawn also has not lived in a congregate living setting in the past 14 days. She does not live with a healthcare worker.   Lashawn has not had a close contact with a laboratory-confirmed COVID-19 patient within 14 days of symptom onset.   Pertinent medical history  Lashawn typically gets a yeast infection when she takes antibiotics. She has used fluconazole (Diflucan) to treat previous yeast infections. 2 doses of fluconazole (Diflucan) has typically been needed for symptoms to resolve in the past.  Lashawn needs a return to work/school note.   Weight: 230 lbs   Lashawn does not smoke or use smokeless tobacco.   Weight: 230 lbs    MEDICATIONS: Ozempic subcutaneous, Jardiance oral, losartan oral, rosuvastatin oral, sertraline oral, metformin oral, Sun Chewable Low Dose Aspirin oral, ALLERGIES: NKDA  Clinician Response:  Dear Lashawn,   Your symptoms show that you may have coronavirus (COVID-19). This illness can cause fever, cough and trouble breathing. Many people get a mild case and get better on their own. Some people can get very sick.  What should I do?  We would like to test you for this virus.   1. Please call [phone] to schedule your visit. Explain that you were referred by [facility] to have a COVID-19 test. Be ready to share your [facility] visit ID number.  The following will serve as your written order for this COVID Test, ordered by me, for the indication of suspected COVID [Z20.828]: The test will be ordered in [employer]ubl, our electronic health record, after you are scheduled. It will show as ordered and authorized by Paco Diaz MD.  Order: COVID-19 (Coronavirus) PCR for SYMPTOMATIC testing from [facility].      2. When it's time for your COVID test:  Stay at least 6 feet away from others. (If someone will drive you to your test, stay in the backseat, as far away from the  as you can.)   Cover your mouth and nose with a mask, tissue or washcloth.   "Go straight to the testing site. Don't make any stops on the way there or back.      3.Starting now: Stay home and away from others (self-isolate) until:   You've had no fever---and no medicine that reduces fever---for 3 full days (72 hours). And...   Your other symptoms have gotten better. For example, your cough or breathing has improved. And...   At least 10 days have passed since your symptoms started.       During this time, don't leave the house except for testing or medical care.   Stay in your own room, even for meals. Use your own bathroom if you can.   Stay away from others in your home. No hugging, kissing or shaking hands. No visitors.  Don't go to work, school or anywhere else.    Clean \"high touch\" surfaces often (doorknobs, counters, handles, etc.). Use a household cleaning spray or wipes. You'll find a full list of  on the EPA website: www.epa.gov/pesticide-registration/list-n-disinfectants-use-against-sars-cov-2.   Cover your mouth and nose with a mask, tissue or washcloth to avoid spreading germs.  Wash your hands and face often. Use soap and water.  Caregivers in these groups are at risk for severe illness due to COVID-19:  o People 65 years and older  o People who live in a nursing home or long-term care facility  o People with chronic disease (lung, heart, cancer, diabetes, kidney, liver, immunologic)  o People who have a weakened immune system, including those who:   Are in cancer treatment  Take medicine that weakens the immune system, such as corticosteroids  Had a bone marrow or organ transplant  Have an immune deficiency  Have poorly controlled HIV or AIDS  Are obese (body mass index of 40 or higher)  Smoke regularly   o Caregivers should wear gloves while washing dishes, handling laundry and cleaning bedrooms and bathrooms.  o Use caution when washing and drying laundry: Don't shake dirty laundry, and use the warmest water setting that you can.  o For more tips, go to " www.cdc.gov/coronavirus/2019-ncov/downloads/10Things.pdf.    4.Sign up for [employer]. We know it's scary to hear that you might have COVID-19. We want to track your symptoms to make sure you're okay over the next 2 weeks. Please look for an email from [employer]---this is a free, online program that we'll use to keep in touch. To sign up, follow the link in the email. Learn more at [email or web address]  How can I take care of myself?   Get lots of rest. Drink extra fluids (unless a doctor has told you not to).   Take Tylenol (acetaminophen) for fever or pain. If you have liver or kidney problems, ask your family doctor if it's okay to take Tylenol.   Adults can take either:    650 mg (two 325 mg pills) every 4 to 6 hours, or...   1,000 mg (two 500 mg pills) every 8 hours as needed.    Note: Don't take more than 3,000 mg in one day. Acetaminophen is found in many medicines (both prescribed and over-the-counter medicines). Read all labels to be sure you don't take too much.   For children, check the Tylenol bottle for the right dose. The dose is based on the child's age or weight.    If you have other health problems (like cancer, heart failure, an organ transplant or severe kidney disease): Call your specialty clinic if you don't feel better in the next 2 days.       Know when to call 911. Emergency warning signs include:    Trouble breathing or shortness of breath Pain or pressure in the chest that doesn't go away Feeling confused like you haven't felt before, or not being able to wake up Bluish-colored lips or face.  Where can I get more information?    [facility] -- About COVID-19: [email or web address]   CDC -- What to Do If You're Sick: www.cdc.gov/coronavirus/2019-ncov/about/steps-when-sick.html   CDC -- Ending Home Isolation: www.cdc.gov/coronavirus/2019-ncov/hcp/disposition-in-home-patients.html   CDC -- Caring for Someone:  www.cdc.gov/coronavirus/2019-ncov/if-you-are-sick/care-for-someone.html   [facility] -- Interim Guidance for Hospital Discharge to Home: www.health.[facility].[place].us/diseases/coronavirus/hcp/hospdischarge.pdf   [facility] clinical trials (COVID-19 research studies): clinicalaffairs.[facility]-clinical-trials    Below are the COVID-19 hotlines at the [place] Department of Health ([facility]). Interpreters are available.    For health questions: Call 921-557-2028 or 1-696.225.5562 (7 a.m. to 7 p.m.) For questions about schools and childcare: Call 958-229-3366 or 1-403.281.7970 (7 a.m. to 7 p.m.)    Diagnosis: Pain in throat  Diagnosis ICD: R07.0

## 2020-08-05 ENCOUNTER — ALLIED HEALTH/NURSE VISIT (OUTPATIENT)
Dept: PHARMACY | Facility: OTHER | Age: 50
End: 2020-08-05
Payer: COMMERCIAL

## 2020-08-05 DIAGNOSIS — Z20.822 SUSPECTED COVID-19 VIRUS INFECTION: ICD-10-CM

## 2020-08-05 DIAGNOSIS — K59.00 CONSTIPATION, UNSPECIFIED CONSTIPATION TYPE: ICD-10-CM

## 2020-08-05 DIAGNOSIS — E11.65 TYPE 2 DIABETES MELLITUS WITH HYPERGLYCEMIA, WITHOUT LONG-TERM CURRENT USE OF INSULIN (H): Primary | ICD-10-CM

## 2020-08-05 PROCEDURE — U0003 INFECTIOUS AGENT DETECTION BY NUCLEIC ACID (DNA OR RNA); SEVERE ACUTE RESPIRATORY SYNDROME CORONAVIRUS 2 (SARS-COV-2) (CORONAVIRUS DISEASE [COVID-19]), AMPLIFIED PROBE TECHNIQUE, MAKING USE OF HIGH THROUGHPUT TECHNOLOGIES AS DESCRIBED BY CMS-2020-01-R: HCPCS | Performed by: FAMILY MEDICINE

## 2020-08-05 PROCEDURE — 99606 MTMS BY PHARM EST 15 MIN: CPT | Performed by: PHARMACIST

## 2020-08-05 PROCEDURE — 99607 MTMS BY PHARM ADDL 15 MIN: CPT | Performed by: PHARMACIST

## 2020-08-05 RX ORDER — SEMAGLUTIDE 1.34 MG/ML
1 INJECTION, SOLUTION SUBCUTANEOUS
Qty: 3 ML | Refills: 1 | Status: SHIPPED | OUTPATIENT
Start: 2020-08-05 | End: 2020-10-05

## 2020-08-05 NOTE — ADDENDUM NOTE
Addended by: BERNABE MUJICA on: 8/5/2020 01:22 PM     Modules accepted: Level of Service, SmartSet

## 2020-08-05 NOTE — PROGRESS NOTES
MTM ENCOUNTER  SUBJECTIVE/OBJECTIVE:                           Lashawn Reddy is a 49 year old female called for a follow-up visit. She was referred to me from Dr. Jaramilol.  Today's visit is a follow-up MTM visit from 20.     Patient consented to a telehealth visit: yes  Telemedicine Visit Details  Type of service:  Telephone visit  Start Time: 1332  End Time: 1355  Originating Location (pt. Location): Home  Distant Location (provider location):  [facility] MTM  Mode of Communication:  Telephone    Chief Complaint: Recheck blood sugars.    Allergies/ADRs: Reviewed in EHR  Tobacco:  reports that she quit smoking about 11 years ago. Her smoking use included cigarettes. She quit after 16.00 years of use. She has never used smokeless tobacco.  Alcohol: not currently using  PMH: Reviewed in EHR    Medication Adherence/Access: no issues reported    Type 2 Diabetes:  Pt currently taking metformin ER 500mg daily, Jardiance 10mg daily, and Ozempic 0.5mg weekly (on Sundays, has 1 dose left in pen). Pt feels she is tolerating Ozempic well so far, although has some mild constipation (see below). Jardiance causes mild vaginal itching, uses nystatin-triamcinolone cream to help, has fluconazole on hand just in case. Previous therapies tried: higher doses of metformin caused nausea, glipizide caused hunger spikes and low blood sugars.   SMBG:once daily, fasting AM. Ranges (based on glucometer):   Morning fastin, 211, 272, 219, 217  Before eatin, 135, 165, 145  After eatin, 141, 199, 203, 254, 189  Random: 181, 144, 204, 128, 126, 174   Symptoms of low blood sugar? none  Symptoms of high blood sugar? none  Aspirin: Taking 81mg daily and denies side effects  Statin: Taking rosuvastatin 20mg daily.  ACEi/ARB: Taking losartan 50mg daily.   Urine Albumin:   Lab Results   Component Value Date    UMALCR 8.97 10/25/2018   Diet/Exercise: Pt is working on eating better.    Lab Results   Component Value Date     A1C 8.7 03/16/2020    A1C 10.4 12/11/2019    A1C 9.1 08/26/2019    A1C 8.6 06/27/2019    A1C 8.6 03/04/2019     Constipation: Pt is currently taking senna-S 2 capsules at bedtime and fiber powder 1 serving daily. Normal BM's are daily for her and now having BM's every third day. Has taken probiotics in the past and interested in trying that again.    Today's Vitals: Not taking (telemed)    ASSESSMENT:                            Medication Adherence: good, no issues identified    Type 2 Diabetes: Needs Improvement. Patient is not meeting A1c goal of < 7%. Self monitoring of blood glucose is not at goal of fasting  mg/dL and post prandial < 180 mg/dL. Pt may benefit from Ozempic dose increase.    Constipation: Needs Improvement. Pt may benefit from a probiotic.     PLAN:                            1. Pt to increase Ozempic to 1mg weekly.  2. Pt to try a probiotic to see if it helps with constipation.    I spent 23 minutes with this patient today. All changes were made via collaborative practice agreement with Dr. Jaramillo. A copy of the visit note was provided to the patient's primary care provider.    Will follow up in 1 month - scheduled appt on 9/2.    The patient declined a summary of these recommendations.     Smiley Brizuela, PharmD  Medication Therapy Management Pharmacist  Pager: [phone]

## 2020-08-06 LAB
SARS-COV-2 RNA SPEC QL NAA+PROBE: NOT DETECTED
SPECIMEN SOURCE: NORMAL

## 2020-09-08 ENCOUNTER — TELEPHONE (OUTPATIENT)
Dept: PHARMACY | Facility: OTHER | Age: 50
End: 2020-09-08

## 2020-09-08 NOTE — TELEPHONE ENCOUNTER
This patient is due for MTM follow-up. I called the patient to schedule an appointment and left a message with the clinic phone number for the patient to call to schedule.    Smiley Brizuela, PharmD  Medication Therapy Management Pharmacist  Pager: [phone]

## 2020-10-05 ENCOUNTER — HOSPITAL ENCOUNTER (OUTPATIENT)
Dept: MAMMOGRAPHY | Facility: CLINIC | Age: 50
Discharge: HOME | End: 2020-10-05
Attending: INTERNAL MEDICINE | Admitting: INTERNAL MEDICINE
Payer: COMMERCIAL

## 2020-10-05 ENCOUNTER — OFFICE VISIT (OUTPATIENT)
Dept: FAMILY MEDICINE | Facility: CLINIC | Age: 50
End: 2020-10-05
Payer: COMMERCIAL

## 2020-10-05 VITALS
TEMPERATURE: 98.9 F | WEIGHT: 231.13 LBS | SYSTOLIC BLOOD PRESSURE: 120 MMHG | BODY MASS INDEX: 37.15 KG/M2 | DIASTOLIC BLOOD PRESSURE: 78 MMHG | HEART RATE: 90 BPM | OXYGEN SATURATION: 97 % | HEIGHT: 66 IN | RESPIRATION RATE: 16 BRPM

## 2020-10-05 DIAGNOSIS — E11.65 TYPE 2 DIABETES MELLITUS WITH HYPERGLYCEMIA, WITHOUT LONG-TERM CURRENT USE OF INSULIN (H): ICD-10-CM

## 2020-10-05 DIAGNOSIS — N94.3 PMS (PREMENSTRUAL SYNDROME): ICD-10-CM

## 2020-10-05 DIAGNOSIS — L30.4 INTERTRIGO: ICD-10-CM

## 2020-10-05 DIAGNOSIS — E78.2 MIXED HYPERLIPIDEMIA: ICD-10-CM

## 2020-10-05 DIAGNOSIS — Z00.00 ROUTINE GENERAL MEDICAL EXAMINATION AT A HEALTH CARE FACILITY: Primary | ICD-10-CM

## 2020-10-05 DIAGNOSIS — I50.9 CONGESTIVE HEART FAILURE, UNSPECIFIED HF CHRONICITY, UNSPECIFIED HEART FAILURE TYPE (H): ICD-10-CM

## 2020-10-05 DIAGNOSIS — Z12.31 ENCOUNTER FOR SCREENING MAMMOGRAM FOR BREAST CANCER: ICD-10-CM

## 2020-10-05 DIAGNOSIS — I10 ESSENTIAL HYPERTENSION: ICD-10-CM

## 2020-10-05 DIAGNOSIS — I51.81 STRESS-INDUCED CARDIOMYOPATHY: ICD-10-CM

## 2020-10-05 PROCEDURE — 99396 PREV VISIT EST AGE 40-64: CPT | Performed by: NURSE PRACTITIONER

## 2020-10-05 PROCEDURE — 77063 BREAST TOMOSYNTHESIS BI: CPT

## 2020-10-05 RX ORDER — SEMAGLUTIDE 1.34 MG/ML
1 INJECTION, SOLUTION SUBCUTANEOUS
Qty: 3 ML | Refills: 1 | Status: SHIPPED | OUTPATIENT
Start: 2020-10-05 | End: 2020-12-07

## 2020-10-05 RX ORDER — LOSARTAN POTASSIUM 50 MG/1
50 TABLET ORAL DAILY
Qty: 90 TABLET | Refills: 2 | Status: SHIPPED | OUTPATIENT
Start: 2020-10-05 | End: 2021-06-28

## 2020-10-05 RX ORDER — HYDROCHLOROTHIAZIDE 25 MG/1
25 TABLET ORAL DAILY
Qty: 90 TABLET | Refills: 3 | Status: SHIPPED | OUTPATIENT
Start: 2020-10-05 | End: 2021-10-01

## 2020-10-05 RX ORDER — ROSUVASTATIN CALCIUM 20 MG/1
20 TABLET, COATED ORAL DAILY
Qty: 90 TABLET | Refills: 2 | Status: SHIPPED | OUTPATIENT
Start: 2020-10-05 | End: 2021-06-28

## 2020-10-05 RX ORDER — NYSTATIN AND TRIAMCINOLONE ACETONIDE 100000; 1 [USP'U]/G; MG/G
CREAM TOPICAL DAILY PRN
Qty: 60 G | Refills: 3 | Status: SHIPPED | OUTPATIENT
Start: 2020-10-05

## 2020-10-05 ASSESSMENT — ENCOUNTER SYMPTOMS
CHILLS: 0
HEADACHES: 0
NAUSEA: 1
WEAKNESS: 0
SHORTNESS OF BREATH: 0
DYSURIA: 0
PALPITATIONS: 0
ARTHRALGIAS: 0
HEMATURIA: 0
BREAST MASS: 0
NERVOUS/ANXIOUS: 0
HEARTBURN: 0
DIZZINESS: 0
PARESTHESIAS: 0
FEVER: 0
COUGH: 0
MYALGIAS: 0
CONSTIPATION: 0
SORE THROAT: 0
HEMATOCHEZIA: 0
DIARRHEA: 0
FREQUENCY: 0
JOINT SWELLING: 0
EYE PAIN: 0
ABDOMINAL PAIN: 0

## 2020-10-05 ASSESSMENT — PAIN SCALES - GENERAL: PAINLEVEL: NO PAIN (0)

## 2020-10-05 ASSESSMENT — MIFFLIN-ST. JEOR: SCORE: 1685.13

## 2020-10-05 NOTE — PROGRESS NOTES
SUBJECTIVE:   CC: Lashawn Reddy is an 49 year old woman who presents for preventive health visit.       Patient has been advised of split billing requirements and indicates understanding: Yes  Healthy Habits:     Getting at least 3 servings of Calcium per day:  Yes    Bi-annual eye exam:  Yes    Dental care twice a year:  Yes    Sleep apnea or symptoms of sleep apnea:  None    Diet:  Diabetic    Frequency of exercise:  None    Taking medications regularly:  Yes    Medication side effects:  Other    PHQ-2 Total Score: 0    Additional concerns today:  No              Today's PHQ-2 Score:   PHQ-2 (  [employer]) 10/5/2020   Q1: Little interest or pleasure in doing things 0   Q2: Feeling down, depressed or hopeless 0   PHQ-2 Score 0   Q1: Little interest or pleasure in doing things Not at all   Q2: Feeling down, depressed or hopeless Not at all   PHQ-2 Score 0       Abuse: Current or Past (Physical, Sexual or Emotional) - No  Do you feel safe in your environment? Yes        Social History     Tobacco Use     Smoking status: Former Smoker     Years: 16.00     Types: Cigarettes     Quit date: 2008     Years since quittin.0     Smokeless tobacco: Never Used   Substance Use Topics     Alcohol use: Yes     Alcohol/week: 0.0 standard drinks     Comment: couple drinks per year         Alcohol Use 10/5/2020   Prescreen: >3 drinks/day or >7 drinks/week? No   Prescreen: >3 drinks/day or >7 drinks/week? -       Reviewed orders with patient.  Reviewed health maintenance and updated orders accordingly - Yes  Labs reviewed in EPIC  BP Readings from Last 3 Encounters:   10/05/20 120/78   19 113/75   19 124/62    Wt Readings from Last 3 Encounters:   10/05/20 104.8 kg (231 lb 2 oz)   19 109.4 kg (241 lb 3.2 oz)   19 113.9 kg (251 lb)                  Patient Active Problem List   Diagnosis     Slow transit constipation     Varicose veins of lower extremities with complications     Family history of  malignant neoplasm of gastrointestinal tract     Hyperlipidemia with target LDL less than 100     PMS (premenstrual syndrome)     Menorrhagia with regular cycle     Mixed hyperlipidemia     History of diverticulitis     Bacterial sepsis (H) -early     Lung nodule     Type 2 diabetes mellitus with hyperglycemia, without long-term current use of insulin (H)     Essential hypertension     Morbid obesity (H)     Personal history of DVT (deep vein thrombosis)     S/P hysterectomy     Sigmoid diverticulitis     Anemia     ACS (acute coronary syndrome) (H)     CHF (congestive heart failure) (H)     Knee mass, left     Past Surgical History:   Procedure Laterality Date     C ANESTH,LOWER LEG VEIN SURG,NOS  2002    bilateral     C APPENDECTOMY  88     COLONOSCOPY  2013    Procedure: COLONOSCOPY;  colonoscopy;  Surgeon: Vamshi Loomis MD;  Location: [facility]     COLONOSCOPY N/A 2017    Procedure: COLONOSCOPY;  Surgeon: Raoul Sage MD;  Location: [facility]     CV HEART CATHETERIZATION WITH POSSIBLE INTERVENTION N/A 3/5/2019    Procedure: Heart Catheterization with possible Intervention;  Surgeon: Kirstin Lynn MD;  Location:  [facility]     EXCISE MASS LOWER EXTREMITY Left 2019    Procedure: Excision Left Posterior Knee Mass;  Surgeon: Raoul Sage MD;  Location: [facility]     HC COLONOSCOPY W/WO BRUSH/WASH  2005     HC DILATION/CURETTAGE DIAG/THER NON OB  1986    after a miscarriage     HC REMOVAL OF TONSILS,<10 Y/O       HYSTERECTOMY TOTAL ABDOMINAL, SALPINGECTOMY Bilateral 2018    Procedure: HYSTERECTOMY TOTAL ABDOMINAL, SALPINGECTOMY;  Total Abdominal Hysterectomy, left Salpingectomy;  Surgeon: Temi Corado MD;  Location: [facility]     TUBAL LIGATION  2006       Social History     Tobacco Use     Smoking status: Former Smoker     Years: 16.00     Types: Cigarettes     Quit date: 2008     Years since quittin.0     Smokeless tobacco: Never Used    Substance Use Topics     Alcohol use: Yes     Alcohol/week: 0.0 standard drinks     Comment: couple drinks per year     Family History   Problem Relation Age of Onset     Anesthesia Reaction Mother         Severe nausea     Gastrointestinal Disease Mother         Partial colon removal secondary to a blockage     Gynecology Mother         hysterectomy     Lipids Mother      Lipids Father      Cancer - colorectal Father         dx Oct '03 (dx at age 57)     Cancer Father         skin     Hypertension Father      Cancer Maternal Grandmother         colon at age 68     Cerebrovascular Disease Paternal Grandfather      Cerebrovascular Disease Paternal Grandmother         brain aneurysm     Arthritis Sister         mainly affecting her legs     Gastrointestinal Disease Sister         2 sisters with colon polyps     Cancer Sister         cervical ca     Cancer Maternal Aunt         all over ? breast was the origin     Cancer Maternal Aunt         pancreatic     Heart Disease Maternal Aunt         MI 2010         Current Outpatient Medications   Medication Sig Dispense Refill     ACCU-CHEK GUIDE test strip USE TO TEST BLOOD SUGAR 2 TIMES DAILY OR AS DIRECTED. 100 each 5     aspirin (ASA) 81 MG EC tablet Take 1 tablet (81 mg) by mouth daily 90 tablet 2     blood glucose monitoring (ACCU-CHEK FASTCLIX) lancets 1 each 2 times daily 102 each 3     empagliflozin (JARDIANCE) 10 MG TABS tablet Take 1 tablet (10 mg) by mouth daily 90 tablet 1     Fiber POWD Take 1 Scoop by mouth daily       hydrochlorothiazide (HYDRODIURIL) 25 MG tablet Take 1 tablet (25 mg) by mouth daily 90 tablet 3     losartan (COZAAR) 50 MG tablet Take 1 tablet (50 mg) by mouth daily 90 tablet 2     nystatin-triamcinolone (MYCOLOG II) 901913-7.1 UNIT/GM-% external cream Apply topically daily as needed (rash or itching) 60 g 3     omeprazole (PRILOSEC) 20 MG DR capsule Take 1 capsule (20 mg) by mouth daily as needed (acid reflux) 90 capsule 3     rosuvastatin  (CRESTOR) 20 MG tablet Take 1 tablet (20 mg) by mouth daily 90 tablet 2     semaglutide (OZEMPIC, 1 MG/DOSE,) 2 MG/1.5ML pen Inject 1 mg Subcutaneous every 7 days 3 mL 1     Sennosides-Docusate Sodium (SENNA-DOCUSATE SODIUM PO) Take 2 capsules by mouth At Bedtime       sertraline (ZOLOFT) 50 MG tablet TAKE 1/2 TABLET (25MG) BY MOUTH EVERY MORNING 45 tablet 3     fluconazole (DIFLUCAN) 150 MG tablet TAKE 1 TABLET (150 MG) BY MOUTH EVERY 3 DAYS (Patient not taking: Reported on 10/5/2020) 10 tablet 0     metFORMIN (GLUCOPHAGE-XR) 500 MG 24 hr tablet TAKE 4 TABLETS (2000MG) BY MOUTH EVERY DAY WITH DINNER (Patient not taking: Reported on 10/5/2020) 172 tablet 3     Vitamin D, Cholecalciferol, 1000 units TABS Take 2,000 Units by mouth daily        Allergies   Allergen Reactions     Glipizide Other (See Comments)     Hunger spikes     No Known Drug Allergies            Pertinent mammograms are reviewed under the imaging tab.  History of abnormal Pap smear: Status post benign hysterectomy. Health Maintenance and Surgical History updated.  PAP / HPV Latest Ref Rng & Units 7/9/2018 11/3/2014 10/14/2014   PAP - NIL NIL UNSAT   HPV 16 DNA NEG:Negative Negative - -   HPV 18 DNA NEG:Negative Negative - -   OTHER HR HPV NEG:Negative Negative - -     Reviewed and updated as needed this visit by clinical staff  Tobacco  Allergies  Meds   Med Hx  Surg Hx  Fam Hx  Soc Hx        Reviewed and updated as needed this visit by Provider                    Review of Systems   Constitutional: Negative for chills and fever.   HENT: Negative for congestion, ear pain, hearing loss and sore throat.    Eyes: Negative for pain and visual disturbance.   Respiratory: Negative for cough and shortness of breath.    Cardiovascular: Positive for peripheral edema. Negative for chest pain and palpitations.   Gastrointestinal: Positive for nausea. Negative for abdominal pain, constipation, diarrhea, heartburn and hematochezia.   Breasts:  Negative for  "tenderness, breast mass and discharge.   Genitourinary: Negative for dysuria, frequency, genital sores, hematuria, pelvic pain, urgency, vaginal bleeding and vaginal discharge.   Musculoskeletal: Negative for arthralgias, joint swelling and myalgias.   Skin: Negative for rash.   Neurological: Negative for dizziness, weakness, headaches and paresthesias.   Psychiatric/Behavioral: Negative for mood changes. The patient is not nervous/anxious.      No new symptoms, the edema is long standing and controlled, and the nausea is from medications and not new. Weight is stable and going down slowly. She is working on healthy diet and stays very active. No fevers or chills. New depression or anxiety.      OBJECTIVE:   /78   Pulse 90   Temp 98.9  F (37.2  C) (Temporal)   Resp 16   Ht 1.676 m (5' 6\")   Wt 104.8 kg (231 lb 2 oz)   LMP 09/03/2018 (Exact Date)   SpO2 97%   Breastfeeding No   BMI 37.30 kg/m    Physical Exam  GENERAL APPEARANCE: healthy, alert and no distress  EYES: Eyes grossly normal to inspection, PERRL and conjunctivae and sclerae normal  HENT: ear canals and TM's normal, nose and mouth without ulcers or lesions, oropharynx clear and oral mucous membranes moist  NECK: no adenopathy, no asymmetry, masses, or scars and thyroid normal to palpation  RESP: lungs clear to auscultation - no rales, rhonchi or wheezes  BREAST: normal without masses, tenderness or nipple discharge and no palpable axillary masses or adenopathy  CV: regular rate and rhythm, normal S1 S2, no S3 or S4, no murmur, click or rub, no peripheral edema and peripheral pulses strong  ABDOMEN: soft, nontender, no hepatosplenomegaly, no masses and bowel sounds normal  MS: no musculoskeletal defects are noted and gait is age appropriate without ataxia  SKIN: no suspicious lesions or rashes  NEURO: Normal strength and tone, sensory exam grossly normal, mentation intact and speech normal  PSYCH: mentation appears normal and affect " normal/bright    Diagnostic Test Results:  Labs reviewed in Epic    ASSESSMENT/PLAN:   1. Routine general medical examination at a health care facility  . Mammogram today.   - Fasting labs tomorrow.     2. Type 2 diabetes mellitus with hyperglycemia, without long-term current use of insulin (H)  Fasting labs to  - ALT; Future  - Hemoglobin A1c; Future  - empagliflozin (JARDIANCE) 10 MG TABS tablet; Take 1 tablet (10 mg) by mouth daily  Dispense: 90 tablet; Refill: 1  - rosuvastatin (CRESTOR) 20 MG tablet; Take 1 tablet (20 mg) by mouth daily  Dispense: 90 tablet; Refill: 2  - semaglutide (OZEMPIC, 1 MG/DOSE,) 2 MG/1.5ML pen; Inject 1 mg Subcutaneous every 7 days  Dispense: 3 mL; Refill: 1    3. Stress-induced cardiomyopathy  - No new symptoms of concern.   - aspirin (ASA) 81 MG EC tablet; Take 1 tablet (81 mg) by mouth daily  Dispense: 90 tablet; Refill: 2  - losartan (COZAAR) 50 MG tablet; Take 1 tablet (50 mg) by mouth daily  Dispense: 90 tablet; Refill: 2    4. Essential hypertension  - Well controlled will continue with current plan of care.   - losartan (COZAAR) 50 MG tablet; Take 1 tablet (50 mg) by mouth daily  Dispense: 90 tablet; Refill: 2    6. Intertrigo  - Intermittent exacerbations, the Mycolog works well, will order refills.   - nystatin-triamcinolone (MYCOLOG II) 718716-3.1 UNIT/GM-% external cream; Apply topically daily as needed (rash or itching)  Dispense: 60 g; Refill: 3    7. Mixed hyperlipidemia  - Labs tomorrow, fasting.  - Will let her know if she needs a change in the plan of care  - rosuvastatin (CRESTOR) 20 MG tablet; Take 1 tablet (20 mg) by mouth daily  Dispense: 90 tablet; Refill: 2    8. PMS (premenstrual syndrome)  - Zoloft is managing her mood and PMS symptoms  - Will continue with current plan of care.   - sertraline (ZOLOFT) 50 MG tablet; TAKE 1/2 TABLET (25MG) BY MOUTH EVERY MORNING  Dispense: 45 tablet; Refill: 3    9. Congestive heart failure, unspecified HF chronicity,  "unspecified heart failure type (H)  - No new concerning symptoms. Weigh is stable, no new SOB or fatigue.   - hydrochlorothiazide (HYDRODIURIL) 25 MG tablet; Take 1 tablet (25 mg) by mouth daily  Dispense: 90 tablet; Refill: 3    Patient has been advised of split billing requirements and indicates understanding: Yes  COUNSELING:  Reviewed preventive health counseling, as reflected in patient instructions  Special attention given to:        Regular exercise       Healthy diet/nutrition       (Daphne)menopause management    Estimated body mass index is 37.3 kg/m  as calculated from the following:    Height as of this encounter: 1.676 m (5' 6\").    Weight as of this encounter: 104.8 kg (231 lb 2 oz).    Weight management plan: Discussed healthy diet and exercise guidelines    She reports that she quit smoking about 12 years ago. Her smoking use included cigarettes. She quit after 16.00 years of use. She has never used smokeless tobacco.      Counseling Resources:  ATP IV Guidelines  Pooled Cohorts Equation Calculator  Breast Cancer Risk Calculator  BRCA-Related Cancer Risk Assessment: FHS-7 Tool  FRAX Risk Assessment  ICSI Preventive Guidelines  Dietary Guidelines for Americans, 2010  USDA's MyPlate  ASA Prophylaxis  Lung CA Screening    Efe Olguin NP  [facility]  "

## 2020-10-06 DIAGNOSIS — E11.65 TYPE 2 DIABETES MELLITUS WITH HYPERGLYCEMIA, WITHOUT LONG-TERM CURRENT USE OF INSULIN (H): ICD-10-CM

## 2020-10-06 LAB
ALT SERPL W P-5'-P-CCNC: 20 U/L (ref 0–50)
HBA1C MFR BLD: 8.6 % (ref 0–5.6)

## 2020-10-06 PROCEDURE — 84460 ALANINE AMINO (ALT) (SGPT): CPT | Performed by: NURSE PRACTITIONER

## 2020-10-06 PROCEDURE — 83036 HEMOGLOBIN GLYCOSYLATED A1C: CPT | Performed by: NURSE PRACTITIONER

## 2020-10-06 PROCEDURE — 36415 COLL VENOUS BLD VENIPUNCTURE: CPT | Performed by: NURSE PRACTITIONER

## 2020-10-08 NOTE — RESULT ENCOUNTER NOTE
Dear Lashawn, your recent test results are attached.    Mammogram is normal.  Recommend annual follow-up.    You will be contacted with any outstanding results when they are available.  Feel free to contact me via the office or My Chart if you have any questions regarding the above.  Sincerely,  DO BI Jacobo

## 2020-10-26 ENCOUNTER — VIRTUAL VISIT (OUTPATIENT)
Dept: PHARMACY | Facility: OTHER | Age: 50
End: 2020-10-26
Payer: COMMERCIAL

## 2020-10-26 DIAGNOSIS — E11.65 TYPE 2 DIABETES MELLITUS WITH HYPERGLYCEMIA, WITHOUT LONG-TERM CURRENT USE OF INSULIN (H): ICD-10-CM

## 2020-10-26 PROCEDURE — 99607 MTMS BY PHARM ADDL 15 MIN: CPT | Mod: TEL | Performed by: PHARMACIST

## 2020-10-26 PROCEDURE — 99606 MTMS BY PHARM EST 15 MIN: CPT | Mod: TEL | Performed by: PHARMACIST

## 2020-10-26 RX ORDER — METFORMIN HCL 500 MG
500 TABLET, EXTENDED RELEASE 24 HR ORAL
Qty: 90 TABLET | Refills: 1 | Status: SHIPPED | OUTPATIENT
Start: 2020-10-26 | End: 2021-01-26

## 2020-10-26 NOTE — PROGRESS NOTES
MTM ENCOUNTER  SUBJECTIVE/OBJECTIVE:                           Lashawn Reddy is a 49 year old female called for a follow-up visit. She was referred to me from Dr. Jaramillo.  Today's visit is a follow-up MTM visit from 20.     Patient consented to a telehealth visit: yes  Telemedicine Visit Details  Type of service:  Telephone visit  Start Time: 1245  End Time: 1301  Originating Location (pt. Location): Home  Distant Location (provider location):  [facility]  Mode of Communication:  Telephone    Chief Complaint: Recheck blood sugars.    Allergies/ADRs: Reviewed in EHR  Tobacco:  reports that she quit smoking about 12 years ago. Her smoking use included cigarettes. She quit after 16.00 years of use. She has never used smokeless tobacco.  Alcohol: not currently using  PMH: Reviewed in EHR    Medication Adherence/Access: Issues reported - see below.    Type 2 Diabetes:  Pt currently taking Jardiance 10mg daily and Ozempic 1mg weekly (on Sundays). Patient stopped metformin >1 month ago because she ran out and heard about cancer risk. Jardiance causes mild vaginal itching, uses nystatin-triamcinolone cream to help, has fluconazole on hand just in case. Previous therapies tried: higher doses of metformin caused nausea, glipizide caused hunger spikes and low blood sugars, metformin caused nausea at doses higher than 500mg daily.  Blood sugar monitoring: once daily, fasting AM. Ranges (based on glucometer):   Before eatin, 143, 180, 170, 200, 185, 145.  After eatin, 221, 250, 170, 171, 205, 184, 145.  Diet/Exercise: No changes since last MTM visit.  Symptoms of low blood sugar? none  Symptoms of high blood sugar? none  Aspirin: Taking 81mg daily and denies side effects  Statin: Taking rosuvastatin 20mg daily.  ACEi/ARB: Taking losartan 50mg daily.   Lab Results   Component Value Date    UMALCR 8.97 10/25/2018     Lab Results   Component Value Date    A1C 8.6 10/06/2020    A1C 8.7 2020     A1C 10.4 12/11/2019    A1C 9.1 08/26/2019    A1C 8.6 06/27/2019     Today's Vitals: None taken (telemed)    Last Comprehensive Metabolic Panel:  Sodium   Date Value Ref Range Status   08/26/2019 136 133 - 144 mmol/L Final     Potassium   Date Value Ref Range Status   08/26/2019 3.4 3.4 - 5.3 mmol/L Final     Chloride   Date Value Ref Range Status   08/26/2019 100 94 - 109 mmol/L Final     Carbon Dioxide   Date Value Ref Range Status   08/26/2019 28 20 - 32 mmol/L Final     Anion Gap   Date Value Ref Range Status   08/26/2019 8 3 - 14 mmol/L Final     Glucose   Date Value Ref Range Status   08/26/2019 278 (H) 70 - 99 mg/dL Final     Urea Nitrogen   Date Value Ref Range Status   08/26/2019 25 7 - 30 mg/dL Final     Creatinine   Date Value Ref Range Status   08/26/2019 0.88 0.52 - 1.04 mg/dL Final     GFR Estimate   Date Value Ref Range Status   08/26/2019 77 >60 mL/min/[1.73_m2] Final     Comment:     Non  GFR Calc  Starting 12/18/2018, serum creatinine based estimated GFR (eGFR) will be   calculated using the Chronic Kidney Disease Epidemiology Collaboration   (CKD-EPI) equation.       Calcium   Date Value Ref Range Status   08/26/2019 8.7 8.5 - 10.1 mg/dL Final     ASSESSMENT:                            Medication Adherence: fair, needs improvement - see below    Type 2 Diabetes: Patient is not meeting A1c goal of < 7%. Self monitoring of blood glucose is not at goal of fasting  mg/dL and post prandial < 180 mg/dL. Educated patient about the recent recalls issues for metformin. Discussed that only certain lots have been found to be contaminated with low levels of NMDA carcinogen and her pharmacy will ensure she is taking a safe product. Patient would benefit from restarting metformin.     PLAN:                            1. Patient to restart metformin ER 500mg daily.    I spent 16 minutes with this patient today. All changes were made via collaborative practice agreement with Dr. Jaramillo.  A copy of the visit note was provided to the patient's primary care provider.    Will follow up in 2 weeks - scheduled appt on 11/11.    The patient declined a summary of these recommendations.     Smiley Brizuela, PharmD  Medication Therapy Management Pharmacist  Pager: [phone]

## 2020-11-11 ENCOUNTER — VIRTUAL VISIT (OUTPATIENT)
Dept: PHARMACY | Facility: OTHER | Age: 50
End: 2020-11-11
Payer: COMMERCIAL

## 2020-11-11 DIAGNOSIS — E11.65 TYPE 2 DIABETES MELLITUS WITH HYPERGLYCEMIA, WITHOUT LONG-TERM CURRENT USE OF INSULIN (H): Primary | ICD-10-CM

## 2020-11-11 PROCEDURE — 99606 MTMS BY PHARM EST 15 MIN: CPT | Mod: TEL | Performed by: PHARMACIST

## 2020-11-11 NOTE — PROGRESS NOTES
MTM ENCOUNTER  SUBJECTIVE/OBJECTIVE:                           Lashawn Reddy is a 50 year old female called for a follow-up visit. She was referred to me from Dr. Jaramillo.  Today's visit is a follow-up MTM visit from 10/26/20.     Reason for visit: Recheck blood sugars.    Allergies/ADRs: Reviewed in chart  Tobacco: She reports that she quit smoking about 12 years ago. Her smoking use included cigarettes. She quit after 16.00 years of use. She has never used smokeless tobacco.  Alcohol: not currently using  Past Medical History: Reviewed in chart    Medication Adherence/Access: no issues reported    Type 2 Diabetes:  Pt currently taking Jardiance 10mg daily, Ozempic 1mg weekly (on Sundays), and metformin ER 500mg daily (restarted at last MTM visit. Patient is not experiencing side effects, would be willing to try increasing metformin dose. Jardiance causes mild vaginal itching, uses nystatin-triamcinolone cream to help, has fluconazole on hand just in case. Previous therapies tried: glipizide caused hunger spikes and low blood sugars.   Blood sugar monitoring: a few times weekly. Ranges (based on glucometer):   Before eatin, 155, 270.  After eatin, 175, 153.  Diet/Exercise: No changes since last MTM visit.  Symptoms of low blood sugar? none  Symptoms of high blood sugar? none  Aspirin: Taking 81mg daily and denies side effects  Statin: Taking rosuvastatin 20mg daily.  ACEi/ARB: Taking losartan 50mg daily.     Lab Results   Component Value Date    A1C 8.6 10/06/2020    A1C 8.7 2020    A1C 10.4 2019    A1C 9.1 2019    A1C 8.6 2019     Today's Vitals: None taken (telemed)    Last Comprehensive Metabolic Panel:  Sodium   Date Value Ref Range Status   2019 136 133 - 144 mmol/L Final     Potassium   Date Value Ref Range Status   2019 3.4 3.4 - 5.3 mmol/L Final     Chloride   Date Value Ref Range Status   2019 100 94 - 109 mmol/L Final     Carbon Dioxide   Date Value  Ref Range Status   08/26/2019 28 20 - 32 mmol/L Final     Anion Gap   Date Value Ref Range Status   08/26/2019 8 3 - 14 mmol/L Final     Glucose   Date Value Ref Range Status   08/26/2019 278 (H) 70 - 99 mg/dL Final     Urea Nitrogen   Date Value Ref Range Status   08/26/2019 25 7 - 30 mg/dL Final     Creatinine   Date Value Ref Range Status   08/26/2019 0.88 0.52 - 1.04 mg/dL Final     GFR Estimate   Date Value Ref Range Status   08/26/2019 77 >60 mL/min/[1.73_m2] Final     Comment:     Non  GFR Calc  Starting 12/18/2018, serum creatinine based estimated GFR (eGFR) will be   calculated using the Chronic Kidney Disease Epidemiology Collaboration   (CKD-EPI) equation.       Calcium   Date Value Ref Range Status   08/26/2019 8.7 8.5 - 10.1 mg/dL Final     ASSESSMENT:                            Medication Adherence: No issues identified    Type 2 Diabetes: Patient is not meeting A1c goal of < 7%. Self monitoring of blood glucose is not at goal of fasting  mg/dL and post prandial < 180 mg/dL. Patient may benefit from metformin dose increase today.    PLAN:                            1. Patient to increase metformin ER to 500mg twice daily.  2. Patient to test blood sugars more often, try to make it a habit every day fasting in the morning.    I spent 13 minutes with this patient today. All changes were made via collaborative practice agreement with Dr. Jaramillo. A copy of the visit note was provided to the patient's primary care provider.    Will follow up in 2 weeks - scheduled appointment on 11/25.    The patient declined a summary of these recommendations.     Smiley Brizuela, PharmD  Medication Therapy Management Pharmacist  Pager: [phone]    Patient consented to a telehealth visit: yes  Telemedicine Visit Details  Type of service:  Telephone visit  Start Time: 1106  End Time: 1119  Originating Location (patient location): Home  Distant Location (provider location):  [facility]  MTM  Mode of Communication:  Telephone

## 2020-11-25 ENCOUNTER — VIRTUAL VISIT (OUTPATIENT)
Dept: PHARMACY | Facility: OTHER | Age: 50
End: 2020-11-25
Payer: COMMERCIAL

## 2020-11-25 DIAGNOSIS — E11.65 TYPE 2 DIABETES MELLITUS WITH HYPERGLYCEMIA, WITHOUT LONG-TERM CURRENT USE OF INSULIN (H): Primary | ICD-10-CM

## 2020-11-25 PROCEDURE — 99606 MTMS BY PHARM EST 15 MIN: CPT | Mod: TEL | Performed by: PHARMACIST

## 2020-11-25 PROCEDURE — 99607 MTMS BY PHARM ADDL 15 MIN: CPT | Mod: TEL | Performed by: PHARMACIST

## 2020-11-25 RX ORDER — PEN NEEDLE, DIABETIC 32GX 5/32"
NEEDLE, DISPOSABLE MISCELLANEOUS
Qty: 100 EACH | Refills: 1 | Status: SHIPPED | OUTPATIENT
Start: 2020-11-25 | End: 2021-06-30

## 2020-11-25 RX ORDER — FLASH GLUCOSE SENSOR
1 KIT MISCELLANEOUS
Qty: 2 EACH | Refills: 2 | Status: SHIPPED | OUTPATIENT
Start: 2020-11-25 | End: 2020-11-25

## 2020-11-25 RX ORDER — FLASH GLUCOSE SCANNING READER
1 EACH MISCELLANEOUS ONCE
Qty: 1 DEVICE | Refills: 0 | Status: SHIPPED | OUTPATIENT
Start: 2020-11-25 | End: 2020-11-25

## 2020-11-25 RX ORDER — INSULIN GLARGINE 100 [IU]/ML
10 INJECTION, SOLUTION SUBCUTANEOUS DAILY
Qty: 15 ML | Refills: 1 | Status: SHIPPED | OUTPATIENT
Start: 2020-11-25 | End: 2020-12-14

## 2020-11-25 NOTE — Clinical Note
Hi Redd - Warm hand off to you! Just started Lantus and needs titration.    Smiley Brizuela, PharmD  Medication Therapy Management Pharmacist  Pager: [phone]

## 2020-11-25 NOTE — PROGRESS NOTES
MTM ENCOUNTER  SUBJECTIVE/OBJECTIVE:                           Lashawn Reddy is a 50 year old female called for an initial visit. She was referred to me from Dr. Jaramillo.    Reason for visit: Recheck blood sugars.    Allergies/ADRs: Reviewed in chart  Tobacco: She reports that she quit smoking about 12 years ago. Her smoking use included cigarettes. She quit after 16.00 years of use. She has never used smokeless tobacco.  Alcohol: not currently using  Past Medical History: Reviewed in chart    Medication Adherence/Access: no issues reported    Type 2 Diabetes:  Currently taking Jardiance 10mg daily, Ozempic 1mg weekly (on Sundays), and metformin ER 500mg daily (her max tolerated dose). Patient is experiencing side effects. She tried taking metformin ER 500mg twice daily as directed at last MTM visit, but it caused intolerable nausea and vomiting. Jardiance causes mild vaginal itching, uses nystatin-triamcinolone cream to help, has fluconazole on hand just in case and last use ~1 month ago. Previous therapies tried: glipizide caused hunger spikes and low blood sugars, unable to be on pioglitazone due to venous insufficiency. We checked on cost of Freestyle Uche per 10/17/19 MTM note and it was too expensive at that time.  Blood sugar monitoring: a few times weekly. Ranges (based on glucometer):   Before eatin, 157, 175, 205, 144, 232, 141, 157.  After eatin, 152, 161, 228.  Diet/Exercise: No changes since last MTM visit.  Symptoms of low blood sugar? none  Symptoms of high blood sugar? none  Aspirin: Taking 81mg daily and denies side effects  Statin: Taking rosuvastatin 20mg daily.  ACEi/ARB: Taking losartan 50mg daily.     Lab Results   Component Value Date    A1C 8.6 10/06/2020    A1C 8.7 2020    A1C 10.4 2019    A1C 9.1 2019    A1C 8.6 2019     Today's Vitals: None taken (telemed)    Last Comprehensive Metabolic Panel:  Sodium   Date Value Ref Range Status   2019 136  133 - 144 mmol/L Final     Potassium   Date Value Ref Range Status   08/26/2019 3.4 3.4 - 5.3 mmol/L Final     Chloride   Date Value Ref Range Status   08/26/2019 100 94 - 109 mmol/L Final     Carbon Dioxide   Date Value Ref Range Status   08/26/2019 28 20 - 32 mmol/L Final     Anion Gap   Date Value Ref Range Status   08/26/2019 8 3 - 14 mmol/L Final     Glucose   Date Value Ref Range Status   08/26/2019 278 (H) 70 - 99 mg/dL Final     Urea Nitrogen   Date Value Ref Range Status   08/26/2019 25 7 - 30 mg/dL Final     Creatinine   Date Value Ref Range Status   08/26/2019 0.88 0.52 - 1.04 mg/dL Final     GFR Estimate   Date Value Ref Range Status   08/26/2019 77 >60 mL/min/[1.73_m2] Final     Comment:     Non  GFR Calc  Starting 12/18/2018, serum creatinine based estimated GFR (eGFR) will be   calculated using the Chronic Kidney Disease Epidemiology Collaboration   (CKD-EPI) equation.       Calcium   Date Value Ref Range Status   08/26/2019 8.7 8.5 - 10.1 mg/dL Final     ASSESSMENT:                            Medication Adherence: No issues identified    Type 2 Diabetes: Patient is not meeting A1c goal of < 7%. Self monitoring of blood glucose is not at goal of fasting  mg/dL and post prandial < 180 mg/dL. We had a shared decision-making discussion regarding options including: starting Freestyle Uche, Jardiance dose increase, starting pioglitazone, or starting basal insulin. Patient would be willing to do Freestyle Uche depending on cost. Patient declines increasing Jardiance and starting pioglitazone due to risks of genital mycotic infections and fluid retention, respectively. Patient is open to starting basal insulin to lower blood sugars although need to monitor for weight gain.    PLAN:                            1. Sent order to pharmacy for Freestyle Uche to check on cost.   2. Patient to start Lantus 10 units daily.     **Update 11/25/20: Called [facility] pharmacy. Freestyle Uche  not covered, reader is $74.50 and 2 sensors is $81.20. Lantus copay is $150, savings card brings down to $0. Called patient to discuss. She declines Freestyle Uche due to cost. Emailed her Lantus savings card and demo video.    I spent 21 minutes with this patient today. All changes were made via collaborative practice agreement with Dr. Jaramillo. A copy of the visit note was provided to the patient's primary care provider.    Will follow up in 2 weeks - scheduled appointment on 12/9 with Redd Sterling PharmD.     The patient declined a summary of these recommendations.     Smiley Brizuela PharmD  Medication Therapy Management Pharmacist  Pager: [phone]    Patient consented to a telehealth visit: yes  Telemedicine Visit Details  Type of service:  Telephone visit  Start Time: 1310  End Time: 1331  Originating Location (patient location): [place]  Distant Location (provider location):  [facility]  Mode of Communication:  Telephone

## 2020-12-02 ENCOUNTER — TELEPHONE (OUTPATIENT)
Dept: PHARMACY | Facility: OTHER | Age: 50
End: 2020-12-02

## 2020-12-02 NOTE — TELEPHONE ENCOUNTER
Patient has been having symptoms of hot flashes and insomnia. She's wondering if this is caused by insulin. Verified that she is not experiencing hypoglycemia, she knows what that feels like but this is different, and no blood sugars <70 mg/dL. States her mother had a hysterectomy and her sister went through early menopause. I advised that her symptoms are not caused by insulin and may be coincidental menopausal symptoms. She will continue monitoring and see a provider if symptoms persist.    Smiley Brizuela, PharmD  Medication Therapy Management Pharmacist  Pager: [phone]

## 2020-12-07 DIAGNOSIS — E11.65 TYPE 2 DIABETES MELLITUS WITH HYPERGLYCEMIA, WITHOUT LONG-TERM CURRENT USE OF INSULIN (H): ICD-10-CM

## 2020-12-07 RX ORDER — SEMAGLUTIDE 1.34 MG/ML
1 INJECTION, SOLUTION SUBCUTANEOUS
Qty: 3 ML | Refills: 1 | Status: SHIPPED | OUTPATIENT
Start: 2020-12-07 | End: 2021-02-02

## 2020-12-07 NOTE — TELEPHONE ENCOUNTER
semaglutide      Last Written Prescription Date:  10/05/20  Last Fill Quantity: 3,   # refills: 1  Last Office Visit: 10/05/20  Future Office visit:

## 2020-12-07 NOTE — TELEPHONE ENCOUNTER
Reason for Call:  Medication or medication refill:    Do you use a [facility]?  Name of the pharmacy and phone number for the current request:  [facility] - [phone]    Name of the medication requested: Ozempic     Other request: please refill today as she is out.  Pharmacy states they have been trying to fax you but it is not going through.    Can we leave a detailed message on this number? YES    Phone number patient can be reached at:     Best Time:     Call taken on 12/7/2020 at 8:51 AM by Adams Bangura

## 2020-12-09 ENCOUNTER — VIRTUAL VISIT (OUTPATIENT)
Dept: PHARMACY | Facility: CLINIC | Age: 50
End: 2020-12-09
Payer: COMMERCIAL

## 2020-12-09 DIAGNOSIS — E11.65 TYPE 2 DIABETES MELLITUS WITH HYPERGLYCEMIA, WITHOUT LONG-TERM CURRENT USE OF INSULIN (H): Primary | ICD-10-CM

## 2020-12-09 PROCEDURE — 99606 MTMS BY PHARM EST 15 MIN: CPT | Mod: TEL | Performed by: PHARMACIST

## 2020-12-09 NOTE — PATIENT INSTRUCTIONS
Recommendations from today's MTM visit:                                                    MTM (medication therapy management) is a service provided by a clinical pharmacist designed to help you get the most of out of your medicines.   Today we reviewed what your medicines are for, how to know if they are working, that your medicines are safe and how to make your medicine regimen as easy as possible.     1. Monitor your blood sugars at least every morning (fasting) and before bedtime.  You can also continue to monitor during the day. I want to make sure you are not having overnight lows before we consider what to do with the dose of your insulin.     2. Here is the form for the DMV related to insulin use. Please fill this out and bring/mail to the clinic for Dr. Jaramillo to sign.  There is also a way to scan/send via [employer] to the clinic as well.     https://dps.[place].gov/divisions/dvs/forms-documents/Documents/DL_[email or web address].[email or web address]    It was great to speak with you today.  I value your experience and would be very thankful for your time with providing feedback on our clinic survey. You may receive a survey via email or text message in the next few days.     Next MTM visit: I will follow-up via [employer]ual Experience on this Friday, December 11th    To schedule another MTM appointment, please call the clinic directly or you may call the MTM scheduling line at [phone] or toll-free at [phone].     My Clinical Pharmacist's contact information:                                                      It was a pleasure talking with you today!  Please feel free to contact me with any questions or concerns you have.      Redd Sterling, PharmD, BCACP  Medication Therapy Management Pharmacist  Pager: [phone]

## 2020-12-09 NOTE — PROGRESS NOTES
"MTM ENCOUNTER  SUBJECTIVE/OBJECTIVE:                           Lashawn Reddy is a 50 year old female called for a follow-up visit. She was referred to me from Dr. Jaramillo.  Today's visit is a follow-up MTM visit from 20 with Smiley Brizuela, PharmD     Reason for visit: Diabetes Follow-Up.    Allergies/ADRs: Reviewed in chart  Tobacco: She reports that she quit smoking about 12 years ago. Her smoking use included cigarettes. She quit after 16.00 years of use. She has never used smokeless tobacco.  Alcohol: not currently using  Past Medical History: Reviewed in chart    Medication Adherence/Access: no issues reported    Type 2 Diabetes:  Currently taking Lantus 10 units daily, Jardiance 10 mg daily, Ozempic 1mg weekly (on Sundays), and metformin ER 500mg daily (her max tolerated dose). Patient is experiencing \"feeling hot/hot flashes\" after eating - she does not know if due to being high or low. Also now experiencing insomnia/trouble sleeping - was not issue before insulin. See 20 note for past therapies.   Blood sugar monitoring: a few times weekly/day since starting insulin Ranges (based on glucometer):   Before eatins-240s mg/dL  After eating (not consistent timing): 141, 132, 107, 120  Diet/Exercise: No changes since last MTM visit.  Symptoms of low blood sugar? none  Symptoms of high blood sugar? none  Aspirin: Taking 81mg daily and denies side effects  Statin: Taking rosuvastatin 20mg daily.  ACEi/ARB: Taking losartan 50mg daily.   Urine Albumin:   Lab Results   Component Value Date    UMALCR 8.97 10/25/2018      Lab Results   Component Value Date    A1C 8.6 10/06/2020    A1C 8.7 2020    A1C 10.4 2019    A1C 9.1 2019    A1C 8.6 2019     Today's Vitals: LMP 2018 (Exact Date)     BP Readings from Last 3 Encounters:   10/05/20 120/78   19 113/75   19 124/62     Wt Readings from Last 5 Encounters:   10/05/20 231 lb 2 oz (104.8 kg)   19 241 lb 3.2 oz " (109.4 kg)   12/11/19 251 lb (113.9 kg)   12/11/19 250 lb 4.8 oz (113.5 kg)   11/11/19 246 lb 8 oz (111.8 kg)         ASSESSMENT:                              Medication Adherence: No issues identified    Type 2 Diabetes: Patient is not meeting A1c goal of < 7%. Self monitoring of blood glucose is not at goal of fasting  mg/dL and post prandial < 180 mg/dL. Patient is checking blood sugars at inconsistent times. Unclear if she is experiencing potential low blood sugars (possible overnight) or pseudohypoglycemia. Would benefit from further monitoring before determining next steps.    PLAN:                            1. Monitor your blood sugars at least every morning (fasting) and before bedtime.  You can also continue to monitor during the day. I want to make sure you are not having overnight lows before we consider what to do with the dose of your insulin.     2. Here is the form for the DMV related to insulin use. Please fill this out and bring/mail to the clinic for Dr. Jaramillo to sign.  There is also a way to scan/send via [employer] to the clinic as well.     https://dps.mn.gov/divisions/dvs/forms-documents/Documents/DL_[email or web address].pdf    I spent 18 minutes with this patient today. A copy of the visit note was provided to the patient's primary care provider.    Will follow up in 1 week.    The patient was sent via [employer] a summary of these recommendations.     Redd Sterling, KeikoD, BCACP  Medication Therapy Management Pharmacist  Pager: [phone]      Patient consented to a telehealth visit: yes  Telemedicine Visit Details  Type of service:  Telephone visit  Start Time: 10:00 AM  End Time: 10:18 AM  Originating Location (patient location): Home  Distant Location (provider location):  [facility]  Mode of Communication:  Telephone      Medication Therapy Recommendations Needing Review  No medication therapy recommendations to display

## 2020-12-18 NOTE — Clinical Note
I think patient would benefit more from Jardiance or Ozempic than glipizide to get better BG control with weight benefit. Would Jardiance or Ozempic be your recommendation given her heart issues? If you prefer Jardiance you can order. If Ozempic, I need to give her some further instruction. Thanks! Dominga Oliveira RDN, LD, CDE Detail Level: Detailed Size Of Lesion: 4 mm

## 2020-12-23 DIAGNOSIS — E11.65 TYPE 2 DIABETES MELLITUS WITH HYPERGLYCEMIA, WITHOUT LONG-TERM CURRENT USE OF INSULIN (H): ICD-10-CM

## 2020-12-23 RX ORDER — BLOOD SUGAR DIAGNOSTIC
STRIP MISCELLANEOUS
Qty: 100 EACH | Refills: 5 | Status: SHIPPED | OUTPATIENT
Start: 2020-12-23 | End: 2022-04-20

## 2020-12-23 NOTE — TELEPHONE ENCOUNTER
"  Requested Prescriptions   Pending Prescriptions Disp Refills     ACCU-CHEK GUIDE test strip [Pharmacy Med Name: ACCU-CHEK GUIDE TEST STRIP] 100 each 5     Sig: USE TO TEST BLOOD SUGAR 2 TIMES DAILY OR AS DIRECTED.     Last office visit: 10/5/2020   Future Office Visit:      Diabetic Supplies Protocol Failed - 12/23/2020  2:48 PM        Failed - Recent (6 mo) or future (30 days) visit within the authorizing provider's specialty     Patient had office visit in the last 6 months or has a visit in the next 30 days with authorizing provider.  See \"Patient Info\" tab in inbasket, or \"Choose Columns\" in Meds & Orders section of the refill encounter.            Passed - Medication is active on med list        Passed - Patient is 18 years of age or older         Prescription approved per [facility] Refill Protocol.  Krystal Oneal RN      "

## 2021-01-07 ENCOUNTER — VIRTUAL VISIT (OUTPATIENT)
Dept: PHARMACY | Facility: CLINIC | Age: 51
End: 2021-01-07
Payer: COMMERCIAL

## 2021-01-07 DIAGNOSIS — E11.65 TYPE 2 DIABETES MELLITUS WITH HYPERGLYCEMIA, WITHOUT LONG-TERM CURRENT USE OF INSULIN (H): Primary | ICD-10-CM

## 2021-01-07 PROCEDURE — 99605 MTMS BY PHARM NP 15 MIN: CPT | Mod: TEL | Performed by: PHARMACIST

## 2021-01-07 NOTE — PATIENT INSTRUCTIONS
Recommendations from today's MTM visit:                                                       1. I placed an order for an A1c. You are scheduled for a lab draw on Friday, January 15th at 8:15 AM.  Dr. Jaramillo also has orders for a basic metabolic panel (kidney function and electrolytes), CBC (blood counts), lipids (cholesterol), and urine albumin (early signs of kidney damage).     It was great to speak with you today.  I value your experience and would be very thankful for your time with providing feedback on our clinic survey. You may receive a survey via email or text message in the next few days.     Next MTM visit: After labs come back    To schedule another MTM appointment, please call the clinic directly or you may call the MTM scheduling line at [phone] or toll-free at [phone].     My Clinical Pharmacist's contact information:                                                      It was a pleasure talking with you today!  Please feel free to contact me with any questions or concerns you have.      Redd Sterling, Asif, BCACP  Medication Therapy Management Pharmacist  Pager: [phone]

## 2021-01-07 NOTE — PROGRESS NOTES
"Medication Therapy Management (MTM) Encounter    ASSESSMENT/PLAN:                            Medication Adherence/Access: No issues identified    Type 2 Diabetes: Would benefit from recheck of A1c before further dose increase of basal insulin. Sugars are still elevated somewhat, but unclear if she is missing variable highs/lows.     PLAN:   1. Labs ordered - A1c    Will follow up after labs drawn.    SUBJECTIVE/OBJECTIVE:                           Lashawn Reddy is a 50 year old female called for a follow-up visit. She was referred to me from Dr. Jaramillo.  Today's visit is a follow-up MTM visit from 12/9/2020. Continues on same [employer] health plan at start of 2021.    Reason for visit: Diabetes Follow-Up.    Allergies/ADRs: Reviewed in chart  Tobacco: She reports that she quit smoking about 12 years ago. Her smoking use included cigarettes. She quit after 16.00 years of use. She has never used smokeless tobacco.  Tobacco: She reports that she quit smoking about 12 years ago. Her smoking use included cigarettes. She quit after 16.00 years of use. She has never used smokeless tobacco.  Alcohol: not currently using  Past Medical History: Reviewed in chart    Medication Adherence/Access: no issues reported    Type 2 Diabetes:  Currently taking Lantus 12 units daily, Jardiance 10 mg daily, Ozempic 1mg weekly (on Sundays), and metformin ER 500mg daily (her max tolerated dose). Patient is experiencing \"feeling hot/hot flashes\" after eating - she does not know if due to being high or low. Sleep issues are resolved. See 11/25/20 note for past therapies.   Blood sugar monitoring: a few times weekly/day since starting insulin Ranges (based on glucometer): 110-180 mg/dL - pretty variable was without test strips around holidays. Interested in A1c recheck.   Diet/Exercise: No changes since last MTM visit - feels she was doing okay over holiday  Symptoms of low blood sugar? none  Symptoms of high blood sugar? none  Aspirin: " Taking 81mg daily and denies side effects  Statin: Taking rosuvastatin 20mg daily.  ACEi/ARB: Taking losartan 50mg daily.    Urine Albumin:   Lab Results   Component Value Date    UMALCR 8.97 10/25/2018      Lab Results   Component Value Date    A1C 8.6 10/06/2020    A1C 8.7 03/16/2020    A1C 10.4 12/11/2019    A1C 9.1 08/26/2019    A1C 8.6 06/27/2019         Today's Vitals: LMP 09/03/2018 (Exact Date)     BP Readings from Last 3 Encounters:   10/05/20 120/78   12/26/19 113/75   12/23/19 124/62     Wt Readings from Last 5 Encounters:   10/05/20 231 lb 2 oz (104.8 kg)   12/23/19 241 lb 3.2 oz (109.4 kg)   12/11/19 251 lb (113.9 kg)   12/11/19 250 lb 4.8 oz (113.5 kg)   11/11/19 246 lb 8 oz (111.8 kg)         I spent 15 minutes with this patient today. All changes were made via collaborative practice agreement with Dr. Jaramillo. A copy of the visit note was provided to the patient's primary care provider.    The patient was sent via [employer] a summary of these recommendations.     Redd Sterling, KeikoD, [facility] Hospital[facility]  Medication Therapy Management Pharmacist  Pager: [phone]      Telemedicine Visit Details  Type of service:  Telephone visit  Start Time: 12:40 PM  End Time: 12:55 PM  Originating Location (patient location): Home  Distant Location (provider location):  [facility]      Medication Therapy Recommendations  Type 2 diabetes mellitus with hyperglycemia, without long-term current use of insulin (H)    Current Medication: insulin glargine (BASAGLAR KWIKPEN) 100 UNIT/ML pen   Rationale: Medication requires monitoring - Needs additional monitoring   Recommendation: Order Lab - insulin glargine 100 UNIT/ML pen - Labs ordered - A1c   Status: Accepted per CPA

## 2021-01-09 ENCOUNTER — HEALTH MAINTENANCE LETTER (OUTPATIENT)
Age: 51
End: 2021-01-09

## 2021-01-15 DIAGNOSIS — K29.30 CHRONIC SUPERFICIAL GASTRITIS WITHOUT BLEEDING: ICD-10-CM

## 2021-01-15 DIAGNOSIS — E78.2 MIXED HYPERLIPIDEMIA: ICD-10-CM

## 2021-01-15 DIAGNOSIS — E11.65 TYPE 2 DIABETES MELLITUS WITH HYPERGLYCEMIA, WITHOUT LONG-TERM CURRENT USE OF INSULIN (H): ICD-10-CM

## 2021-01-15 LAB
ANION GAP SERPL CALCULATED.3IONS-SCNC: 3 MMOL/L (ref 3–14)
BUN SERPL-MCNC: 18 MG/DL (ref 7–30)
CALCIUM SERPL-MCNC: 9.1 MG/DL (ref 8.5–10.1)
CHLORIDE SERPL-SCNC: 104 MMOL/L (ref 94–109)
CHOLEST SERPL-MCNC: 130 MG/DL
CO2 SERPL-SCNC: 32 MMOL/L (ref 20–32)
CREAT SERPL-MCNC: 0.87 MG/DL (ref 0.52–1.04)
CREAT UR-MCNC: 130 MG/DL
ERYTHROCYTE [DISTWIDTH] IN BLOOD BY AUTOMATED COUNT: 14.3 % (ref 10–15)
GFR SERPL CREATININE-BSD FRML MDRD: 77 ML/MIN/{1.73_M2}
GLUCOSE SERPL-MCNC: 162 MG/DL (ref 70–99)
HBA1C MFR BLD: 8 % (ref 0–5.6)
HCT VFR BLD AUTO: 43.3 % (ref 35–47)
HDLC SERPL-MCNC: 66 MG/DL
HGB BLD-MCNC: 13.9 G/DL (ref 11.7–15.7)
LDLC SERPL CALC-MCNC: 50 MG/DL
MCH RBC QN AUTO: 27.4 PG (ref 26.5–33)
MCHC RBC AUTO-ENTMCNC: 32.1 G/DL (ref 31.5–36.5)
MCV RBC AUTO: 85 FL (ref 78–100)
MICROALBUMIN UR-MCNC: 12 MG/L
MICROALBUMIN/CREAT UR: 9.62 MG/G CR (ref 0–25)
NONHDLC SERPL-MCNC: 64 MG/DL
PLATELET # BLD AUTO: 247 10E9/L (ref 150–450)
POTASSIUM SERPL-SCNC: 3.7 MMOL/L (ref 3.4–5.3)
RBC # BLD AUTO: 5.07 10E12/L (ref 3.8–5.2)
SODIUM SERPL-SCNC: 139 MMOL/L (ref 133–144)
TRIGL SERPL-MCNC: 71 MG/DL
WBC # BLD AUTO: 10 10E9/L (ref 4–11)

## 2021-01-15 PROCEDURE — 85027 COMPLETE CBC AUTOMATED: CPT | Performed by: INTERNAL MEDICINE

## 2021-01-15 PROCEDURE — 83036 HEMOGLOBIN GLYCOSYLATED A1C: CPT | Performed by: INTERNAL MEDICINE

## 2021-01-15 PROCEDURE — 82043 UR ALBUMIN QUANTITATIVE: CPT | Performed by: INTERNAL MEDICINE

## 2021-01-15 PROCEDURE — 80061 LIPID PANEL: CPT | Performed by: INTERNAL MEDICINE

## 2021-01-15 PROCEDURE — 36415 COLL VENOUS BLD VENIPUNCTURE: CPT | Performed by: INTERNAL MEDICINE

## 2021-01-15 PROCEDURE — 80048 BASIC METABOLIC PNL TOTAL CA: CPT | Performed by: INTERNAL MEDICINE

## 2021-01-26 DIAGNOSIS — E11.65 TYPE 2 DIABETES MELLITUS WITH HYPERGLYCEMIA, WITHOUT LONG-TERM CURRENT USE OF INSULIN (H): ICD-10-CM

## 2021-01-26 RX ORDER — METFORMIN HCL 500 MG
TABLET, EXTENDED RELEASE 24 HR ORAL
Qty: 90 TABLET | Refills: 0 | Status: SHIPPED | OUTPATIENT
Start: 2021-01-26 | End: 2021-05-17

## 2021-01-30 DIAGNOSIS — E11.65 TYPE 2 DIABETES MELLITUS WITH HYPERGLYCEMIA, WITHOUT LONG-TERM CURRENT USE OF INSULIN (H): ICD-10-CM

## 2021-02-02 ENCOUNTER — TELEPHONE (OUTPATIENT)
Dept: INTERNAL MEDICINE | Facility: CLINIC | Age: 51
End: 2021-02-02

## 2021-02-02 DIAGNOSIS — E11.65 TYPE 2 DIABETES MELLITUS WITH HYPERGLYCEMIA, WITHOUT LONG-TERM CURRENT USE OF INSULIN (H): Primary | ICD-10-CM

## 2021-02-02 RX ORDER — SEMAGLUTIDE 1.34 MG/ML
1 INJECTION, SOLUTION SUBCUTANEOUS
Qty: 3 ML | Refills: 1 | Status: SHIPPED | OUTPATIENT
Start: 2021-02-02 | End: 2021-04-14

## 2021-02-02 NOTE — TELEPHONE ENCOUNTER
Per [facility], patients Susan La is no longer covered under her insurance. Please send new script for insulin. Change to Lantus, Levemir, Toujeo or Tresiba which are covered under insurance.     Routing to provider to advise and send new script.     Janet Jack MA

## 2021-02-04 DIAGNOSIS — E11.65 TYPE 2 DIABETES MELLITUS WITH HYPERGLYCEMIA, WITHOUT LONG-TERM CURRENT USE OF INSULIN (H): ICD-10-CM

## 2021-02-04 RX ORDER — INSULIN GLARGINE 100 [IU]/ML
INJECTION, SOLUTION SUBCUTANEOUS
Qty: 15 ML | Refills: 3 | OUTPATIENT
Start: 2021-02-04

## 2021-02-04 NOTE — TELEPHONE ENCOUNTER
Spoke to Sanjuanita at  - does see the script from 2/2/2021     Refused as duplicate.                    Annia Leal RN  Triage Nurse  [facility], 24 hour nurse line, available by calling clinic at [phone] and following prompts.

## 2021-04-12 DIAGNOSIS — E11.65 TYPE 2 DIABETES MELLITUS WITH HYPERGLYCEMIA, WITHOUT LONG-TERM CURRENT USE OF INSULIN (H): ICD-10-CM

## 2021-04-14 RX ORDER — SEMAGLUTIDE 1.34 MG/ML
1 INJECTION, SOLUTION SUBCUTANEOUS
Qty: 3 ML | Refills: 1 | Status: SHIPPED | OUTPATIENT
Start: 2021-04-14 | End: 2021-05-17

## 2021-04-14 NOTE — TELEPHONE ENCOUNTER
Medication is being filled for 1 time refill only due to:  Patient needs to be seen because overdue for diabetic check.     Routing to team to set up appointment for patient for diabetic check.  Patient has been given a corey refill to get to appointment per triage protocol.    Katelynn [facility], TARANN, RN  [facility]

## 2021-04-26 DIAGNOSIS — E11.65 TYPE 2 DIABETES MELLITUS WITH HYPERGLYCEMIA, WITHOUT LONG-TERM CURRENT USE OF INSULIN (H): ICD-10-CM

## 2021-04-26 RX ORDER — EMPAGLIFLOZIN 10 MG/1
TABLET, FILM COATED ORAL
Qty: 90 TABLET | Refills: 0 | Status: SHIPPED | OUTPATIENT
Start: 2021-04-26 | End: 2021-05-17

## 2021-04-26 NOTE — TELEPHONE ENCOUNTER
Routing refill request to provider for review/approval because:  Labs not current:  A1C    KATIE Rodas, RN  [facility]

## 2021-05-17 ENCOUNTER — VIRTUAL VISIT (OUTPATIENT)
Dept: PHARMACY | Facility: CLINIC | Age: 51
End: 2021-05-17
Payer: COMMERCIAL

## 2021-05-17 DIAGNOSIS — E11.65 TYPE 2 DIABETES MELLITUS WITH HYPERGLYCEMIA, WITHOUT LONG-TERM CURRENT USE OF INSULIN (H): Primary | ICD-10-CM

## 2021-05-17 DIAGNOSIS — E78.2 MIXED HYPERLIPIDEMIA: ICD-10-CM

## 2021-05-17 PROCEDURE — 99607 MTMS BY PHARM ADDL 15 MIN: CPT | Performed by: PHARMACIST

## 2021-05-17 PROCEDURE — 99606 MTMS BY PHARM EST 15 MIN: CPT | Performed by: PHARMACIST

## 2021-05-17 RX ORDER — METFORMIN HCL 500 MG
TABLET, EXTENDED RELEASE 24 HR ORAL
Qty: 90 TABLET | Refills: 1 | Status: SHIPPED | OUTPATIENT
Start: 2021-05-17 | End: 2021-12-24

## 2021-05-17 RX ORDER — SEMAGLUTIDE 1.34 MG/ML
1 INJECTION, SOLUTION SUBCUTANEOUS
Qty: 3 ML | Refills: 4 | Status: SHIPPED | OUTPATIENT
Start: 2021-05-17 | End: 2021-10-28

## 2021-05-17 NOTE — PROGRESS NOTES
Medication Therapy Management (MTM) Encounter    ASSESSMENT:                            Medication Adherence/Access: No issues identified    Type 2 Diabetes: A1c not at goal - recent readings improved. Would benefit from A1c recheck to help guide next steps.  Cravings for sugar could be sign of too high of insulin dose.     Hyperlipidemia: Stable. No clear reason at this time to reduce or discontinue medication.     PLAN:                            1. Labs ordered - A1c    2. Refilled - Ozempic, Jardiance, metformin     Follow-up: After labs    SUBJECTIVE/OBJECTIVE:                          Lashawn Reddy is a 50 year old female called for a follow-up visit. She was referred to me from Dr. Jaramillo.  Today's visit is a follow-up MTM visit from 1/7/2021     Reason for visit: Diabetes Follow-up.    Allergies/ADRs: Reviewed in chart  Tobacco: She reports that she quit smoking about 12 years ago. Her smoking use included cigarettes. She quit after 16.00 years of use. She has never used smokeless tobacco.  Alcohol: not currently using  Past Medical History: Reviewed in chart    Medication Adherence/Access: no issues reported    Type 2 Diabetes:  Currently taking Lantus 15 units daily, Jardiance 10 mg daily, Ozempic 1mg weekly (on Sundays), and metformin ER 500mg daily (her max tolerated dose). Patient is having more cravings for sugar lately. Has been cutting down on carbohydrates with  who recently had physical and is making dietary changes. Was having more yeast infections in the past (was in [place] at the time so wonders if that may have been part of it).    Blood sugar monitoring: a few times weekly/day  Ranges (based on glucometer): 158 mg/dL (90 day average) 162 mg/dL (30 day), 127 (14 day)   Diet/Exercise: Diet has improved as  has improved his diet.   Symptoms of low blood sugar? none  Symptoms of high blood sugar? none  Aspirin: Taking 81mg daily and denies side effects  Statin: Taking  rosuvastatin 20mg daily.  ACEi/ARB: Taking losartan 50mg daily.    Urine Albumin:   Lab Results   Component Value Date    UMALCR 9.62 01/15/2021      Lab Results   Component Value Date    A1C 8.0 01/15/2021    A1C 8.6 10/06/2020    A1C 8.7 03/16/2020    A1C 10.4 12/11/2019    A1C 9.1 08/26/2019       Hyperlipidemia: Current therapy includes rosuvastatin 20 mg daily.  Patient reports no significant myalgias or other side effects.  She does wonder about this dose of medication given her good cholesterol readings.   The 10-year ASCVD risk score ([place] MURILLO Jr., et al., 2013) is: 1.4%    Values used to calculate the score:      Age: 50 years      Sex: Female      Is Non- : No      Diabetic: Yes      Tobacco smoker: No      Systolic Blood Pressure: 120 mmHg      Is BP treated: Yes      HDL Cholesterol: 66 mg/dL      Total Cholesterol: 130 mg/dL  Recent Labs   Lab Test 01/15/21  0825 03/05/19  0510 06/29/15  0759 06/29/15  0759 10/14/14  1016   CHOL 130 117   < > 190 217*   HDL 66 47*   < > 50* 59   LDL 50 40   < > 111 135*   TRIG 71 151*   < > 147 117   CHOLHDLRATIO  --   --   --  3.8 3.7    < > = values in this interval not displayed.       Today's Vitals: LMP 09/03/2018 (Exact Date)   ----------------      I spent 20 minutes with this patient today. All changes were made via collaborative practice agreement with Dr. Jaramillo. A copy of the visit note was provided to the patient's primary care provider.    The patient was sent via [employer] a summary of these recommendations.     Redd Sterling, KeikoD, BCACP  Medication Therapy Management Pharmacist  Pager: [phone]    Telemedicine Visit Details  Type of service:  Telephone visit  Start Time: 1:00 PM  End Time: 1:20 PM  Originating Location (patient location): [place]  Distant Location (provider location):  [facility]      Medication Therapy Recommendations  Type 2 diabetes mellitus with hyperglycemia, without long-term current  use of insulin (H)    Current Medication: insulin glargine (LANTUS PEN) 100 UNIT/ML pen   Rationale: Medication requires monitoring - Needs additional monitoring   Recommendation: Order Lab - Lantus SoloStar 100 UNIT/ML soln - Labs ordered - A1c   Status: Accepted per CPA

## 2021-05-18 DIAGNOSIS — E11.65 TYPE 2 DIABETES MELLITUS WITH HYPERGLYCEMIA, WITHOUT LONG-TERM CURRENT USE OF INSULIN (H): ICD-10-CM

## 2021-05-18 LAB — HBA1C MFR BLD: 7.6 % (ref 0–5.6)

## 2021-05-18 PROCEDURE — 36415 COLL VENOUS BLD VENIPUNCTURE: CPT | Performed by: INTERNAL MEDICINE

## 2021-05-18 PROCEDURE — 83036 HEMOGLOBIN GLYCOSYLATED A1C: CPT | Performed by: INTERNAL MEDICINE

## 2021-05-19 NOTE — PATIENT INSTRUCTIONS
Recommendations from today's MTM visit:                                                       1. Labs ordered - A1c. We will see whether your blood sugars have improved or gotten worse to try and better understand what is happening with your cravings/symptoms.    2. Refilled Ozempic, Jardiance, metformin    Follow-up: after labs    It was great to speak with you today.  I value your experience and would be very thankful for your time with providing feedback on our clinic survey. You may receive a survey via email or text message in the next few days.     To schedule another MTM appointment, please call the clinic directly or you may call the MTM scheduling line at [phone] or toll-free at [phone].     My Clinical Pharmacist's contact information:                                                      Please feel free to contact me with any questions or concerns you have.      Redd Sterling, PharmD, [facility]  Medication Therapy Management Pharmacist  Pager: [phone]

## 2021-05-26 ENCOUNTER — TELEPHONE (OUTPATIENT)
Dept: INTERNAL MEDICINE | Facility: CLINIC | Age: 51
End: 2021-05-26

## 2021-05-26 NOTE — TELEPHONE ENCOUNTER
Prior Authorization Retail Medication Request- [id number]    Medication/Dose: ACCU-CHEK GUIDE test strip  ICD code (if different than what is on RX):    Previously Tried and Failed:    Rationale:      Insurance Name:  [employer]  Insurance ID:  [id number]       Pharmacy Information (if different than what is on RX)  Name:    Phone:

## 2021-05-26 NOTE — TELEPHONE ENCOUNTER
Prior Authorization Approval    Authorization Effective Date: 5/20/2021  Authorization Expiration Date: 5/20/2022  Medication: ACCU-CHEK GUIDE test strip-APPROVED  Approved Dose/Quantity:   Reference #:     Insurance Company: [employer] - Phone [phone] Fax [phone]  Expected CoPay:       CoPay Card Available:      Foundation Assistance Needed:    Which Pharmacy is filling the prescription (Not needed for infusion/clinic administered): [id number] - [place], [place] - [address] [place] [address]  Pharmacy Notified: Yes  Patient Notified: No    Pharmacy will notify patient when medication is ready.

## 2021-05-26 NOTE — TELEPHONE ENCOUNTER
Central Prior Authorization Team   Phone: [phone]      [place] Initiation    Medication: ACCU-CHEK GUIDE test strip-Initiated  Insurance Company: [employer] - Phone [phone] Fax [phone]  Pharmacy Filling the Rx: [id number] - [place], [place] - [address] [place] [address]  Filling Pharmacy Phone: [phone]  Filling Pharmacy Fax:    Start Date: 5/26/2021

## 2021-06-09 ENCOUNTER — VIRTUAL VISIT (OUTPATIENT)
Dept: PHARMACY | Facility: CLINIC | Age: 51
End: 2021-06-09
Payer: COMMERCIAL

## 2021-06-09 DIAGNOSIS — E11.65 TYPE 2 DIABETES MELLITUS WITH HYPERGLYCEMIA, WITHOUT LONG-TERM CURRENT USE OF INSULIN (H): Primary | ICD-10-CM

## 2021-06-09 PROCEDURE — 99606 MTMS BY PHARM EST 15 MIN: CPT | Performed by: PHARMACIST

## 2021-06-09 NOTE — PROGRESS NOTES
Medication Therapy Management (MTM) Encounter    ASSESSMENT:                            Medication Adherence/Access: No issues identified    Type 2 Diabetes: Improved. Patient would benefit from continuing to work on diet and activity.     PLAN:                            1. Continue current medications. Continue to work on diet and activity as able.     Follow-up: 3 months or sooner if needed    SUBJECTIVE/OBJECTIVE:                          Lashawn Reddy is a 50 year old female called for a follow-up visit. She was referred to me from Dr. Jaramillo.  Today's visit is a follow-up MTM visit from 5/17/2021.     Reason for visit: Diabetes Follow-up.    Allergies/ADRs: Reviewed in chart  Tobacco: She reports that she quit smoking about 12 years ago. Her smoking use included cigarettes. She quit after 16.00 years of use. She has never used smokeless tobacco.  Alcohol: not currently using  Past Medical History: Reviewed in chart    Medication Adherence/Access: no issues reported    Type 2 Diabetes:  Currently taking Lantus 15 units daily, Jardiance 10 mg daily, Ozempic 1mg weekly (on Sundays), and metformin ER 500mg daily (her max tolerated dose).     Blood sugar monitoring: a few times weekly/day  Ranges (based on glucometer):  mg/dL one average (often ~130 mg/dL in the last week)  Diet/Exercise: Diet continues to be low carbohydrate.    Symptoms of low blood sugar? none  Symptoms of high blood sugar? none  Aspirin: Taking 81mg daily and denies side effects  Statin: Taking rosuvastatin 20mg daily.  ACEi/ARB: Taking losartan 50mg daily.    Urine Albumin:   Lab Results   Component Value Date    UMALCR 9.62 01/15/2021      Lab Results   Component Value Date    A1C 7.6 05/18/2021    A1C 8.0 01/15/2021    A1C 8.6 10/06/2020    A1C 8.7 03/16/2020    A1C 10.4 12/11/2019       Today's Vitals: LMP 09/03/2018 (Exact Date)    BP Readings from Last 3 Encounters:   10/05/20 120/78   12/26/19 113/75   12/23/19 124/62     Wt  Readings from Last 5 Encounters:   10/05/20 231 lb 2 oz (104.8 kg)   12/23/19 241 lb 3.2 oz (109.4 kg)   12/11/19 251 lb (113.9 kg)   12/11/19 250 lb 4.8 oz (113.5 kg)   11/11/19 246 lb 8 oz (111.8 kg)     ----------------      I spent 9 minutes with this patient today. A copy of the visit note was provided to the patient's primary care provider.    The patient was sent via clinic portal a summary of these recommendations.     Redd Sterling, KeikoD, [facility]  Medication Therapy Management Pharmacist  Pager: [phone]    Telemedicine Visit Details  Type of service:  Telephone visit  Start Time: 1:00 PM  End Time: 1:09 PM  Originating Location (patient location): [place]  Distant Location (provider location):  [facility]      Medication Therapy Recommendations  No medication therapy recommendations to display

## 2021-06-09 NOTE — PATIENT INSTRUCTIONS
Recommendations from today's MTM visit:                                                    MTM (medication therapy management) is a service provided by a clinical pharmacist designed to help you get the most of out of your medicines.       1. Continue current medications. Continue to work on diet and activity as able.     Follow-up: 3 months or sooner if needed    It was great to speak with you today.  I value your experience and would be very thankful for your time with providing feedback on our clinic survey. You may receive a survey via email or text message in the next few days.     To schedule another MTM appointment, please call the clinic directly or you may call the MTM scheduling line at [phone] or toll-free at [phone].     My Clinical Pharmacist's contact information:                                                      Please feel free to contact me with any questions or concerns you have.      Redd Sterling, PharmD, [facility]  Medication Therapy Management Pharmacist  Pager: [phone]

## 2021-06-28 DIAGNOSIS — E11.65 TYPE 2 DIABETES MELLITUS WITH HYPERGLYCEMIA, WITHOUT LONG-TERM CURRENT USE OF INSULIN (H): ICD-10-CM

## 2021-06-30 RX ORDER — FLURBIPROFEN SODIUM 0.3 MG/ML
SOLUTION/ DROPS OPHTHALMIC
Qty: 100 EACH | Refills: 1 | Status: SHIPPED | OUTPATIENT
Start: 2021-06-30 | End: 2021-11-24

## 2021-08-19 DIAGNOSIS — E11.65 TYPE 2 DIABETES MELLITUS WITH HYPERGLYCEMIA, WITHOUT LONG-TERM CURRENT USE OF INSULIN (H): ICD-10-CM

## 2021-08-20 RX ORDER — EMPAGLIFLOZIN 10 MG/1
TABLET, FILM COATED ORAL
Qty: 90 TABLET | Refills: 0 | Status: SHIPPED | OUTPATIENT
Start: 2021-08-20 | End: 2021-10-07

## 2021-08-20 NOTE — TELEPHONE ENCOUNTER
Prescription approved per [facility] Refill Protocol.    Bianca Knott RN on 8/20/2021 at 3:09 PM

## 2021-09-27 DIAGNOSIS — N94.3 PMS (PREMENSTRUAL SYNDROME): ICD-10-CM

## 2021-09-28 DIAGNOSIS — I50.9 CONGESTIVE HEART FAILURE, UNSPECIFIED HF CHRONICITY, UNSPECIFIED HEART FAILURE TYPE (H): ICD-10-CM

## 2021-09-29 DIAGNOSIS — E11.65 TYPE 2 DIABETES MELLITUS WITH HYPERGLYCEMIA, WITHOUT LONG-TERM CURRENT USE OF INSULIN (H): ICD-10-CM

## 2021-10-01 RX ORDER — HYDROCHLOROTHIAZIDE 25 MG/1
25 TABLET ORAL DAILY
Qty: 30 TABLET | Refills: 0 | Status: SHIPPED | OUTPATIENT
Start: 2021-10-01 | End: 2021-10-29

## 2021-10-01 RX ORDER — EMPAGLIFLOZIN 10 MG/1
TABLET, FILM COATED ORAL
Qty: 90 TABLET | Refills: 0 | OUTPATIENT
Start: 2021-10-01

## 2021-10-01 NOTE — TELEPHONE ENCOUNTER
"  Requested Prescriptions   Pending Prescriptions Disp Refills     JARDIANCE 10 MG TABS tablet [Pharmacy Med Name: JARDIANCE 10MG TABLET] 90 tablet 0     Sig: TAKE 1 TABLET (10 MG) BY MOUTH DAILY     Last office visit: 10/5/2020  Future Office Visit:        Sodium Glucose Co-Transport Inhibitor Agents Failed - 9/29/2021  1:15 AM        Failed - Recent (6 mo) or future (30 days) visit within the authorizing provider's specialty     Patient had office visit in the last 6 months or has a visit in the next 30 days with authorizing provider or within the authorizing provider's specialty.  See \"Patient Info\" tab in inbasket, or \"Choose Columns\" in Meds & Orders section of the refill encounter.            Passed - Patient has documented A1c within the specified period of time.     If HgbA1C is 8 or greater, it needs to be on file within the past 3 months.  If less than 8, must be on file within the past 6 months.     Recent Labs   Lab Test 05/18/21  1630   A1C 7.6*             Passed - No creatinine >1.4 or GFR <45 within the past 12 mos     Recent Labs   Lab Test 01/15/21  0825   GFRESTIMATED 77   GFRESTBLACK 90       Recent Labs   Lab Test 01/15/21  0825   CR 0.87             Passed - Medication is active on med list        Passed - Patient is age 18 or older        Passed - Patient is not pregnant        Passed - Patient has documented normal Potassium within the last 12 mos.     Recent Labs   Lab Test 01/15/21  0825   POTASSIUM 3.7             Passed - Patient has no positive pregnancy test within the past 12 mos.         Denied  Too early to request, sent 8/20/2021 for 3 months to this pharmacy  Krystal Oneal RN      "

## 2021-10-01 NOTE — TELEPHONE ENCOUNTER
Routing refill request to provider for review/approval because:  Patient needs to be seen because it has been more than 1 year since last office visit.    TARAN RodasN, RN  [facility]

## 2021-10-01 NOTE — TELEPHONE ENCOUNTER
Pending Prescriptions:                       Disp   Refills    sertraline (ZOLOFT) 50 MG tablet           45 tab*3        Sig: TAKE 1/2 TABLET (25MG) BY MOUTH EVERY MORNING    Routing refill request to provider for review/approval because:  A break in medication

## 2021-10-07 ENCOUNTER — VIRTUAL VISIT (OUTPATIENT)
Dept: PHARMACY | Facility: CLINIC | Age: 51
End: 2021-10-07
Payer: COMMERCIAL

## 2021-10-07 DIAGNOSIS — E11.65 TYPE 2 DIABETES MELLITUS WITH HYPERGLYCEMIA, WITHOUT LONG-TERM CURRENT USE OF INSULIN (H): Primary | ICD-10-CM

## 2021-10-07 PROCEDURE — 99606 MTMS BY PHARM EST 15 MIN: CPT | Performed by: PHARMACIST

## 2021-10-07 NOTE — PROGRESS NOTES
Medication Therapy Management (MTM) Encounter    ASSESSMENT:                            Medication Adherence/Access: No issues identified    Type 2 Diabetes: Blood sugars are fairly stable off of insulin and diet/cravings are reduced.  Reasonable to continue off until next visit/labs. If A1c is elevated would consider dose increase of Jardiance vs restart of insulin (weight loss benefit/current concerns with insulin).    PLAN:                            1. Refilled Jardiance 10 mg daily.     Follow-up: 3 months or sooner if needed      SUBJECTIVE/OBJECTIVE:                          Lashawn Reddy is a 50 year old female called for a follow-up visit. She was referred to me from Dr. Jaramillo.  Today's visit is a follow-up MTM visit from 2021     Reason for visit: Diabetes Follow-Up.    Allergies/ADRs: Reviewed in chart  Past Medical History: Reviewed in chart  Tobacco: She reports that she quit smoking about 13 years ago. Her smoking use included cigarettes. She quit after 16.00 years of use. She has never used smokeless tobacco.  Alcohol: not currently using      Medication Adherence/Access: no issues reported    Type 2 Diabetes:  Currently taking Jardiance 10 mg daily, metformin  mg every day, Ozempic 1 mg weekly.  Stopped Lantus on her own about 1 month ago due to non-stop cravings - had a little bit of swelling but that resolved after stopping insulin. She does not want to restart. Patient is not experiencing side effects.  Blood sugar monitorin-1 time(s) daily. Ranges (patient reported):  mg/dL one average (often ~130 mg/dL in the last week) - did get up to 180 mg/dL in the morning when she over ate and ate to late.   Symptoms of low blood sugar? none  Symptoms of high blood sugar? none  Eye exam: up to date  Foot exam: due  Diet/Exercise: exercise is reduced due to working more hours - hopeful to be more active on vacation. Cravings/bad food choices are better since going off insulin.    Aspirin: Taking 81mg daily and denies side effects  Statin: Yes: rosuvastatin   ACEi/ARB: Yes: losartan.   Urine Albumin:   Lab Results   Component Value Date    UMALCR 9.62 01/15/2021      Lab Results   Component Value Date    A1C 7.6 05/18/2021    A1C 8.0 01/15/2021    A1C 8.6 10/06/2020    A1C 8.7 03/16/2020    A1C 10.4 12/11/2019     Today's Vitals: LMP 09/03/2018 (Exact Date)     BP Readings from Last 3 Encounters:   10/05/20 120/78   12/26/19 113/75   12/23/19 124/62     Wt Readings from Last 5 Encounters:   10/05/20 231 lb 2 oz (104.8 kg)   12/23/19 241 lb 3.2 oz (109.4 kg)   12/11/19 251 lb (113.9 kg)   12/11/19 250 lb 4.8 oz (113.5 kg)   11/11/19 246 lb 8 oz (111.8 kg)     ----------------      I spent 15 minutes with this patient today. All changes were made via collaborative practice agreement with Venkata Jaramillo DO. A copy of the visit note was provided to the patient's primary care provider.    The patient was sent via [employer] a summary of these recommendations.     Redd Sterling, KeikoD, [facility]  Medication Therapy Management Pharmacist  Pager: [phone]    Telemedicine Visit Details  Type of service:  Telephone visit  Start Time: 1:30 PM  End Time: 1:45 PM  Originating Location (patient location): [place]  Distant Location (provider location):  [facility]     Medication Therapy Recommendations  No medication therapy recommendations to display

## 2021-10-08 NOTE — PATIENT INSTRUCTIONS
Recommendations from today's MTM visit:                                                    MTM (medication therapy management) is a service provided by a clinical pharmacist designed to help you get the most of out of your medicines.      1. Refilled Jardiance 10 mg daily.     Follow-up: 3 months or sooner if needed    It was great to speak with you today.  I value your experience and would be very thankful for your time with providing feedback on our clinic survey. You may receive a survey via email or text message in the next few days.     To schedule another MTM appointment, please call the clinic directly or you may call the MTM scheduling line at [phone] or toll-free at [phone].     My Clinical Pharmacist's contact information:                                                      Please feel free to contact me with any questions or concerns you have.      Redd Sterling, Asif, [facility]  Medication Therapy Management Pharmacist  Pager: [phone]

## 2021-10-11 DIAGNOSIS — E11.65 TYPE 2 DIABETES MELLITUS WITH HYPERGLYCEMIA, WITHOUT LONG-TERM CURRENT USE OF INSULIN (H): ICD-10-CM

## 2021-10-12 RX ORDER — EMPAGLIFLOZIN 10 MG/1
TABLET, FILM COATED ORAL
Qty: 90 TABLET | Refills: 0 | Status: SHIPPED | OUTPATIENT
Start: 2021-10-12 | End: 2021-11-26 | Stop reason: DRUGHIGH

## 2021-10-12 NOTE — TELEPHONE ENCOUNTER
Jardiance  Routing refill request to provider for review/approval because:  Patient needs to be seen because it has been more than 1 year since last office visit.    Sending to scheduling for yearly office visit due    Krystal Oneal RN

## 2021-10-23 ENCOUNTER — HEALTH MAINTENANCE LETTER (OUTPATIENT)
Age: 51
End: 2021-10-23

## 2021-10-26 DIAGNOSIS — E11.65 TYPE 2 DIABETES MELLITUS WITH HYPERGLYCEMIA, WITHOUT LONG-TERM CURRENT USE OF INSULIN (H): ICD-10-CM

## 2021-10-28 RX ORDER — SEMAGLUTIDE 1.34 MG/ML
INJECTION, SOLUTION SUBCUTANEOUS
Qty: 9 ML | Refills: 3 | Status: SHIPPED | OUTPATIENT
Start: 2021-10-28 | End: 2022-11-02 | Stop reason: DRUGHIGH

## 2021-10-28 NOTE — TELEPHONE ENCOUNTER
Pending Prescriptions:                       Disp   Refills    OZEMPIC, 1 MG/DOSE, 4 MG/3ML SOPN [Pharmac*       3        Sig: INJECT 1 MG SUBCUTANEOUS EVERY 7 DAYS    Routing refill request to provider for review/approval because:  Patient needs to be seen because:  >6 month since LOV

## 2021-10-29 DIAGNOSIS — I50.9 CONGESTIVE HEART FAILURE, UNSPECIFIED HF CHRONICITY, UNSPECIFIED HEART FAILURE TYPE (H): ICD-10-CM

## 2021-10-29 RX ORDER — HYDROCHLOROTHIAZIDE 25 MG/1
TABLET ORAL
Qty: 30 TABLET | Refills: 0 | Status: SHIPPED | OUTPATIENT
Start: 2021-10-29 | End: 2021-11-24

## 2021-10-29 NOTE — TELEPHONE ENCOUNTER
Hydrodiuril  Medication is being filled for 1 time refill only due to:  Patient needs to be seen because it has been more than one year since last visit.     Sending to scheduling for yearly office visit due    Krystal Oneal RN

## 2021-11-24 ENCOUNTER — OFFICE VISIT (OUTPATIENT)
Dept: FAMILY MEDICINE | Facility: CLINIC | Age: 51
End: 2021-11-24
Payer: COMMERCIAL

## 2021-11-24 VITALS
SYSTOLIC BLOOD PRESSURE: 116 MMHG | DIASTOLIC BLOOD PRESSURE: 72 MMHG | HEART RATE: 74 BPM | TEMPERATURE: 98.5 F | WEIGHT: 231 LBS | BODY MASS INDEX: 37.12 KG/M2 | OXYGEN SATURATION: 99 % | HEIGHT: 66 IN | RESPIRATION RATE: 18 BRPM

## 2021-11-24 DIAGNOSIS — Z00.00 ROUTINE GENERAL MEDICAL EXAMINATION AT A HEALTH CARE FACILITY: ICD-10-CM

## 2021-11-24 DIAGNOSIS — Z12.31 ENCOUNTER FOR SCREENING MAMMOGRAM FOR BREAST CANCER: ICD-10-CM

## 2021-11-24 DIAGNOSIS — E78.2 MIXED HYPERLIPIDEMIA: ICD-10-CM

## 2021-11-24 DIAGNOSIS — I50.9 CONGESTIVE HEART FAILURE, UNSPECIFIED HF CHRONICITY, UNSPECIFIED HEART FAILURE TYPE (H): ICD-10-CM

## 2021-11-24 DIAGNOSIS — Z11.59 ENCOUNTER FOR HEPATITIS C SCREENING TEST FOR LOW RISK PATIENT: ICD-10-CM

## 2021-11-24 DIAGNOSIS — Z23 NEED FOR VACCINATION: ICD-10-CM

## 2021-11-24 DIAGNOSIS — I10 ESSENTIAL HYPERTENSION: ICD-10-CM

## 2021-11-24 DIAGNOSIS — E11.65 TYPE 2 DIABETES MELLITUS WITH HYPERGLYCEMIA, WITHOUT LONG-TERM CURRENT USE OF INSULIN (H): Primary | ICD-10-CM

## 2021-11-24 DIAGNOSIS — Z83.49 FAMILY HISTORY OF THYROID DISEASE: ICD-10-CM

## 2021-11-24 LAB
ALBUMIN SERPL-MCNC: 3.8 G/DL (ref 3.4–5)
ALP SERPL-CCNC: 86 U/L (ref 40–150)
ALT SERPL W P-5'-P-CCNC: 22 U/L (ref 0–50)
ANION GAP SERPL CALCULATED.3IONS-SCNC: 5 MMOL/L (ref 3–14)
AST SERPL W P-5'-P-CCNC: 12 U/L (ref 0–45)
BILIRUB SERPL-MCNC: 0.3 MG/DL (ref 0.2–1.3)
BUN SERPL-MCNC: 15 MG/DL (ref 7–30)
CALCIUM SERPL-MCNC: 8.9 MG/DL (ref 8.5–10.1)
CHLORIDE BLD-SCNC: 105 MMOL/L (ref 94–109)
CO2 SERPL-SCNC: 28 MMOL/L (ref 20–32)
CREAT SERPL-MCNC: 0.78 MG/DL (ref 0.52–1.04)
GFR SERPL CREATININE-BSD FRML MDRD: 88 ML/MIN/1.73M2
GLUCOSE BLD-MCNC: 129 MG/DL (ref 70–99)
HBA1C MFR BLD: 8.4 % (ref 0–5.6)
POTASSIUM BLD-SCNC: 3.6 MMOL/L (ref 3.4–5.3)
PROT SERPL-MCNC: 7.5 G/DL (ref 6.8–8.8)
SODIUM SERPL-SCNC: 138 MMOL/L (ref 133–144)
TSH SERPL DL<=0.005 MIU/L-ACNC: 1.27 MU/L (ref 0.4–4)

## 2021-11-24 PROCEDURE — 80053 COMPREHEN METABOLIC PANEL: CPT | Performed by: NURSE PRACTITIONER

## 2021-11-24 PROCEDURE — 86803 HEPATITIS C AB TEST: CPT | Performed by: NURSE PRACTITIONER

## 2021-11-24 PROCEDURE — 90471 IMMUNIZATION ADMIN: CPT | Performed by: NURSE PRACTITIONER

## 2021-11-24 PROCEDURE — 36415 COLL VENOUS BLD VENIPUNCTURE: CPT | Performed by: NURSE PRACTITIONER

## 2021-11-24 PROCEDURE — 99396 PREV VISIT EST AGE 40-64: CPT | Mod: 25 | Performed by: NURSE PRACTITIONER

## 2021-11-24 PROCEDURE — 83036 HEMOGLOBIN GLYCOSYLATED A1C: CPT | Performed by: NURSE PRACTITIONER

## 2021-11-24 PROCEDURE — 84443 ASSAY THYROID STIM HORMONE: CPT | Performed by: NURSE PRACTITIONER

## 2021-11-24 PROCEDURE — 99213 OFFICE O/P EST LOW 20 MIN: CPT | Mod: 25 | Performed by: NURSE PRACTITIONER

## 2021-11-24 PROCEDURE — 90715 TDAP VACCINE 7 YRS/> IM: CPT | Performed by: NURSE PRACTITIONER

## 2021-11-24 RX ORDER — HYDROCHLOROTHIAZIDE 25 MG/1
TABLET ORAL
Qty: 90 TABLET | Refills: 1 | Status: SHIPPED | OUTPATIENT
Start: 2021-11-24 | End: 2022-03-24

## 2021-11-24 ASSESSMENT — ENCOUNTER SYMPTOMS
EYE PAIN: 0
PALPITATIONS: 0
NERVOUS/ANXIOUS: 0
CONSTIPATION: 0
MYALGIAS: 0
NAUSEA: 0
WEAKNESS: 0
PARESTHESIAS: 0
FREQUENCY: 0
CHILLS: 0
DIZZINESS: 0
JOINT SWELLING: 0
SORE THROAT: 0
HEADACHES: 0
HEMATOCHEZIA: 0
SHORTNESS OF BREATH: 0
ARTHRALGIAS: 0
FEVER: 0
BREAST MASS: 0
HEARTBURN: 0
COUGH: 0
DYSURIA: 0
HEMATURIA: 0
ABDOMINAL PAIN: 0
DIARRHEA: 0

## 2021-11-24 ASSESSMENT — MIFFLIN-ST. JEOR: SCORE: 1674.56

## 2021-11-24 ASSESSMENT — PAIN SCALES - GENERAL: PAINLEVEL: NO PAIN (0)

## 2021-11-24 NOTE — PATIENT INSTRUCTIONS

## 2021-11-24 NOTE — PROGRESS NOTES
SUBJECTIVE:   CC: Lashawn Reddy is an 51 year old woman who presents for preventive health visit.       Patient has been advised of split billing requirements and indicates understanding: Yes  Healthy Habits:     Getting at least 3 servings of Calcium per day:  Yes    Bi-annual eye exam:  Yes    Dental care twice a year:  Yes    Sleep apnea or symptoms of sleep apnea:  None    Diet:  Diabetic    Frequency of exercise:  None    Taking medications regularly:  Yes    Medication side effects:  None    PHQ-2 Total Score: 0    Additional concerns today:  No          PROBLEMS TO ADD ON... Please refill for hydrochlorothiazide blood pressure has been stable denies side effects and Acacian    Today's PHQ-2 Score:   PHQ-2 (  [employer]) 2021   Q1: Little interest or pleasure in doing things 0   Q2: Feeling down, depressed or hopeless 0   PHQ-2 Score 0   PHQ-2 Total Score (12-17 Years)- Positive if 3 or more points; Administer PHQ-A if positive -   Q1: Little interest or pleasure in doing things Not at all   Q2: Feeling down, depressed or hopeless Not at all   PHQ-2 Score 0       Abuse: Current or Past (Physical, Sexual or Emotional) - No  Do you feel safe in your environment? Yes        Social History     Tobacco Use     Smoking status: Former Smoker     Years: 16.00     Types: Cigarettes     Quit date: 2008     Years since quittin.1     Smokeless tobacco: Never Used   Substance Use Topics     Alcohol use: Yes     Alcohol/week: 0.0 standard drinks     Comment: couple drinks per year         Alcohol Use 2021   Prescreen: >3 drinks/day or >7 drinks/week? No   Prescreen: >3 drinks/day or >7 drinks/week? -       Reviewed orders with patient.  Reviewed health maintenance and updated orders accordingly - Yes  Labs reviewed in EPIC  BP Readings from Last 3 Encounters:   21 116/72   10/05/20 120/78   19 113/75    Wt Readings from Last 3 Encounters:   21 104.8 kg (231 lb)   10/05/20 104.8 kg  (231 lb 2 oz)   12/23/19 109.4 kg (241 lb 3.2 oz)                  Patient Active Problem List   Diagnosis     Slow transit constipation     Varicose veins of lower extremities with complications     Family history of malignant neoplasm of gastrointestinal tract     Hyperlipidemia with target LDL less than 100     PMS (premenstrual syndrome)     Menorrhagia with regular cycle     Mixed hyperlipidemia     History of diverticulitis     Bacterial sepsis (H) -early     Lung nodule     Type 2 diabetes mellitus with hyperglycemia, without long-term current use of insulin (H)     Essential hypertension     Morbid obesity (H)     Personal history of DVT (deep vein thrombosis)     S/P hysterectomy     Sigmoid diverticulitis     Anemia     ACS (acute coronary syndrome) (H)     CHF (congestive heart failure) (H)     Knee mass, left     Past Surgical History:   Procedure Laterality Date     C ANESTH,LOWER LEG VEIN SURG,NOS  2002    bilateral     C APPENDECTOMY  04/14/89     COLONOSCOPY  6/21/2013    Procedure: COLONOSCOPY;  colonoscopy;  Surgeon: Vamshi Loomis MD;  Location:  GI     COLONOSCOPY N/A 2/22/2017    Procedure: COLONOSCOPY;  Surgeon: Raoul Sage MD;  Location:  GI     CV HEART CATHETERIZATION WITH POSSIBLE INTERVENTION N/A 3/5/2019    Procedure: Heart Catheterization with possible Intervention;  Surgeon: Kirstin Lynn MD;  Location:  HEART CARDIAC CATH LAB     EXCISE MASS LOWER EXTREMITY Left 12/26/2019    Procedure: Excision Left Posterior Knee Mass;  Surgeon: Raoul Sage MD;  Location: [facility]     HC DILATION/CURETTAGE DIAG/THER NON OB  1986    after a miscarriage     HC REMOVAL OF TONSILS,<13 Y/O       HYSTERECTOMY       HYSTERECTOMY TOTAL ABDOMINAL, SALPINGECTOMY Bilateral 9/12/2018    Procedure: HYSTERECTOMY TOTAL ABDOMINAL, SALPINGECTOMY;  Total Abdominal Hysterectomy, left Salpingectomy;  Surgeon: Temi Corado MD;  Location: [facility]     HYSTERECTOMY, PAP NO LONGER INDICATED   2018     TUBAL LIGATION  2006     [facility] COLONOSCOPY W/WO BRUSH/WASH  2005       Social History     Tobacco Use     Smoking status: Former Smoker     Years: 16.00     Types: Cigarettes     Quit date: 2008     Years since quittin.1     Smokeless tobacco: Never Used   Substance Use Topics     Alcohol use: Yes     Alcohol/week: 0.0 standard drinks     Comment: couple drinks per year     Family History   Problem Relation Age of Onset     Anesthesia Reaction Mother         Severe nausea     Gastrointestinal Disease Mother         Partial colon removal secondary to a blockage     Gynecology Mother         hysterectomy     Lipids Mother      Lipids Father      Cancer - colorectal Father         dx Oct '03 (dx at age 57)     Cancer Father         skin     Hypertension Father      Cancer Maternal Grandmother         colon at age 68     Cerebrovascular Disease Paternal Grandfather      Cerebrovascular Disease Paternal Grandmother         brain aneurysm     Arthritis Sister         mainly affecting her legs     Gastrointestinal Disease Sister         2 sisters with colon polyps     Cancer Sister         cervical ca     Cancer Maternal Aunt         all over ? breast was the origin     Cancer Maternal Aunt         pancreatic     Heart Disease Maternal Aunt         MI          Current Outpatient Medications   Medication Sig Dispense Refill     hydrochlorothiazide (HYDRODIURIL) 25 MG tablet TAKE 1 TABLET (25 MG) BY MOUTH DAILY - NEED OFFICE VISIT FOR ANY FURTHER REFILLS 90 tablet 1     ACCU-CHEK GUIDE test strip USE TO TEST BLOOD SUGAR 2 TIMES DAILY OR AS DIRECTED. 100 each 5     blood glucose monitoring (ACCU-CHEK FASTCLIX) lancets 1 each 2 times daily 102 each 3     Fiber POWD Take 1 Scoop by mouth daily       fluconazole (DIFLUCAN) 150 MG tablet TAKE 1 TABLET (150 MG) BY MOUTH EVERY 3 DAYS 10 tablet 0     JARDIANCE 10 MG TABS tablet TAKE 1 TABLET (10 MG) BY MOUTH DAILY 90 tablet 0     losartan (COZAAR)  50 MG tablet TAKE 1 TABLET BY MOUTH EVERY DAY 90 tablet 2     metFORMIN (GLUCOPHAGE-XR) 500 MG 24 hr tablet TAKE 1 TABLET BY MOUTH EVERY DAY WITH FOOD 90 tablet 1     nystatin-triamcinolone (MYCOLOG II) 249794-2.1 UNIT/GM-% external cream Apply topically daily as needed (rash or itching) 60 g 3     omeprazole (PRILOSEC) 20 MG DR capsule Take 1 capsule (20 mg) by mouth daily as needed (acid reflux) 90 capsule 3     OZEMPIC, 1 MG/DOSE, 4 MG/3ML SOPN INJECT 1 MG SUBCUTANEOUS EVERY 7 DAYS 9 mL 3     rosuvastatin (CRESTOR) 20 MG tablet TAKE 1 TABLET (20 MG) BY MOUTH DAILY 90 tablet 2     Sennosides-Docusate Sodium (SENNA-DOCUSATE SODIUM PO) Take 2 capsules by mouth At Bedtime       sertraline (ZOLOFT) 50 MG tablet TAKE 1/2 TABLET (25MG) BY MOUTH EVERY MORNING 45 tablet 3     SM ASPIRIN ADULT LOW STRENGTH 81 MG EC tablet TAKE 1 TABLET BY MOUTH EVERY DAY 90 tablet 2     Vitamin D, Cholecalciferol, 1000 units TABS Take 2,000 Units by mouth daily        Allergies   Allergen Reactions     Glipizide Other (See Comments)     Hunger spikes     No Known Drug Allergies      Recent Labs   Lab Test 05/18/21  1630 01/15/21  0825 10/06/20  0813 12/11/19  1044 08/26/19  0734 04/23/19  0944 03/14/19  0807 03/12/19  1213 03/05/19  0510 03/04/19  0529 03/04/19  0000 11/14/18  2139 10/25/18  1126 09/26/18  0956 09/21/18  1722   A1C 7.6* 8.0* 8.6*   < > 9.1*   < >  --   --   --    < >  --    < > 6.8*  --   --    LDL  --  50  --   --   --   --   --   --  40  --   --   --  69  --   --    HDL  --  66  --   --   --   --   --   --  47*  --   --   --  63  --   --    TRIG  --  71  --   --   --   --   --   --  151*  --   --   --  95  --   --    ALT  --   --  20  --  25  --  16  --   --   --  28  --  18  --  18   CR  --  0.87  --   --  0.88   < > 1.08*   < > 0.82  --  0.71   < >  --    < > 0.51*   GFRESTIMATED  --  77  --   --  77   < > 60*   < > 83  --  >90   < >  --    < > >90   GFRESTBLACK  --  90  --   --  89   < > 70   < > >90  --  >90   < >   --    < > >90   POTASSIUM  --  3.7  --   --  3.4   < > 4.3   < > 3.7   < > 3.9   < >  --    < > 3.7   TSH  --   --   --   --   --   --   --   --   --   --  3.97  --   --   --  1.57    < > = values in this interval not displayed.        Breast Cancer Screening:    FHS-7:   Breast CA Risk Assessment (FHS-7) 11/24/2021   Did any of your first-degree relatives have breast or ovarian cancer? No   Did any of your relatives have bilateral breast cancer? Unknown   Did any man in your family have breast cancer? No   Did any woman in your family have breast and ovarian cancer? No   Did any woman in your family have breast cancer before age 50 y? No   Do you have 2 or more relatives with breast and/or ovarian cancer? No   Do you have 2 or more relatives with breast and/or bowel cancer? No       Mammogram Screening: Recommended annual mammography  Pertinent mammograms are reviewed under the imaging tab.    History of abnormal Pap smear: Status post benign hysterectomy. Health Maintenance and Surgical History updated.  PAP / HPV Latest Ref Rng & Units 7/9/2018 11/3/2014 10/14/2014   PAP (Historical) - NIL NIL UNSAT   HPV16 NEG:Negative Negative - -   HPV18 NEG:Negative Negative - -   HRHPV NEG:Negative Negative - -     Reviewed and updated as needed this visit by clinical staff  Tobacco  Allergies  Meds             Reviewed and updated as needed this visit by Provider               Past Medical History:   Diagnosis Date     Anemia 11/15/2018     CAD (coronary artery disease)     Nonobstructive per angiogram 3/2019     Depressive disorder, not elsewhere classified     depression in the post partum period after her daughter     Diabetes mellitus, type 2 (H) 6/6/2013     Diffuse cystic mastopathy     fibrocystic breast disease     Former smoker      Lymphedema of right lower extremity      Peripheral venous insufficiency      Personal history of DVT (deep vein thrombosis) 8/30/2018     S/P hysterectomy 9/12/2018     Sigmoid  diverticulitis 11/15/2018     Tobacco use disorder     quit in      Type 2 diabetes, HbA1c goal < 7% (H) 2013     Varicose veins of lower extremities with complications 2005     Problem list name updated by automated process. Provider to review      Past Surgical History:   Procedure Laterality Date     C ANESTH,LOWER LEG VEIN SURG,NOS  2002    bilateral     C APPENDECTOMY  88     COLONOSCOPY  2013    Procedure: COLONOSCOPY;  colonoscopy;  Surgeon: Vamshi Loomis MD;  [facility]: [facility]     COLONOSCOPY N/A 2017    Procedure: COLONOSCOPY;  Surgeon: Raoul Sage MD;  Location[facility]     CV HEART CATHETERIZATION WITH POSSIBLE INTERVENTION N/A 3/5/2019    Procedure: Heart Catheterization with possible Intervention;  Surgeon: Kirstin Lynn MD;  Location:  HEART CARDIAC CATH LAB     EXCISE MASS LOWER EXTREMITY Left 2019    Procedure: Excision Left Posterior Knee Mass;  Surgeon: Raoul Sage MD;  Location: [facility]     HC DILATION/CURETTAGE DIAG/THER NON OB      after a miscarriage     HC REMOVAL OF TONSILS,<13 Y/O       HYSTERECTOMY       HYSTERECTOMY TOTAL ABDOMINAL, SALPINGECTOMY Bilateral 2018    Procedure: HYSTERECTOMY TOTAL ABDOMINAL, SALPINGECTOMY;  Total Abdominal Hysterectomy, left Salpingectomy;  Surgeon: Temi Corado MD;  [facility]     HYSTERECTOMY, PAP NO LONGER INDICATED  2018     TUBAL LIGATION  2006     [facility] COLONOSCOPY W/WO BRUSH/WASH  2005     OB History    Para Term  AB Living   3 2 2 0 1 2   SAB IAB Ectopic Multiple Live Births   1 0 0 0 2      # Outcome Date GA Lbr Ezio/2nd Weight Sex Delivery Anes PTL Lv   3 Term 99 39w0d 06:00 3.657 kg (8 lb 1 oz) M    CYNTHIA      Name: Paul   2 Term 96 37w0d 13:00 3.6 kg (7 lb 15 oz) F    CYNTHIA      Name: Jesscia   1 SAB 86 6w0d    INCOMPLETE C         Birth Comments: needed a D&C       Review of Systems   Constitutional: Negative for chills and  "fever.   HENT: Negative for congestion, ear pain, hearing loss and sore throat.    Eyes: Negative for pain and visual disturbance.   Respiratory: Negative for cough and shortness of breath.    Cardiovascular: Positive for peripheral edema. Negative for chest pain and palpitations.   Gastrointestinal: Negative for abdominal pain, constipation, diarrhea, heartburn, hematochezia and nausea.   Breasts:  Negative for tenderness, breast mass and discharge.   Genitourinary: Negative for dysuria, frequency, genital sores, hematuria, pelvic pain, urgency, vaginal bleeding and vaginal discharge.   Musculoskeletal: Negative for arthralgias, joint swelling and myalgias.   Skin: Negative for rash.   Neurological: Negative for dizziness, weakness, headaches and paresthesias.   Psychiatric/Behavioral: Negative for mood changes. The patient is not nervous/anxious.      CONSTITUTIONAL: NEGATIVE for fever, chills, change in weight  INTEGUMENTARY/SKIN: NEGATIVE for worrisome rashes, moles or lesions  EYES: NEGATIVE for vision changes or irritation  ENT: NEGATIVE for ear, mouth and throat problems  RESP: NEGATIVE for significant cough or SOB  BREAST: NEGATIVE for masses, tenderness or discharge  CV: NEGATIVE for chest pain, palpitations or peripheral edema  GI: NEGATIVE for nausea, abdominal pain, heartburn, or change in bowel habits  : NEGATIVE for unusual urinary or vaginal symptoms. No vaginal bleeding.  MUSCULOSKELETAL: NEGATIVE for significant arthralgias or myalgia  NEURO: NEGATIVE for weakness, dizziness or paresthesias  PSYCHIATRIC: NEGATIVE for changes in mood or affect      OBJECTIVE:   /72   Pulse 74   Temp 98.5  F (36.9  C) (Temporal)   Resp 18   Ht 1.676 m (5' 6\")   Wt 104.8 kg (231 lb)   LMP 09/03/2018 (Exact Date)   SpO2 99%   Breastfeeding No   BMI 37.28 kg/m    Physical Exam  GENERAL: healthy, alert and no distress  EYES: Eyes grossly normal to inspection, PERRL and conjunctivae and sclerae " normal  HENT: ear canals and TM's normal, nose and mouth without ulcers or lesions  NECK: no adenopathy, no asymmetry, masses, or scars and thyroid normal to palpation  RESP: lungs clear to auscultation - no rales, rhonchi or wheezes  BREAST: normal without masses, tenderness or nipple discharge and no palpable axillary masses or adenopathy  CV: regular rate and rhythm, normal S1 S2, no S3 or S4, no murmur, click or rub, no peripheral edema and peripheral pulses strong  ABDOMEN: soft, nontender, no hepatosplenomegaly, no masses and bowel sounds normal  MS: no gross musculoskeletal defects noted, no edema  SKIN: no suspicious lesions or rashes  NEURO: Normal strength and tone, mentation intact and speech normal  PSYCH: mentation appears normal, affect normal/bright        ASSESSMENT/PLAN:       ICD-10-CM    1. Type 2 diabetes mellitus with hyperglycemia, without long-term current use of insulin (H)  E11.65 Comprehensive metabolic panel (BMP + Alb, Alk Phos, ALT, AST, Total. Bili, TP)     Hemoglobin A1c   2. Congestive heart failure, unspecified HF chronicity, unspecified heart failure type (H)  I50.9 hydrochlorothiazide (HYDRODIURIL) 25 MG tablet   3. Mixed hyperlipidemia  E78.2    4. Essential hypertension  I10 Comprehensive metabolic panel (BMP + Alb, Alk Phos, ALT, AST, Total. Bili, TP)     hydrochlorothiazide (HYDRODIURIL) 25 MG tablet   5. Encounter for screening mammogram for breast cancer  Z12.31 MA Screen Bilateral w/Vivek   6. Routine general medical examination at a health care facility  Z00.00 REVIEW OF HEALTH MAINTENANCE PROTOCOL ORDERS     Comprehensive metabolic panel (BMP + Alb, Alk Phos, ALT, AST, Total. Bili, TP)     MA Screen Bilateral w/Vivek     TSH with free T4 reflex     Hepatitis C Screen Reflex to HCV RNA Quant and Genotype     Hepatitis C Screen Reflex to HCV RNA Quant and Genotype   7. Need for vaccination  Z23 TDAP VACCINE (Adacel, Boostrix)  [[id number]]   8. Family history of thyroid disease   "Z83.49 TSH with free T4 reflex   9. Encounter for hepatitis C screening test for low risk patient  Z11.59 Hepatitis C Screen Reflex to HCV RNA Quant and Genotype     Hepatitis C Screen Reflex to HCV RNA Quant and Genotype       Patient has been advised of split billing requirements and indicates understanding: Yes  COUNSELING:  Reviewed preventive health counseling, as reflected in patient instructions       Regular exercise       Healthy diet/nutrition       Vision screening       Immunizations    Declined: Hepatitis B, Influenza, Pneumococcal and Zoster due to Other                Osteoporosis prevention/bone health       Colon cancer screening       Consider Hep C screening for all patients one time for ages 18-79 years       HIV screeninx in teen years, 1x in adult years, and at intervals if high risk       One time pneumovax for smokers    Estimated body mass index is 37.28 kg/m  as calculated from the following:    Height as of this encounter: 1.676 m (5' 6\").    Weight as of this encounter: 104.8 kg (231 lb).    Weight management plan: Discussed healthy diet and exercise guidelines    She reports that she quit smoking about 13 years ago. Her smoking use included cigarettes. She quit after 16.00 years of use. She has never used smokeless tobacco.      Counseling Resources:  ATP IV Guidelines  Pooled Cohorts Equation Calculator  Breast Cancer Risk Calculator  BRCA-Related Cancer Risk Assessment: FHS-7 Tool  FRAX Risk Assessment  ICSI Preventive Guidelines  Dietary Guidelines for Americans, 2010  USDA's MyPlate  ASA Prophylaxis  Lung CA Screening    KRISTI Hilario CNP  [facility]  "

## 2021-11-24 NOTE — NURSING NOTE
Prior to immunization administration, verified patients identity using patient s name and date of birth. Please see Immunization Activity for additional information.     Screening Questionnaire for Adult Immunization    Are you sick today?   No   Do you have allergies to medications, food, a vaccine component or latex?   Yes   Have you ever had a serious reaction after receiving a vaccination?   No   Do you have a long-term health problem with heart, lung, kidney, or metabolic disease (e.g., diabetes), asthma, a blood disorder, no spleen, complement component deficiency, a cochlear implant, or a spinal fluid leak?  Are you on long-term aspirin therapy?  Yes   Do you have cancer, leukemia, HIV/AIDS, or any other immune system problem?   No   Do you have a parent, brother, or sister with an immune system problem?   No   In the past 3 months, have you taken medications that affect  your immune system, such as prednisone, other steroids, or anticancer drugs; drugs for the treatment of rheumatoid arthritis, Crohn s disease, or psoriasis; or have you had radiation treatments?   No   Have you had a seizure, or a brain or other nervous system problem?   No   During the past year, have you received a transfusion of blood or blood    products, or been given immune (gamma) globulin or antiviral drug?   No   For women: Are you pregnant or is there a chance you could become       pregnant during the next month?   No   Have you received any vaccinations in the past 4 weeks?   No     Immunization questionnaire was positive for at least one answer.  Notified Karen Valencia.        Per orders of Karen Valencia, injection of TDAP given by Janet Jack CMA. Patient instructed to remain in clinic for 15 minutes afterwards, and to report any adverse reaction to me immediately.       Screening performed by Janet Jack CMA on 11/24/2021 at 2:34 PM.

## 2021-11-26 DIAGNOSIS — E11.65 TYPE 2 DIABETES MELLITUS WITH HYPERGLYCEMIA, WITHOUT LONG-TERM CURRENT USE OF INSULIN (H): Primary | ICD-10-CM

## 2021-11-26 LAB — HCV AB SERPL QL IA: NONREACTIVE

## 2021-12-06 ENCOUNTER — HOSPITAL ENCOUNTER (OUTPATIENT)
Dept: MAMMOGRAPHY | Facility: CLINIC | Age: 51
Discharge: HOME | End: 2021-12-06
Attending: NURSE PRACTITIONER | Admitting: NURSE PRACTITIONER
Payer: COMMERCIAL

## 2021-12-06 DIAGNOSIS — Z00.00 ROUTINE GENERAL MEDICAL EXAMINATION AT A HEALTH CARE FACILITY: ICD-10-CM

## 2021-12-06 DIAGNOSIS — Z12.31 ENCOUNTER FOR SCREENING MAMMOGRAM FOR BREAST CANCER: ICD-10-CM

## 2021-12-06 PROCEDURE — 77063 BREAST TOMOSYNTHESIS BI: CPT

## 2021-12-22 DIAGNOSIS — K29.30 CHRONIC SUPERFICIAL GASTRITIS WITHOUT BLEEDING: ICD-10-CM

## 2021-12-23 NOTE — TELEPHONE ENCOUNTER
Prescription approved per [facility] Refill Protocol.    TARAN RodasN, RN  [facility]

## 2021-12-24 DIAGNOSIS — E11.65 TYPE 2 DIABETES MELLITUS WITH HYPERGLYCEMIA, WITHOUT LONG-TERM CURRENT USE OF INSULIN (H): ICD-10-CM

## 2021-12-24 RX ORDER — METFORMIN HCL 500 MG
TABLET, EXTENDED RELEASE 24 HR ORAL
Qty: 90 TABLET | Refills: 0 | Status: SHIPPED | OUTPATIENT
Start: 2021-12-24 | End: 2022-03-31

## 2021-12-24 NOTE — TELEPHONE ENCOUNTER
Prescription approved per [facility] Refill Protocol.    TARAN RodasN, RN  [facility]

## 2022-02-03 ENCOUNTER — TELEPHONE (OUTPATIENT)
Dept: FAMILY MEDICINE | Facility: CLINIC | Age: 52
End: 2022-02-03

## 2022-02-03 NOTE — TELEPHONE ENCOUNTER
Patient is following up on physical recently:    - she is stating that she is now experiencing some low back pain  - she also has a bad taste in her mouth and feels that it could be related to her kidneys    She said that you had talked about some things at her recent physical and she is just following up.    She would like you to call her back she will be at work number until 330pm [phone] ext [phone]  After she will be available on her cell    Jennifer MELOO/

## 2022-02-13 DIAGNOSIS — E11.65 TYPE 2 DIABETES MELLITUS WITH HYPERGLYCEMIA, WITHOUT LONG-TERM CURRENT USE OF INSULIN (H): ICD-10-CM

## 2022-02-15 RX ORDER — EMPAGLIFLOZIN 25 MG/1
TABLET, FILM COATED ORAL
Qty: 90 TABLET | Refills: 0 | Status: SHIPPED | OUTPATIENT
Start: 2022-02-15 | End: 2022-05-02

## 2022-02-15 NOTE — TELEPHONE ENCOUNTER
Pending Prescriptions:                       Disp   Refills    JARDIANCE 25 MG TABS tablet [Pharmacy Med *90 tab*0        Sig: TAKE 1 TABLET (25 MG) BY MOUTH DAILY    Routing refill request to provider for review/approval because:  To pharmacy: Patient enrolled in our Rx Med Sync service to improve adherence. We are requesting a refill authorization in advance to ensure an active prescription is on file

## 2022-03-24 DIAGNOSIS — I51.81 STRESS-INDUCED CARDIOMYOPATHY: ICD-10-CM

## 2022-03-24 DIAGNOSIS — I10 ESSENTIAL HYPERTENSION: ICD-10-CM

## 2022-03-25 RX ORDER — LOSARTAN POTASSIUM 50 MG/1
50 TABLET ORAL DAILY
Qty: 90 TABLET | Refills: 2 | Status: SHIPPED | OUTPATIENT
Start: 2022-03-25 | End: 2022-09-28

## 2022-03-25 NOTE — TELEPHONE ENCOUNTER
Cozaar  Aspirin  Prescription approved per [facility] Refill Protocol.  Patient HAS been seen within 12 months    Krystal Oneal RN

## 2022-03-28 DIAGNOSIS — E11.65 TYPE 2 DIABETES MELLITUS WITH HYPERGLYCEMIA, WITHOUT LONG-TERM CURRENT USE OF INSULIN (H): ICD-10-CM

## 2022-03-29 NOTE — TELEPHONE ENCOUNTER
Pending Prescriptions:                       Disp   Refills    metFORMIN (GLUCOPHAGE-XR) 500 MG 24 hr tab*90 tab*0        Sig: TAKE 1 TABLET BY MOUTH EVERY DAY WITH FOOD    Routing refill request to provider for review/approval because:  Labs not current:  A1C    Krystal Oneal RN

## 2022-03-31 RX ORDER — METFORMIN HCL 500 MG
TABLET, EXTENDED RELEASE 24 HR ORAL
Qty: 90 TABLET | Refills: 0 | Status: SHIPPED | OUTPATIENT
Start: 2022-03-31 | End: 2022-06-29

## 2022-04-04 ENCOUNTER — LAB (OUTPATIENT)
Dept: LAB | Facility: CLINIC | Age: 52
End: 2022-04-04
Payer: COMMERCIAL

## 2022-04-04 ENCOUNTER — VIRTUAL VISIT (OUTPATIENT)
Dept: PHARMACY | Facility: CLINIC | Age: 52
End: 2022-04-04
Payer: COMMERCIAL

## 2022-04-04 DIAGNOSIS — E78.2 MIXED HYPERLIPIDEMIA: ICD-10-CM

## 2022-04-04 DIAGNOSIS — I10 ESSENTIAL HYPERTENSION: ICD-10-CM

## 2022-04-04 DIAGNOSIS — E11.65 TYPE 2 DIABETES MELLITUS WITH HYPERGLYCEMIA, WITHOUT LONG-TERM CURRENT USE OF INSULIN (H): Primary | ICD-10-CM

## 2022-04-04 DIAGNOSIS — E11.65 TYPE 2 DIABETES MELLITUS WITH HYPERGLYCEMIA, WITHOUT LONG-TERM CURRENT USE OF INSULIN (H): ICD-10-CM

## 2022-04-04 DIAGNOSIS — K21.9 GASTROESOPHAGEAL REFLUX DISEASE, UNSPECIFIED WHETHER ESOPHAGITIS PRESENT: ICD-10-CM

## 2022-04-04 DIAGNOSIS — N94.3 PMS (PREMENSTRUAL SYNDROME): ICD-10-CM

## 2022-04-04 LAB
ANION GAP SERPL CALCULATED.3IONS-SCNC: 7 MMOL/L (ref 3–14)
BUN SERPL-MCNC: 20 MG/DL (ref 7–30)
CALCIUM SERPL-MCNC: 9.6 MG/DL (ref 8.5–10.1)
CHLORIDE BLD-SCNC: 99 MMOL/L (ref 94–109)
CO2 SERPL-SCNC: 29 MMOL/L (ref 20–32)
CREAT SERPL-MCNC: 0.9 MG/DL (ref 0.52–1.04)
CREAT UR-MCNC: 44 MG/DL
GFR SERPL CREATININE-BSD FRML MDRD: 77 ML/MIN/1.73M2
GLUCOSE BLD-MCNC: 164 MG/DL (ref 70–99)
HBA1C MFR BLD: 9.1 % (ref 0–5.6)
MICROALBUMIN UR-MCNC: <5 MG/L
MICROALBUMIN/CREAT UR: NORMAL MG/G{CREAT}
POTASSIUM BLD-SCNC: 3.7 MMOL/L (ref 3.4–5.3)
SODIUM SERPL-SCNC: 135 MMOL/L (ref 133–144)

## 2022-04-04 PROCEDURE — 99605 MTMS BY PHARM NP 15 MIN: CPT | Performed by: PHARMACIST

## 2022-04-04 PROCEDURE — 36415 COLL VENOUS BLD VENIPUNCTURE: CPT

## 2022-04-04 PROCEDURE — 83036 HEMOGLOBIN GLYCOSYLATED A1C: CPT

## 2022-04-04 PROCEDURE — 80048 BASIC METABOLIC PNL TOTAL CA: CPT

## 2022-04-04 PROCEDURE — 99607 MTMS BY PHARM ADDL 15 MIN: CPT | Performed by: PHARMACIST

## 2022-04-04 PROCEDURE — 82043 UR ALBUMIN QUANTITATIVE: CPT

## 2022-04-04 NOTE — PATIENT INSTRUCTIONS
Recommendations from today's MTM visit:                                                    MTM (medication therapy management) is a service provided by a clinical pharmacist designed to help you get the most of out of your medicines.   Today we reviewed what your medicines are for, how to know if they are working, that your medicines are safe and how to make your medicine regimen as easy as possible.      1. Labs ordered - A1c (3 month average of your blood sugars), basic metabolic panel (kidney function and electrolytes), microalbumin (protein in urine/kidney damage screening). We scheduled your labs for today at 2 PM at the [place] laboratory.    2. Check you blood sugars twice daily - fasting and at least one other time daily - either before meals, 2-hours after meals, or before bedtime.  We will use this and your lab information to decide next steps. If you A1c comes back significantly elevated we may consider re-starting Lantus (or a similar once-daily insulin) sooner.     Follow-up: I will call you on Monday, April 18th at 1:30 PM    It was great to speak with you today.  I value your experience and would be very thankful for your time with providing feedback on our clinic survey. You may receive a survey via email or text message in the next few days.     To schedule another MTM appointment, please call the clinic directly or you may call the MTM scheduling line at [phone] or toll-free at [phone].     My Clinical Pharmacist's contact information:                                                      Please feel free to contact me with any questions or concerns you have.      Redd Sterling, Asif, [facility]  Medication Therapy Management Pharmacist  Pager: [phone]

## 2022-04-04 NOTE — PROGRESS NOTES
Medication Therapy Management (MTM) Encounter    ASSESSMENT:                            Medication Adherence/Access: No issues identified    Type 2 Diabetes: A1c is not at goal of <7%.  Due for A1c recheck since increase of Jardiance dose.  Home blood sugars suggest that sugars may still be elevated.  Will recheck labs and have patient monitor home blood sugars. Likely will consider addition of basal insulin again - though in May 2022 Ozempic 2 mg dose also available so depending on blood sugar data will decide next steps.     Hypertension: Stable.    Hyperlipidemia: Stable.    GERD: Stable    Premenstrual syndrome: Stable per patient.    PLAN:                            1. Labs ordered - A1c (3 month average of your blood sugars), basic metabolic panel (kidney function and electrolytes), microalbumin (protein in urine/kidney damage screening). We scheduled your labs for today at 2 PM at the [facility].    2. Check you blood sugars twice daily - fasting and at least one other time daily - either before meals, 2-hours after meals, or before bedtime.  We will use this and your lab information to decide next steps. If you A1c comes back significantly elevated we may consider re-starting Lantus (or a similar once-daily insulin) sooner.     Follow-up: I will call you on Monday, April 18th at 1:30 PM    SUBJECTIVE/OBJECTIVE:                          Lashawn Reddy is a 51 year old female called for a follow-up visit.  Today's visit is a follow-up MTM visit from 10/7/2021.     Reason for visit: Diabetes Follow-Up.    Allergies/ADRs: Reviewed in chart  Past Medical History: Reviewed in chart  Tobacco: She reports that she quit smoking about 13 years ago. Her smoking use included cigarettes. She quit after 16.00 years of use. She has never used smokeless tobacco.  Alcohol: not currently using      Medication Adherence/Access: no issues reported    Type 2 Diabetes:  Currently taking Jardiance 25 mg daily, metformin XR  500 mg every day, Ozempic 1 mg weekly.  Has been off Lantus since the fall.  Has been on higher dose of Jardiance for at least 3 months.  Feels that sugars have gotten higher. Patient is experiencing dry mouth (states she gets cold sores as a result).  Does drink plenty of fluid - urine is not dark.  No yeast infections at this higher dose so far.   Blood sugar monitorin time(s) daily. Ranges (patient reported): high 100s-200s mg/dL   Symptoms of low blood sugar? none  Symptoms of high blood sugar? none  Eye exam: up to date  Foot exam: due  Diet/Exercise: Activity is up and down. Diet is not changed too much from previous.    Aspirin: Taking 81mg daily and denies side effects  Statin: Yes: rosuvastatin   ACEi/ARB: Yes: losartan.   Urine Albumin:   Lab Results   Component Value Date    UMALCR 9.62 01/15/2021      Lab Results   Component Value Date    A1C 8.4 2021    A1C 7.6 2021    A1C 8.0 01/15/2021    A1C 8.6 10/06/2020    A1C 8.7 2020    A1C 10.4 2019     Hypertension: Current medications include hydrochlorothiazide 25 mg daily and losartan 50 mg daily.  Patient does not self-monitor blood pressure.  Patient reports no current medication side effects.  BP Readings from Last 3 Encounters:   21 116/72   10/05/20 120/78   19 113/75     Hyperlipidemia: Current therapy includes rosuvastatin 20 mg daily.  Patient reports no significant myalgias or other side effects.  The 10-year ASCVD risk score (Fenwick LIDIA Jr., et al., 2013) is: 1.5%    Values used to calculate the score:      Age: 51 years      Sex: Female      Is Non- : No      Diabetic: Yes      Tobacco smoker: No      Systolic Blood Pressure: 116 mmHg      Is BP treated: Yes      HDL Cholesterol: 66 mg/dL      Total Cholesterol: 130 mg/dL  Recent Labs   Lab Test 01/15/21  0825 19  0510 16  0958 06/29/15  0759 10/14/14  1016   CHOL 130 117   < > 190 217*   HDL 66 47*   < > 50* 59   LDL 50 40    < > 111 135*   TRIG 71 151*   < > 147 117   CHOLHDLRATIO  --   --   --  3.8 3.7    < > = values in this interval not displayed.       GERD: Current medications include: Prilosec (omeprazole) 20 mg daily as needed. Pt reports no current symptoms.  Patient feels that current regimen is effective.    Premenstrual syndrome: Patient is taking sertraline 25 mg every morning. States this helps with her mood. Denies any concerns.    Today's Vitals: LMP 09/03/2018 (Exact Date)   ----------------      I spent 18 minutes with this patient today. All changes were made via collaborative practice agreement with Venkata Jaramillo DO. A copy of the visit note was provided to the patient's provider(s).    The patient was sent via [employer] a summary of these recommendations.     Redd Sterling, KeikoD, BCACP  Medication Therapy Management Pharmacist  Pager: [phone]    Telemedicine Visit Details  Type of service:  Telephone visit  Start Time: 1:30 PM  End Time: 1:48 PM  Originating Location (patient location): [place]  Distant Location (provider location):  [facility]     Medication Therapy Recommendations  No medication therapy recommendations to display

## 2022-04-06 RX ORDER — ROSUVASTATIN CALCIUM 20 MG/1
20 TABLET, COATED ORAL DAILY
Qty: 90 TABLET | Refills: 2 | Status: SHIPPED | OUTPATIENT
Start: 2022-04-06 | End: 2022-12-20

## 2022-04-06 NOTE — TELEPHONE ENCOUNTER
Routing refill request to provider for review/approval because:  Pt due for an office visit  Labs not current:    LDL Cholesterol Calculated   Date Value Ref Range Status   01/15/2021 50 <100 mg/dL Final     Comment:     Desirable:       <100 mg/dl

## 2022-04-18 DIAGNOSIS — E11.65 TYPE 2 DIABETES MELLITUS WITH HYPERGLYCEMIA, WITHOUT LONG-TERM CURRENT USE OF INSULIN (H): ICD-10-CM

## 2022-04-20 RX ORDER — BLOOD SUGAR DIAGNOSTIC
STRIP MISCELLANEOUS
Qty: 100 STRIP | Refills: 5 | Status: SHIPPED | OUTPATIENT
Start: 2022-04-20

## 2022-04-20 NOTE — TELEPHONE ENCOUNTER
Test strips  Prescription approved per [facility] Refill Protocol.    Krystal Oneal RN

## 2022-05-02 ENCOUNTER — VIRTUAL VISIT (OUTPATIENT)
Dept: PHARMACY | Facility: CLINIC | Age: 52
End: 2022-05-02
Payer: COMMERCIAL

## 2022-05-02 DIAGNOSIS — E11.65 TYPE 2 DIABETES MELLITUS WITH HYPERGLYCEMIA, WITHOUT LONG-TERM CURRENT USE OF INSULIN (H): ICD-10-CM

## 2022-05-02 PROCEDURE — 99607 MTMS BY PHARM ADDL 15 MIN: CPT | Performed by: PHARMACIST

## 2022-05-02 PROCEDURE — 99606 MTMS BY PHARM EST 15 MIN: CPT | Performed by: PHARMACIST

## 2022-05-02 RX ORDER — INSULIN GLARGINE-YFGN 100 [IU]/ML
12 INJECTION, SOLUTION SUBCUTANEOUS DAILY
Qty: 15 ML | Refills: 3 | Status: SHIPPED | OUTPATIENT
Start: 2022-05-02

## 2022-05-02 NOTE — PROGRESS NOTES
Medication Therapy Management (MTM) Encounter    ASSESSMENT:                            Medication Adherence/Access: No issues identified    Type 2 Diabetes: Blood sugars are still elevated - not at goal for fasting blood sugars. Would benefit from further increase in basal insulin dose. Due to dry mouth issues will continue Jardiance at lower dose.    PLAN:                            1. Continue Jardiance 12.5 mg by mouth daily - we will have you continue this dose until your current prescription is completed.    2. Increase Lantus (or alternative insulin glargine like Basaglar or Semglee) to 12 units under the skin daily.  Continue to increase by 2 units every 3 days until fasting sugars are between  mg/dL OR you reach a max dose of 30 units daily.     Follow-up: I will call you on Wednesday, May 25th at 1:30 PM (unless you need to reschedule)    SUBJECTIVE/OBJECTIVE:                          Lashawn Reddy is a 51 year old female called for a follow-up visit.  Today's visit is a follow-up MTM visit from 2022     Reason for visit: Diabetes Follow-Up.    Allergies/ADRs: Reviewed in chart  Past Medical History: Reviewed in chart  Tobacco: She reports that she quit smoking about 13 years ago. Her smoking use included cigarettes. She quit after 16.00 years of use. She has never used smokeless tobacco.  Alcohol: not currently using      Medication Adherence/Access: no issues reported    Type 2 Diabetes:  Currently taking Jardiance 12.5 mg daily (decreased due to dry mouth - see below), Lantus 10 units daily (started after A1c), metformin  mg every day, Ozempic 1 mg weekly. Dry mouth was getting so bad her lips were cracking, but has seen noticeable improvement. Not consistent taking Lantus every day since she is out of the habit.   Blood sugar monitorin time(s) daily. Ranges (patient reported): high 100s-200s mg/dL (does not trust numbers due to inconsistent insulin use.   Symptoms of low blood sugar?  none  Symptoms of high blood sugar? none  Eye exam: up to date  Foot exam: due  Diet/Exercise: Unchanged.  Aspirin: Taking 81mg daily and denies side effects  Statin: Yes: rosuvastatin   ACEi/ARB: Yes: losartan.   Urine Albumin:   Lab Results   Component Value Date    UMALCR  04/04/2022      Comment:      Unable to calculate:  Urine creatinine or albumin value below detectable level      Lab Results   Component Value Date    A1C 9.1 04/04/2022    A1C 8.4 11/24/2021    A1C 7.6 05/18/2021    A1C 8.0 01/15/2021    A1C 8.6 10/06/2020    A1C 8.7 03/16/2020    A1C 10.4 12/11/2019         Today's Vitals: LMP 09/03/2018 (Exact Date)      BP Readings from Last 3 Encounters:   11/24/21 116/72   10/05/20 120/78   12/26/19 113/75     Wt Readings from Last 5 Encounters:   11/24/21 231 lb (104.8 kg)   10/05/20 231 lb 2 oz (104.8 kg)   12/23/19 241 lb 3.2 oz (109.4 kg)   12/11/19 251 lb (113.9 kg)   12/11/19 250 lb 4.8 oz (113.5 kg)     ----------------      I spent 22 minutes with this patient today. All changes were made via collaborative practice agreement with Venkata Jaramillo DO. A copy of the visit note was provided to the patient's provider(s).    The patient was sent via [employer] a summary of these recommendations.     Redd Sterling, KeikoD, BCACP  Medication Therapy Management Pharmacist  Pager: [phone]    Telemedicine Visit Details  Type of service:  Telephone visit  Start Time: 2:30 PM  End Time: 2:52 PM  Originating Location (patient location): Home  Distant Location (provider location):  [facility]     Medication Therapy Recommendations  Type 2 diabetes mellitus with hyperglycemia, without long-term current use of insulin (H)    Current Medication: Insulin Glargine-yfgn (SEMGLEE, YFGN,) 100 UNIT/ML SOLN   Rationale: Dose too low - Dosage too low - Effectiveness   Recommendation: Increase Dose - Increase Semglee to 12 units under the skin daily. Increase by 2 units every 3 days until  fasitng sugars are between  mg/dL or max dose of 30 units daily   Status: Accepted per CPA          Current Medication: JARDIANCE 25 MG TABS tablet (Discontinued)   Rationale: Undesirable effect - Adverse medication event - Safety   Recommendation: Decrease Dose - Decrease Jardiance to 12.5 mg by mouth daily   Status: Accepted per CPA

## 2022-05-02 NOTE — PATIENT INSTRUCTIONS
"Recommendations from today's MTM visit:                                                    MTM (medication therapy management) is a service provided by a clinical pharmacist designed to help you get the most of out of your medicines.   Today we reviewed what your medicines are for, how to know if they are working, that your medicines are safe and how to make your medicine regimen as easy as possible.      1. Continue Jardiance 12.5 mg by mouth daily - we will have you continue this dose until your current prescription is completed.    2. Increase Lantus (or alternative insulin glargine like Basaglar or Semglee) to 12 units under the skin daily.  Continue to increase by 2 units every 3 days until fasting sugars are between  mg/dL OR you reach a max dose of 30 units daily.     Follow-up: I will call you on Wednesday, May 25th at 1:30 PM (unless you need to reschedule)    It was great speaking with you today.  I value your experience and would be very thankful for your time in providing feedback in our clinic survey. In the next few days, you may receive an email or text message from [employer] with a link to a survey related to your  clinical pharmacist.\"     To schedule another MTM appointment, please call the clinic directly or you may call the MTM scheduling line at [phone] or toll-free at [phone].     My Clinical Pharmacist's contact information:                                                      Please feel free to contact me with any questions or concerns you have.      Redd Sterling, PharmD, BCACP  Medication Therapy Management Pharmacist  Pager: [phone]    "

## 2022-05-16 DIAGNOSIS — E11.65 TYPE 2 DIABETES MELLITUS WITH HYPERGLYCEMIA, WITHOUT LONG-TERM CURRENT USE OF INSULIN (H): Primary | ICD-10-CM

## 2022-05-18 RX ORDER — BLOOD-GLUCOSE METER
EACH MISCELLANEOUS
Qty: 1 KIT | Refills: 0 | Status: SHIPPED | OUTPATIENT
Start: 2022-05-18 | End: 2022-08-25

## 2022-05-18 NOTE — TELEPHONE ENCOUNTER
Pending Prescriptions:                       Disp   Refills    blood glucose monitoring (ONE TOUCH ULTRA *       0        Sig: USE DAILY AS DIRECTED    Routing refill request to provider for review/approval because:  Drug not active on patient's medication list

## 2022-05-21 DIAGNOSIS — E11.65 TYPE 2 DIABETES MELLITUS WITH HYPERGLYCEMIA, WITHOUT LONG-TERM CURRENT USE OF INSULIN (H): ICD-10-CM

## 2022-05-24 RX ORDER — EMPAGLIFLOZIN 25 MG/1
TABLET, FILM COATED ORAL
Qty: 90 TABLET | Refills: 0 | Status: SHIPPED | OUTPATIENT
Start: 2022-05-24 | Stop reason: DRUGHIGH

## 2022-05-24 NOTE — TELEPHONE ENCOUNTER
Jardiance  Prescription approved per [facility] Refill Protocol.    Krystal Oneal RN

## 2022-06-02 ENCOUNTER — TRANSFERRED RECORDS (OUTPATIENT)
Dept: HEALTH INFORMATION MANAGEMENT | Facility: CLINIC | Age: 52
End: 2022-06-02
Payer: COMMERCIAL

## 2022-06-02 LAB — RETINOPATHY: NEGATIVE

## 2022-06-27 DIAGNOSIS — E11.65 TYPE 2 DIABETES MELLITUS WITH HYPERGLYCEMIA, WITHOUT LONG-TERM CURRENT USE OF INSULIN (H): ICD-10-CM

## 2022-06-29 RX ORDER — METFORMIN HCL 500 MG
TABLET, EXTENDED RELEASE 24 HR ORAL
Qty: 90 TABLET | Refills: 0 | Status: SHIPPED | OUTPATIENT
Start: 2022-06-29 | End: 2022-10-03

## 2022-06-29 NOTE — TELEPHONE ENCOUNTER
"Pending Prescriptions:                       Disp   Refills    metFORMIN (GLUCOPHAGE XR) 500 MG 24 hr tab*90 tab*0        Sig: TAKE 1 TABLET BY MOUTH EVERY DAY WITH FOOD    Routing refill request to provider for review/approval because:  DM VISIT OVERDUE    Requested Prescriptions   Pending Prescriptions Disp Refills    metFORMIN (GLUCOPHAGE XR) 500 MG 24 hr tablet [Pharmacy Med Name: METFORMIN 500MG ER TABLET] 90 tablet 0     Sig: TAKE 1 TABLET BY MOUTH EVERY DAY WITH FOOD        Biguanide Agents Failed - 6/27/2022  1:27 AM        Failed - Recent (6 mo) or future (30 days) visit within the authorizing provider's specialty     Patient had office visit in the last 6 months or has a visit in the next 30 days with authorizing provider or within the authorizing provider's specialty.  See \"Patient Info\" tab in inbasket, or \"Choose Columns\" in Meds & Orders section of the refill encounter.            Passed - Patient is age 10 or older        Passed - Patient has documented A1c within the specified period of time.     If HgbA1C is 8 or greater, it needs to be on file within the past 3 months.  If less than 8, must be on file within the past 6 months.     Recent Labs   Lab Test 04/04/22  1401   A1C 9.1*             Passed - Patient's CR is NOT>1.4 OR Patient's EGFR is NOT<45 within past 12 mos.       Recent Labs   Lab Test 04/04/22  1401 11/24/21  1456 01/15/21  0825   GFRESTIMATED 77   < > 77   GFRESTBLACK  --   --  90    < > = values in this interval not displayed.       Recent Labs   Lab Test 04/04/22  1401   CR 0.90             Passed - Patient does NOT have a diagnosis of CHF.        Passed - Medication is active on med list        Passed - Patient is not pregnant        Passed - Patient has not had a positive pregnancy test within the past 12 mos.                     "

## 2022-07-20 DIAGNOSIS — L30.4 INTERTRIGO: ICD-10-CM

## 2022-07-20 RX ORDER — FLUCONAZOLE 150 MG/1
150 TABLET ORAL
Qty: 10 TABLET | Refills: 0 | Status: SHIPPED | OUTPATIENT
Start: 2022-07-20

## 2022-07-20 NOTE — TELEPHONE ENCOUNTER
Patient would like a refill of her fluconazole (DIFLUCAN) 150 MG tablet  , she is prone to yeast infections as she is on Trulicity, but was recently at the dentist and given an Rx for an antibiotic.     She has a yeast infection and is asking for a refill.    Jennifer Palacios XRO/

## 2022-07-29 ENCOUNTER — TELEPHONE (OUTPATIENT)
Dept: PHARMACY | Facility: CLINIC | Age: 52
End: 2022-07-29

## 2022-07-29 NOTE — TELEPHONE ENCOUNTER
This patient is due for MTM follow-up. I called the patient to schedule an appointment and left a message with the clinic phone number for the patient to call to schedule.    Redd Sterling, KeikoD, [facility]  Medication Therapy Management Pharmacist  Pager: [phone]

## 2022-08-22 ENCOUNTER — VIRTUAL VISIT (OUTPATIENT)
Dept: PHARMACY | Facility: CLINIC | Age: 52
End: 2022-08-22
Payer: COMMERCIAL

## 2022-08-22 DIAGNOSIS — E11.65 TYPE 2 DIABETES MELLITUS WITH HYPERGLYCEMIA, WITHOUT LONG-TERM CURRENT USE OF INSULIN (H): ICD-10-CM

## 2022-08-22 DIAGNOSIS — E11.65 TYPE 2 DIABETES MELLITUS WITH HYPERGLYCEMIA, WITHOUT LONG-TERM CURRENT USE OF INSULIN (H): Primary | ICD-10-CM

## 2022-08-22 PROCEDURE — 99606 MTMS BY PHARM EST 15 MIN: CPT | Performed by: PHARMACIST

## 2022-08-22 NOTE — PROGRESS NOTES
Medication Therapy Management (MTM) Encounter    ASSESSMENT:                            Medication Adherence/Access: No issues identified    Type 2 Diabetes: Improved.  Would benefit from recheck of A1c to determine next steps. If elevated could consider either insulin increase or GLP-1 agonist increase.     PLAN:                            1. Updated Jardiance to 10 mg by mouth once daily.     2. I scheduled a lab appointment for  at 4 PM to recheck your A1c.    3. If your A1c comes back elevated we could consider increasing your Ozempic dose - though because the highest dose is on backorder we may have to wait a little bit to start this change. Labs ordered.    4. Test strips refilled.    Follow-up: we will determine follow-up after labs.     SUBJECTIVE/OBJECTIVE:                          Lashawn Reddy is a 51 year old female called for a follow-up visit.  Today's visit is a follow-up MTM visit from 2022.     Reason for visit: Diabetes Follow-Up.    Allergies/ADRs: Reviewed in chart  Past Medical History: Reviewed in chart  Tobacco: She reports that she quit smoking about 13 years ago. Her smoking use included cigarettes. She quit after 16.00 years of use. She has never used smokeless tobacco.  Alcohol: not currently using    Medication Adherence/Access: no issues reported    Type 2 Diabetes:  Currently taking Jardiance 12.5 mg daily (just ran out), Lantus 18 units daily, metformin  mg every day, Ozempic 1 mg weekly. Lower dose was because she felt so dehydrated/dry mouth was very bad on Jardiance 25 mg. More consistent with her insulin.   Blood sugar monitorin time(s) daily. Ranges (patient reported): 150-160 mg/dL - feels strong sugar cravings if she's gone higher on her insulin dose - worries about going low  Symptoms of low blood sugar? none  Symptoms of high blood sugar? none  Eye exam: up to date  Foot exam: due  Diet/Exercise: Is trying to be more diligent with her portions  and carbohydrate intake.  Aspirin: Taking 81mg daily and denies side effects  Statin: Yes: rosuvastatin   ACEi/ARB: Yes: losartan.   Urine Albumin:   Lab Results   Component Value Date    UMALCR  04/04/2022      Comment:      Unable to calculate:  Urine creatinine or albumin value below detectable level      Lab Results   Component Value Date    A1C 9.1 04/04/2022    A1C 8.4 11/24/2021    A1C 7.6 05/18/2021    A1C 8.0 01/15/2021    A1C 8.6 10/06/2020    A1C 8.7 03/16/2020    A1C 10.4 12/11/2019     Today's Vitals: LMP 09/03/2018 (Exact Date)      BP Readings from Last 3 Encounters:   11/24/21 116/72   10/05/20 120/78   12/26/19 113/75     Wt Readings from Last 5 Encounters:   11/24/21 231 lb (104.8 kg)   10/05/20 231 lb 2 oz (104.8 kg)   12/23/19 241 lb 3.2 oz (109.4 kg)   12/11/19 251 lb (113.9 kg)   12/11/19 250 lb 4.8 oz (113.5 kg)     ----------------      I spent 15 minutes with this patient today. All changes were made via collaborative practice agreement with Venkata Jaramillo DO. A copy of the visit note was provided to the patient's provider(s).    The patient was sent via [employer] a summary of these recommendations.     Redd Sterling, KeikoD, BCACP  Medication Therapy Management Pharmacist  Pager: [phone]    Telemedicine Visit Details  Type of service:  Telephone visit  Start Time: 1:00 PM  End Time: 1:15 PM  Originating Location (patient location): [place]  Distant Location (provider location):  [facility]     Medication Therapy Recommendations  No medication therapy recommendations to display

## 2022-08-22 NOTE — PATIENT INSTRUCTIONS
"Recommendations from today's MTM visit:                                                    MTM (medication therapy management) is a service provided by a clinical pharmacist designed to help you get the most of out of your medicines.      1. Updated Jardiance to 10 mg by mouth once daily.     2. I scheduled a lab appointment for Tuesday, August 23rd at 4 PM to recheck your A1c.    3. If your A1c comes back elevated we could consider increasing your Ozempic dose - though because the highest dose is on backorder we may have to wait a little bit to start this change. Labs ordered.    4. Test strips refilled.    Follow-up: we will determine follow-up after labs.     It was great speaking with you today.  I value your experience and would be very thankful for your time in providing feedback in our clinic survey. In the next few days, you may receive an email or text message from [employer] with a link to a survey related to your  clinical pharmacist.\"     To schedule another MTM appointment, please call the clinic directly or you may call the MTM scheduling line at [phone] or toll-free at [phone].     My Clinical Pharmacist's contact information:                                                      Please feel free to contact me with any questions or concerns you have.      Redd Sterling, PharmD, [facility]  Medication Therapy Management Pharmacist  Pager: [phone]    "

## 2022-08-23 ENCOUNTER — LAB (OUTPATIENT)
Dept: LAB | Facility: CLINIC | Age: 52
End: 2022-08-23
Payer: COMMERCIAL

## 2022-08-23 DIAGNOSIS — Z13.220 SCREENING FOR HYPERLIPIDEMIA: ICD-10-CM

## 2022-08-23 DIAGNOSIS — E11.65 TYPE 2 DIABETES MELLITUS WITH HYPERGLYCEMIA, WITHOUT LONG-TERM CURRENT USE OF INSULIN (H): ICD-10-CM

## 2022-08-23 DIAGNOSIS — I50.9 CONGESTIVE HEART FAILURE, UNSPECIFIED HF CHRONICITY, UNSPECIFIED HEART FAILURE TYPE (H): ICD-10-CM

## 2022-08-23 LAB
ERYTHROCYTE [DISTWIDTH] IN BLOOD BY AUTOMATED COUNT: 13.8 % (ref 10–15)
HBA1C MFR BLD: 8.4 % (ref 0–5.6)
HCT VFR BLD AUTO: 43 % (ref 35–47)
HGB BLD-MCNC: 13.9 G/DL (ref 11.7–15.7)
MCH RBC QN AUTO: 27.3 PG (ref 26.5–33)
MCHC RBC AUTO-ENTMCNC: 32.3 G/DL (ref 31.5–36.5)
MCV RBC AUTO: 84 FL (ref 78–100)
PLATELET # BLD AUTO: 269 10E3/UL (ref 150–450)
RBC # BLD AUTO: 5.1 10E6/UL (ref 3.8–5.2)
WBC # BLD AUTO: 10.8 10E3/UL (ref 4–11)

## 2022-08-23 PROCEDURE — 85027 COMPLETE CBC AUTOMATED: CPT

## 2022-08-23 PROCEDURE — 83036 HEMOGLOBIN GLYCOSYLATED A1C: CPT

## 2022-08-23 PROCEDURE — 36415 COLL VENOUS BLD VENIPUNCTURE: CPT

## 2022-08-25 RX ORDER — LANCETS
EACH MISCELLANEOUS
Qty: 100 EACH | Refills: 0 | Status: SHIPPED | OUTPATIENT
Start: 2022-08-25

## 2022-08-25 RX ORDER — BLOOD-GLUCOSE METER
KIT MISCELLANEOUS
Qty: 1 KIT | Refills: 0 | Status: SHIPPED | OUTPATIENT
Start: 2022-08-25

## 2022-09-24 DIAGNOSIS — I10 ESSENTIAL HYPERTENSION: ICD-10-CM

## 2022-09-24 DIAGNOSIS — I51.81 STRESS-INDUCED CARDIOMYOPATHY: ICD-10-CM

## 2022-09-24 DIAGNOSIS — N94.3 PMS (PREMENSTRUAL SYNDROME): ICD-10-CM

## 2022-09-28 RX ORDER — LOSARTAN POTASSIUM 50 MG/1
50 TABLET ORAL DAILY
Qty: 90 TABLET | Refills: 2 | Status: SHIPPED | OUTPATIENT
Start: 2022-09-28

## 2022-09-28 RX ORDER — ASPIRIN 81 MG/1
TABLET, COATED ORAL
Qty: 90 TABLET | Refills: 2 | Status: SHIPPED | OUTPATIENT
Start: 2022-09-28 | End: 2023-02-15

## 2022-09-28 NOTE — TELEPHONE ENCOUNTER
Sertraline  Losartan   Aspirin  Routing refill request to provider for review/approval because:  Patient needs to be seen because it has been more than 1 year since last office visit.    Sami Sapp RN

## 2022-09-30 DIAGNOSIS — E11.65 TYPE 2 DIABETES MELLITUS WITH HYPERGLYCEMIA, WITHOUT LONG-TERM CURRENT USE OF INSULIN (H): ICD-10-CM

## 2022-10-03 ENCOUNTER — OFFICE VISIT (OUTPATIENT)
Dept: ORTHOPEDICS | Facility: CLINIC | Age: 52
End: 2022-10-03

## 2022-10-03 ENCOUNTER — ANCILLARY PROCEDURE (OUTPATIENT)
Dept: GENERAL RADIOLOGY | Facility: CLINIC | Age: 52
End: 2022-10-03
Attending: PHYSICIAN ASSISTANT
Payer: COMMERCIAL

## 2022-10-03 VITALS
DIASTOLIC BLOOD PRESSURE: 70 MMHG | SYSTOLIC BLOOD PRESSURE: 104 MMHG | BODY MASS INDEX: 37.21 KG/M2 | HEIGHT: 66 IN | WEIGHT: 231.5 LBS

## 2022-10-03 DIAGNOSIS — M25.511 RIGHT SHOULDER PAIN: ICD-10-CM

## 2022-10-03 DIAGNOSIS — M75.01 ADHESIVE CAPSULITIS OF RIGHT SHOULDER: Primary | ICD-10-CM

## 2022-10-03 DIAGNOSIS — M75.81 ROTATOR CUFF TENDINITIS, RIGHT: ICD-10-CM

## 2022-10-03 PROCEDURE — 20610 DRAIN/INJ JOINT/BURSA W/O US: CPT | Mod: RT | Performed by: PHYSICIAN ASSISTANT

## 2022-10-03 PROCEDURE — 73030 X-RAY EXAM OF SHOULDER: CPT | Mod: TC | Performed by: RADIOLOGY

## 2022-10-03 PROCEDURE — 99203 OFFICE O/P NEW LOW 30 MIN: CPT | Mod: 25 | Performed by: PHYSICIAN ASSISTANT

## 2022-10-03 RX ORDER — TRIAMCINOLONE ACETONIDE 40 MG/ML
80 INJECTION, SUSPENSION INTRA-ARTICULAR; INTRAMUSCULAR
Status: SHIPPED | OUTPATIENT
Start: 2022-10-03

## 2022-10-03 RX ORDER — METFORMIN HCL 500 MG
TABLET, EXTENDED RELEASE 24 HR ORAL
Qty: 90 TABLET | Refills: 0 | Status: SHIPPED | OUTPATIENT
Start: 2022-10-03 | End: 2022-12-28

## 2022-10-03 RX ORDER — BUPIVACAINE HYDROCHLORIDE 5 MG/ML
3 INJECTION, SOLUTION PERINEURAL
Status: SHIPPED | OUTPATIENT
Start: 2022-10-03

## 2022-10-03 RX ORDER — HYDROXYZINE PAMOATE 25 MG/1
CAPSULE ORAL
Qty: 30 CAPSULE | Refills: 1 | Status: SHIPPED | OUTPATIENT
Start: 2022-10-03

## 2022-10-03 RX ADMIN — TRIAMCINOLONE ACETONIDE 80 MG: 40 INJECTION, SUSPENSION INTRA-ARTICULAR; INTRAMUSCULAR at 08:54

## 2022-10-03 RX ADMIN — BUPIVACAINE HYDROCHLORIDE 3 ML: 5 INJECTION, SOLUTION PERINEURAL at 08:54

## 2022-10-03 ASSESSMENT — PAIN SCALES - GENERAL: PAINLEVEL: MILD PAIN (3)

## 2022-10-03 NOTE — TELEPHONE ENCOUNTER
Routing refill request to provider for review/approval because:  Patient needs to be seen because:  6 month visit    Sami Sapp RN

## 2022-10-03 NOTE — LETTER
10/3/2022         RE: Lashawn Reddy  [address] [facility] [id number]        Dear Colleague,    Thank you for referring your patient, Lashawn Reddy, to the [facility]. Please see a copy of my visit note below.    ORTHOPEDIC CONSULT      Chief Complaint: Lashawn Reddy is a 51 year old right hand dominant female who works in customer service at [employer].  She has 2 kids and 1 granddaughter that is about 5 years old.  She enjoys reading and traveling.    She is being seen for   Chief Complaints and History of Present Illnesses   Patient presents with     Shoulder Pain     Right shoulder pain     Consult     Ref: self         History of Present Illness:   Mechanism of Injury: No injury fall or trauma that she knows of  Location: Right lateral shoulder  Duration of Pain: Approximately 3 months  Rating of Pain: 3 out of 10  Pain Quality: Achy  Pain is better with: Advil and Tylenol PM slightly  Pain is worse with: Use of the arm above shoulder level and away and also reaching back and most difficult at night.  Treatment so far consists of: Tylenol PM which helps slightly, Advil which helps slightly, she has not had any formal physical therapy or home exercise program or any injections..   Associated Features: Denies numbness or tingling shooting burning or electric pain but does note that the pain sometimes goes from the hand all the way up to the trapezius area as well as the lateral shoulder.  Prior history of related problems: No previous major injury trauma or surgery.  Pain is Limiting: Heavy use of the right upper extremity  Here to: Orthopedic consultation  The Pain Has: Gotten slightly worse over the last 3 months  Additional History: Patient feels she can feel a click with some movements.      Patient's past medical, surgical, social and family histories reviewed.     Past Medical History:   Diagnosis Date     Anemia 11/15/2018     CAD (coronary artery disease)      Nonobstructive per angiogram 3/2019     Depressive disorder, not elsewhere classified     depression in the post partum period after her daughter     Diabetes mellitus, type 2 (H) 6/6/2013     Diffuse cystic mastopathy     fibrocystic breast disease     Former smoker      Lymphedema of right lower extremity      Peripheral venous insufficiency      Personal history of DVT (deep vein thrombosis) 8/30/2018     S/P hysterectomy 9/12/2018     Sigmoid diverticulitis 11/15/2018     Tobacco use disorder     quit in 2008     Type 2 diabetes, HbA1c goal < 7% (H) 6/6/2013     Varicose veins of lower extremities with complications 5/13/2005     Problem list name updated by automated process. Provider to review        Past Surgical History:   Procedure Laterality Date     COLONOSCOPY  6/21/2013    Procedure: COLONOSCOPY;  colonoscopy;  Surgeon: Vamshi Loomis MD;  Location:  GI     COLONOSCOPY N/A 2/22/2017    Procedure: COLONOSCOPY;  Surgeon: Raoul Sage MD;  Location:  GI     CV HEART CATHETERIZATION WITH POSSIBLE INTERVENTION N/A 3/5/2019    Procedure: Heart Catheterization with possible Intervention;  Surgeon: Kirstin Lynn MD;  Location:  HEART CARDIAC CATH LAB     EXCISE MASS LOWER EXTREMITY Left 12/26/2019    Procedure: Excision Left Posterior Knee Mass;  Surgeon: Raoul Sage MD;  Location: [facility]     HC DILATION/CURETTAGE DIAG/THER NON OB  1986    after a miscarriage     HC REMOVAL OF TONSILS,<13 Y/O       HYSTERECTOMY       HYSTERECTOMY TOTAL ABDOMINAL, SALPINGECTOMY Bilateral 9/12/2018    Procedure: HYSTERECTOMY TOTAL ABDOMINAL, SALPINGECTOMY;  Total Abdominal Hysterectomy, left Salpingectomy;  Surgeon: Temi Corado MD;  Location: [facility]     HYSTERECTOMY, PAP NO LONGER INDICATED  2018     TUBAL LIGATION  08/04/2006     ZZC ANESTH,LOWER LEG VEIN SURG,NOS  2002    bilateral     Z APPENDECTOMY  04/14/89     [facility]       Medications:  ACCU-CHEK  GUIDE test strip, USE TO TEST BLOOD SUGAR 2 TIMES DAILY OR AS DIRECTED.  ASPIRIN LOW DOSE 81 MG EC tablet, TAKE 1 TABLET (81 MG) BY MOUTH DAILY  blood glucose (CONTOUR NEXT TEST) test strip, Use to test blood sugar three times daily or as directed.  blood glucose monitoring (ACCU-CHEK FASTCLIX) lancets, TEST TWICE A DAY  Blood Glucose Monitoring Suppl (ONETOUCH VERIO REFLECT) w/Device KIT, USE WITH TEST STRIPS  empagliflozin (JARDIANCE) 10 MG TABS tablet, Take 1 tablet (10 mg) by mouth daily  Fiber POWD, Take 1 Scoop by mouth daily  fluconazole (DIFLUCAN) 150 MG tablet, Take 1 tablet (150 mg) by mouth every 3 days  hydrochlorothiazide (HYDRODIURIL) 25 MG tablet, TAKE 1 TABLET (25 MG) BY MOUTH DAILY - NEED OFFICE VISIT FOR ANY FURTHER REFILLS  Insulin Glargine-yfgn (SEMGLEE, YFGN,) 100 UNIT/ML SOLN, Inject 12 Units Subcutaneous daily Increase by 2 units every 3 days as directed. Max dose 30 units daily.  losartan (COZAAR) 50 MG tablet, TAKE 1 TABLET (50 MG) BY MOUTH DAILY  metFORMIN (GLUCOPHAGE XR) 500 MG 24 hr tablet, TAKE 1 TABLET BY MOUTH EVERY DAY WITH FOOD  nystatin-triamcinolone (MYCOLOG II) 258297-2.1 UNIT/GM-% external cream, Apply topically daily as needed (rash or itching)  omeprazole (PRILOSEC) 20 MG DR capsule, TAKE 1 CAPSULE BY MOUTH EVERY DAY AS NEEDED FOR ACID REFLUX  OZEMPIC, 1 MG/DOSE, 4 MG/3ML SOPN, INJECT 1 MG SUBCUTANEOUS EVERY 7 DAYS  rosuvastatin (CRESTOR) 20 MG tablet, Take 1 tablet (20 mg) by mouth daily  Sennosides-Docusate Sodium (SENNA-DOCUSATE SODIUM PO), Take 2 capsules by mouth At Bedtime  sertraline (ZOLOFT) 50 MG tablet, TAKE 1/2 TABLET (25MG) BY MOUTH EVERY MORNING  Vitamin D, Cholecalciferol, 1000 units TABS, Take 2,000 Units by mouth daily   [DISCONTINUED] JARDIANCE 25 MG TABS tablet, 1TAKE BY MOUTH EVERY DAY (Patient taking differently: Take 12.5 mg by mouth daily)    No current facility-administered medications on file prior to visit.      Allergies   Allergen Reactions      "Glipizide Other (See Comments)     Hunger spikes     No Known Drug Allergies        Social History     Occupational History     Occupation: labor   Tobacco Use     Smoking status: Former Smoker     Years: 16.00     Types: Cigarettes     Quit date: 2008     Years since quittin.0     Smokeless tobacco: Never Used   Substance and Sexual Activity     Alcohol use: Yes     Alcohol/week: 0.0 standard drinks     Comment: couple drinks per year     Drug use: No     Sexual activity: Yes     Partners: Male     Birth control/protection: Surgical     Comment: tubal ligation       Family History   Problem Relation Age of Onset     Anesthesia Reaction Mother         Severe nausea     Gastrointestinal Disease Mother         Partial colon removal secondary to a blockage     Gynecology Mother         hysterectomy     Lipids Mother      Lipids Father      Cancer - colorectal Father         dx Oct '03 (dx at age 57)     Cancer Father         skin     Hypertension Father      Cancer Maternal Grandmother         colon at age 68     Cerebrovascular Disease Paternal Grandfather      Cerebrovascular Disease Paternal Grandmother         brain aneurysm     Arthritis Sister         mainly affecting her legs     Gastrointestinal Disease Sister         2 sisters with colon polyps     Cancer Sister         cervical ca     Cancer Maternal Aunt         all over ? breast was the origin     Cancer Maternal Aunt         pancreatic     Heart Disease Maternal Aunt         MI        REVIEW OF SYSTEMS  10 point review systems performed otherwise negative as noted as per history of present illness.    Physical Exam:  Vitals: /70   Ht 1.664 m (5' 5.5\")   Wt 105 kg (231 lb 8 oz)   LMP 2018 (Exact Date)   BMI 37.94 kg/m    BMI= Body mass index is 37.94 kg/m .    Constitutional: healthy, alert and no acute distress   Psychiatric: mentation appears normal and affect normal/bright  NEURO: no focal deficits, CMS intact right upper " extremity  RESP: Normal with easy respirations and no use of accessory muscles to breathe, no audible wheezing or retractions  CV: +2 radial pulse and her hand is warm to palpation.   SKIN: No erythema, rashes, excoriation, or breakdown. No evidence of infection.   MUSCULOSKELETAL:    INSPECTION of right shoulder: No gross deformities, erythema, edema, ecchymosis, atrophy or fasciculations.     PALPATION: No tenderness AC joint, proximal biceps tendon, clavicle, lateral shoulder, posterior shoulder, trapezius area. No increased warmth noted.     ROM: Passive: Forward flexion to 150  compared to 180 on the contralateral side, abduction to 160  compared to 180 on the contralateral side, external rotation to 45 degrees and symmetrical, internal rotation to back pocket.  All range of motion is without catching, locking but there is pain in all range of motion directions especially at maximal range of motion.  Also noted with passive range of motion patient has a hard stop her I cannot move her past forward flexion and abduction..      STRENGTH: 5 out of 5 , deltoid as well as internal and external rotation. 5 out of 5 supraspinatus, infraspinatus and subscapularis.  Patient did have some pain with supraspinatus rotator cuff testing.  The lateral shoulder.    SPECIAL TEST: Patient has a negative Neer test, negative Garcia sign, negative cross over test, negative bear hug test and negative liftoff test, negative empty can and full can test but patient has weakness with empty can testing and some pain at the lateral shoulder also., negative external rotator lag sign, negative drop test, negative crank test.  GAIT: non-antalgic  Lymph: no palpable lymph nodes    Diagnostic Modalities:  X-rays done today show no fracture no dislocation no tumor no high riding humerus no downsloping acromion good joint spacing at the glenohumeral joint and also the AC joint.    Independent visualization of the images was  performed.    Impression: 1.  Right shoulder adhesive capsulitis.  2.  Right shoulder rotator cuff tendinitis.    Plan:  All of the above pertinent physical exam and imaging modalities findings was reviewed with Lashawn .    Large Joint Injection/Arthocentesis: R subacromial bursa    Date/Time: 10/3/2022 8:54 AM  Performed by: Bob Nogueira PA-C  Authorized by: Bob Nogueira PA-C     Indications:  Pain  Needle Size:  22 G  Guidance: landmark guided    Approach:  Posterolateral  Location:  Shoulder      Site:  R subacromial bursa  Medications:  80 mg triamcinolone 40 MG/ML; 3 mL bupivacaine 0.5 %  Aspirate amount (mL):  0  Procedure discussed: discussed risks, benefits, and alternatives    Consent Given by:  Patient  Timeout: timeout called immediately prior to procedure    Prep: patient was prepped and draped in usual sterile fashion     The skin was prepped with betadine. The patient was in a seated position. I used arian chloride spray prior to doing the injection.  The patient tolerated the injection well, and there were no complications. The injection site was covered with a Band-Aid.           FOCUSED PLAN:  51-year-old diabetic female with 3 months of increasing shoulder pain and decreased range of motion.  On exam today the patient has resistance to active range of motion with forward flexion and abduction and external rotation.  Patient also has pain with rotator cuff testing.  Patient has had some relief with anti-inflammatory medication and Tylenol PM.  Patient is having most of her trouble at night with difficulty sleeping.  Patient is having trouble with activity away from body and above shoulder level.  Today we decided to do a steroid injection as I have a feeling this patient has adhesive capsulitis as well as rotator cuff tendinitis.  Patient can have another steroid injection in 3-month if needed.  We ordered formal physical therapy to work on stretching.  Also ordered some Vistaril 1 to 2 tablets  to take at night, quantity 30 with 1 refill to help her sleep at night.  Patient is to work on stretching and do formal therapy to help the adhesive capsulitis.  Patient can follow-up on an as-needed basis.    Re-x-ray on return: No      This note was dictated with [employer].    Bob Nogueira PA-C        Again, thank you for allowing me to participate in the care of your patient.        Sincerely,        Bob Nogueira PA-C

## 2022-10-03 NOTE — PROGRESS NOTES
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  She has 2 kids and 1 granddaughter that is about 5 years old.  She enjoys reading and traveling.    She is being seen for   Chief Complaints and History of Present Illnesses   Patient presents with     Shoulder Pain     Right shoulder pain     Consult     Ref: self         History of Present Illness:   Mechanism of Injury: No injury fall or trauma that she knows of  Location: Right lateral shoulder  Duration of Pain: Approximately 3 months  Rating of Pain: 3 out of 10  Pain Quality: Achy  Pain is better with: Advil and Tylenol PM slightly  Pain is worse with: Use of the arm above shoulder level and away and also reaching back and most difficult at night.  Treatment so far consists of: Tylenol PM which helps slightly, Advil which helps slightly, she has not had any formal physical therapy or home exercise program or any injections..   Associated Features: Denies numbness or tingling shooting burning or electric pain but does note that the pain sometimes goes from the hand all the way up to the trapezius area as well as the lateral shoulder.  Prior history of related problems: No previous major injury trauma or surgery.  Pain is Limiting: Heavy use of the right upper extremity  Here to: Orthopedic consultation  The Pain Has: Gotten slightly worse over the last 3 months  Additional History: Patient feels she can feel a click with some movements.      Patient's past medical, surgical, social and family histories reviewed.     Past Medical History:   Diagnosis Date     Anemia 11/15/2018     CAD (coronary artery disease)     Nonobstructive per angiogram 3/2019     Depressive disorder, not elsewhere classified     depression in the post partum period after her daughter     Diabetes mellitus, type 2 (H) 6/6/2013     Diffuse cystic mastopathy     fibrocystic breast disease      Former smoker      Lymphedema of right lower extremity      Peripheral venous insufficiency      Personal history of DVT (deep vein thrombosis) 8/30/2018     S/P hysterectomy 9/12/2018     Sigmoid diverticulitis 11/15/2018     Tobacco use disorder     quit in 2008     Type 2 diabetes, HbA1c goal < 7% (H) 6/6/2013     Varicose veins of lower extremities with complications 5/13/2005     Problem list name updated by automated process. Provider to review        Past Surgical History:   Procedure Laterality Date     COLONOSCOPY  6/21/2013    Procedure: COLONOSCOPY;  colonoscopy;  Surgeon: Vamshi Loomis MD;  [facility]: [facility]     COLONOSCOPY N/A 2/22/2017    Procedure: COLONOSCOPY;  Surgeon: Raoul Sage MD;  Location: [facility]     CV HEART CATHETERIZATION WITH POSSIBLE INTERVENTION N/A 3/5/2019    Procedure: Heart Catheterization with possible Intervention;  Surgeon: Kirstin Lynn MD;  Location:  HEART CARDIAC CATH LAB     EXCISE MASS LOWER EXTREMITY Left 12/26/2019    Procedure: Excision Left Posterior Knee Mass;  Surgeon: Raoul Sage MD;  Location: [facility]     HC DILATION/CURETTAGE DIAG/THER NON OB  1986    after a miscarriage     HC REMOVAL OF TONSILS,<10 Y/O       HYSTERECTOMY       HYSTERECTOMY TOTAL ABDOMINAL, SALPINGECTOMY Bilateral 9/12/2018    Procedure: HYSTERECTOMY TOTAL ABDOMINAL, SALPINGECTOMY;  Total Abdominal Hysterectomy, left Salpingectomy;  Surgeon: Temi Corado MD;  [facility]: [facility]     HYSTERECTOMY, PAP NO LONGER INDICATED  2018     TUBAL LIGATION  08/04/2006     ZC ANESTH,LOWER LEG VEIN SURG,NOS  2002    bilateral     [facility]  [facility]14/89     [facility]  [facility]       Medications:  ACCU-CHEK GUIDE test strip, USE TO TEST BLOOD SUGAR 2 TIMES DAILY OR AS DIRECTED.  ASPIRIN LOW DOSE 81 MG EC tablet, TAKE 1 TABLET (81 MG) BY MOUTH DAILY  blood glucose (CONTOUR NEXT TEST) test strip, Use to test blood sugar three times daily or as  directed.  blood glucose monitoring (ACCU-CHEK FASTCLIX) lancets, TEST TWICE A DAY  Blood Glucose Monitoring Suppl (ONETOUCH VERIO REFLECT) w/Device KIT, USE WITH TEST STRIPS  empagliflozin (JARDIANCE) 10 MG TABS tablet, Take 1 tablet (10 mg) by mouth daily  Fiber POWD, Take 1 Scoop by mouth daily  fluconazole (DIFLUCAN) 150 MG tablet, Take 1 tablet (150 mg) by mouth every 3 days  hydrochlorothiazide (HYDRODIURIL) 25 MG tablet, TAKE 1 TABLET (25 MG) BY MOUTH DAILY - NEED OFFICE VISIT FOR ANY FURTHER REFILLS  Insulin Glargine-yfgn (SEMGLEE, YFGN,) 100 UNIT/ML SOLN, Inject 12 Units Subcutaneous daily Increase by 2 units every 3 days as directed. Max dose 30 units daily.  losartan (COZAAR) 50 MG tablet, TAKE 1 TABLET (50 MG) BY MOUTH DAILY  metFORMIN (GLUCOPHAGE XR) 500 MG 24 hr tablet, TAKE 1 TABLET BY MOUTH EVERY DAY WITH FOOD  nystatin-triamcinolone (MYCOLOG II) 465618-1.1 UNIT/GM-% external cream, Apply topically daily as needed (rash or itching)  omeprazole (PRILOSEC) 20 MG DR capsule, TAKE 1 CAPSULE BY MOUTH EVERY DAY AS NEEDED FOR ACID REFLUX  OZEMPIC, 1 MG/DOSE, 4 MG/3ML SOPN, INJECT 1 MG SUBCUTANEOUS EVERY 7 DAYS  rosuvastatin (CRESTOR) 20 MG tablet, Take 1 tablet (20 mg) by mouth daily  Sennosides-Docusate Sodium (SENNA-DOCUSATE SODIUM PO), Take 2 capsules by mouth At Bedtime  sertraline (ZOLOFT) 50 MG tablet, TAKE 1/2 TABLET (25MG) BY MOUTH EVERY MORNING  Vitamin D, Cholecalciferol, 1000 units TABS, Take 2,000 Units by mouth daily   [DISCONTINUED] JARDIANCE 25 MG TABS tablet, 1TAKE BY MOUTH EVERY DAY (Patient taking differently: Take 12.5 mg by mouth daily)    No current facility-administered medications on file prior to visit.      Allergies   Allergen Reactions     Glipizide Other (See Comments)     Hunger spikes     No Known Drug Allergies        Social History     Occupational History     Occupation: labor   Tobacco Use     Smoking status: Former Smoker     Years: 16.00     Types: Cigarettes     Quit  "date: 2008     Years since quittin.0     Smokeless tobacco: Never Used   Substance and Sexual Activity     Alcohol use: Yes     Alcohol/week: 0.0 standard drinks     Comment: couple drinks per year     Drug use: No     Sexual activity: Yes     Partners: Male     Birth control/protection: Surgical     Comment: tubal ligation       Family History   Problem Relation Age of Onset     Anesthesia Reaction Mother         Severe nausea     Gastrointestinal Disease Mother         Partial colon removal secondary to a blockage     Gynecology Mother         hysterectomy     Lipids Mother      Lipids Father      Cancer - colorectal Father         dx Oct '03 (dx at age 57)     Cancer Father         skin     Hypertension Father      Cancer Maternal Grandmother         colon at age 68     Cerebrovascular Disease Paternal Grandfather      Cerebrovascular Disease Paternal Grandmother         brain aneurysm     Arthritis Sister         mainly affecting her legs     Gastrointestinal Disease Sister         2 sisters with colon polyps     Cancer Sister         cervical ca     Cancer Maternal Aunt         all over ? breast was the origin     Cancer Maternal Aunt         pancreatic     Heart Disease Maternal Aunt         MI        REVIEW OF SYSTEMS  10 point review systems performed otherwise negative as noted as per history of present illness.    Physical Exam:  Vitals: /70   Ht 1.664 m (5' 5.5\")   Wt 105 kg (231 lb 8 oz)   LMP 2018 (Exact Date)   BMI 37.94 kg/m    BMI= Body mass index is 37.94 kg/m .    Constitutional: healthy, alert and no acute distress   Psychiatric: mentation appears normal and affect normal/bright  NEURO: no focal deficits, CMS intact right upper extremity  RESP: Normal with easy respirations and no use of accessory muscles to breathe, no audible wheezing or retractions  CV: +2 radial pulse and her hand is warm to palpation.   SKIN: No erythema, rashes, excoriation, or breakdown. No " evidence of infection.   MUSCULOSKELETAL:    INSPECTION of right shoulder: No gross deformities, erythema, edema, ecchymosis, atrophy or fasciculations.     PALPATION: No tenderness AC joint, proximal biceps tendon, clavicle, lateral shoulder, posterior shoulder, trapezius area. No increased warmth noted.     ROM: Passive: Forward flexion to 150  compared to 180 on the contralateral side, abduction to 160  compared to 180 on the contralateral side, external rotation to 45 degrees and symmetrical, internal rotation to back pocket.  All range of motion is without catching, locking but there is pain in all range of motion directions especially at maximal range of motion.  Also noted with passive range of motion patient has a hard stop her I cannot move her past forward flexion and abduction..      STRENGTH: 5 out of 5 , deltoid as well as internal and external rotation. 5 out of 5 supraspinatus, infraspinatus and subscapularis.  Patient did have some pain with supraspinatus rotator cuff testing.  The lateral shoulder.    SPECIAL TEST: Patient has a negative Neer test, negative Garcia sign, negative cross over test, negative bear hug test and negative liftoff test, negative empty can and full can test but patient has weakness with empty can testing and some pain at the lateral shoulder also., negative external rotator lag sign, negative drop test, negative crank test.  GAIT: non-antalgic  Lymph: no palpable lymph nodes    Diagnostic Modalities:  X-rays done today show no fracture no dislocation no tumor no high riding humerus no downsloping acromion good joint spacing at the glenohumeral joint and also the AC joint.    Independent visualization of the images was performed.    Impression: 1.  Right shoulder adhesive capsulitis.  2.  Right shoulder rotator cuff tendinitis.    Plan:  All of the above pertinent physical exam and imaging modalities findings was reviewed with Lashawn .    Large Joint  Injection/Arthocentesis: R subacromial bursa    Date/Time: 10/3/2022 8:54 AM  Performed by: Bob Nogueira PA-C  Authorized by: Bob Nogueira PA-C     Indications:  Pain  Needle Size:  22 G  Guidance: landmark guided    Approach:  Posterolateral  Location:  Shoulder      Site:  R subacromial bursa  Medications:  80 mg triamcinolone 40 MG/ML; 3 mL bupivacaine 0.5 %  Aspirate amount (mL):  0  Procedure discussed: discussed risks, benefits, and alternatives    Consent Given by:  Patient  Timeout: timeout called immediately prior to procedure    Prep: patient was prepped and draped in usual sterile fashion     The skin was prepped with betadine. The patient was in a seated position. I used arian chloride spray prior to doing the injection.  The patient tolerated the injection well, and there were no complications. The injection site was covered with a Band-Aid.           FOCUSED PLAN:  51-year-old diabetic female with 3 months of increasing shoulder pain and decreased range of motion.  On exam today the patient has resistance to active range of motion with forward flexion and abduction and external rotation.  Patient also has pain with rotator cuff testing.  Patient has had some relief with anti-inflammatory medication and Tylenol PM.  Patient is having most of her trouble at night with difficulty sleeping.  Patient is having trouble with activity away from body and above shoulder level.  Today we decided to do a steroid injection as I have a feeling this patient has adhesive capsulitis as well as rotator cuff tendinitis.  Patient can have another steroid injection in 3-month if needed.  We ordered formal physical therapy to work on stretching.  Also ordered some Vistaril 1 to 2 tablets to take at night, quantity 30 with 1 refill to help her sleep at night.  Patient is to work on stretching and do formal therapy to help the adhesive capsulitis.  Patient can follow-up on an as-needed basis.    Re-x-ray on return:  No      This note was dictated with [employer].    Bob Nogueira PA-C

## 2022-10-05 DIAGNOSIS — I10 ESSENTIAL HYPERTENSION: ICD-10-CM

## 2022-10-05 DIAGNOSIS — I50.9 CONGESTIVE HEART FAILURE, UNSPECIFIED HF CHRONICITY, UNSPECIFIED HEART FAILURE TYPE (H): ICD-10-CM

## 2022-10-06 ENCOUNTER — HOSPITAL ENCOUNTER (OUTPATIENT)
Dept: PHYSICAL THERAPY | Facility: CLINIC | Age: 52
Setting detail: THERAPIES SERIES
Discharge: HOME | End: 2022-10-06
Attending: PHYSICIAN ASSISTANT
Payer: COMMERCIAL

## 2022-10-06 DIAGNOSIS — M75.01 ADHESIVE CAPSULITIS OF RIGHT SHOULDER: ICD-10-CM

## 2022-10-06 DIAGNOSIS — M75.81 ROTATOR CUFF TENDINITIS, RIGHT: ICD-10-CM

## 2022-10-06 PROCEDURE — 97140 MANUAL THERAPY 1/> REGIONS: CPT | Mod: GP | Performed by: PHYSICAL THERAPIST

## 2022-10-06 PROCEDURE — 97110 THERAPEUTIC EXERCISES: CPT | Mod: GP | Performed by: PHYSICAL THERAPIST

## 2022-10-06 PROCEDURE — 97161 PT EVAL LOW COMPLEX 20 MIN: CPT | Mod: GP | Performed by: PHYSICAL THERAPIST

## 2022-10-06 PROCEDURE — 97010 HOT OR COLD PACKS THERAPY: CPT | Mod: GP | Performed by: PHYSICAL THERAPIST

## 2022-10-06 NOTE — PROGRESS NOTES
10/06/22 1500   General Information   Type of Visit Initial OP Ortho PT Evaluation   Start of Care Date 10/06/22   Referring Physician Bob Nogueira PA-C   Patient/Family Goals Statement Reduce pain   Orders Evaluate and Treat   Date of Order 10/03/22   Certification Required? No   Medical Diagnosis R shoulder adhesive capsulitis, R shoulder rotator cuff tendonitis.   Surgical/Medical history reviewed Yes   Weight-Bearing Status - LUE full weight-bearing   Weight-Bearing Status - RUE full weight-bearing   Weight-Bearing Status - LLE full weight-bearing   Weight-Bearing Status - RLE full weight-bearing   Body Part(s)   Body Part(s) Shoulder   Presentation and Etiology   Pertinent history of current problem (include personal factors and/or comorbidities that impact the POC) Pt reports gradual onset of R shoulder pain over the past few months.  Pt reports gradual restriction with ROM. Pt had injection on 10/3/22. Pt reports arm pain down R arm is still present.  Pt reports slight increase in motion. PMH: Anemia, DMII, CAD, depression, morbid obesity, hyperlipidemia, HTN, CHF. Cortisone injection R shoulder 10/3/22.   Impairments A. Pain;D. Decreased ROM;F. Decreased strength and endurance   Functional Limitations perform activities of daily living;perform required work activities;perform desired leisure / sports activities   Symptom Location R superior and upper arm.   How/Where did it occur From insidious onset   Onset date of current episode/exacerbation 06/01/22   Chronicity Chronic   Pain rating (0-10 point scale) Best (/10);Worst (/10)   Best (/10) 5/10   Worst (/10) 9/10   Pain quality G. Cramping;C. Aching   Frequency of pain/symptoms C. With activity   Pain/symptoms are: Worse during the night   Pain/symptoms exacerbated by D. Carrying;C. Lifting;G. Certain positions;H. Overhead reach;K. Home tasks;L. Work tasks   Pain/symptoms eased by C. Rest;E. Changing positions;F. Certain positions;I. OTC medication(s)    Progression of symptoms since onset: Improved   Current Level of Function   Current Community Support Family/friend caregiver   Patient role/employment history A. Employed   Employment Comments customer service at [employer]   Living environment House[facility]   Home/community accessibility no concerns   Current equipment-Gait/Locomotion None   Current equipment-ADL None   Fall Risk Screen   Fall screen completed by PT   Have you fallen 2 or more times in the past year? No   Have you fallen and had an injury in the past year? No   Abuse Screen (yes response referral indicated)   Feels Unsafe at Home or Work/School no   Feels Threatened by Someone no   Does Anyone Try to Keep You From Having Contact with Others or Doing Things Outside Your Home? no   Physical Signs of Abuse Present no   Patient needs abuse support services and resources No   Shoulder Objective Findings   Side (if bilateral, select both right and left) Right   Right Shoulder Flexion AROM 85   Right Shoulder Flexion PROM 110   Right Shoulder Abduction AROM 75   Right Shoulder Abduction PROM 95   Right Shoulder ER AROM 30   Right Shoulder ER PROM 45   Right Shoulder IR AROM to R buttock.   Right Shoulder IR PROM 30   Right Shoulder Flexion Strength 4/5   Right Shoulder Abduction Strength 4/5   Right Shoulder ER Strength 4/5   Right Shoulder IR Strength 4/5   Planned Therapy Interventions   Planned Therapy Interventions joint mobilization;manual therapy;neuromuscular re-education;ROM;strengthening;stretching   Planned Modality Interventions   Planned Modality Interventions Cryotherapy;Electrical stimulation;Hot packs;TENS;Ultrasound   Planned Modality Interventions Comments as needed   Clinical Impression   Criteria for Skilled Therapeutic Interventions Met yes, treatment indicated   PT Diagnosis R shoulder pain   Influenced by the following impairments Pain, decreased ROM, weakness   Functional limitations due to impairments reaching, lifting,  carrying   Clinical Presentation Stable/Uncomplicated   Clinical Presentation Rationale Clinical judgement   Clinical Decision Making (Complexity) Low complexity   Therapy Frequency 2 times/Week   Predicted Duration of Therapy Intervention (days/wks) 8 weeks   Risk & Benefits of therapy have been explained Yes   Patient, Family & other staff in agreement with plan of care Yes   Clinical Impression Comments Pt is a 51 y.o. female who presented to PT with symptoms of R shoulder pain, limited ROM, and weakness. Pt will benefit from skilled PT to address these impairments with modalities, manual therapy, and exercise based treatments.   Education Assessment   Preferred Learning Style Demonstration   Barriers to Learning No barriers   ORTHO GOALS   PT Ortho Eval Goals 1;2;3   Ortho Goal 1   Goal Identifier Shoulder elevation   Goal Description Pt will demonstrate R shoulder elevation (flexion or scaption) to 150 degrees or better in order to improve tolerance to overhead reaching.   Target Date 12/01/22   Ortho Goal 2   Goal Identifier HEP   Goal Description Pt will be independent with HEP in order to improve R Shoulder ROM and strength.   Target Date 12/01/22   Ortho Goal 3   Goal Identifier SPADI   Goal Description Pt will demonstrate 20% improvement per SPADI in order to demonstrate functional improvement of R shoulder.   Target Date 12/01/22   Total Evaluation Time   PT Edilson Low Complexity Minutes (77263) 15

## 2022-10-07 RX ORDER — HYDROCHLOROTHIAZIDE 25 MG/1
TABLET ORAL
Qty: 30 TABLET | Refills: 0 | Status: SHIPPED | OUTPATIENT
Start: 2022-10-07 | End: 2023-01-04

## 2022-10-07 NOTE — TELEPHONE ENCOUNTER
"Requested Prescriptions   Pending Prescriptions Disp Refills    hydrochlorothiazide (HYDRODIURIL) 25 MG tablet 30 tablet 0     Sig: TAKE 1 TABLET (25 MG) BY MOUTH DAILY - NEED OFFICE VISIT FOR ANY FURTHER REFILLS        Diuretics (Including Combos) Protocol Failed - 10/5/2022  3:35 PM        Failed - Recent (12 mo) or future (30 days) visit within the authorizing provider's specialty     Patient has had an office visit with the authorizing provider or a provider within the authorizing providers department within the previous 12 mos or has a future within next 30 days. See \"Patient Info\" tab in inbasket, or \"Choose Columns\" in Meds & Orders section of the refill encounter.              Passed - Blood pressure under 140/90 in past 12 months       BP Readings from Last 3 Encounters:   10/03/22 104/70   11/24/21 116/72   10/05/20 120/78                 Passed - Medication is active on med list        Passed - Patient is age 18 or older        Passed - No active pregancy on record        Passed - Normal serum creatinine on file in past 12 months       Recent Labs   Lab Test 04/04/22  1401   CR 0.90              Passed - Normal serum potassium on file in past 12 months       Recent Labs   Lab Test 04/04/22  1401   POTASSIUM 3.7                    Passed - Normal serum sodium on file in past 12 months       Recent Labs   Lab Test 04/04/22  1401                 Passed - No positive pregnancy test in past 12 months            Routing refill request to provider for review/approval because:  Rehana given x1 and patient did not follow up, please advise        Veronika Grande, TARANN, RN                  "

## 2022-10-09 ENCOUNTER — HEALTH MAINTENANCE LETTER (OUTPATIENT)
Age: 52
End: 2022-10-09

## 2022-10-11 DIAGNOSIS — I10 ESSENTIAL HYPERTENSION: ICD-10-CM

## 2022-10-11 DIAGNOSIS — I50.9 CONGESTIVE HEART FAILURE, UNSPECIFIED HF CHRONICITY, UNSPECIFIED HEART FAILURE TYPE (H): ICD-10-CM

## 2022-10-11 RX ORDER — HYDROCHLOROTHIAZIDE 25 MG/1
TABLET ORAL
Qty: 30 TABLET | Refills: 0 | OUTPATIENT
Start: 2022-10-11

## 2022-10-11 NOTE — TELEPHONE ENCOUNTER
Prescription was sent 10/7/22 for #30 with 0 refills.  Pharmacy notified via E-Prescribe refusal.    TARAN MontejoN, RN

## 2022-10-12 ENCOUNTER — HOSPITAL ENCOUNTER (OUTPATIENT)
Dept: PHYSICAL THERAPY | Facility: CLINIC | Age: 52
Setting detail: THERAPIES SERIES
Discharge: HOME | End: 2022-10-12
Attending: PHYSICIAN ASSISTANT
Payer: COMMERCIAL

## 2022-10-12 DIAGNOSIS — N94.3 PMS (PREMENSTRUAL SYNDROME): ICD-10-CM

## 2022-10-12 PROCEDURE — 97110 THERAPEUTIC EXERCISES: CPT | Mod: GP | Performed by: PHYSICAL THERAPIST

## 2022-10-21 ENCOUNTER — HOSPITAL ENCOUNTER (OUTPATIENT)
Dept: PHYSICAL THERAPY | Facility: CLINIC | Age: 52
Setting detail: THERAPIES SERIES
Discharge: HOME | End: 2022-10-21
Attending: PHYSICIAN ASSISTANT
Payer: COMMERCIAL

## 2022-10-21 PROCEDURE — 97110 THERAPEUTIC EXERCISES: CPT | Mod: GP | Performed by: PHYSICAL THERAPIST

## 2022-10-21 PROCEDURE — 97010 HOT OR COLD PACKS THERAPY: CPT | Mod: GP | Performed by: PHYSICAL THERAPIST

## 2022-10-21 PROCEDURE — 97140 MANUAL THERAPY 1/> REGIONS: CPT | Mod: GP | Performed by: PHYSICAL THERAPIST

## 2022-10-25 DIAGNOSIS — E11.65 TYPE 2 DIABETES MELLITUS WITH HYPERGLYCEMIA, WITHOUT LONG-TERM CURRENT USE OF INSULIN (H): ICD-10-CM

## 2022-10-26 ENCOUNTER — HOSPITAL ENCOUNTER (OUTPATIENT)
Dept: PHYSICAL THERAPY | Facility: CLINIC | Age: 52
Setting detail: THERAPIES SERIES
Discharge: HOME | End: 2022-10-26
Attending: PHYSICIAN ASSISTANT
Payer: COMMERCIAL

## 2022-10-26 PROCEDURE — 97010 HOT OR COLD PACKS THERAPY: CPT | Mod: GP | Performed by: PHYSICAL THERAPIST

## 2022-10-26 PROCEDURE — 97110 THERAPEUTIC EXERCISES: CPT | Mod: GP | Performed by: PHYSICAL THERAPIST

## 2022-10-26 PROCEDURE — 97140 MANUAL THERAPY 1/> REGIONS: CPT | Mod: GP | Performed by: PHYSICAL THERAPIST

## 2022-10-26 RX ORDER — FLURBIPROFEN SODIUM 0.3 MG/ML
SOLUTION/ DROPS OPHTHALMIC
Qty: 100 EACH | Refills: 1 | OUTPATIENT
Start: 2022-10-26

## 2022-10-26 NOTE — TELEPHONE ENCOUNTER
Pending Prescriptions:                       Disp   Refills    [facility] SAFEPACK PEN NEEDLE 32G X 4 MM m*100 ea*1        Sig: USE 1 PEN NEEDLE DAILY FOR INSULIN INJECTIONS AS AS           DIRECTED.      Routing refill request to provider for review/approval because:  Drug not active on patient's medication list    Anette Jovel RN

## 2022-10-28 ENCOUNTER — HOSPITAL ENCOUNTER (OUTPATIENT)
Dept: PHYSICAL THERAPY | Facility: CLINIC | Age: 52
Setting detail: THERAPIES SERIES
Discharge: HOME | End: 2022-10-28
Attending: PHYSICIAN ASSISTANT
Payer: COMMERCIAL

## 2022-10-28 PROCEDURE — 97010 HOT OR COLD PACKS THERAPY: CPT | Mod: GP | Performed by: PHYSICAL THERAPIST

## 2022-10-28 PROCEDURE — 97110 THERAPEUTIC EXERCISES: CPT | Mod: GP | Performed by: PHYSICAL THERAPIST

## 2022-10-28 PROCEDURE — 97140 MANUAL THERAPY 1/> REGIONS: CPT | Mod: GP | Performed by: PHYSICAL THERAPIST

## 2022-11-02 ENCOUNTER — VIRTUAL VISIT (OUTPATIENT)
Dept: PHARMACY | Facility: CLINIC | Age: 52
End: 2022-11-02
Payer: COMMERCIAL

## 2022-11-02 DIAGNOSIS — E11.65 TYPE 2 DIABETES MELLITUS WITH HYPERGLYCEMIA, WITHOUT LONG-TERM CURRENT USE OF INSULIN (H): Primary | ICD-10-CM

## 2022-11-02 PROCEDURE — 99607 MTMS BY PHARM ADDL 15 MIN: CPT | Performed by: PHARMACIST

## 2022-11-02 PROCEDURE — 99606 MTMS BY PHARM EST 15 MIN: CPT | Performed by: PHARMACIST

## 2022-11-02 RX ORDER — SEMAGLUTIDE 2.68 MG/ML
2 INJECTION, SOLUTION SUBCUTANEOUS WEEKLY
Qty: 9 ML | Refills: 1 | Status: SHIPPED | OUTPATIENT
Start: 2022-11-02 | End: 2023-02-15

## 2022-11-02 NOTE — PROGRESS NOTES
Medication Therapy Management (MTM) Encounter    ASSESSMENT:                            Medication Adherence/Access: No issues identified    Type 2 Diabetes: Patient is not meeting A1c goal of <7%.  Would likely benefit from increase in Ozempic dose to help decrease blood sugars/reduce cravings.     PLAN:                            1. Increase Ozempic to 2 mg under the skin weekly.  There is currently some issues with getting this from our supplier so we may need to continue the 1 mg dose longer. Continue to take your 1 mg under the skin weekly dose until we can get you set up with a new prescription.     2. Refilled pen needles    Follow-up: 3 months or sooner if needed    SUBJECTIVE/OBJECTIVE:                          Lashawn Reddy is a 52 year old female called for a follow-up visit.  Today's visit is a follow-up MTM visit from 2022     Reason for visit: Diabetes Follow-Up.    Allergies/ADRs: Reviewed in chart  Past Medical History: Reviewed in chart  Tobacco: She reports that she quit smoking about 14 years ago. Her smoking use included cigarettes. She has never used smokeless tobacco.  Alcohol: not currently using    Medication Adherence/Access: no issues reported    Type 2 Diabetes:  Currently taking Jardiance 10 mg daily, Lantus 18 units daily, metformin  mg every day, Ozempic 1 mg weekly. Doing better on current dose of Jardiance. Feels that sugars have somewhat plateau'd on her current insulin dose.   Blood sugar monitorin time(s) daily. Ranges (patient reported): 110 to 150-160 mg/dL (usually the higher range) - feels strong sugar cravings if she's gone higher on her insulin dose - worries about going low  Symptoms of low blood sugar? none  Symptoms of high blood sugar? none  Eye exam: up to date  Foot exam: due  Diet/Exercise: Is trying to be more diligent with her portions and carbohydrate intake.  Aspirin: Taking 81mg daily and denies side effects  Statin: Yes: rosuvastatin   ACEi/ARB:  Yes: losartan.    Urine Albumin:   Lab Results   Component Value Date    UMALCR  04/04/2022      Comment:      Unable to calculate:  Urine creatinine or albumin value below detectable level      Lab Results   Component Value Date    A1C 8.4 08/23/2022    A1C 9.1 04/04/2022    A1C 8.4 11/24/2021    A1C 7.6 05/18/2021    A1C 8.0 01/15/2021    A1C 8.6 10/06/2020    A1C 8.7 03/16/2020    A1C 10.4 12/11/2019     Today's Vitals: LMP 09/03/2018 (Exact Date)      BP Readings from Last 3 Encounters:   10/03/22 104/70   11/24/21 116/72   10/05/20 120/78     Wt Readings from Last 5 Encounters:   10/03/22 231 lb 8 oz (105 kg)   11/24/21 231 lb (104.8 kg)   10/05/20 231 lb 2 oz (104.8 kg)   12/23/19 241 lb 3.2 oz (109.4 kg)   12/11/19 251 lb (113.9 kg)     ----------------      I spent 15 minutes with this patient today. All changes were made via collaborative practice agreement with Venkata Jaramillo DO. A copy of the visit note was provided to the patient's provider(s).    The patient was sent via [employer] a summary of these recommendations.     Redd Sterling, KeikoD, BCACP  Medication Therapy Management Pharmacist  Pager: [phone]    Telemedicine Visit Details  Type of service:  Telephone visit  Start Time: 2:11 PM  End Time: 2:26 PM  Originating Location (pt. Location): Home      Distant Location (provider location):  On-site  Provider has received verbal consent for a visit from the patient? Yes     Medication Therapy Recommendations  No medication therapy recommendations to display

## 2022-11-02 NOTE — PATIENT INSTRUCTIONS
"Recommendations from today's MTM visit:                                                    MTM (medication therapy management) is a service provided by a clinical pharmacist designed to help you get the most of out of your medicines.      1. Increase Ozempic to 2 mg under the skin weekly.  There is currently some issues with getting this from our supplier so we may need to continue the 1 mg dose longer. Continue to take your 1 mg under the skin weekly dose until we can get you set up with a new prescription.     2. Refilled pen needles    Follow-up: 3 months or sooner if needed    It was great speaking with you today.  I value your experience and would be very thankful for your time in providing feedback in our clinic survey. In the next few days, you may receive an email or text message from [employer] with a link to a survey related to your  clinical pharmacist.\"     To schedule another MTM appointment, please call the clinic directly or you may call the MTM scheduling line at [phone] or toll-free at [phone].     My Clinical Pharmacist's contact information:                                                      Please feel free to contact me with any questions or concerns you have.      Redd Sterling, PharmD, BCACP  Medication Therapy Management Pharmacist  Pager: [phone]    "

## 2022-11-08 NOTE — TELEPHONE ENCOUNTER
Patient was informed of note below on mychart:     Name from pharmacy: [facility] PEN NEEDLES 4MM          Will file in chart as: [facility] SAFEPACK PEN NEEDLE 32G X 4 MM miscellaneous    Sig: USE 1 PEN NEEDLE DAILY FOR INSULIN INJECTIONS AS AS DIRECTED.    Disp:  100 each    Refills:  1    Start: 10/26/2022    Class: E-Prescribe    Refused by: Gelacio Ivan MD    Refusal reason: Medication has been discontinued    Non-formulary

## 2022-12-06 ENCOUNTER — HOSPITAL ENCOUNTER (OUTPATIENT)
Dept: MAMMOGRAPHY | Facility: CLINIC | Age: 52
Discharge: HOME | End: 2022-12-06
Attending: INTERNAL MEDICINE | Admitting: INTERNAL MEDICINE
Payer: COMMERCIAL

## 2022-12-06 DIAGNOSIS — Z12.31 VISIT FOR SCREENING MAMMOGRAM: ICD-10-CM

## 2022-12-06 PROCEDURE — 77067 SCR MAMMO BI INCL CAD: CPT

## 2022-12-20 DIAGNOSIS — E78.2 MIXED HYPERLIPIDEMIA: ICD-10-CM

## 2022-12-20 DIAGNOSIS — E11.65 TYPE 2 DIABETES MELLITUS WITH HYPERGLYCEMIA, WITHOUT LONG-TERM CURRENT USE OF INSULIN (H): ICD-10-CM

## 2022-12-20 RX ORDER — ROSUVASTATIN CALCIUM 20 MG/1
20 TABLET, COATED ORAL DAILY
Qty: 90 TABLET | Refills: 2 | Status: SHIPPED | OUTPATIENT
Start: 2022-12-20

## 2022-12-20 RX ORDER — EMPAGLIFLOZIN 10 MG/1
TABLET, FILM COATED ORAL
Qty: 90 TABLET | Refills: 1 | Status: SHIPPED | OUTPATIENT
Start: 2022-12-20

## 2022-12-20 NOTE — TELEPHONE ENCOUNTER
"Requested Prescriptions   Pending Prescriptions Disp Refills    rosuvastatin (CRESTOR) 20 MG tablet [Pharmacy Med Name: ROSUVASTATIN 20MG TABLET] 90 tablet 2     Sig: Take 1 tablet (20 mg) by mouth daily       Statins Protocol Failed - 12/20/2022  1:10 AM        Failed - LDL on file in past 12 months     Recent Labs   Lab Test 01/15/21  0825   LDL 50             Failed - Recent (12 mo) or future (30 days) visit within the authorizing provider's specialty     Patient has had an office visit with the authorizing provider or a provider within the authorizing providers department within the previous 12 mos or has a future within next 30 days. See \"Patient Info\" tab in inbasket, or \"Choose Columns\" in Meds & Orders section of the refill encounter.              Passed - No abnormal creatine kinase in past 12 months     Recent Labs   Lab Test 10/25/18  1126   CKT 69                Passed - Medication is active on med list        Passed - Patient is age 18 or older        Passed - No active pregnancy on record        Passed - No positive pregnancy test in past 12 months          JARDIANCE 10 MG TABS tablet [Pharmacy Med Name: JARDIANCE 10MG TABLET] 90 tablet 1     Sig: TAKE 1 TABLET BY MOUTH EVERY DAY       Sodium Glucose Co-Transport Inhibitor Agents Failed - 12/20/2022  1:10 AM        Failed - Patient has documented A1c within the specified period of time.     If HgbA1C is 8 or greater, it needs to be on file within the past 3 months.  If less than 8, must be on file within the past 6 months.     Recent Labs   Lab Test 08/23/22  1604   A1C 8.4*             Failed - Recent (6 mo) or future (30 days) visit within the authorizing provider's specialty     Patient had office visit in the last 6 months or has a visit in the next 30 days with authorizing provider or within the authorizing provider's specialty.  See \"Patient Info\" tab in inbasket, or \"Choose Columns\" in Meds & Orders section of the refill encounter.            " Passed - No creatinine >1.4 or GFR <45 within the past 12 mos     Recent Labs   Lab Test 04/04/22  1401 11/24/21  1456 01/15/21  0825   GFRESTIMATED 77   < > 77   GFRESTBLACK  --   --  90    < > = values in this interval not displayed.       Recent Labs   Lab Test 04/04/22  1401   CR 0.90             Passed - Medication is active on med list        Passed - Patient is age 18 or older        Passed - Patient is not pregnant        Passed - Patient has documented normal Potassium within the last 12 mos.     Recent Labs   Lab Test 04/04/22  1401   POTASSIUM 3.7             Passed - Patient has no positive pregnancy test within the past 12 mos.

## 2022-12-24 DIAGNOSIS — E11.65 TYPE 2 DIABETES MELLITUS WITH HYPERGLYCEMIA, WITHOUT LONG-TERM CURRENT USE OF INSULIN (H): ICD-10-CM

## 2022-12-28 RX ORDER — METFORMIN HCL 500 MG
TABLET, EXTENDED RELEASE 24 HR ORAL
Qty: 90 TABLET | Refills: 0 | Status: SHIPPED | OUTPATIENT
Start: 2022-12-28 | End: 2023-04-13

## 2022-12-28 NOTE — TELEPHONE ENCOUNTER
"Routing refill request to provider for review/approval because:  Future Appointments 12/28/2022 - 6/26/2023      None          Sending to scheduling for yearly office visit due    Requested Prescriptions   Pending Prescriptions Disp Refills    metFORMIN (GLUCOPHAGE XR) 500 MG 24 hr tablet [Pharmacy Med Name: METFORMIN 500MG ER TABLET] 90 tablet 0     Sig: TAKE 1 TABLET BY MOUTH EVERY DAY WITH FOOD       Biguanide Agents Failed - 12/24/2022  1:01 AM        Failed - Patient has documented A1c within the specified period of time.     If HgbA1C is 8 or greater, it needs to be on file within the past 3 months.  If less than 8, must be on file within the past 6 months.     Recent Labs   Lab Test 08/23/22  1604   A1C 8.4*             Failed - Recent (6 mo) or future (30 days) visit within the authorizing provider's specialty     Patient had office visit in the last 6 months or has a visit in the next 30 days with authorizing provider or within the authorizing provider's specialty.  See \"Patient Info\" tab in inbasket, or \"Choose Columns\" in Meds & Orders section of the refill encounter.         "

## 2022-12-30 DIAGNOSIS — I10 ESSENTIAL HYPERTENSION: ICD-10-CM

## 2022-12-30 DIAGNOSIS — I50.9 CONGESTIVE HEART FAILURE, UNSPECIFIED HF CHRONICITY, UNSPECIFIED HEART FAILURE TYPE (H): ICD-10-CM

## 2023-01-03 NOTE — TELEPHONE ENCOUNTER
"Hydrodiuril  Routing refill request to provider for review/approval because:  Patient needs to be seen because it has been more than 1 year since last office visit.  Future Appointments 1/3/2023 - 7/2/2023    None      Last office visit 11/24/2021    Sending to scheduling for yearly office visit due      Requested Prescriptions   Pending Prescriptions Disp Refills     hydrochlorothiazide (HYDRODIURIL) 25 MG tablet 30 tablet 0     Sig: TAKE 1 TABLET (25 MG) BY MOUTH DAILY - NEED OFFICE VISIT FOR ANY FURTHER REFILLS       Diuretics (Including Combos) Protocol Failed - 12/30/2022  9:23 AM        Failed - Recent (12 mo) or future (30 days) visit within the authorizing provider's specialty     Patient has had an office visit with the authorizing provider or a provider within the authorizing providers department within the previous 12 mos or has a future within next 30 days. See \"Patient Info\" tab in inbasket, or \"Choose Columns\" in Meds & Orders section of the refill encounter.         Krystal Oneal RN    "

## 2023-01-04 RX ORDER — HYDROCHLOROTHIAZIDE 25 MG/1
TABLET ORAL
Qty: 30 TABLET | Refills: 0 | Status: SHIPPED | OUTPATIENT
Start: 2023-01-04 | End: 2023-02-21

## 2023-02-15 ENCOUNTER — OFFICE VISIT (OUTPATIENT)
Dept: FAMILY MEDICINE | Facility: CLINIC | Age: 53
End: 2023-02-15
Payer: COMMERCIAL

## 2023-02-15 VITALS
OXYGEN SATURATION: 100 % | TEMPERATURE: 97.4 F | HEART RATE: 78 BPM | HEIGHT: 66 IN | DIASTOLIC BLOOD PRESSURE: 80 MMHG | BODY MASS INDEX: 36 KG/M2 | RESPIRATION RATE: 16 BRPM | WEIGHT: 224 LBS | SYSTOLIC BLOOD PRESSURE: 128 MMHG

## 2023-02-15 DIAGNOSIS — Z00.00 ROUTINE GENERAL MEDICAL EXAMINATION AT A HEALTH CARE FACILITY: Primary | ICD-10-CM

## 2023-02-15 DIAGNOSIS — E78.2 MIXED HYPERLIPIDEMIA: ICD-10-CM

## 2023-02-15 DIAGNOSIS — Z12.11 SCREEN FOR COLON CANCER: ICD-10-CM

## 2023-02-15 DIAGNOSIS — I51.81 STRESS-INDUCED CARDIOMYOPATHY: ICD-10-CM

## 2023-02-15 DIAGNOSIS — F41.9 ANXIETY: ICD-10-CM

## 2023-02-15 DIAGNOSIS — E78.5 HYPERLIPIDEMIA WITH TARGET LDL LESS THAN 100: ICD-10-CM

## 2023-02-15 DIAGNOSIS — N94.3 PMS (PREMENSTRUAL SYNDROME): ICD-10-CM

## 2023-02-15 DIAGNOSIS — E11.65 TYPE 2 DIABETES MELLITUS WITH HYPERGLYCEMIA, WITHOUT LONG-TERM CURRENT USE OF INSULIN (H): ICD-10-CM

## 2023-02-15 DIAGNOSIS — I10 ESSENTIAL HYPERTENSION: ICD-10-CM

## 2023-02-15 DIAGNOSIS — E66.01 MORBID OBESITY (H): ICD-10-CM

## 2023-02-15 LAB
ALT SERPL W P-5'-P-CCNC: 17 U/L (ref 10–35)
ANION GAP SERPL CALCULATED.3IONS-SCNC: 11 MMOL/L (ref 7–15)
BUN SERPL-MCNC: 12.8 MG/DL (ref 6–20)
CALCIUM SERPL-MCNC: 9.5 MG/DL (ref 8.6–10)
CHLORIDE SERPL-SCNC: 102 MMOL/L (ref 98–107)
CHOLEST SERPL-MCNC: 192 MG/DL
CREAT SERPL-MCNC: 0.75 MG/DL (ref 0.51–0.95)
DEPRECATED HCO3 PLAS-SCNC: 26 MMOL/L (ref 22–29)
GFR SERPL CREATININE-BSD FRML MDRD: >90 ML/MIN/1.73M2
GLUCOSE SERPL-MCNC: 171 MG/DL (ref 70–99)
HBA1C MFR BLD: 8.3 %
HDLC SERPL-MCNC: 60 MG/DL
LDLC SERPL CALC-MCNC: 101 MG/DL
NONHDLC SERPL-MCNC: 132 MG/DL
POTASSIUM SERPL-SCNC: 4.1 MMOL/L (ref 3.4–5.3)
SODIUM SERPL-SCNC: 139 MMOL/L (ref 136–145)
TRIGL SERPL-MCNC: 157 MG/DL

## 2023-02-15 PROCEDURE — 84460 ALANINE AMINO (ALT) (SGPT): CPT | Performed by: STUDENT IN AN ORGANIZED HEALTH CARE EDUCATION/TRAINING PROGRAM

## 2023-02-15 PROCEDURE — 80048 BASIC METABOLIC PNL TOTAL CA: CPT | Performed by: STUDENT IN AN ORGANIZED HEALTH CARE EDUCATION/TRAINING PROGRAM

## 2023-02-15 PROCEDURE — 99207 PR FOOT EXAM NO CHARGE: CPT | Mod: 25 | Performed by: STUDENT IN AN ORGANIZED HEALTH CARE EDUCATION/TRAINING PROGRAM

## 2023-02-15 PROCEDURE — 99396 PREV VISIT EST AGE 40-64: CPT | Performed by: STUDENT IN AN ORGANIZED HEALTH CARE EDUCATION/TRAINING PROGRAM

## 2023-02-15 PROCEDURE — 99214 OFFICE O/P EST MOD 30 MIN: CPT | Mod: 25 | Performed by: STUDENT IN AN ORGANIZED HEALTH CARE EDUCATION/TRAINING PROGRAM

## 2023-02-15 PROCEDURE — 80061 LIPID PANEL: CPT | Performed by: STUDENT IN AN ORGANIZED HEALTH CARE EDUCATION/TRAINING PROGRAM

## 2023-02-15 PROCEDURE — 36415 COLL VENOUS BLD VENIPUNCTURE: CPT | Performed by: STUDENT IN AN ORGANIZED HEALTH CARE EDUCATION/TRAINING PROGRAM

## 2023-02-15 PROCEDURE — 83036 HEMOGLOBIN GLYCOSYLATED A1C: CPT | Performed by: STUDENT IN AN ORGANIZED HEALTH CARE EDUCATION/TRAINING PROGRAM

## 2023-02-15 RX ORDER — SEMAGLUTIDE 2.68 MG/ML
2 INJECTION, SOLUTION SUBCUTANEOUS WEEKLY
Qty: 9 ML | Refills: 1 | Status: SHIPPED | OUTPATIENT
Start: 2023-02-15 | End: 2023-12-13

## 2023-02-15 ASSESSMENT — ENCOUNTER SYMPTOMS
DIZZINESS: 0
ABDOMINAL PAIN: 0
SHORTNESS OF BREATH: 0
HEMATOCHEZIA: 0
EYE PAIN: 0
CHILLS: 0
HEARTBURN: 0
DIARRHEA: 0
COUGH: 0
MYALGIAS: 0
PARESTHESIAS: 0
FEVER: 0
SORE THROAT: 0
DYSURIA: 0
BREAST MASS: 0
NERVOUS/ANXIOUS: 0
JOINT SWELLING: 0
FREQUENCY: 0
WEAKNESS: 0
CONSTIPATION: 0
HEMATURIA: 0
HEADACHES: 0
PALPITATIONS: 0
ARTHRALGIAS: 1
NAUSEA: 0

## 2023-02-15 NOTE — PROGRESS NOTES
SUBJECTIVE:   CC: Lashawn is an 52 year old who presents for preventive health visit.     Patient has been advised of split billing requirements and indicates understanding: Yes  Healthy Habits:     Getting at least 3 servings of Calcium per day:  NO    Bi-annual eye exam:  Yes    Dental care twice a year:  Yes    Sleep apnea or symptoms of sleep apnea:  None    Diet:  Diabetic    Frequency of exercise:  None    Taking medications regularly:  Yes    PHQ-2 Total Score: 0    Additional concerns today:  Yes          Today's PHQ-2 Score:   PHQ-2 (  [employer]) 2/15/2023   Q1: Little interest or pleasure in doing things 0   Q2: Feeling down, depressed or hopeless 0   PHQ-2 Score 0   PHQ-2 Total Score (12-17 Years)- Positive if 3 or more points; Administer PHQ-A if positive -   Q1: Little interest or pleasure in doing things Not at all   Q2: Feeling down, depressed or hopeless Not at all   PHQ-2 Score 0           Social History     Tobacco Use     Smoking status: Former     Years: 16.00     Types: Cigarettes     Quit date: 2008     Years since quittin.3     Smokeless tobacco: Never   Substance Use Topics     Alcohol use: Yes     Alcohol/week: 0.0 standard drinks     Comment: couple drinks per year         Alcohol Use 2/15/2023   Prescreen: >3 drinks/day or >7 drinks/week? No   Prescreen: >3 drinks/day or >7 drinks/week? -       Reviewed orders with patient.  Reviewed health maintenance and updated orders accordingly - Yes  Lab work is in process    Breast Cancer Screening:    Breast CA Risk Assessment (FHS-7) 2021   Do you have a family history of breast, colon, or ovarian cancer? Yes No / Unknown       Mammogram Screening: Recommended annual mammography  Pertinent mammograms are reviewed under the imaging tab.    History of abnormal Pap smear: Status post benign hysterectomy. Health Maintenance and Surgical History updated.  PAP / HPV Latest Ref Rng & Units 2018 11/3/2014 10/14/2014   PAP  (Historical) - NIL NIL UNSAT   HPV16 NEG:Negative Negative - -   HPV18 NEG:Negative Negative - -   HRHPV NEG:Negative Negative - -     Reviewed and updated as needed this visit by clinical staff    Allergies  Meds              Reviewed and updated as needed this visit by Provider                 Past Medical History:   Diagnosis Date     Anemia 11/15/2018     CAD (coronary artery disease)     Nonobstructive per angiogram 3/2019     Depressive disorder, not elsewhere classified     depression in the post partum period after her daughter     Diabetes mellitus, type 2 (H) 6/6/2013     Diffuse cystic mastopathy     fibrocystic breast disease     Former smoker      Lymphedema of right lower extremity      Peripheral venous insufficiency      Personal history of DVT (deep vein thrombosis) 8/30/2018     S/P hysterectomy 9/12/2018     Sigmoid diverticulitis 11/15/2018     Tobacco use disorder     quit in 2008     Type 2 diabetes, HbA1c goal < 7% (H) 6/6/2013     Varicose veins of lower extremities with complications 5/13/2005     Problem list name updated by automated process. Provider to review      Past Surgical History:   Procedure Laterality Date     COLONOSCOPY  6/21/2013    Procedure: COLONOSCOPY;  colonoscopy;  Surgeon: Vamshi Loomis MD;  Location:  GI     COLONOSCOPY N/A 2/22/2017    Procedure: COLONOSCOPY;  Surgeon: Raoul Sage MD;  Location:  GI     CV HEART CATHETERIZATION WITH POSSIBLE INTERVENTION N/A 3/5/2019    Procedure: Heart Catheterization with possible Intervention;  Surgeon: Kirstin Lynn MD;  Location:  HEART CARDIAC CATH LAB     EXCISE MASS LOWER EXTREMITY Left 12/26/2019    Procedure: Excision Left Posterior Knee Mass;  Surgeon: Raoul Sage MD;  Location: [facility] OR     HC DILATION/CURETTAGE DIAG/THER NON OB  1986    after a miscarriage     HC REMOVAL OF TONSILS,<11 Y/O       HYSTERECTOMY       HYSTERECTOMY TOTAL ABDOMINAL, SALPINGECTOMY Bilateral 9/12/2018    Procedure:  "HYSTERECTOMY TOTAL ABDOMINAL, SALPINGECTOMY;  Total Abdominal Hysterectomy, left Salpingectomy;  Surgeon: Temi Corado MD;  Location: [facility] OR     HYSTERECTOMY, PAP NO LONGER INDICATED  2018     TUBAL LIGATION  2006     ZZC ANESTH,LOWER LEG VEIN SURG,NOS  2002    bilateral     ZC APPENDECTOMY  88     ZZHC COLONOSCOPY W/WO BRUSH/WASH  2005     OB History    Para Term  AB Living   3 2 2 0 1 2   SAB IAB Ectopic Multiple Live Births   1 0 0 0 2      # Outcome Date GA Lbr Ezio/2nd Weight Sex Delivery Anes PTL Lv   3 Term 99 39w0d 06:00 3.657 kg (8 lb 1 oz) M    CYNTHIA      Name: Paul   2 Term 96 37w0d 13:00 3.6 kg (7 lb 15 oz) F    CYNTHIA      Name: Jessica Lawrence SAB 86 6w0d    INCOMPLETE C         Birth Comments: needed a D&C       Review of Systems   Constitutional: Negative for chills and fever.   HENT: Positive for congestion. Negative for ear pain, hearing loss and sore throat.    Eyes: Negative for pain and visual disturbance.   Respiratory: Negative for cough and shortness of breath.    Cardiovascular: Negative for chest pain, palpitations and peripheral edema.   Gastrointestinal: Negative for abdominal pain, constipation, diarrhea, heartburn, hematochezia and nausea.   Breasts:  Negative for tenderness, breast mass and discharge.   Genitourinary: Negative for dysuria, frequency, genital sores, hematuria, pelvic pain, urgency, vaginal bleeding and vaginal discharge.   Musculoskeletal: Positive for arthralgias. Negative for joint swelling and myalgias.   Skin: Negative for rash.   Neurological: Negative for dizziness, weakness, headaches and paresthesias.   Psychiatric/Behavioral: Negative for mood changes. The patient is not nervous/anxious.      OBJECTIVE:   /80   Pulse 78   Temp 97.4  F (36.3  C) (Temporal)   Resp 16   Ht 1.67 m (5' 5.75\")   Wt 101.6 kg (224 lb)   LMP 2018 (Exact Date)   SpO2 100%   BMI 36.43 kg/m    Physical " Exam  GENERAL APPEARANCE: healthy, alert and no distress  EYES: Eyes grossly normal to inspection, PERRL and conjunctivae and sclerae normal  HENT: ear canals and TM's normal, nose and mouth without ulcers or lesions, oropharynx clear and oral mucous membranes moist  NECK: no adenopathy, no asymmetry, masses, or scars and thyroid normal to palpation  RESP: lungs clear to auscultation - no rales, rhonchi or wheezes  CV: regular rate and rhythm, normal S1 S2, no S3 or S4, no murmur, click or rub, no peripheral edema and peripheral pulses strong  ABDOMEN: soft, nontender, no hepatosplenomegaly, no masses and bowel sounds normal  MS: no musculoskeletal defects are noted and gait is age appropriate without ataxia, diabetic foot exam with normal monofilament sensation.  SKIN: no suspicious lesions or rashes  NEURO: Normal strength and tone, sensory exam grossly normal, mentation intact and speech normal  PSYCH: mentation appears normal and affect normal/bright    Diagnostic Test Results:  Labs reviewed in Epic    ASSESSMENT/PLAN:   Lashawn was seen today for physical.    Diagnoses and all orders for this visit:    Routine general medical examination at a health care facility    Screen for colon cancer  -     Colonoscopy Screening  Referral; Future    Essential hypertension  Stable with plan to repeat BMP today.  -     BASIC METABOLIC PANEL; Future  -     BASIC METABOLIC PANEL    Type 2 diabetes mellitus with hyperglycemia, without long-term current use of insulin (H)  Patient following pharmacy and can continue this to modify to goal.  Recent A1c improved with increase Ozempic dose.  Home blood sugars still slightly elevated but better than they were.  Given her fasting sugars I do think she has room for improvement in A1c of 8.3 today.  We will plan for her to increase insulin to 16 units daily along with her 3 oral medications.  Plan to follow-up with pharmacy in 1 month.  -     Hemoglobin A1c; Future  -     FOOT  EXAM  -     Semaglutide, 2 MG/DOSE, (OZEMPIC, 2 MG/DOSE,) 8 MG/3ML pen; Inject 2 mg Subcutaneous once a week  -     Hemoglobin A1c    Morbid obesity (H)  Importance of diet and exercise discussed in detail today.  Would have her consider weight management clinic in the future.    Hyperlipidemia with target LDL less than 100  -     Lipid panel reflex to direct LDL Non-fasting; Future  -     Lipid panel reflex to direct LDL Non-fasting    Mixed hyperlipidemia  -     ALT; Future  -     ALT    Stress-induced cardiomyopathy  Resolved and cardiology not wanting further follow-up.    -     aspirin (ASPIRIN LOW DOSE) 81 MG EC tablet; Take 1 tablet (81 mg) by mouth daily    PMS (premenstrual syndrome)  Anxiety  Patient initially started on medication for premenstrual syndrome but has done well with that despite hysterectomy and wanting to continue now.  Does note some increased anxiety and sensation of noticing her heartbeat without irregular rhythm or increased rate.  She has no other chest pain or respiratory symptoms.  No exertional symptoms.  Does sound anxiety related but we did discuss EKG today.  She does not want that now but will follow-up if things not resolving.  -     sertraline (ZOLOFT) 50 MG tablet; Take 0.5 tablets (25 mg) by mouth daily    Other orders  -     REVIEW OF HEALTH MAINTENANCE PROTOCOL ORDERS      Patient has been advised of split billing requirements and indicates understanding: Yes      COUNSELING:  Reviewed preventive health counseling, as reflected in patient instructions       Regular exercise       Healthy diet/nutrition       Vision screening       Hearing screening       Immunizations       Alcohol Use       Safe sex practices/STD prevention       Colorectal Cancer Screening       Consider Hep C screening for all patients one time for ages 18-79 years       HIV screeninx in teen years, 1x in adult years, and at intervals if high risk       Consider lung cancer screening for ages 55-80  "years (77 for Medicare) and 20 pack-year smoking history       BMI:   Estimated body mass index is 36.43 kg/m  as calculated from the following:    Height as of this encounter: 1.67 m (5' 5.75\").    Weight as of this encounter: 101.6 kg (224 lb).   Weight management plan: Discussed healthy diet and exercise guidelines      She reports that she quit smoking about 14 years ago. Her smoking use included cigarettes. She has never used smokeless tobacco.          Aly Osorio MD  [facility]  "

## 2023-02-19 ENCOUNTER — MYC REFILL (OUTPATIENT)
Dept: INTERNAL MEDICINE | Facility: CLINIC | Age: 53
End: 2023-02-19
Payer: COMMERCIAL

## 2023-02-19 DIAGNOSIS — I10 ESSENTIAL HYPERTENSION: ICD-10-CM

## 2023-02-19 DIAGNOSIS — I50.9 CONGESTIVE HEART FAILURE, UNSPECIFIED HF CHRONICITY, UNSPECIFIED HEART FAILURE TYPE (H): ICD-10-CM

## 2023-02-20 ENCOUNTER — TELEPHONE (OUTPATIENT)
Dept: FAMILY MEDICINE | Facility: CLINIC | Age: 53
End: 2023-02-20
Payer: COMMERCIAL

## 2023-02-20 DIAGNOSIS — R00.2 PALPITATIONS: Primary | ICD-10-CM

## 2023-02-20 PROCEDURE — 99207 EKG 12-LEAD COMPLETE W/READ - CLINICS: CPT | Performed by: STUDENT IN AN ORGANIZED HEALTH CARE EDUCATION/TRAINING PROGRAM

## 2023-02-20 NOTE — TELEPHONE ENCOUNTER
Order/Referral Request    Who is requesting: patient    Orders being requested: EKG    Reason service is needed/diagnosis: HEART BEATS    When are orders needed by: ASAP    Has this been discussed with Provider: Yes    Does patient have a preference on a Group/Provider/Facility? [facility]    Does patient have an appointment scheduled?: No    Where to send orders:     Could we send this information to you in [employer] [facility] or would you prefer to receive a phone call?:   Patient would prefer a phone call   Okay to leave a detailed message?: Yes at Home number on file [phone] ([place])

## 2023-02-21 DIAGNOSIS — I50.9 CONGESTIVE HEART FAILURE, UNSPECIFIED HF CHRONICITY, UNSPECIFIED HEART FAILURE TYPE (H): ICD-10-CM

## 2023-02-21 DIAGNOSIS — I10 ESSENTIAL HYPERTENSION: ICD-10-CM

## 2023-02-21 RX ORDER — HYDROCHLOROTHIAZIDE 25 MG/1
TABLET ORAL
Qty: 90 TABLET | Refills: 1 | Status: SHIPPED | OUTPATIENT
Start: 2023-02-21 | End: 2023-05-25

## 2023-02-21 RX ORDER — HYDROCHLOROTHIAZIDE 25 MG/1
TABLET ORAL
Qty: 30 TABLET | Refills: 0 | OUTPATIENT
Start: 2023-02-21

## 2023-02-21 NOTE — TELEPHONE ENCOUNTER
Prescription approved per [facility] Refill Protocol.  Bianca Knott RN on 2/21/2023 at 11:21 AM

## 2023-02-22 NOTE — PROGRESS NOTES
[facility]    Outpatient Physical Therapy Discharge Note  Patient: Lashawn Reddy  : 1970    Beginning/End Dates of Reporting Period:  10/6/22 to 10/28/22    Referring Provider: Bob Nogueira PA-C    Therapy Diagnosis: R shoulder pain     Client Self Report: Pt reports improvement since last session, pain consistently at 1/10.    Objective Measurements:  Objective Measure: PROM     Objective Measure: AROM     Objective Measure: Pain  Details: 1/10;  waking 0-1x/night       Goals:  Goal Identifier Shoulder elevation   Goal Description Pt will demonstrate R shoulder elevation (flexion or scaption) to 150 degrees or better in order to improve tolerance to overhead reaching.   Target Date 22   Date Met      Progress (detail required for progress note):       Goal Identifier HEP   Goal Description Pt will be independent with HEP in order to improve R Shoulder ROM and strength.   Target Date 22   Date Met   10/28/22   Progress (detail required for progress note):       Goal Identifier SPADI   Goal Description Pt will demonstrate 20% improvement per SPADI in order to demonstrate functional improvement of R shoulder.   Target Date 22   Date Met      Progress (detail required for progress note):  Not assessed       Plan:  Discharge from therapy.    Discharge:    Reason for Discharge: Patient has failed to schedule further appointments however had consistent improvement in symptoms prior to discharge.    Discharge Plan: Patient to continue home program.      Vicki Anna, PT, DPT, OCS, CSCS  [facility]  [phone]

## 2023-03-29 ENCOUNTER — TELEPHONE (OUTPATIENT)
Dept: FAMILY MEDICINE | Facility: CLINIC | Age: 53
End: 2023-03-29
Payer: COMMERCIAL

## 2023-04-12 DIAGNOSIS — E11.65 TYPE 2 DIABETES MELLITUS WITH HYPERGLYCEMIA, WITHOUT LONG-TERM CURRENT USE OF INSULIN (H): ICD-10-CM

## 2023-04-13 RX ORDER — METFORMIN HCL 500 MG
500 TABLET, EXTENDED RELEASE 24 HR ORAL
Qty: 90 TABLET | Refills: 1 | Status: SHIPPED | OUTPATIENT
Start: 2023-04-13

## 2023-04-13 NOTE — TELEPHONE ENCOUNTER
Prescription approved per [facility] Refill Protocol.  Bianca Knott RN on 4/13/2023 at 3:55 PM

## 2023-05-23 DIAGNOSIS — I10 ESSENTIAL HYPERTENSION: ICD-10-CM

## 2023-05-23 DIAGNOSIS — I50.9 CONGESTIVE HEART FAILURE, UNSPECIFIED HF CHRONICITY, UNSPECIFIED HEART FAILURE TYPE (H): ICD-10-CM

## 2023-05-25 RX ORDER — HYDROCHLOROTHIAZIDE 25 MG/1
TABLET ORAL
Qty: 90 TABLET | Refills: 1 | Status: SHIPPED | OUTPATIENT
Start: 2023-05-25 | End: 2024-07-29

## 2023-05-25 NOTE — TELEPHONE ENCOUNTER
Prescription approved per [facility] Refill Protocol.  Bianca Knott RN on 5/25/2023 at 10:28 AM

## 2023-06-02 ENCOUNTER — TELEPHONE (OUTPATIENT)
Dept: PHARMACY | Facility: CLINIC | Age: 53
End: 2023-06-02
Payer: COMMERCIAL

## 2023-06-02 NOTE — TELEPHONE ENCOUNTER
This patient is due for MTM follow-up. I called the patient to schedule an appointment and left a message with the clinic phone number for the patient to call to schedule.    Redd Sterling, KeikoD, [facility]  Medication Therapy Management Pharmacist  Pager: [phone]

## 2023-06-23 DIAGNOSIS — L30.4 INTERTRIGO: ICD-10-CM

## 2023-06-23 NOTE — TELEPHONE ENCOUNTER
"Requested Prescriptions   Pending Prescriptions Disp Refills    fluconazole (DIFLUCAN) 150 MG tablet 10 tablet 0     Sig: Take 1 tablet (150 mg) by mouth every 3 days       Antifungal Agents Failed - 6/23/2023  1:24 PM        Failed - Not Fluconazole or Terconazole      If oral Fluconazole or Terconazole, may refill if indicated in progress notes.           Passed - Recent (12 mo) or future (30 days) visit within the authorizing provider's specialty     Patient has had an office visit with the authorizing provider or a provider within the authorizing providers department within the previous 12 mos or has a future within next 30 days. See \"Patient Info\" tab in inbasket, or \"Choose Columns\" in Meds & Orders section of the refill encounter.              Passed - Medication is active on med list             "

## 2023-06-26 RX ORDER — FLUCONAZOLE 150 MG/1
150 TABLET ORAL
Qty: 10 TABLET | Refills: 0 | OUTPATIENT
Start: 2023-06-26

## 2023-06-29 ENCOUNTER — MEDICAL CORRESPONDENCE (OUTPATIENT)
Dept: HEALTH INFORMATION MANAGEMENT | Facility: CLINIC | Age: 53
End: 2023-06-29
Payer: COMMERCIAL

## 2023-06-29 NOTE — TELEPHONE ENCOUNTER
I called this patient with the message below and she stated she had another clinic refill the medication.

## 2023-07-05 ENCOUNTER — TELEPHONE (OUTPATIENT)
Dept: CARDIOLOGY | Facility: CLINIC | Age: 53
End: 2023-07-05
Payer: COMMERCIAL

## 2023-07-05 DIAGNOSIS — I51.81 TAKOTSUBO SYNDROME: Primary | ICD-10-CM

## 2023-07-05 NOTE — TELEPHONE ENCOUNTER
Outside Referral    Patient has been referred to [facility] by [facility] for a 2D cardiac echo and ECG for a history of Takotsubo Syndrome. The urgency on the referral was written as routine. Orders have been placed Please assist patient in scheduling.     A copy of the referral was sent to scanning and the original was placed in Patricia Alvarez RN's mailbox.     Ludmila Hughes RN   [facility]

## 2023-08-08 ENCOUNTER — HOSPITAL ENCOUNTER (OUTPATIENT)
Dept: CARDIOLOGY | Facility: CLINIC | Age: 53
Discharge: HOME | End: 2023-08-08
Attending: STUDENT IN AN ORGANIZED HEALTH CARE EDUCATION/TRAINING PROGRAM | Admitting: STUDENT IN AN ORGANIZED HEALTH CARE EDUCATION/TRAINING PROGRAM
Payer: COMMERCIAL

## 2023-08-08 DIAGNOSIS — I51.81 TAKOTSUBO SYNDROME: ICD-10-CM

## 2023-08-08 LAB — LVEF ECHO: NORMAL

## 2023-08-08 PROCEDURE — 93306 TTE W/DOPPLER COMPLETE: CPT

## 2023-08-08 PROCEDURE — 93306 TTE W/DOPPLER COMPLETE: CPT | Mod: 26 | Performed by: INTERNAL MEDICINE

## 2023-08-19 ENCOUNTER — HEALTH MAINTENANCE LETTER (OUTPATIENT)
Age: 53
End: 2023-08-19

## 2023-11-28 ENCOUNTER — NURSE TRIAGE (OUTPATIENT)
Dept: FAMILY MEDICINE | Facility: CLINIC | Age: 53
End: 2023-11-28
Payer: COMMERCIAL

## 2023-11-28 DIAGNOSIS — U07.1 INFECTION DUE TO 2019 NOVEL CORONAVIRUS: Primary | ICD-10-CM

## 2023-11-28 NOTE — TELEPHONE ENCOUNTER
RN COVID TREATMENT VISIT  11/28/23      The patient has been triaged and does not require a higher level of care.    Lashawn Reddy  53 year old  Current weight? 210    Has the patient been seen by a primary care provider at an [facility] or [facility] within the past two years? Yes.   Have you been in close proximity to/do you have a known exposure to a person with a confirmed case of influenza? No.     General treatment eligibility:  Date of positive COVID test (PCR or at home)?  11/28/23    Are you or have you been hospitalized for this COVID-19 infection? No.   Have you received monoclonal antibodies or antiviral treatment for COVID-19 since this positive test? No.   Do you have any of the following conditions that place you at risk of being very sick from COVID-19?   - Age 50 years or older  - Diabetes mellitus, type 1 and type 2  Yes, patient has at least one high risk condition as noted above.     Current COVID symptoms:   - fever or chills  - cough  - congestion or runny nose  Yes. Patient has at least one symptom as selected.     How many days since symptoms started? 5 days or less. Established patient, 12 years or older weighing at least 88.2 lbs, who has symptoms that started in the past 5 days, has not been hospitalized nor received treatment already, and is at risk for being very sick from COVID-19.     Treatment eligibility by RN:  Are you currently pregnant or nursing? No  Do you have a clinically significant hypersensitivity to nirmatrelvir or ritonavir, or toxic epidermal necrolysis (TEN) or Yang-Wilfrido Syndrome? No  Do you have a history of hepatitis, any hepatic impairment on the Problem List (such as Child-Guerrero Class C, cirrhosis, fatty liver disease, alcoholic liver disease), or was the last liver lab (hepatic panel, ALT, AST, ALK Phos, bilirubin) elevated in the past 6 months? No  Do you have any history of severe renal impairment (eGFR < 30mL/min)? No    Is  patient eligible to continue? Yes, patient meets all eligibility requirements for the RN COVID treatment (as denoted by all no responses above).     Current Outpatient Medications   Medication Sig Dispense Refill    ACCU-CHEK GUIDE test strip USE TO TEST BLOOD SUGAR 2 TIMES DAILY OR AS DIRECTED. 100 strip 5    aspirin (ASPIRIN LOW DOSE) 81 MG EC tablet Take 1 tablet (81 mg) by mouth daily 90 tablet 3    blood glucose (CONTOUR NEXT TEST) test strip Use to test blood sugar three times daily or as directed. 100 strip 5    blood glucose monitoring (ACCU-CHEK FASTCLIX) lancets TEST TWICE A  each 0    Blood Glucose Monitoring Suppl (ONETOUCH VERIO REFLECT) w/Device KIT USE WITH TEST STRIPS 1 kit 0    Fiber POWD Take 1 Scoop by mouth daily      fluconazole (DIFLUCAN) 150 MG tablet Take 1 tablet (150 mg) by mouth every 3 days (Patient not taking: Reported on 2/15/2023) 10 tablet 0    hydrochlorothiazide (HYDRODIURIL) 25 MG tablet TAKE 1 TABLET BY MOUTH ONCE DAILY 90 tablet 1    hydrOXYzine (VISTARIL) 25 MG capsule 1-2 tabs at night as needed (Patient not taking: Reported on 2/15/2023) 30 capsule 1    Insulin Glargine-yfgn (SEMGLEE, YFGN,) 100 UNIT/ML SOLN Inject 12 Units Subcutaneous daily Increase by 2 units every 3 days as directed. Max dose 30 units daily. 15 mL 3    insulin pen needle (32G X 4 MM) 32G X 4 MM miscellaneous Use 1 pen needles daily 100 each 3    JARDIANCE 10 MG TABS tablet TAKE 1 TABLET BY MOUTH EVERY DAY 90 tablet 1    losartan (COZAAR) 50 MG tablet TAKE 1 TABLET (50 MG) BY MOUTH DAILY 90 tablet 2    metFORMIN (GLUCOPHAGE XR) 500 MG 24 hr tablet Take 1 tablet (500 mg) by mouth daily with food 90 tablet 1    nystatin-triamcinolone (MYCOLOG II) 581501-2.1 UNIT/GM-% external cream Apply topically daily as needed (rash or itching) (Patient not taking: Reported on 2/15/2023) 60 g 3    omeprazole (PRILOSEC) 20 MG DR capsule TAKE 1 CAPSULE BY MOUTH EVERY DAY AS NEEDED FOR ACID REFLUX 90 capsule 0     rosuvastatin (CRESTOR) 20 MG tablet TAKE 1 TABLET (20 MG) BY MOUTH DAILY 90 tablet 2    Semaglutide, 2 MG/DOSE, (OZEMPIC, 2 MG/DOSE,) 8 MG/3ML pen Inject 2 mg Subcutaneous once a week 9 mL 1    Sennosides-Docusate Sodium (SENNA-DOCUSATE SODIUM PO) Take 2 capsules by mouth At Bedtime (Patient not taking: Reported on 2/15/2023)      sertraline (ZOLOFT) 50 MG tablet Take 0.5 tablets (25 mg) by mouth daily 45 tablet 3    Vitamin D, Cholecalciferol, 1000 units TABS Take 2,000 Units by mouth daily          Medications from List 1 of the standing order (on medications that exclude the use of Paxlovid) that patient is taking: NONE. Is patient taking [place]'s Wort? No  Is patient taking [place]'s Wort or any meds from List 1? No.   Medications from List 2 of the standing order (on meds that provider needs to adjust) that patient is taking: NONE. Is patient on any of the meds from List 2? No.   Medications from List 3 of standing order (on meds that a RN needs to adjust) that patient is taking: rosuvastatin (Crestor): Instructed patient to stop rosuvastatin while taking Paxlovid and restart rosuvastatin 1 day after the completion of Paxlovid.  Is patient on any meds from List 3? Yes. Patient is on meds from list 3. No meds require a provider visit and at least one med required RN to adjust.     Paxlovid has an approximate 90% reduction in hospitalization. Paxlovid can possibly cause altered sense of taste, diarrhea (loose, watery stools), high blood pressure, muscle aches.     Would patient like a Paxlovid prescription?   Yes.   Lab Results   Component Value Date    GFRESTIMATED >90 02/15/2023       Was last eGFR reduced? No, eGFR 60 or greater/ No Result on record. Patient can receive the normal renal function dose. Paxlovid Rx sent to [place] pharmacy   [facility]     Temporary change to home medications: rosuvastatin     All medication adjustments (holds, etc) were discussed with the patient and patient was  asked to repeat back (teachback) their med adjustment.  Did patient understand med adjustment? Yes, patient repeated back and understood correctly.        Reviewed the following instructions with the patient:    Paxlovid (nimatrelvir and ritonavir)    How it works  Two medicines (nirmatrelvir and ritonavir) are taken together. They stop the virus from growing. Less amount of virus is easier for your body to fight.    How to take  Medicine comes in a daily container with both medicine tablets. Take by mouth twice daily (once in the morning, once at night) for 5 days.  The number of tablets to take varies by patient.  Don't chew or break capsules. Swallow whole.    When to take  Take as soon as possible after positive COVID-19 test result, and within 5 days of your first symptoms.    Possible side effects  Can cause altered sense of taste, diarrhea (loose, watery stools), high blood pressure, muscle aches.    Debi Benavides RN       Reason for Disposition   HIGH RISK patient (e.g., weak immune system, age > 64 years, obesity with BMI of 30 or higher, pregnant, chronic lung disease or other chronic medical condition) and COVID symptoms (e.g., cough, fever)  (Exceptions: Already seen by doctor or NP/PA and no new or worsening symptoms.)    Additional Information   Negative: SEVERE difficulty breathing (e.g., struggling for each breath, speaks in single words)   Negative: Difficult to awaken or acting confused (e.g., disoriented, slurred speech)   Negative: Bluish (or gray) lips or face now   Negative: Shock suspected (e.g., cold/pale/clammy skin, too weak to stand, low BP, rapid pulse)   Negative: Sounds like a life-threatening emergency to the triager   Negative: Diagnosed or suspected COVID-19 and symptoms lasting 3 or more weeks   Negative: COVID-19 exposure and no symptoms   Negative: COVID-19 vaccine reaction suspected (e.g., fever, headache, muscle aches) occurring 1 to 3 days after getting vaccine   Negative:  COVID-19 vaccine, questions about   Negative: Lives with someone known to have influenza (flu test positive) and flu-like symptoms (e.g., cough, runny nose, sore throat, SOB; with or without fever)   Negative: Possible COVID-19 symptoms and triager concerned about severity of symptoms or other causes   Negative: COVID-19 and breastfeeding, questions about   Negative: SEVERE or constant chest pain or pressure  (Exception: Mild central chest pain, present only when coughing.)   Negative: MODERATE difficulty breathing (e.g., speaks in phrases, SOB even at rest, pulse 100-120)   Negative: Headache and stiff neck (can't touch chin to chest)   Negative: Oxygen level (e.g., pulse oximetry) 90% or lower   Negative: Chest pain or pressure  (Exception: MILD central chest pain, present only when coughing.)   Negative: Drinking very little and dehydration suspected (e.g., no urine > 12 hours, very dry mouth, very lightheaded)   Negative: Patient sounds very sick or weak to the triager   Negative: MILD difficulty breathing (e.g., minimal/no SOB at rest, SOB with walking, pulse <100)   Negative: Fever > 103 F (39.4 C)   Negative: Fever > 101 F (38.3 C) and over 60 years of age   Negative: Fever > 100.0 F (37.8 C) and bedridden (e.g., CVA, chronic illness, recovering from surgery)    Protocols used: Coronavirus (COVID-19) Diagnosed or [id number]

## 2023-12-12 DIAGNOSIS — E11.65 TYPE 2 DIABETES MELLITUS WITH HYPERGLYCEMIA, WITHOUT LONG-TERM CURRENT USE OF INSULIN (H): ICD-10-CM

## 2023-12-13 RX ORDER — SEMAGLUTIDE 2.68 MG/ML
2 INJECTION, SOLUTION SUBCUTANEOUS WEEKLY
Qty: 9 ML | Refills: 0 | Status: SHIPPED | OUTPATIENT
Start: 2023-12-13

## 2024-01-10 NOTE — TELEPHONE ENCOUNTER
RN called patient and reviewed with her Dr. Roth's recommendations. Patient unable to pickup samples in [place] due to location so requested RN [facility] in [facility] for 5 tablets. Patient aware to hold her ASA while taking Xarelto. Orders placed for ultrasound in one week. Patient will call [facility] to schedule both follow up ultrasound in one week and office visit with KAMRYN for 30 day F/U.        Stable

## 2024-03-16 ENCOUNTER — HEALTH MAINTENANCE LETTER (OUTPATIENT)
Age: 54
End: 2024-03-16

## 2024-04-29 DIAGNOSIS — E11.65 TYPE 2 DIABETES MELLITUS WITH HYPERGLYCEMIA, WITHOUT LONG-TERM CURRENT USE OF INSULIN (H): ICD-10-CM

## 2024-04-30 RX ORDER — METFORMIN HCL 500 MG
500 TABLET, EXTENDED RELEASE 24 HR ORAL
Qty: 90 TABLET | Refills: 1 | OUTPATIENT
Start: 2024-04-30

## 2024-05-01 NOTE — TELEPHONE ENCOUNTER
Patient informed via mychart to schedule. 5/6 reminder if not read to send letter.   Janet Jack MA

## 2024-07-29 DIAGNOSIS — I50.9 CONGESTIVE HEART FAILURE, UNSPECIFIED HF CHRONICITY, UNSPECIFIED HEART FAILURE TYPE (H): ICD-10-CM

## 2024-07-29 DIAGNOSIS — I10 ESSENTIAL HYPERTENSION: ICD-10-CM

## 2024-07-30 RX ORDER — HYDROCHLOROTHIAZIDE 25 MG/1
TABLET ORAL
Qty: 90 TABLET | Refills: 0 | Status: SHIPPED | OUTPATIENT
Start: 2024-07-30

## 2024-07-31 ENCOUNTER — MYC MEDICAL ADVICE (OUTPATIENT)
Dept: FAMILY MEDICINE | Facility: CLINIC | Age: 54
End: 2024-07-31
Payer: COMMERCIAL

## 2024-07-31 NOTE — TELEPHONE ENCOUNTER
Patient has been notified of the note below via [employer]. Reminder set for 3 days to send letter if not read.

## 2024-07-31 NOTE — LETTER
7/31/2024      Lashawn Reddy  [address] [place] [id number]        We are concerned about your health care.  We recently provided you with a medication refill.  Many medications require routine follow-up with your Doctor.      At this time we ask that: You schedule a routine office visit with your physician to follow your medications.  Call the clinic at [phone] to schedule.     Your prescription:  Has been filled. Please schedule a follow up visit for first available appointment. Courtesy refills will be given if needed until your scheduled appointment.       Thank you   [facility] Care Team   [phone]\

## 2024-09-23 ENCOUNTER — TRANSCRIBE ORDERS (OUTPATIENT)
Dept: OTHER | Age: 54
End: 2024-09-23

## 2024-09-23 ENCOUNTER — TRANSFERRED RECORDS (OUTPATIENT)
Dept: HEALTH INFORMATION MANAGEMENT | Facility: CLINIC | Age: 54
End: 2024-09-23
Payer: COMMERCIAL

## 2024-09-23 DIAGNOSIS — Z80.0 FAMILY HISTORY OF COLON CANCER: Primary | ICD-10-CM

## 2024-09-24 ENCOUNTER — TELEPHONE (OUTPATIENT)
Dept: GASTROENTEROLOGY | Facility: CLINIC | Age: 54
End: 2024-09-24
Payer: COMMERCIAL

## 2024-09-24 NOTE — TELEPHONE ENCOUNTER
"Endoscopy Scheduling Screen    Have you had any respiratory illness or flu-like symptoms in the last 10 days?  No    What is your communication preference for Instructions and/or Bowel Prep?   MyChart    What insurance is in the chart?  Other:  [employer]    Ordering/Referring Provider: CEASAR POLK   (If ordering provider performs procedure, schedule with ordering provider unless otherwise instructed. )    BMI: Estimated body mass index is 36.43 kg/m  as calculated from the following:    Height as of 2/15/23: 1.67 m (5' 5.75\").    Weight as of 2/15/23: 101.6 kg (224 lb).     Sedation Ordered  MAC/deep sedation.   BMI<= 45 45 < BMI <= 48 48 < BMI < = 50  BMI > 50   No Restrictions No [facility]  No [facility]  [facility] with exceptions Hospital Only OR Only       Do you have a history of malignant hyperthermia?  No    (Females) Are you currently pregnant?   No     Have you been diagnosed or told you have pulmonary hypertension?   No    Do you have an LVAD?  No    Have you been told you have moderate to severe sleep apnea?  No.    Have you been told you have COPD, asthma, or any other lung disease?  No    Do you have any heart conditions?  No     Have you ever had or are you waiting for an organ transplant?  No. Continue scheduling, no site restrictions.    Have you had a stroke or transient ischemic attack (TIA aka \"mini stroke\" in the last 6 months?   No    Have you been diagnosed with or been told you have cirrhosis of the liver?   No.    Are you currently on dialysis?   No    Do you need assistance transferring?   No    BMI: Estimated body mass index is 36.43 kg/m  as calculated from the following:    Height as of 2/15/23: 1.67 m (5' 5.75\").    Weight as of 2/15/23: 101.6 kg (224 lb).     Is patients BMI > 40 and scheduling location [facility]?  No    Do you take an injectable or oral medication for weight loss or diabetes (excluding insulin)?  Yes, hold time can be up to 7 days. Please consult with you prescribing provider to " discuss endoscopy recommendations. (Please schedule at least 7 days out.)    Do you take the medication Naltrexone?  No    Do you take blood thinners?  No       Prep   Are you currently on dialysis or do you have chronic kidney disease?  No    Do you have a diagnosis of diabetes?  Yes (Golytely Prep)    Do you have a diagnosis of cystic fibrosis (CF)?  No    On a regular basis do you go 3 -5 days between bowel movements?  Yes (Extended Prep)    BMI > 40?  No    Preferred Pharmacy:    [id number] - [address] [address]  Phone: [phone] Fax: [phone]    Final Scheduling Details     Procedure scheduled  Colonoscopy    Surgeon:  LYNN     Date of procedure:  11/25/24     Pre-OP / PAC:   No - Not required for this site.    Location  [facility] - Per order.    Sedation   MAC/Deep Sedation - Per order.      Patient Reminders:   You will receive a call from a Nurse to review instructions and health history.  This assessment must be completed prior to your procedure.  Failure to complete the Nurse assessment may result in the procedure being cancelled.      On the day of your procedure, please designate an adult(s) who can drive you home stay with you for the next 24 hours. The medicines used in the exam will make you sleepy. You will not be able to drive.      You cannot take public transportation, ride share services, or non-medical taxi service without a responsible caregiver.  Medical transport services are allowed with the requirement that a responsible caregiver will receive you at your destination.  We require that drivers and caregivers are confirmed prior to your procedure.

## 2024-10-12 ENCOUNTER — HEALTH MAINTENANCE LETTER (OUTPATIENT)
Age: 54
End: 2024-10-12

## 2024-10-24 DIAGNOSIS — E11.65 TYPE 2 DIABETES MELLITUS WITH HYPERGLYCEMIA, WITHOUT LONG-TERM CURRENT USE OF INSULIN (H): ICD-10-CM

## 2024-10-24 RX ORDER — METFORMIN HYDROCHLORIDE 500 MG/1
500 TABLET, EXTENDED RELEASE ORAL
Qty: 90 TABLET | Refills: 0 | Status: SHIPPED | OUTPATIENT
Start: 2024-10-24

## 2024-10-24 NOTE — TELEPHONE ENCOUNTER
I called Marcie and informed her that she needs a follow up prior to refilling medications.  She stated she doesn't come here to this clinic.

## 2024-11-25 ENCOUNTER — ANESTHESIA (OUTPATIENT)
Dept: GASTROENTEROLOGY | Facility: CLINIC | Age: 54
End: 2024-11-25
Payer: COMMERCIAL

## 2024-11-25 ENCOUNTER — ANESTHESIA EVENT (OUTPATIENT)
Dept: GASTROENTEROLOGY | Facility: CLINIC | Age: 54
End: 2024-11-25
Payer: COMMERCIAL

## 2024-11-25 ENCOUNTER — HOSPITAL ENCOUNTER (OUTPATIENT)
Facility: CLINIC | Age: 54
Discharge: HOME | End: 2024-11-25
Attending: SPECIALIST | Admitting: SPECIALIST
Payer: COMMERCIAL

## 2024-11-25 VITALS
OXYGEN SATURATION: 95 % | HEART RATE: 80 BPM | TEMPERATURE: 97.6 F | DIASTOLIC BLOOD PRESSURE: 90 MMHG | SYSTOLIC BLOOD PRESSURE: 124 MMHG | RESPIRATION RATE: 18 BRPM

## 2024-11-25 LAB
COLONOSCOPY: NORMAL
GLUCOSE BLDC GLUCOMTR-MCNC: 76 MG/DL (ref 70–99)

## 2024-11-25 PROCEDURE — 88305 TISSUE EXAM BY PATHOLOGIST: CPT | Mod: TC | Performed by: SPECIALIST

## 2024-11-25 PROCEDURE — 370N000017 HC ANESTHESIA TECHNICAL FEE, PER MIN: Performed by: SPECIALIST

## 2024-11-25 PROCEDURE — 250N000009 HC RX 250: Performed by: NURSE ANESTHETIST, CERTIFIED REGISTERED

## 2024-11-25 PROCEDURE — 82962 GLUCOSE BLOOD TEST: CPT

## 2024-11-25 PROCEDURE — 45385 COLONOSCOPY W/LESION REMOVAL: CPT | Mod: PT | Performed by: SPECIALIST

## 2024-11-25 PROCEDURE — 45380 COLONOSCOPY AND BIOPSY: CPT | Performed by: SPECIALIST

## 2024-11-25 PROCEDURE — 88305 TISSUE EXAM BY PATHOLOGIST: CPT | Mod: 26 | Performed by: PATHOLOGY

## 2024-11-25 PROCEDURE — 250N000011 HC RX IP 250 OP 636: Performed by: NURSE ANESTHETIST, CERTIFIED REGISTERED

## 2024-11-25 RX ORDER — NALOXONE HYDROCHLORIDE 0.4 MG/ML
0.1 INJECTION, SOLUTION INTRAMUSCULAR; INTRAVENOUS; SUBCUTANEOUS
Status: CANCELLED | OUTPATIENT
Start: 2024-11-25

## 2024-11-25 RX ORDER — LIDOCAINE 40 MG/G
CREAM TOPICAL
Status: DISCONTINUED | OUTPATIENT
Start: 2024-11-25 | End: 2024-11-25 | Stop reason: HOSPADM

## 2024-11-25 RX ORDER — PROPOFOL 10 MG/ML
INJECTION, EMULSION INTRAVENOUS PRN
Status: DISCONTINUED | OUTPATIENT
Start: 2024-11-25 | End: 2024-11-25

## 2024-11-25 RX ORDER — ONDANSETRON 4 MG/1
4 TABLET, ORALLY DISINTEGRATING ORAL EVERY 30 MIN PRN
Status: CANCELLED | OUTPATIENT
Start: 2024-11-25

## 2024-11-25 RX ORDER — ONDANSETRON 2 MG/ML
4 INJECTION INTRAMUSCULAR; INTRAVENOUS EVERY 30 MIN PRN
Status: CANCELLED | OUTPATIENT
Start: 2024-11-25

## 2024-11-25 RX ORDER — LIDOCAINE HYDROCHLORIDE 20 MG/ML
INJECTION, SOLUTION INFILTRATION; PERINEURAL PRN
Status: DISCONTINUED | OUTPATIENT
Start: 2024-11-25 | End: 2024-11-25

## 2024-11-25 RX ORDER — PROPOFOL 10 MG/ML
INJECTION, EMULSION INTRAVENOUS CONTINUOUS PRN
Status: DISCONTINUED | OUTPATIENT
Start: 2024-11-25 | End: 2024-11-25

## 2024-11-25 RX ORDER — DEXAMETHASONE SODIUM PHOSPHATE 10 MG/ML
4 INJECTION, SOLUTION INTRAMUSCULAR; INTRAVENOUS
Status: CANCELLED | OUTPATIENT
Start: 2024-11-25

## 2024-11-25 RX ADMIN — PROPOFOL 80 MG: 10 INJECTION, EMULSION INTRAVENOUS at 10:24

## 2024-11-25 RX ADMIN — LIDOCAINE HYDROCHLORIDE 50 MG: 20 INJECTION, SOLUTION INFILTRATION; PERINEURAL at 10:22

## 2024-11-25 RX ADMIN — PROPOFOL 200 MCG/KG/MIN: 10 INJECTION, EMULSION INTRAVENOUS at 10:25

## 2024-11-25 RX ADMIN — PROPOFOL 30 MG: 10 INJECTION, EMULSION INTRAVENOUS at 10:27

## 2024-11-25 ASSESSMENT — ACTIVITIES OF DAILY LIVING (ADL)
ADLS_ACUITY_SCORE: 0

## 2024-11-25 ASSESSMENT — LIFESTYLE VARIABLES: TOBACCO_USE: 1

## 2024-11-25 NOTE — ANESTHESIA CARE TRANSFER NOTE
Patient: Lashawn Reddy    Procedure: Procedure(s):  COLONOSCOPY, WITH POLYPECTOMY       Diagnosis: Family history of colon cancer [Z80.0]  Diagnosis Additional Information: No value filed.    Anesthesia Type:   MAC     Note:    Oropharynx: oropharynx clear of all foreign objects and spontaneously breathing  Level of Consciousness: drowsy  Oxygen Supplementation: face mask    Independent Airway: airway patency satisfactory and stable  Dentition: dentition unchanged  Vital Signs Stable: post-procedure vital signs reviewed and stable  Report to RN Given: handoff report given  Patient transferred to: Phase II    Handoff Report: Identifed the Patient, Identified the Reponsible Provider, Reviewed the pertinent medical history, Discussed the surgical course, Reviewed Intra-OP anesthesia mangement and issues during anesthesia, Set expectations for post-procedure period and Allowed opportunity for questions and acknowledgement of understanding      Vitals:  Vitals Value Taken Time   /73 11/25/24 1050   Temp     Pulse 76 11/25/24 1050   Resp     SpO2 100 % 11/25/24 1058   Vitals shown include unfiled device data.    Electronically Signed By: KRISTI Zayas CRNA  November 25, 2024  10:59 AM

## 2024-11-25 NOTE — H&P
[facility] History and Physical    Lashawn Reddy MRN# [id number]   Age: 54 year old YOB: 1970     Date of Admission:  (Not on file)    Home clinic: [facility]  Primary care provider: Marcie Maria          Impression and Plan:   Impression:   Family history of colon cancer [Z80.0] - father with colon ca at age 58  Last colonoscopy 2017-diverticulosis      Plan:   Proceed to  Colonoscopy as planned.  The procedure, risks(bleeding, perforation), benefits and alternatives were discussed and the patient agrees to proceed. Cleared for Anesthesia             Chief Complaint:   Family history of colon cancer [Z80.0]    History is obtained from the patient          History of Present Illness:   This 54 year old female is being seen at this time for evaluation for colonoscopy.  No complaints or concerns.  Father with colon ca at age 58           Past Medical History:     Past Medical History:   Diagnosis Date    Anemia 11/15/2018    CAD (coronary artery disease)     Nonobstructive per angiogram 3/2019    Depressive disorder, not elsewhere classified     depression in the post partum period after her daughter    Diabetes mellitus, type 2 (H) 6/6/2013    Diffuse cystic mastopathy     fibrocystic breast disease    Former smoker     Lymphedema of right lower extremity     Peripheral venous insufficiency     Personal history of DVT (deep vein thrombosis) 8/30/2018    S/P hysterectomy 9/12/2018    Sigmoid diverticulitis 11/15/2018    Tobacco use disorder     quit in 2008    Type 2 diabetes, HbA1c goal < 7% (H) 6/6/2013    Varicose veins of lower extremities with complications 5/13/2005     Problem list name updated by automated process. Provider to review            Past Surgical History:     Past Surgical History:   Procedure Laterality Date    COLONOSCOPY  6/21/2013    Procedure: COLONOSCOPY;  colonoscopy;  Surgeon: Vamshi Loomis MD;  Location: [facility]    COLONOSCOPY N/A 2/22/2017     Procedure: COLONOSCOPY;  Surgeon: Raoul Sage MD;  Location: [facility]    CV HEART CATHETERIZATION WITH POSSIBLE INTERVENTION N/A 3/5/2019    Procedure: Heart Catheterization with possible Intervention;  Surgeon: Kirstin Lynn MD;  Location:  HEART CARDIAC CATH LAB    EXCISE MASS LOWER EXTREMITY Left 2019    Procedure: Excision Left Posterior Knee Mass;  Surgeon: Raoul Sage MD;  Location: [facility] OR    HC DILATION/CURETTAGE DIAG/THER NON OB      after a miscarriage    HC REMOVAL OF TONSILS,<11 Y/O      HYSTERECTOMY      HYSTERECTOMY TOTAL ABDOMINAL, SALPINGECTOMY Bilateral 2018    Procedure: HYSTERECTOMY TOTAL ABDOMINAL, SALPINGECTOMY;  Total Abdominal Hysterectomy, left Salpingectomy;  Surgeon: Temi Corado MD;  [facility]    HYSTERECTOMY, PAP NO LONGER INDICATED      TUBAL LIGATION  2006    ZZC ANESTH,LOWER LEG VEIN SURG,NOS      bilateral    ZZC APPENDECTOMY  88    ZZHC COLONOSCOPY W/WO BRUSH/WASH  2005            Social History:     Social History     Tobacco Use    Smoking status: Former     Current packs/day: 0.00     Types: Cigarettes     Start date: 1992     Quit date: 2008     Years since quittin.1    Smokeless tobacco: Never   Substance Use Topics    Alcohol use: Yes     Alcohol/week: 0.0 standard drinks of alcohol     Comment: couple drinks per year            Family History:     Family History   Problem Relation Age of Onset    Anesthesia Reaction Mother         Severe nausea    Gastrointestinal Disease Mother         Partial colon removal secondary to a blockage    Gynecology Mother         hysterectomy    Lipids Mother     Lipids Father     Cancer - colorectal Father         dx Oct '03 (dx at age 57)    Cancer Father         skin    Hypertension Father     Cancer Maternal Grandmother         colon at age 68    Cerebrovascular Disease Paternal Grandfather     Cerebrovascular Disease Paternal Grandmother         brain  aneurysm    Arthritis Sister         mainly affecting her legs    Gastrointestinal Disease Sister         2 sisters with colon polyps    Cancer Sister         cervical ca    Cancer Maternal Aunt         all over ? breast was the origin    Cancer Maternal Aunt         pancreatic    Heart Disease Maternal Aunt         MI 2010            Immunizations:     VACCINE/DOSE   Diptheria   DPT   DTAP   HBIG   Hepatitis A   Hepatitis B   HIB   Influenza   Measles   Meningococcal   MMR   Mumps   Pneumococcal   Polio   Rubella   Small Pox   TDAP   Varicella   Zoster            Allergies:     Allergies   Allergen Reactions    Glipizide Other (See Comments)     Hunger spikes    No Known Drug Allergy             Medications:     Current Facility-Administered Medications   Medication Dose Route Frequency Provider Last Rate Last Admin    3.0 mL bupivacaine (MARCAINE) 0.5% injection (50 mL vial)  3 mL   Bob Nogueira PA-C   3 mL at 10/03/22 0854    triamcinolone (KENALOG-40) injection 80 mg  80 mg   Bob Nogueira PA-C   80 mg at 10/03/22 0854     Current Outpatient Medications   Medication Sig Dispense Refill    ACCU-CHEK GUIDE test strip USE TO TEST BLOOD SUGAR 2 TIMES DAILY OR AS DIRECTED. 100 strip 5    aspirin (ASPIRIN LOW DOSE) 81 MG EC tablet Take 1 tablet (81 mg) by mouth daily 90 tablet 3    bisacodyl (DULCOLAX) 5 MG EC tablet Two days prior to exam take two (2) tablets at 4pm. One day prior to exam take two (2) tablets at 4pm 4 tablet 0    blood glucose (CONTOUR NEXT TEST) test strip Use to test blood sugar three times daily or as directed. 100 strip 5    blood glucose monitoring (ACCU-CHEK FASTCLIX) lancets TEST TWICE A  each 0    Blood Glucose Monitoring Suppl (ONETOUCH VERIO REFLECT) w/Device KIT USE WITH TEST STRIPS 1 kit 0    Fiber POWD Take 1 Scoop by mouth daily      fluconazole (DIFLUCAN) 150 MG tablet Take 1 tablet (150 mg) by mouth every 3 days (Patient not taking: Reported on 2/15/2023) 10 tablet 0     hydrochlorothiazide (HYDRODIURIL) 25 MG tablet TAKE 1 TABLET BY MOUTH ONCE DAILY 90 tablet 0    hydrOXYzine (VISTARIL) 25 MG capsule 1-2 tabs at night as needed (Patient not taking: Reported on 2/15/2023) 30 capsule 1    Insulin Glargine-yfgn (SEMGLEE, YFGN,) 100 UNIT/ML SOLN Inject 12 Units Subcutaneous daily Increase by 2 units every 3 days as directed. Max dose 30 units daily. 15 mL 3    insulin pen needle (32G X 4 MM) 32G X 4 MM miscellaneous Use 1 pen needles daily 100 each 3    JARDIANCE 10 MG TABS tablet TAKE 1 TABLET BY MOUTH EVERY DAY 90 tablet 1    losartan (COZAAR) 50 MG tablet TAKE 1 TABLET (50 MG) BY MOUTH DAILY 90 tablet 2    metFORMIN (GLUCOPHAGE XR) 500 MG 24 hr tablet TAKE 1 TABLET (500 MG) BY MOUTH DAILY WITH FOOD 90 tablet 0    nystatin-triamcinolone (MYCOLOG II) 606299-1.1 UNIT/GM-% external cream Apply topically daily as needed (rash or itching) (Patient not taking: Reported on 2/15/2023) 60 g 3    omeprazole (PRILOSEC) 20 MG DR capsule TAKE 1 CAPSULE BY MOUTH EVERY DAY AS NEEDED FOR ACID REFLUX 90 capsule 0    polyethylene glycol (GOLYTELY) 236 g suspension Two days before procedure at 5PM fill first container with water. Mix and drink an 8 oz glass every 15 minutes until HALF of the container is gone. Place the remainder in the refrigerator. One day before procedure at 5PM drink second half of bowel prep. Drink an 8 oz glass every 15 minutes until it is gone. Day of procedure 6 hours before arrival time fill the 2nd container with water. Mix and drink an 8 oz glass every 15 minutes until HALF of the container is gone. Discard the remaining solution. 8000 mL 0    rosuvastatin (CRESTOR) 20 MG tablet TAKE 1 TABLET (20 MG) BY MOUTH DAILY 90 tablet 2    Semaglutide, 2 MG/DOSE, (OZEMPIC, 2 MG/DOSE,) 8 MG/3ML pen INJECT 2 MG SUBCUTANEOUS ONCE A WEEK 9 mL 0    Sennosides-Docusate Sodium (SENNA-DOCUSATE SODIUM PO) Take 2 capsules by mouth At Bedtime (Patient not taking: Reported on 2/15/2023)       sertraline (ZOLOFT) 50 MG tablet Take 0.5 tablets (25 mg) by mouth daily 45 tablet 3    Vitamin D, Cholecalciferol, 1000 units TABS Take 2,000 Units by mouth daily                Review of Systems:   The review of systems was positive for the following findings.  None.  The remainder of the review of systems was unremarkable.          Physical Exam:   All vitals have been reviewed  Last menstrual period 09/03/2018, not currently breastfeeding.  No intake or output data in the 24 hours ending 11/24/24 2042  SHEENT examination revealed NC/AT, EOMI.  Examination of the chest revealed CTA.  Examination of the heart revealed RRR.  Examination of the abdomen revealed Soft  non tender.  The neuromuscular examination was NL.          Data:   All laboratory data reviewed  No results found for any visits on 11/25/24.  -     Raoul Sage MD, FACS

## 2024-11-25 NOTE — ANESTHESIA PREPROCEDURE EVALUATION
Anesthesia Pre-Procedure Evaluation    Patient: Lashawn Reddy   MRN: [id number] : 1970        Procedure : Procedure(s):  Colonoscopy          Past Medical History:   Diagnosis Date    Anemia 11/15/2018    CAD (coronary artery disease)     Nonobstructive per angiogram 3/2019    Depressive disorder, not elsewhere classified     depression in the post partum period after her daughter    Diabetes mellitus, type 2 (H) 2013    Diffuse cystic mastopathy     fibrocystic breast disease    Former smoker     Lymphedema of right lower extremity     Peripheral venous insufficiency     Personal history of DVT (deep vein thrombosis) 2018    S/P hysterectomy 2018    Sigmoid diverticulitis 11/15/2018    Tobacco use disorder     quit in     Type 2 diabetes, HbA1c goal < 7% (H) 2013    Varicose veins of lower extremities with complications 2005     Problem list name updated by automated process. Provider to review      Past Surgical History:   Procedure Laterality Date    COLONOSCOPY  2013    Procedure: COLONOSCOPY;  colonoscopy;  Surgeon: Vamshi Loomis MD;  Location:  GI    COLONOSCOPY N/A 2017    Procedure: COLONOSCOPY;  Surgeon: Raoul Sage MD;  Location:  GI    CV HEART CATHETERIZATION WITH POSSIBLE INTERVENTION N/A 3/5/2019    Procedure: Heart Catheterization with possible Intervention;  Surgeon: Kirstin Lynn MD;  Location:  HEART CARDIAC CATH LAB    EXCISE MASS LOWER EXTREMITY Left 2019    Procedure: Excision Left Posterior Knee Mass;  Surgeon: Raoul Sage MD;  Location: [facility]    HC DILATION/CURETTAGE DIAG/THER NON OB  1986    after a miscarriage    HC REMOVAL OF TONSILS,<13 Y/O      HYSTERECTOMY      HYSTERECTOMY TOTAL ABDOMINAL, SALPINGECTOMY Bilateral 2018    Procedure: HYSTERECTOMY TOTAL ABDOMINAL, SALPINGECTOMY;  Total Abdominal Hysterectomy, left Salpingectomy;  Surgeon: Temi Corado MD;  [facility]    HYSTERECTOMY, PAP NO  LONGER INDICATED  2018    TUBAL LIGATION  2006    ZZC ANESTH,LOWER LEG VEIN SURG,NOS  2002    bilateral    ZZC APPENDECTOMY  88    ZZ COLONOSCOPY W/WO BRUSH/WASH  2005      Allergies   Allergen Reactions    Glipizide Other (See Comments)     Hunger spikes    No Known Drug Allergy       Social History     Tobacco Use    Smoking status: Former     Current packs/day: 0.00     Types: Cigarettes     Start date: 1992     Quit date: 2008     Years since quittin.1    Smokeless tobacco: Never   Substance Use Topics    Alcohol use: Yes     Alcohol/week: 0.0 standard drinks of alcohol     Comment: couple drinks per year      Wt Readings from Last 1 Encounters:   02/15/23 101.6 kg (224 lb)        Anesthesia Evaluation            ROS/MED HX  ENT/Pulmonary:     (+)     NATY risk factors, snores loudly, hypertension, obese,        tobacco use, Past use,                       Neurologic:       Cardiovascular: Comment: lymphodema    (+) Dyslipidemia hypertension- -  CAD -  - -      CHF                           Previous cardiac testing   Echo: Date: Results:    Stress Test:  Date: Results:    ECG Reviewed:  Date: 3-5-19 Results:  SR with non-specific T abnormality and prolonged QT  Cath:  Date: Results:      METS/Exercise Tolerance:     Hematologic:     (+)      anemia,          Musculoskeletal:  - neg musculoskeletal ROS     GI/Hepatic:     (+) GERD, Asymptomatic on medication,      bowel prep,            Renal/Genitourinary:  - neg Renal ROS     Endo:     (+)  type II DM (2-15-23), Last HgA1c: 8.3, date: 2-15-23, Using insulin, - not using insulin pump. Normal glucose range: FSBG 76 pre-op,        Obesity,       Psychiatric/Substance Use:     (+) psychiatric history depression       Infectious Disease:  - neg infectious disease ROS     Malignancy:  - neg malignancy ROS     Other:  - neg other ROS          Physical Exam    Airway        Mallampati: II   TM distance: > 3 FB   Neck ROM: full   Mouth  opening: > 3 cm    Respiratory Devices and Support         Dental     Comment: Multiple crowns and missing posterior right tooth        Cardiovascular   cardiovascular exam normal       Rhythm and rate: regular and normal     Pulmonary   pulmonary exam normal        breath sounds clear to auscultation           OUTSIDE LABS:  CBC:   Lab Results   Component Value Date    WBC 10.8 08/23/2022    WBC 10.0 01/15/2021    HGB 13.9 08/23/2022    HGB 13.9 01/15/2021    HCT 43.0 08/23/2022    HCT 43.3 01/15/2021     08/23/2022     01/15/2021     BMP:   Lab Results   Component Value Date     02/15/2023     04/04/2022    POTASSIUM 4.1 02/15/2023    POTASSIUM 3.7 04/04/2022    CHLORIDE 102 02/15/2023    CHLORIDE 99 04/04/2022    CO2 26 02/15/2023    CO2 29 04/04/2022    BUN 12.8 02/15/2023    BUN 20 04/04/2022    CR 0.75 02/15/2023    CR 0.90 04/04/2022     (H) 02/15/2023     (H) 04/04/2022     COAGS:   Lab Results   Component Value Date    PTT 37 11/17/2013    INR 1.12 03/05/2019     POC:   Lab Results   Component Value Date     (H) 03/06/2019    HCG Negative 09/12/2018     HEPATIC:   Lab Results   Component Value Date    ALBUMIN 3.8 11/24/2021    PROTTOTAL 7.5 11/24/2021    ALT 17 02/15/2023    AST 12 11/24/2021    ALKPHOS 86 11/24/2021    BILITOTAL 0.3 11/24/2021    BILIDIRECT 0 03/01/2003     OTHER:   Lab Results   Component Value Date    PH 5.5 02/24/2003    LACT 1.6 03/04/2019    A1C 8.3 (H) 02/15/2023    MICHAELA 9.5 02/15/2023    LIPASE 72 (L) 11/28/2016    TSH 1.27 11/24/2021    T4 0.7 03/01/2003    CRP 54.3 (H) 11/17/2013       Anesthesia Plan    ASA Status:  3    NPO Status:  NPO Appropriate    Anesthesia Type: MAC.     - Reason for MAC: straight local not clinically adequate, chronic cardiopulmonary disease   Induction: Intravenous, Propofol.   Maintenance: TIVA.        Consents    Anesthesia Plan(s) and associated risks, benefits, and realistic alternatives discussed.  Questions answered and patient/representative(s) expressed understanding.     - Discussed:     - Discussed with:  Patient      - Extended Intubation/Ventilatory Support Discussed: No.      - Patient is DNR/DNI Status: No     Use of blood products discussed: No .     Postoperative Care       PONV prophylaxis: Background Propofol Infusion     Comments:    Other Comments: The risks and benefits of anesthesia, and the alternatives where applicable, have been discussed with the patient, and they wish to proceed.              KRISTI Zayas CRNA    I have reviewed the pertinent notes and labs in the chart from the past 30 days and (re)examined the patient.  Any updates or changes from those notes are reflected in this note.               # Hypertension: Noted on problem list  # Chronic heart failure with preserved ejection fraction: heart failure noted on problem list and last echo with EF >50%

## 2024-11-25 NOTE — DISCHARGE INSTRUCTIONS
[facility]    Home Care Following Endoscopy          Activity:  You have just undergone an endoscopic procedure usually performed with conscious sedation.  Do not work or operate machinery (including a car) for at least 12 hours.      Diet:  Return to the diet you were on before your procedure but eat lightly for the first 12-24 hours.  Drink plenty of water.  Resume any regular medications unless otherwise advised by your physician.  Please begin any new medication prescribed as a result of your procedure as directed by your physician.   Pain:  You may take Tylenol as needed for pain.  Expected Recovery:  You can expect some mild abdominal fullness and/or discomfort due to the air used to inflate your intestinal tract. I encourage you to walk and attempt to pass this air as soon as possible.    Call Your Physician if You Have:    After Colonoscopy:  Worsening persisting abdominal pain which is worse with activity.  Fevers (>101 degrees F), chills or shakes.  Passage of continued blood with bowel movements.     Any questions or concerns about your recovery, please call [phone] or after hours [phone] ([phone]) Nurse Advice Line.    Follow-up Care:  You did have polyps/biopsy tissue sample(s) removed.  The polyps/biopsy tissue sample(s) will be sent to pathology.    You should receive letter in your My Chart with your results within 1-2 weeks.   Please call if you have not received a notification of your results.  If asked to return to clinic please make an appointment 1 week after your procedure.  Call [phone].

## 2024-11-25 NOTE — ANESTHESIA POSTPROCEDURE EVALUATION
Patient: Lashawn Reddy    Procedure: Procedure(s):  COLONOSCOPY, WITH POLYPECTOMY       Anesthesia Type:  MAC    Note:  Disposition: Outpatient   Postop Pain Control: Uneventful            Sign Out: Well controlled pain   PONV: No   Neuro/Psych: Uneventful            Sign Out: Acceptable/Baseline neuro status   Airway/Respiratory: Uneventful            Sign Out: Acceptable/Baseline resp. status   CV/Hemodynamics: Uneventful            Sign Out: Acceptable CV status   Other NRE: NONE   DID A NON-ROUTINE EVENT OCCUR? No    Event details/Postop Comments:  Pt was happy with anesthesia care.  No complications.  I will follow up with the pt if needed.           Last vitals:  Vitals Value Taken Time   /73 11/25/24 1050   Temp     Pulse 76 11/25/24 1050   Resp     SpO2 100 % 11/25/24 1058   Vitals shown include unfiled device data.    Electronically Signed By: KRISTI Zayas CRNA  November 25, 2024  10:59 AM

## 2024-11-27 LAB
PATH REPORT.COMMENTS IMP SPEC: NORMAL
PATH REPORT.COMMENTS IMP SPEC: NORMAL
PATH REPORT.FINAL DX SPEC: NORMAL
PATH REPORT.GROSS SPEC: NORMAL
PATH REPORT.MICROSCOPIC SPEC OTHER STN: NORMAL
PATH REPORT.RELEVANT HX SPEC: NORMAL
PHOTO IMAGE: NORMAL

## 2024-11-29 ENCOUNTER — HOSPITAL ENCOUNTER (OUTPATIENT)
Dept: MAMMOGRAPHY | Facility: CLINIC | Age: 54
Discharge: HOME | End: 2024-11-29
Attending: NURSE PRACTITIONER | Admitting: NURSE PRACTITIONER
Payer: COMMERCIAL

## 2024-11-29 DIAGNOSIS — Z12.31 VISIT FOR SCREENING MAMMOGRAM: ICD-10-CM

## 2024-11-29 PROCEDURE — 77063 BREAST TOMOSYNTHESIS BI: CPT

## 2024-12-10 PROBLEM — D12.6 ADENOMATOUS COLON POLYP: Status: ACTIVE | Noted: 2024-12-10

## 2024-12-10 PROBLEM — Z80.0 FAMILY HISTORY OF COLON CANCER: Status: ACTIVE | Noted: 2024-12-10

## 2025-03-22 ENCOUNTER — HEALTH MAINTENANCE LETTER (OUTPATIENT)
Age: 55
End: 2025-03-22

## 2025-05-03 ENCOUNTER — HEALTH MAINTENANCE LETTER (OUTPATIENT)
Age: 55
End: 2025-05-03

## 2025-08-14 ENCOUNTER — HOSPITAL ENCOUNTER (EMERGENCY)
Facility: CLINIC | Age: 55
Discharge: HOME | End: 2025-08-14
Attending: FAMILY MEDICINE
Payer: COMMERCIAL

## 2025-08-14 VITALS
DIASTOLIC BLOOD PRESSURE: 92 MMHG | HEART RATE: 74 BPM | SYSTOLIC BLOOD PRESSURE: 119 MMHG | WEIGHT: 209.6 LBS | RESPIRATION RATE: 16 BRPM | HEIGHT: 66 IN | TEMPERATURE: 96.9 F | OXYGEN SATURATION: 97 % | BODY MASS INDEX: 33.68 KG/M2

## 2025-08-14 DIAGNOSIS — S60.851A SUPERFICIAL FOREIGN BODY OF RIGHT WRIST, INITIAL ENCOUNTER: Primary | ICD-10-CM

## 2025-08-14 PROCEDURE — 10120 INC&RMVL FB SUBQ TISS SMPL: CPT

## 2025-08-14 PROCEDURE — 99282 EMERGENCY DEPT VISIT SF MDM: CPT | Performed by: FAMILY MEDICINE

## 2025-08-14 ASSESSMENT — COLUMBIA-SUICIDE SEVERITY RATING SCALE - C-SSRS
2. HAVE YOU ACTUALLY HAD ANY THOUGHTS OF KILLING YOURSELF IN THE PAST MONTH?: NO
1. IN THE PAST MONTH, HAVE YOU WISHED YOU WERE DEAD OR WISHED YOU COULD GO TO SLEEP AND NOT WAKE UP?: NO
6. HAVE YOU EVER DONE ANYTHING, STARTED TO DO ANYTHING, OR PREPARED TO DO ANYTHING TO END YOUR LIFE?: NO

## 2025-08-14 ASSESSMENT — ACTIVITIES OF DAILY LIVING (ADL): ADLS_ACUITY_SCORE: 52

## (undated) DEVICE — APPLICATOR COTTON TIP 6"X2 STERILE LF 6012

## (undated) DEVICE — Device

## (undated) DEVICE — DRSG ABDOMINAL 07 1/2X8" 7197D

## (undated) DEVICE — TOTE ANGIO CORP PC15AT SAN32CC83O

## (undated) DEVICE — NDL 18GA 1.5" 305196

## (undated) DEVICE — CATH ANGIO INFINITI JL4 4FRX100CM 538420

## (undated) DEVICE — CATH ANGIO INFINITI JL3.5 4FRX100CM 538418

## (undated) DEVICE — SYR BULB IRRIG 50ML LATEX FREE 0035280

## (undated) DEVICE — SYR 10ML FINGER CONTROL W/O NDL 309695

## (undated) DEVICE — PACK MINOR PROCEDURE CUSTOM

## (undated) DEVICE — KIT HAND CONTROL ANGIOTOUCH ACIST 65CM AT-P65

## (undated) DEVICE — WIRE GUIDE 0.035"X260CM SAFE-T-J EXCHANGE G00517

## (undated) DEVICE — BLADE KNIFE SURG 15 371115

## (undated) DEVICE — SOL ADH LIQUID BENZOIN SWAB 0.6ML C1544

## (undated) DEVICE — SYR BULB IRRIG DOVER 60 ML LATEX FREE 67000

## (undated) DEVICE — MANIFOLD KIT ANGIO AUTOMATED 014613

## (undated) DEVICE — SU VICRYL 3-0 SH 27" UND J416H

## (undated) DEVICE — LIGHT HANDLE X2

## (undated) DEVICE — ESU PENCIL W/SMOKE EVAC NEPTUNE STRYKER 0703-046-000

## (undated) DEVICE — PREP CHLORAPREP 26ML TINTED ORANGE  260815

## (undated) DEVICE — SPONGE RAY-TEC 4X8" 7318

## (undated) DEVICE — SUCTION TIP YANKAUER W/O VENT K86

## (undated) DEVICE — SU VICRYL 0 CT-1 CR 8X27" UND JJ41G

## (undated) DEVICE — CATH TRAY FOLEY SURESTEP 16FR WDRAIN BAG STLK LATEX A300316A

## (undated) DEVICE — DRSG TELFA ISLAND 4X8" 7541

## (undated) DEVICE — PREP POVIDONE IODINE SOLUTION 10% 120ML

## (undated) DEVICE — GLOVE PROTEXIS W/NEU-THERA 7.5  2D73TE75

## (undated) DEVICE — SUCTION MANIFOLD DORNOCH ULTRA CART UL-CL500

## (undated) DEVICE — TUBING SUCTION 6"X3/16" N56A

## (undated) DEVICE — SPECIMEN CONTAINER 5OZ STERILE 2600SA

## (undated) DEVICE — PANTIES MESH LG/XLG 2PK 706M2

## (undated) DEVICE — SYR 03ML LL W/O NDL 309657

## (undated) DEVICE — ESU GROUND PAD UNIVERSAL W/O CORD

## (undated) DEVICE — DRAPE UNDER BUTTOCK 8483

## (undated) DEVICE — CATH DIAG 4FR ANG PIG 538453S

## (undated) DEVICE — SU VICRYL 3-0 CT-1 36" J344H

## (undated) DEVICE — STRAP KNEE/BODY 31143004

## (undated) DEVICE — SPONGE LAP 18X18" X8435

## (undated) DEVICE — PAD PERI INDIV WRAP 11" 2022A

## (undated) DEVICE — ESU PENCIL W/HOLSTER E2350H

## (undated) DEVICE — PREP SKIN SCRUB TRAY 4461A

## (undated) DEVICE — ESU ELEC BLADE HEX-LOCKING 2.5" E1450X

## (undated) DEVICE — CATH ANGIO INFINITI JR4 4FRX100CM 538421

## (undated) DEVICE — WIPES FOLEY CARE SURESTEP PROVON DFC100

## (undated) DEVICE — TUBING SUCTION MEDI-VAC 1/4"X20' N620A

## (undated) DEVICE — SLEEVE TR BAND RADIAL COMPRESSION DEVICE 24CM TRB24-REG

## (undated) DEVICE — DEFIB PRO-PADZ LVP LQD GEL ADULT 8900-2105-01

## (undated) DEVICE — SU PDS II 0 CTX 60" Z990G

## (undated) DEVICE — NDL COUNTER 20CT 31142493

## (undated) DEVICE — SU MONOCRYL 4-0 PS-2 18" UND Y496G

## (undated) DEVICE — NDL 25GA 1.5" 305127

## (undated) DEVICE — DRSG PRIMAPORE 03 1/8X6" 66000318

## (undated) DEVICE — LINEN ORTHO PACK 5446

## (undated) DEVICE — SOL WATER IRRIG 1000ML BOTTLE 2F7114

## (undated) DEVICE — SOL NACL 0.9% IRRIG 1000ML BOTTLE 2F7124

## (undated) DEVICE — LINEN GOWN X4 5410

## (undated) DEVICE — INTRO GLIDESHEATH SLENDER 6FR 10X45CM 60-1060

## (undated) DEVICE — GLOVE PROTEXIS BLUE W/NEU-THERA 6.5  2D73EB65

## (undated) DEVICE — KIT ENDO TURNOVER/PROCEDURE CARRY-ON 101822

## (undated) DEVICE — DRSG STERI STRIP 1/2X4" R1547

## (undated) DEVICE — DRAPE LAVH/LAPAROSCOPY W/POUCH 29474

## (undated) DEVICE — SU VICRYL 0 CT-2 CR 8X18" J727D

## (undated) DEVICE — ESU ELEC BLADE 6" COATED E1450-6

## (undated) DEVICE — PAD CHUX UNDERPAD 30X36" P3036C

## (undated) DEVICE — SU VICRYL 0 TIE 54" J608H

## (undated) RX ORDER — KETOROLAC TROMETHAMINE 30 MG/ML
INJECTION, SOLUTION INTRAMUSCULAR; INTRAVENOUS
Status: DISPENSED
Start: 2018-09-12

## (undated) RX ORDER — GLYCOPYRROLATE 0.2 MG/ML
INJECTION, SOLUTION INTRAMUSCULAR; INTRAVENOUS
Status: DISPENSED
Start: 2018-09-12

## (undated) RX ORDER — ONDANSETRON 2 MG/ML
INJECTION INTRAMUSCULAR; INTRAVENOUS
Status: DISPENSED
Start: 2018-09-12

## (undated) RX ORDER — EPHEDRINE SULFATE 50 MG/ML
INJECTION, SOLUTION INTRAMUSCULAR; INTRAVENOUS; SUBCUTANEOUS
Status: DISPENSED
Start: 2018-09-12

## (undated) RX ORDER — SODIUM CHLORIDE, SODIUM LACTATE, POTASSIUM CHLORIDE, CALCIUM CHLORIDE 600; 310; 30; 20 MG/100ML; MG/100ML; MG/100ML; MG/100ML
INJECTION, SOLUTION INTRAVENOUS
Status: DISPENSED
Start: 2018-09-12

## (undated) RX ORDER — DEXAMETHASONE SODIUM PHOSPHATE 4 MG/ML
INJECTION, SOLUTION INTRA-ARTICULAR; INTRALESIONAL; INTRAMUSCULAR; INTRAVENOUS; SOFT TISSUE
Status: DISPENSED
Start: 2018-09-12

## (undated) RX ORDER — LIDOCAINE HYDROCHLORIDE 10 MG/ML
INJECTION, SOLUTION EPIDURAL; INFILTRATION; INTRACAUDAL; PERINEURAL
Status: DISPENSED
Start: 2017-02-22

## (undated) RX ORDER — BUPIVACAINE HYDROCHLORIDE AND EPINEPHRINE 2.5; 5 MG/ML; UG/ML
INJECTION, SOLUTION EPIDURAL; INFILTRATION; INTRACAUDAL; PERINEURAL
Status: DISPENSED
Start: 2019-12-26

## (undated) RX ORDER — FENTANYL CITRATE 50 UG/ML
INJECTION, SOLUTION INTRAMUSCULAR; INTRAVENOUS
Status: DISPENSED
Start: 2018-09-12

## (undated) RX ORDER — VERAPAMIL HYDROCHLORIDE 2.5 MG/ML
INJECTION, SOLUTION INTRAVENOUS
Status: DISPENSED
Start: 2019-03-05

## (undated) RX ORDER — PROPOFOL 10 MG/ML
INJECTION, EMULSION INTRAVENOUS
Status: DISPENSED
Start: 2018-09-12

## (undated) RX ORDER — LABETALOL HYDROCHLORIDE 5 MG/ML
INJECTION, SOLUTION INTRAVENOUS
Status: DISPENSED
Start: 2018-09-12

## (undated) RX ORDER — HYDROMORPHONE HYDROCHLORIDE 1 MG/ML
INJECTION, SOLUTION INTRAMUSCULAR; INTRAVENOUS; SUBCUTANEOUS
Status: DISPENSED
Start: 2018-09-12

## (undated) RX ORDER — LIDOCAINE HYDROCHLORIDE AND EPINEPHRINE 10; 10 MG/ML; UG/ML
INJECTION, SOLUTION INFILTRATION; PERINEURAL
Status: DISPENSED
Start: 2019-12-26

## (undated) RX ORDER — VASOPRESSIN 20 U/ML
INJECTION PARENTERAL
Status: DISPENSED
Start: 2018-09-12

## (undated) RX ORDER — ACETAMINOPHEN 325 MG/1
TABLET ORAL
Status: DISPENSED
Start: 2018-09-12

## (undated) RX ORDER — CEFAZOLIN SODIUM 1 G/50ML
SOLUTION INTRAVENOUS
Status: DISPENSED
Start: 2018-09-12

## (undated) RX ORDER — LIDOCAINE HYDROCHLORIDE 20 MG/ML
INJECTION, SOLUTION EPIDURAL; INFILTRATION; INTRACAUDAL; PERINEURAL
Status: DISPENSED
Start: 2018-09-12

## (undated) RX ORDER — PHENYLEPHRINE HCL IN 0.9% NACL 1 MG/10 ML
SYRINGE (ML) INTRAVENOUS
Status: DISPENSED
Start: 2018-09-12

## (undated) RX ORDER — PROPOFOL 10 MG/ML
INJECTION, EMULSION INTRAVENOUS
Status: DISPENSED
Start: 2017-02-22

## (undated) RX ORDER — FENTANYL CITRATE 50 UG/ML
INJECTION, SOLUTION INTRAMUSCULAR; INTRAVENOUS
Status: DISPENSED
Start: 2019-03-05

## (undated) RX ORDER — HEPARIN SODIUM 1000 [USP'U]/ML
INJECTION, SOLUTION INTRAVENOUS; SUBCUTANEOUS
Status: DISPENSED
Start: 2019-03-05

## (undated) RX ORDER — LIDOCAINE HYDROCHLORIDE 10 MG/ML
INJECTION, SOLUTION EPIDURAL; INFILTRATION; INTRACAUDAL; PERINEURAL
Status: DISPENSED
Start: 2019-03-05

## (undated) RX ORDER — GABAPENTIN 300 MG/1
CAPSULE ORAL
Status: DISPENSED
Start: 2018-09-12